# Patient Record
Sex: MALE | Race: WHITE | Employment: OTHER | URBAN - METROPOLITAN AREA
[De-identification: names, ages, dates, MRNs, and addresses within clinical notes are randomized per-mention and may not be internally consistent; named-entity substitution may affect disease eponyms.]

---

## 2020-01-01 ENCOUNTER — ANESTHESIA (OUTPATIENT)
Dept: OPERATING ROOM | Age: 75
End: 2020-01-01
Payer: MEDICARE

## 2020-01-01 ENCOUNTER — HOSPITAL ENCOUNTER (OUTPATIENT)
Age: 75
Setting detail: OUTPATIENT SURGERY
Discharge: HOME | End: 2020-11-03
Attending: OPHTHALMOLOGY | Admitting: OPHTHALMOLOGY
Payer: MEDICARE

## 2020-01-01 ENCOUNTER — HOSPITAL ENCOUNTER (OUTPATIENT)
Age: 75
Discharge: HOME | End: 2020-10-30
Payer: MEDICARE

## 2020-01-01 ENCOUNTER — ANESTHESIA EVENT (OUTPATIENT)
Dept: OPERATING ROOM | Age: 75
End: 2020-01-01
Payer: MEDICARE

## 2020-01-01 VITALS
RESPIRATION RATE: 15 BRPM | DIASTOLIC BLOOD PRESSURE: 70 MMHG | SYSTOLIC BLOOD PRESSURE: 172 MMHG | OXYGEN SATURATION: 98 %

## 2020-01-01 VITALS
WEIGHT: 149 LBS | HEART RATE: 70 BPM | BODY MASS INDEX: 24.83 KG/M2 | DIASTOLIC BLOOD PRESSURE: 72 MMHG | SYSTOLIC BLOOD PRESSURE: 117 MMHG | RESPIRATION RATE: 14 BRPM | HEIGHT: 65 IN | OXYGEN SATURATION: 94 % | TEMPERATURE: 98.6 F

## 2020-01-01 LAB
METER GLUCOSE: 117 MG/DL (ref 74–99)
SARS-COV-2: NOT DETECTED
SOURCE: NORMAL

## 2020-01-01 PROCEDURE — 2580000003 HC RX 258: Performed by: ANESTHESIOLOGY

## 2020-01-01 PROCEDURE — 3700000000 HC ANESTHESIA ATTENDED CARE: Performed by: OPHTHALMOLOGY

## 2020-01-01 PROCEDURE — 82962 GLUCOSE BLOOD TEST: CPT

## 2020-01-01 PROCEDURE — 7100000010 HC PHASE II RECOVERY - FIRST 15 MIN: Performed by: OPHTHALMOLOGY

## 2020-01-01 PROCEDURE — 2500000003 HC RX 250 WO HCPCS: Performed by: NURSE ANESTHETIST, CERTIFIED REGISTERED

## 2020-01-01 PROCEDURE — 6370000000 HC RX 637 (ALT 250 FOR IP): Performed by: OPHTHALMOLOGY

## 2020-01-01 PROCEDURE — 82962 GLUCOSE BLOOD TEST: CPT | Performed by: OPHTHALMOLOGY

## 2020-01-01 PROCEDURE — 6360000002 HC RX W HCPCS: Performed by: NURSE ANESTHETIST, CERTIFIED REGISTERED

## 2020-01-01 PROCEDURE — V2632 POST CHMBR INTRAOCULAR LENS: HCPCS | Performed by: OPHTHALMOLOGY

## 2020-01-01 PROCEDURE — 3600000003 HC SURGERY LEVEL 3 BASE: Performed by: OPHTHALMOLOGY

## 2020-01-01 PROCEDURE — U0003 INFECTIOUS AGENT DETECTION BY NUCLEIC ACID (DNA OR RNA); SEVERE ACUTE RESPIRATORY SYNDROME CORONAVIRUS 2 (SARS-COV-2) (CORONAVIRUS DISEASE [COVID-19]), AMPLIFIED PROBE TECHNIQUE, MAKING USE OF HIGH THROUGHPUT TECHNOLOGIES AS DESCRIBED BY CMS-2020-01-R: HCPCS

## 2020-01-01 PROCEDURE — 7100000011 HC PHASE II RECOVERY - ADDTL 15 MIN: Performed by: OPHTHALMOLOGY

## 2020-01-01 PROCEDURE — 2709999900 HC NON-CHARGEABLE SUPPLY: Performed by: OPHTHALMOLOGY

## 2020-01-01 PROCEDURE — 2500000003 HC RX 250 WO HCPCS: Performed by: OPHTHALMOLOGY

## 2020-01-01 DEVICE — LENS INTOCU +16.0 DIOPT A CONSTANT 118.8 L13MM DIA6MM 0DEG: Type: IMPLANTABLE DEVICE | Site: EYE | Status: FUNCTIONAL

## 2020-01-01 RX ORDER — PROMETHAZINE HYDROCHLORIDE 25 MG/ML
25 INJECTION, SOLUTION INTRAMUSCULAR; INTRAVENOUS
Status: DISCONTINUED | OUTPATIENT
Start: 2020-01-01 | End: 2020-01-01 | Stop reason: HOSPADM

## 2020-01-01 RX ORDER — MIDAZOLAM HYDROCHLORIDE 1 MG/ML
INJECTION INTRAMUSCULAR; INTRAVENOUS PRN
Status: DISCONTINUED | OUTPATIENT
Start: 2020-01-01 | End: 2020-01-01 | Stop reason: SDUPTHER

## 2020-01-01 RX ORDER — DIPHENHYDRAMINE HYDROCHLORIDE 50 MG/ML
12.5 INJECTION INTRAMUSCULAR; INTRAVENOUS
Status: DISCONTINUED | OUTPATIENT
Start: 2020-01-01 | End: 2020-01-01 | Stop reason: HOSPADM

## 2020-01-01 RX ORDER — HYDRALAZINE HYDROCHLORIDE 20 MG/ML
5 INJECTION INTRAMUSCULAR; INTRAVENOUS EVERY 10 MIN PRN
Status: DISCONTINUED | OUTPATIENT
Start: 2020-01-01 | End: 2020-01-01 | Stop reason: HOSPADM

## 2020-01-01 RX ORDER — MORPHINE SULFATE 2 MG/ML
1 INJECTION, SOLUTION INTRAMUSCULAR; INTRAVENOUS EVERY 5 MIN PRN
Status: DISCONTINUED | OUTPATIENT
Start: 2020-01-01 | End: 2020-01-01 | Stop reason: HOSPADM

## 2020-01-01 RX ORDER — FENTANYL CITRATE 50 UG/ML
50 INJECTION, SOLUTION INTRAMUSCULAR; INTRAVENOUS EVERY 5 MIN PRN
Status: DISCONTINUED | OUTPATIENT
Start: 2020-01-01 | End: 2020-01-01 | Stop reason: HOSPADM

## 2020-01-01 RX ORDER — HYDROCODONE BITARTRATE AND ACETAMINOPHEN 5; 325 MG/1; MG/1
1 TABLET ORAL PRN
Status: DISCONTINUED | OUTPATIENT
Start: 2020-01-01 | End: 2020-01-01 | Stop reason: HOSPADM

## 2020-01-01 RX ORDER — LABETALOL HYDROCHLORIDE 5 MG/ML
5 INJECTION, SOLUTION INTRAVENOUS EVERY 10 MIN PRN
Status: DISCONTINUED | OUTPATIENT
Start: 2020-01-01 | End: 2020-01-01 | Stop reason: HOSPADM

## 2020-01-01 RX ORDER — TETRACAINE HYDROCHLORIDE 5 MG/ML
1 SOLUTION OPHTHALMIC ONCE
Status: COMPLETED | OUTPATIENT
Start: 2020-01-01 | End: 2020-01-01

## 2020-01-01 RX ORDER — TETRACAINE HYDROCHLORIDE 5 MG/ML
SOLUTION OPHTHALMIC PRN
Status: DISCONTINUED | OUTPATIENT
Start: 2020-01-01 | End: 2020-01-01 | Stop reason: ALTCHOICE

## 2020-01-01 RX ORDER — MEPERIDINE HYDROCHLORIDE 25 MG/ML
12.5 INJECTION INTRAMUSCULAR; INTRAVENOUS; SUBCUTANEOUS EVERY 5 MIN PRN
Status: DISCONTINUED | OUTPATIENT
Start: 2020-01-01 | End: 2020-01-01 | Stop reason: HOSPADM

## 2020-01-01 RX ORDER — CYCLOPENTOLATE HYDROCHLORIDE 10 MG/ML
1 SOLUTION/ DROPS OPHTHALMIC
Status: COMPLETED | OUTPATIENT
Start: 2020-01-01 | End: 2020-01-01

## 2020-01-01 RX ORDER — HYDROCODONE BITARTRATE AND ACETAMINOPHEN 5; 325 MG/1; MG/1
2 TABLET ORAL PRN
Status: DISCONTINUED | OUTPATIENT
Start: 2020-01-01 | End: 2020-01-01 | Stop reason: HOSPADM

## 2020-01-01 RX ORDER — BALANCED SALT SOLUTION 6.4; .75; .48; .3; 3.9; 1.7 MG/ML; MG/ML; MG/ML; MG/ML; MG/ML; MG/ML
SOLUTION OPHTHALMIC PRN
Status: DISCONTINUED | OUTPATIENT
Start: 2020-01-01 | End: 2020-01-01 | Stop reason: ALTCHOICE

## 2020-01-01 RX ORDER — FLURBIPROFEN SODIUM 0.3 MG/ML
1 SOLUTION/ DROPS OPHTHALMIC
Status: COMPLETED | OUTPATIENT
Start: 2020-01-01 | End: 2020-01-01

## 2020-01-01 RX ORDER — SODIUM CHLORIDE, SODIUM LACTATE, POTASSIUM CHLORIDE, CALCIUM CHLORIDE 600; 310; 30; 20 MG/100ML; MG/100ML; MG/100ML; MG/100ML
INJECTION, SOLUTION INTRAVENOUS CONTINUOUS
Status: DISCONTINUED | OUTPATIENT
Start: 2020-01-01 | End: 2020-01-01 | Stop reason: HOSPADM

## 2020-01-01 RX ORDER — PHENYLEPHRINE HCL 2.5 %
1 DROPS OPHTHALMIC (EYE)
Status: COMPLETED | OUTPATIENT
Start: 2020-01-01 | End: 2020-01-01

## 2020-01-01 RX ORDER — PHENYLEPHRINE HYDROCHLORIDE 100 MG/ML
1 SOLUTION/ DROPS OPHTHALMIC PRN
Status: DISCONTINUED | OUTPATIENT
Start: 2020-01-01 | End: 2020-01-01 | Stop reason: HOSPADM

## 2020-01-01 RX ORDER — ALFENTANIL HYDROCHLORIDE 500 UG/ML
INJECTION INTRAVENOUS PRN
Status: DISCONTINUED | OUTPATIENT
Start: 2020-01-01 | End: 2020-01-01 | Stop reason: SDUPTHER

## 2020-01-01 RX ADMIN — FLURBIPROFEN SODIUM 1 DROP: 0.3 SOLUTION/ DROPS OPHTHALMIC at 08:25

## 2020-01-01 RX ADMIN — CYCLOPENTOLATE HYDROCHLORIDE 1 DROP: 10 SOLUTION/ DROPS OPHTHALMIC at 08:15

## 2020-01-01 RX ADMIN — CYCLOPENTOLATE HYDROCHLORIDE 1 DROP: 10 SOLUTION/ DROPS OPHTHALMIC at 08:25

## 2020-01-01 RX ADMIN — SODIUM CHLORIDE, POTASSIUM CHLORIDE, SODIUM LACTATE AND CALCIUM CHLORIDE: 600; 310; 30; 20 INJECTION, SOLUTION INTRAVENOUS at 08:33

## 2020-01-01 RX ADMIN — FLURBIPROFEN SODIUM 1 DROP: 0.3 SOLUTION/ DROPS OPHTHALMIC at 08:20

## 2020-01-01 RX ADMIN — TETRACAINE HYDROCHLORIDE 1 DROP: 25 LIQUID OPHTHALMIC at 08:34

## 2020-01-01 RX ADMIN — MIDAZOLAM 1 MG: 1 INJECTION INTRAMUSCULAR; INTRAVENOUS at 09:04

## 2020-01-01 RX ADMIN — PHENYLEPHRINE HYDROCHLORIDE 1 DROP: 25 SOLUTION/ DROPS OPHTHALMIC at 08:15

## 2020-01-01 RX ADMIN — PHENYLEPHRINE HYDROCHLORIDE 1 DROP: 25 SOLUTION/ DROPS OPHTHALMIC at 08:20

## 2020-01-01 RX ADMIN — FLURBIPROFEN SODIUM 1 DROP: 0.3 SOLUTION/ DROPS OPHTHALMIC at 08:15

## 2020-01-01 RX ADMIN — MIDAZOLAM 1 MG: 1 INJECTION INTRAMUSCULAR; INTRAVENOUS at 09:06

## 2020-01-01 RX ADMIN — CYCLOPENTOLATE HYDROCHLORIDE 1 DROP: 10 SOLUTION/ DROPS OPHTHALMIC at 08:20

## 2020-01-01 RX ADMIN — ALFENTANIL HYDROCHLORIDE 250 MCG: 500 INJECTION INTRAVENOUS at 09:06

## 2020-01-01 RX ADMIN — PHENYLEPHRINE HYDROCHLORIDE 1 DROP: 25 SOLUTION/ DROPS OPHTHALMIC at 08:25

## 2020-01-01 ASSESSMENT — PAIN SCALES - GENERAL
PAINLEVEL_OUTOF10: 0

## 2020-01-01 ASSESSMENT — PAIN - FUNCTIONAL ASSESSMENT: PAIN_FUNCTIONAL_ASSESSMENT: 0-10

## 2020-01-01 ASSESSMENT — LIFESTYLE VARIABLES: SMOKING_STATUS: 0

## 2020-01-31 ENCOUNTER — APPOINTMENT (OUTPATIENT)
Dept: GENERAL RADIOLOGY | Age: 75
End: 2020-01-31
Payer: MEDICARE

## 2020-01-31 ENCOUNTER — HOSPITAL ENCOUNTER (EMERGENCY)
Age: 75
Discharge: HOME | End: 2020-01-31
Attending: EMERGENCY MEDICINE
Payer: MEDICARE

## 2020-01-31 ENCOUNTER — APPOINTMENT (OUTPATIENT)
Dept: CT IMAGING | Age: 75
End: 2020-01-31
Payer: MEDICARE

## 2020-01-31 VITALS
OXYGEN SATURATION: 93 % | DIASTOLIC BLOOD PRESSURE: 83 MMHG | TEMPERATURE: 97.5 F | WEIGHT: 150 LBS | HEIGHT: 64 IN | HEART RATE: 81 BPM | RESPIRATION RATE: 19 BRPM | BODY MASS INDEX: 25.61 KG/M2 | SYSTOLIC BLOOD PRESSURE: 209 MMHG

## 2020-01-31 LAB
ALBUMIN SERPL-MCNC: 4.5 G/DL (ref 3.5–5.2)
ALP BLD-CCNC: 70 U/L (ref 40–129)
ALT SERPL-CCNC: 41 U/L (ref 0–40)
ANION GAP SERPL CALCULATED.3IONS-SCNC: 15 MMOL/L (ref 7–16)
ANION GAP SERPL CALCULATED.3IONS-SCNC: 17 MMOL/L (ref 7–16)
ANION GAP SERPL CALCULATED.3IONS-SCNC: 17 MMOL/L (ref 7–16)
AST SERPL-CCNC: 49 U/L (ref 0–39)
BACTERIA: ABNORMAL /HPF
BASOPHILS ABSOLUTE: 0.07 E9/L (ref 0–0.2)
BASOPHILS RELATIVE PERCENT: 0.6 % (ref 0–2)
BETA-HYDROXYBUTYRATE: 0.33 MMOL/L (ref 0.02–0.27)
BILIRUB SERPL-MCNC: 0.5 MG/DL (ref 0–1.2)
BILIRUBIN URINE: ABNORMAL
BLOOD, URINE: NEGATIVE
BUN BLDV-MCNC: 28 MG/DL (ref 8–23)
BUN BLDV-MCNC: 28 MG/DL (ref 8–23)
BUN BLDV-MCNC: 33 MG/DL (ref 8–23)
CALCIUM SERPL-MCNC: 9.3 MG/DL (ref 8.6–10.2)
CALCIUM SERPL-MCNC: 9.3 MG/DL (ref 8.6–10.2)
CALCIUM SERPL-MCNC: 9.8 MG/DL (ref 8.6–10.2)
CHLORIDE BLD-SCNC: 98 MMOL/L (ref 98–107)
CHLORIDE BLD-SCNC: 99 MMOL/L (ref 98–107)
CHLORIDE BLD-SCNC: 99 MMOL/L (ref 98–107)
CHP ED QC CHECK: YES
CLARITY: CLEAR
CO2: 22 MMOL/L (ref 22–29)
CO2: 23 MMOL/L (ref 22–29)
CO2: 23 MMOL/L (ref 22–29)
COLOR: YELLOW
CREAT SERPL-MCNC: 1.6 MG/DL (ref 0.7–1.2)
CREAT SERPL-MCNC: 1.6 MG/DL (ref 0.7–1.2)
CREAT SERPL-MCNC: 1.7 MG/DL (ref 0.7–1.2)
EOSINOPHILS ABSOLUTE: 0.29 E9/L (ref 0.05–0.5)
EOSINOPHILS RELATIVE PERCENT: 2.3 % (ref 0–6)
GFR AFRICAN AMERICAN: 48
GFR AFRICAN AMERICAN: 51
GFR AFRICAN AMERICAN: 51
GFR NON-AFRICAN AMERICAN: 40 ML/MIN/1.73
GFR NON-AFRICAN AMERICAN: 42 ML/MIN/1.73
GFR NON-AFRICAN AMERICAN: 42 ML/MIN/1.73
GLUCOSE BLD-MCNC: 265 MG/DL (ref 74–99)
GLUCOSE BLD-MCNC: 266 MG/DL (ref 74–99)
GLUCOSE BLD-MCNC: 267 MG/DL
GLUCOSE BLD-MCNC: 286 MG/DL (ref 74–99)
GLUCOSE URINE: 250 MG/DL
HCT VFR BLD CALC: 39.5 % (ref 37–54)
HEMOGLOBIN: 13.3 G/DL (ref 12.5–16.5)
IMMATURE GRANULOCYTES #: 0.1 E9/L
IMMATURE GRANULOCYTES %: 0.8 % (ref 0–5)
KETONES, URINE: NEGATIVE MG/DL
LEUKOCYTE ESTERASE, URINE: NEGATIVE
LYMPHOCYTES ABSOLUTE: 2.03 E9/L (ref 1.5–4)
LYMPHOCYTES RELATIVE PERCENT: 16 % (ref 20–42)
MAGNESIUM: 1.9 MG/DL (ref 1.6–2.6)
MCH RBC QN AUTO: 28.4 PG (ref 26–35)
MCHC RBC AUTO-ENTMCNC: 33.7 % (ref 32–34.5)
MCV RBC AUTO: 84.4 FL (ref 80–99.9)
METER GLUCOSE: 267 MG/DL (ref 74–99)
MONOCYTES ABSOLUTE: 0.86 E9/L (ref 0.1–0.95)
MONOCYTES RELATIVE PERCENT: 6.8 % (ref 2–12)
NEUTROPHILS ABSOLUTE: 9.35 E9/L (ref 1.8–7.3)
NEUTROPHILS RELATIVE PERCENT: 73.5 % (ref 43–80)
NITRITE, URINE: NEGATIVE
OSMOLALITY: 308 MOSM/KG (ref 285–310)
PDW BLD-RTO: 14.9 FL (ref 11.5–15)
PH UA: 5.5 (ref 5–9)
PH VENOUS: 7.4 (ref 7.35–7.45)
PLATELET # BLD: 333 E9/L (ref 130–450)
PMV BLD AUTO: 10.2 FL (ref 7–12)
POTASSIUM REFLEX MAGNESIUM: 3.5 MMOL/L (ref 3.5–5)
POTASSIUM REFLEX MAGNESIUM: 5 MMOL/L (ref 3.5–5)
POTASSIUM SERPL-SCNC: 3.6 MMOL/L (ref 3.5–5)
PRO-BNP: 79 PG/ML (ref 0–450)
PROTEIN UA: 30 MG/DL
RBC # BLD: 4.68 E12/L (ref 3.8–5.8)
RBC UA: ABNORMAL /HPF (ref 0–2)
REASON FOR REJECTION: NORMAL
REJECTED TEST: NORMAL
SODIUM BLD-SCNC: 137 MMOL/L (ref 132–146)
SODIUM BLD-SCNC: 138 MMOL/L (ref 132–146)
SODIUM BLD-SCNC: 138 MMOL/L (ref 132–146)
SPECIFIC GRAVITY UA: 1.02 (ref 1–1.03)
TOTAL PROTEIN: 7.5 G/DL (ref 6.4–8.3)
TROPONIN: <0.01 NG/ML (ref 0–0.03)
UROBILINOGEN, URINE: 0.2 E.U./DL
WBC # BLD: 12.7 E9/L (ref 4.5–11.5)
WBC UA: ABNORMAL /HPF (ref 0–5)

## 2020-01-31 PROCEDURE — 70450 CT HEAD/BRAIN W/O DYE: CPT

## 2020-01-31 PROCEDURE — 82800 BLOOD PH: CPT

## 2020-01-31 PROCEDURE — 81001 URINALYSIS AUTO W/SCOPE: CPT

## 2020-01-31 PROCEDURE — 99285 EMERGENCY DEPT VISIT HI MDM: CPT

## 2020-01-31 PROCEDURE — 94760 N-INVAS EAR/PLS OXIMETRY 1: CPT

## 2020-01-31 PROCEDURE — 2580000003 HC RX 258: Performed by: STUDENT IN AN ORGANIZED HEALTH CARE EDUCATION/TRAINING PROGRAM

## 2020-01-31 PROCEDURE — 93005 ELECTROCARDIOGRAM TRACING: CPT | Performed by: STUDENT IN AN ORGANIZED HEALTH CARE EDUCATION/TRAINING PROGRAM

## 2020-01-31 PROCEDURE — 83735 ASSAY OF MAGNESIUM: CPT

## 2020-01-31 PROCEDURE — 83880 ASSAY OF NATRIURETIC PEPTIDE: CPT

## 2020-01-31 PROCEDURE — 84484 ASSAY OF TROPONIN QUANT: CPT

## 2020-01-31 PROCEDURE — 6370000000 HC RX 637 (ALT 250 FOR IP): Performed by: STUDENT IN AN ORGANIZED HEALTH CARE EDUCATION/TRAINING PROGRAM

## 2020-01-31 PROCEDURE — 2580000003 HC RX 258: Performed by: EMERGENCY MEDICINE

## 2020-01-31 PROCEDURE — 96360 HYDRATION IV INFUSION INIT: CPT

## 2020-01-31 PROCEDURE — 83930 ASSAY OF BLOOD OSMOLALITY: CPT

## 2020-01-31 PROCEDURE — 85025 COMPLETE CBC W/AUTO DIFF WBC: CPT

## 2020-01-31 PROCEDURE — 71045 X-RAY EXAM CHEST 1 VIEW: CPT

## 2020-01-31 PROCEDURE — 82010 KETONE BODYS QUAN: CPT

## 2020-01-31 PROCEDURE — 80048 BASIC METABOLIC PNL TOTAL CA: CPT

## 2020-01-31 PROCEDURE — 80053 COMPREHEN METABOLIC PANEL: CPT

## 2020-01-31 PROCEDURE — 36415 COLL VENOUS BLD VENIPUNCTURE: CPT

## 2020-01-31 PROCEDURE — 96361 HYDRATE IV INFUSION ADD-ON: CPT

## 2020-01-31 PROCEDURE — 82962 GLUCOSE BLOOD TEST: CPT

## 2020-01-31 RX ORDER — GLIPIZIDE 5 MG/1
10 TABLET ORAL 2 TIMES DAILY
Status: ON HOLD | COMMUNITY
End: 2021-01-01 | Stop reason: HOSPADM

## 2020-01-31 RX ORDER — HYDROCHLOROTHIAZIDE 25 MG/1
25 TABLET ORAL DAILY
COMMUNITY
End: 2020-01-01 | Stop reason: ALTCHOICE

## 2020-01-31 RX ORDER — LAMOTRIGINE 100 MG/1
200 TABLET ORAL 2 TIMES DAILY
Status: ON HOLD | COMMUNITY
End: 2021-01-01 | Stop reason: HOSPADM

## 2020-01-31 RX ORDER — 0.9 % SODIUM CHLORIDE 0.9 %
1000 INTRAVENOUS SOLUTION INTRAVENOUS ONCE
Status: DISCONTINUED | OUTPATIENT
Start: 2020-01-31 | End: 2020-01-31

## 2020-01-31 RX ORDER — TAMSULOSIN HYDROCHLORIDE 0.4 MG/1
0.4 CAPSULE ORAL NIGHTLY
Status: ON HOLD | COMMUNITY
End: 2021-01-01 | Stop reason: HOSPADM

## 2020-01-31 RX ORDER — HYDRALAZINE HYDROCHLORIDE 50 MG/1
25 TABLET, FILM COATED ORAL 3 TIMES DAILY
Status: ON HOLD | COMMUNITY
End: 2021-01-01 | Stop reason: HOSPADM

## 2020-01-31 RX ORDER — ALBUTEROL SULFATE 90 UG/1
2 AEROSOL, METERED RESPIRATORY (INHALATION) EVERY 6 HOURS PRN
Status: ON HOLD | COMMUNITY
End: 2021-01-01 | Stop reason: HOSPADM

## 2020-01-31 RX ORDER — POTASSIUM CHLORIDE 20 MEQ/1
20 TABLET, EXTENDED RELEASE ORAL 2 TIMES DAILY
Status: ON HOLD | COMMUNITY
End: 2021-01-01 | Stop reason: HOSPADM

## 2020-01-31 RX ORDER — GABAPENTIN 300 MG/1
300 CAPSULE ORAL 2 TIMES DAILY
Status: ON HOLD | COMMUNITY
End: 2021-01-01 | Stop reason: HOSPADM

## 2020-01-31 RX ORDER — METOPROLOL SUCCINATE 50 MG/1
50 TABLET, EXTENDED RELEASE ORAL 2 TIMES DAILY
Status: ON HOLD | COMMUNITY
End: 2021-01-01 | Stop reason: HOSPADM

## 2020-01-31 RX ORDER — METOPROLOL SUCCINATE 25 MG/1
50 TABLET, EXTENDED RELEASE ORAL ONCE
Status: COMPLETED | OUTPATIENT
Start: 2020-01-31 | End: 2020-01-31

## 2020-01-31 RX ORDER — HYDRALAZINE HYDROCHLORIDE 25 MG/1
50 TABLET, FILM COATED ORAL ONCE
Status: COMPLETED | OUTPATIENT
Start: 2020-01-31 | End: 2020-01-31

## 2020-01-31 RX ORDER — 0.9 % SODIUM CHLORIDE 0.9 %
1000 INTRAVENOUS SOLUTION INTRAVENOUS ONCE
Status: COMPLETED | OUTPATIENT
Start: 2020-01-31 | End: 2020-01-31

## 2020-01-31 RX ADMIN — SODIUM CHLORIDE 1000 ML: 9 INJECTION, SOLUTION INTRAVENOUS at 12:13

## 2020-01-31 RX ADMIN — HYDRALAZINE HYDROCHLORIDE 50 MG: 25 TABLET, FILM COATED ORAL at 15:26

## 2020-01-31 RX ADMIN — METOPROLOL SUCCINATE 50 MG: 25 TABLET, EXTENDED RELEASE ORAL at 15:25

## 2020-01-31 RX ADMIN — SODIUM CHLORIDE 1000 ML: 9 INJECTION, SOLUTION INTRAVENOUS at 10:30

## 2020-01-31 ASSESSMENT — ENCOUNTER SYMPTOMS
CONSTIPATION: 0
NAUSEA: 0
RHINORRHEA: 0
ABDOMINAL PAIN: 0
COUGH: 0
EYE REDNESS: 0
PHOTOPHOBIA: 0
WHEEZING: 0
APNEA: 0
TROUBLE SWALLOWING: 0
SORE THROAT: 0
CHEST TIGHTNESS: 0
EYE PAIN: 0
SHORTNESS OF BREATH: 0
BACK PAIN: 0
DIARRHEA: 0
VISUAL CHANGE: 0
VOMITING: 0

## 2020-01-31 ASSESSMENT — PAIN DESCRIPTION - LOCATION: LOCATION: WRIST

## 2020-01-31 ASSESSMENT — PAIN DESCRIPTION - DESCRIPTORS: DESCRIPTORS: ACHING

## 2020-01-31 ASSESSMENT — PAIN DESCRIPTION - ORIENTATION: ORIENTATION: RIGHT

## 2020-01-31 ASSESSMENT — PAIN SCALES - GENERAL: PAINLEVEL_OUTOF10: 1

## 2020-01-31 ASSESSMENT — PAIN DESCRIPTION - PAIN TYPE: TYPE: ACUTE PAIN

## 2020-01-31 ASSESSMENT — PAIN DESCRIPTION - FREQUENCY: FREQUENCY: INTERMITTENT

## 2020-01-31 NOTE — ED PROVIDER NOTES
[address]      Pt Name: Alison Johnson  MRN: [id number]  [place] 1945  Date of evaluation: 1/31/2020      CHIEF COMPLAINT       Chief Complaint   Patient presents with    Fall     fell this am, caught himself with right wrist. denies hitting his head or any loc    Hyperglycemia     blood sugar at home 333          Fall   The accident occurred less than 1 hour ago. The fall occurred while walking. The pain is at a severity of 0/10. The patient is experiencing no pain. He was ambulatory at the scene. There was no drug use involved in the accident. Pertinent negatives include no visual change, no fever, no numbness, no abdominal pain, no nausea and no vomiting. Alison Johnson is a 76 y.o. male with history of previous CVA with residual gait issues, hyperlipidemia, type 2 diabetes, hypertension, who presents to the emergency department after a fall this morning as well as with hyperglycemia. The patient states that this morning he was walking into the kitchen when he bent over to drink some water and he fell onto his right side. He is unsure if he lost consciousness. Denies any precipitating symptoms. He was able to get up off of the ground and ambulate after the fall. He then checked his blood sugar and found it to be \"390\". His wife was concerned that with his fall and high blood sugar he may be having another stroke and therefore called 911 to bring him to the emergency department. Patient denies any headache, vision changes, chest pain, shortness of breath, back pain. Denies any injuries from the fall. He states that his gait seems to be about normal for him given his history of CVA. Denies any focal weakness, tingling, difficulty speaking. He is not on any blood thinners. States he has not yet taken his medications this morning.     Except as noted above the remainder of the review of systems was reviewed and negative. Review of Systems   Constitutional: Negative for activity change, chills, diaphoresis, fatigue and fever. HENT: Negative for rhinorrhea, sore throat and trouble swallowing. Eyes: Negative for photophobia, pain and redness. Respiratory: Negative for apnea, cough, chest tightness, shortness of breath and wheezing. Cardiovascular: Negative for chest pain, palpitations and leg swelling. Gastrointestinal: Negative for abdominal pain, constipation, diarrhea, nausea and vomiting. Endocrine: Negative for polyuria. Reports hyperglycemia   Genitourinary: Negative for difficulty urinating and dysuria. Musculoskeletal: Negative for back pain, neck pain and neck stiffness. Skin: Negative for pallor and rash. Neurological: Negative for dizziness, syncope, weakness, light-headedness and numbness. Reports fall   Psychiatric/Behavioral: Negative for confusion. The patient is not nervous/anxious. Physical Exam  Vitals signs and nursing note reviewed. Constitutional:       General: He is not in acute distress. Appearance: He is well-developed. Comments: Awake and alert. In no obvious distress. HENT:      Head: Normocephalic and atraumatic. Mouth/Throat:      Pharynx: No oropharyngeal exudate. Eyes:      General: No scleral icterus. Pupils: Pupils are equal, round, and reactive to light. Neck:      Musculoskeletal: Normal range of motion and neck supple. Cardiovascular:      Rate and Rhythm: Normal rate and regular rhythm. Heart sounds: Normal heart sounds. No murmur. Comments: 2+ radial and dorsal pedis pulses bilaterally. Pulmonary:      Effort: Pulmonary effort is normal. No respiratory distress. Breath sounds: Normal breath sounds. No wheezing. Abdominal:      General: There is no distension. Palpations: Abdomen is soft. Tenderness: There is no abdominal tenderness. There is no guarding or rebound.    Musculoskeletal: Normal range of motion. General: No tenderness or deformity. Right lower leg: No edema. Left lower leg: No edema. Skin:     General: Skin is warm and dry. Capillary Refill: Capillary refill takes less than 2 seconds. Findings: No rash. Neurological:      Mental Status: He is alert and oriented to person, place, and time. Cranial Nerves: No cranial nerve deficit. Sensory: No sensory deficit. Motor: No weakness or abnormal muscle tone. Gait: Gait normal.      Comments: Able to ambulate without difficulty in the emergency department. No pronator drift. Normal finger-nose testing. No aphasia or dysarthria. Psychiatric:         Mood and Affect: Mood normal.         Behavior: Behavior normal.          Procedures     MDM   This is a 72-year-old male with a history of diabetes, hypertension, previous CVA with mild gait abnormality as sequela, who presents to the emergency department after a fall at home and with concerns of hyperglycemia. In the emerge department patient is awake and alert. Denies any pain. In no obvious distress. Hemodynamically stable. CT the head without contrast showed no evidence of any CVA or injury from the fall. Neurologic symmetry no focal findings. metabolic panel showed creatinine of 1.7. Most recent for comparison was from 5 years ago and is 1.0. Likely related to dehydration. Administered 2 L normal saline IV and repeat creatinine mildly improved at 1.6. CBC showed the patient is not anemic. Chest x-ray showed no evidence of pneumonia. Troponin was negative, EKG showed no ischemic changes and patient is not complaining of any chest pain making ACS very unlikely. Hydroxybutyrate 0.33 pH was 7.4 patient is not in DKA. He did ambulate that difficulty in the emergency department. Patient was able to eat and drink well in the emerge department without difficulty.   Given his change in creatinine from previous 5 years ago ordered UA 2-5 0 - 5 /HPF    RBC, UA 1-3 0 - 2 /HPF    Bacteria, UA RARE (A) /HPF   SPECIMEN REJECTION   Result Value Ref Range    Rejected Test bmp     Reason for Rejection see below    Basic Metabolic Panel w/ Reflex to MG   Result Value Ref Range    Sodium 137 132 - 146 mmol/L    Potassium reflex Magnesium 3.5 3.5 - 5.0 mmol/L    Chloride 99 98 - 107 mmol/L    CO2 23 22 - 29 mmol/L    Anion Gap 15 7 - 16 mmol/L    Glucose 265 (H) 74 - 99 mg/dL    BUN 28 (H) 8 - 23 mg/dL    CREATININE 1.6 (H) 0.7 - 1.2 mg/dL    GFR Non-African American 42 >=60 mL/min/1.73    GFR African American 51     Calcium 9.3 8.6 - 10.2 mg/dL   Basic Metabolic Panel   Result Value Ref Range    Sodium 138 132 - 146 mmol/L    Potassium 3.6 3.5 - 5.0 mmol/L    Chloride 99 98 - 107 mmol/L    CO2 22 22 - 29 mmol/L    Anion Gap 17 (H) 7 - 16 mmol/L    Glucose 266 (H) 74 - 99 mg/dL    BUN 28 (H) 8 - 23 mg/dL    CREATININE 1.6 (H) 0.7 - 1.2 mg/dL    GFR Non-African American 42 >=60 mL/min/1.73    GFR African American 51     Calcium 9.3 8.6 - 10.2 mg/dL   POCT Glucose   Result Value Ref Range    Glucose 267 mg/dL    QC OK? YES    POCT Glucose   Result Value Ref Range    Meter Glucose 267 (H) 74 - 99 mg/dL   EKG 12 Lead   Result Value Ref Range    Ventricular Rate 72 BPM    Atrial Rate 72 BPM    P-R Interval 152 ms    QRS Duration 106 ms    Q-T Interval 452 ms    QTc Calculation (Bazett) 494 ms    P Axis 56 degrees    R Axis 49 degrees    T Axis 133 degrees       Radiology:  CT Head WO Contrast   Final Result   1. There is no acute intracranial abnormality. 2. Significant Atrophy and periventricular leukomalacia. XR CHEST PORTABLE   Final Result   No acute cardiopulmonary disease. EKG:  This EKG is signed and interpreted by me. Rate: 72bpm  Rhythm: Sinus  Interpretation: no acute changes and non-specific EKG. no ST segment elevation or depression. QTC of 494 ms.   Comparison: no previous EKG     ------------------------- NURSING NOTES AND VITALS REVIEWED ---------------------------  Date / Time Roomed:  1/31/2020  9:10 AM  ED Bed Assignment:  SHAW/SHAW    The nursing notes within the ED encounter and vital signs as below have been reviewed. BP (!) 209/83 Comment:  MADE AWARE. PT DID NOT TAKE HIS MEDS THIS AM  Pulse 81   Temp 97.5 °F (36.4 °C) (Oral)   Resp 19   Ht 5' 4\" (1.626 m)   Wt 150 lb (68 kg)   SpO2 93%   BMI 25.75 kg/m²   Oxygen Saturation Interpretation: Normal      ------------------------------------------ PROGRESS NOTES ------------------------------------------  3:43 PM  I have spoken with the patient and discussed todays results, in addition to providing specific details for the plan of care and counseling regarding the diagnosis and prognosis. Their questions are answered at this time and they are agreeable with the plan. I discussed at length with them reasons for immediate return here for re evaluation. They will followup with their primary care physician by calling their office tomorrow. --------------------------------- ADDITIONAL PROVIDER NOTES ---------------------------------  At this time the patient is without objective evidence of an acute process requiring hospitalization or inpatient management. They have remained hemodynamically stable throughout their entire ED visit and are stable for discharge with outpatient follow-up. The plan has been discussed in detail and they are aware of the specific conditions for emergent return, as well as the importance of follow-up. Discharge Medication List as of 1/31/2020  3:12 PM          Diagnosis:  1. Dehydration    2. Renal insufficiency    3. Fall, initial encounter    4. Hyperglycemia        Disposition:  Patient's disposition: Discharge to home  Patient's condition is stable.        Paulina Adams DO  Resident  01/31/20 [id number]

## 2020-02-02 LAB
EKG ATRIAL RATE: 72 BPM
EKG P AXIS: 56 DEGREES
EKG P-R INTERVAL: 152 MS
EKG Q-T INTERVAL: 452 MS
EKG QRS DURATION: 106 MS
EKG QTC CALCULATION (BAZETT): 494 MS
EKG R AXIS: 49 DEGREES
EKG T AXIS: 133 DEGREES
EKG VENTRICULAR RATE: 72 BPM

## 2020-10-29 NOTE — H&P
History and Physical    Patient's Name/Date of Birth: Tammy Tabares / 1945 (11 y.o.)    Date: October 29, 2020     Chief Complaint: Decreased vision of the left eye    HPI: Mature left cataract with decreased vision. Risks and complications as well as options and benefits were discussed with the patient and he has elected to proceed with left cataract extraction with intra ocular lens implant. Past Medical History:   Diagnosis Date    Atrial fibrillation ([address]     2018 pt states has a heart monitor, implantable    Carotid artery stenosis     History of cardiovascular stress test 11/13/13    lexiscan    Hyperlipidemia     Hypertension     Non-insulin dependent type 2 diabetes mellitus ([address]     Unspecified cerebral artery occlusion with cerebral infarction nov 11 2013    right face numb balance problems vision fades in and out  walks with walker       Past Surgical History:   Procedure Laterality Date    APPENDECTOMY      CAROTID ENDARTERECTOMY Left Dec 2013    Left Carotid Endarterectomy    ECHO COMPL W DOP COLOR FLOW  11/12/2013         TONSILLECTOMY         Prior to Admission medications    Medication Sig Start Date End Date Taking?  Authorizing Provider   apixaban (ELIQUIS) 5 MG TABS tablet Take by mouth 2 times daily   Yes Historical Provider, MD   hydrALAZINE (APRESOLINE) 50 MG tablet Take 25 mg by mouth 3 times daily     Historical Provider, MD   lamoTRIgine (LAMICTAL) 100 MG tablet Take 200 mg by mouth 2 times daily    Historical Provider, MD   metoprolol succinate (TOPROL XL) 50 MG extended release tablet Take 50 mg by mouth 2 times daily    Historical Provider, MD   potassium chloride (KLOR-CON M) 20 MEQ extended release tablet Take 20 mEq by mouth 2 times daily     Historical Provider, MD   glipiZIDE (GLUCOTROL) 5 MG tablet Take 10 mg by mouth 2 times daily    Historical Provider, MD   tamsulosin (FLOMAX) 0.4 MG capsule Take 0.4 mg by mouth nightly    Historical Provider, MD   gabapentin (NEURONTIN) 300 MG capsule Take 300 mg by mouth 2 times daily. Historical Provider, MD   albuterol sulfate  (90 Base) MCG/ACT inhaler Inhale 2 puffs into the lungs every 6 hours as needed for Wheezing or Shortness of Breath    Historical Provider, MD   influenza virus trivalent vaccine (FLUZONE) injection Inject 0.5 mLs into the muscle once Given 10/2019    Historical Provider, MD   HYDROcodone-acetaminophen (NORCO) 5-325 MG per tablet Take 1 tablet by mouth every 6 hours as needed for Pain    Historical Provider, MD   cloNIDine (CATAPRES) 0.1 MG tablet Take 0.2 mg by mouth 2 times daily     Historical Provider, MD   metFORMIN (GLUCOPHAGE) 500 MG tablet Take 1,000 mg by mouth 2 times daily     Historical Provider, MD   spironolactone (ALDACTONE) 25 MG tablet Take 1 tablet by mouth 2 times daily. Patient taking differently: Take 25 mg by mouth daily  11/18/13   Judge Loren DO, FACOI   lisinopril (PRINIVIL;ZESTRIL) 20 MG tablet Take 20 mg by mouth every 12 hours     Historical Provider, MD   amLODIPine (NORVASC) 10 MG tablet Take 10 mg by mouth daily. Historical Provider, MD   Multiple Vitamins-Minerals (THERAPEUTIC MULTIVITAMIN-MINERALS) tablet Take 1 tablet by mouth daily. Historical Provider, MD   aspirin 81 MG tablet Take 81 mg by mouth daily. Historical Provider, MD       Patient has no known allergies.     Family History   Adopted: Yes       Social History     Socioeconomic History    Marital status:      Spouse name: Not on file    Number of children: Not on file    Years of education: Not on file    Highest education level: Not on file   Occupational History    Not on file   Social Needs    Financial resource strain: Not on file    Food insecurity     Worry: Not on file     Inability: Not on file    Transportation needs     Medical: Not on file     Non-medical: Not on file   Tobacco Use    Smoking status: Former Smoker     Packs/day: 1.00     Types: Cigarettes     Last attempt to quit: 2019     Years since quittin.3    Smokeless tobacco: Never Used   Substance and Sexual Activity    Alcohol use: Yes     Alcohol/week: 1.0 standard drinks     Types: 1 Cans of beer per week     Comment: occ    Drug use: No    Sexual activity: Not on file   Lifestyle    Physical activity     Days per week: Not on file     Minutes per session: Not on file    Stress: Not on file   Relationships    Social connections     Talks on phone: Not on file     Gets together: Not on file     Attends Gnosticist service: Not on file     Active member of club or organization: Not on file     Attends meetings of clubs or organizations: Not on file     Relationship status: Not on file    Intimate partner violence     Fear of current or ex partner: Not on file     Emotionally abused: Not on file     Physically abused: Not on file     Forced sexual activity: Not on file   Other Topics Concern    Not on file   Social History Narrative    Not on file       Review of Systems:   CONSTITUTIONAL: Oriented to person, place and time   EYES:  Mature left cataract with decreased vision effecting reading, driving and all routine home activities.   Visual acuity is 20/80  HEENT:  negative  RESPIRATORY:  negative  CARDIOVASCULAR:  negative  GASTROINTESTINAL:  negative  GENITOURINARY:  negative  INTEGUMENT/BREAST:  negative  HEMATOLOGIC/LYMPHATIC:  negative  ALLERGIC/IMMUNOLOGIC:  negative  ENDOCRINE:  negative  MUSCULOSKELETAL:  negative  NEUROLOGICAL:  negative  BEHAVIOR/PSYCH:  negative    Physical Exam:  Vitals:    10/28/20 0942   Weight: 148 lb (67.1 kg)   Height: 5' 5\" (1.651 m)       CONSTITUTIONAL:  awake, alert, cooperative, no apparent distress, and appears stated age  EYES:  Lids and lashes normal, pupils equal, round and reactive to light, extra ocular muscles intact, sclera clear, conjunctiva normal  ENT:  Normocephalic, without obvious abnormality, atraumatic, sinuses nontender on palpation, external ears without lesions, oral pharynx with moist mucus membranes, tonsils without erythema or exudates, gums normal and good dentition. NECK:  Supple, symmetrical, trachea midline, no adenopathy, thyroid symmetric, not enlarged and no tenderness, skin normal  HEMATOLOGIC/LYMPHATICS:  no cervical lymphadenopathy and no supraclavicular lymphadenopathy  BACK:  Symmetric, no curvature, spinous processes are non-tender on palpation, paraspinous muscles are non-tender on palpation, no costal vertebral tenderness  LUNGS:  No increased work of breathing, good air exchange, clear to auscultation bilaterally, no crackles or wheezing  CARDIOVASCULAR:  Normal apical impulse, regular rate and rhythm, normal S1 and S2, no S3 or S4, and no murmur noted  ABDOMEN:  No scars, normal bowel sounds, soft, non-distended, non-tender, no masses palpated, no hepatosplenomegally  CHEST/BREASTS:  Breasts symmetrical, skin without lesion(s), no nipple retraction or dimpling, no nipple discharge, no masses palpated, no axillary or supraclavicular adenopathy  GENITAL/URINARY: Deferred   MUSCULOSKELETAL:  There is no redness, warmth, or swelling of the joints. Full range of motion noted. Motor strength is 5 out of 5 all extremities bilaterally. Tone is normal.  NEUROLOGIC:  Awake, alert, oriented to name, place and time. Cranial nerves II-XII are grossly intact. Motor is 5 out of 5 bilaterally. Cerebellar finger to nose, heel to shin intact. Sensory is intact. Babinski down going, Romberg negative, and gait is normal.  SKIN:  no bruising or bleeding, normal skin color, texture, turgor and no redness, warmth, or swelling    Assessment:  Active Problems:    * No active hospital problems. *  Resolved Problems:    * No resolved hospital problems. *      Plan:  1.  Left cataract extraction with intra ocular lens implant    Electronically signed by Jono Wright MD on 10/29/20 at 9:35 AM EDT

## 2020-11-02 NOTE — ANESTHESIA PRE PROCEDURE
Department of Anesthesiology  Preprocedure Note       Name:  Elby Aguanga   Age:  76 y.o.  :  1945                                          MRN:  [id number]         Date:  2020      Surgeon: Carissa Ritter):  Harish Enriquez MD    Procedure: Procedure(s):  LEFT EYE CATARACT EMULSIFICATION IOL IMPLANT    Medications prior to admission:   Prior to Admission medications    Medication Sig Start Date End Date Taking? Authorizing Provider   apixaban (ELIQUIS) 5 MG TABS tablet Take by mouth 2 times daily   Yes Historical Provider, MD   hydrALAZINE (APRESOLINE) 50 MG tablet Take 25 mg by mouth 3 times daily     Historical Provider, MD   lamoTRIgine (LAMICTAL) 100 MG tablet Take 200 mg by mouth 2 times daily    Historical Provider, MD   metoprolol succinate (TOPROL XL) 50 MG extended release tablet Take 50 mg by mouth 2 times daily    Historical Provider, MD   potassium chloride (KLOR-CON M) 20 MEQ extended release tablet Take 20 mEq by mouth 2 times daily     Historical Provider, MD   glipiZIDE (GLUCOTROL) 5 MG tablet Take 10 mg by mouth 2 times daily    Historical Provider, MD   tamsulosin (FLOMAX) 0.4 MG capsule Take 0.4 mg by mouth nightly    Historical Provider, MD   gabapentin (NEURONTIN) 300 MG capsule Take 300 mg by mouth 2 times daily.     Historical Provider, MD   albuterol sulfate  (90 Base) MCG/ACT inhaler Inhale 2 puffs into the lungs every 6 hours as needed for Wheezing or Shortness of Breath    Historical Provider, MD   influenza virus trivalent vaccine (FLUZONE) injection Inject 0.5 mLs into the muscle once Given 10/2019    Historical Provider, MD   HYDROcodone-acetaminophen (NORCO) 5-325 MG per tablet Take 1 tablet by mouth every 6 hours as needed for Pain    Historical Provider, MD   cloNIDine (CATAPRES) 0.1 MG tablet Take 0.2 mg by mouth 2 times daily     Historical Provider, MD   metFORMIN (GLUCOPHAGE) 500 MG tablet Take 1,000 mg by mouth 2 times daily     Historical Provider, MD spironolactone (ALDACTONE) 25 MG tablet Take 1 tablet by mouth 2 times daily. Patient taking differently: Take 25 mg by mouth daily  11/18/13   Mackenzie Rojas DO, FACOI   lisinopril (PRINIVIL;ZESTRIL) 20 MG tablet Take 20 mg by mouth every 12 hours     Historical Provider, MD   amLODIPine (NORVASC) 10 MG tablet Take 10 mg by mouth daily. Historical Provider, MD   Multiple Vitamins-Minerals (THERAPEUTIC MULTIVITAMIN-MINERALS) tablet Take 1 tablet by mouth daily. Historical Provider, MD   aspirin 81 MG tablet Take 81 mg by mouth daily. Historical Provider, MD       Current medications:    No current facility-administered medications for this encounter. Current Outpatient Medications   Medication Sig Dispense Refill    apixaban (ELIQUIS) 5 MG TABS tablet Take by mouth 2 times daily      hydrALAZINE (APRESOLINE) 50 MG tablet Take 25 mg by mouth 3 times daily       lamoTRIgine (LAMICTAL) 100 MG tablet Take 200 mg by mouth 2 times daily      metoprolol succinate (TOPROL XL) 50 MG extended release tablet Take 50 mg by mouth 2 times daily      potassium chloride (KLOR-CON M) 20 MEQ extended release tablet Take 20 mEq by mouth 2 times daily       glipiZIDE (GLUCOTROL) 5 MG tablet Take 10 mg by mouth 2 times daily      tamsulosin (FLOMAX) 0.4 MG capsule Take 0.4 mg by mouth nightly      gabapentin (NEURONTIN) 300 MG capsule Take 300 mg by mouth 2 times daily.       albuterol sulfate  (90 Base) MCG/ACT inhaler Inhale 2 puffs into the lungs every 6 hours as needed for Wheezing or Shortness of Breath      influenza virus trivalent vaccine (FLUZONE) injection Inject 0.5 mLs into the muscle once Given 10/2019      HYDROcodone-acetaminophen (NORCO) 5-325 MG per tablet Take 1 tablet by mouth every 6 hours as needed for Pain      cloNIDine (CATAPRES) 0.1 MG tablet Take 0.2 mg by mouth 2 times daily       metFORMIN (GLUCOPHAGE) 500 MG tablet Take 1,000 mg by mouth 2 times daily       spironolactone (ALDACTONE) 25 MG tablet Take 1 tablet by mouth 2 times daily. (Patient taking differently: Take 25 mg by mouth daily ) 30 tablet     lisinopril (PRINIVIL;ZESTRIL) 20 MG tablet Take 20 mg by mouth every 12 hours       amLODIPine (NORVASC) 10 MG tablet Take 10 mg by mouth daily.  Multiple Vitamins-Minerals (THERAPEUTIC MULTIVITAMIN-MINERALS) tablet Take 1 tablet by mouth daily.  aspirin 81 MG tablet Take 81 mg by mouth daily. Allergies:  No Known Allergies    Problem List:    Patient Active Problem List   Diagnosis Code    Carotid stenosis I65.29    CVA (cerebral vascular accident) I63.9    Hypertension I10    Cerebral artery occlusion with cerebral infarction ([facility] [address] I63.50    Hyperlipidemia E78.5    Leukocytosis D72.829    Vitamin D deficiency E55.9    Non-insulin dependent type 2 diabetes mellitus ([facility] [address] E11.9       Past Medical History:        Diagnosis Date    Atrial fibrillation ([facility] [address]     2018 pt states has a heart monitor, implantable    Carotid artery stenosis     History of cardiovascular stress test 13    lexiscan    Hyperlipidemia     Hypertension     Non-insulin dependent type 2 diabetes mellitus ([facility] [address]     Unspecified cerebral artery occlusion with cerebral infarction 2013    right face numb balance problems vision fades in and out  walks with walker       Past Surgical History:        Procedure Laterality Date    APPENDECTOMY      CAROTID ENDARTERECTOMY Left Dec 2013    Left Carotid Endarterectomy    ECHO COMPL W DOP COLOR FLOW  2013         TONSILLECTOMY         Social History:    Social History     Tobacco Use    Smoking status: Former Smoker     Packs/day: 1.00     Types: Cigarettes     Last attempt to quit: 2019     Years since quittin.3    Smokeless tobacco: Never Used   Substance Use Topics    Alcohol use:  Yes     Alcohol/week: 1.0 standard drinks     Types: 1 Cans of beer per week     Comment: occ Counseling given: Not Answered      Vital Signs (Current):   Vitals:    10/28/20 0942   Weight: 148 lb (67.1 kg)   Height: 5' 5\" (1.651 m)                                              BP Readings from Last 3 Encounters:   01/31/20 (!) 209/83   06/25/15 150/82   06/22/15 (!) 164/92       NPO Status:  >8.H                                                                               BMI:   Wt Readings from Last 3 Encounters:   01/31/20 150 lb (68 kg)   06/25/15 148 lb (67.1 kg)   06/22/15 150 lb (68 kg)     Body mass index is 24.63 kg/m². CBC:   Lab Results   Component Value Date    WBC 12.7 01/31/2020    RBC 4.68 01/31/2020    HGB 13.3 01/31/2020    HCT 39.5 01/31/2020    MCV 84.4 01/31/2020    RDW 14.9 01/31/2020     01/31/2020       CMP:   Lab Results   Component Value Date     01/31/2020    K 3.6 01/31/2020    K 3.5 01/31/2020    CL 99 01/31/2020    CO2 22 01/31/2020    BUN 28 01/31/2020    CREATININE 1.6 01/31/2020    GFRAA 51 01/31/2020    LABGLOM 42 01/31/2020    GLUCOSE 266 01/31/2020    PROT 7.5 01/31/2020    CALCIUM 9.3 01/31/2020    BILITOT 0.5 01/31/2020    ALKPHOS 70 01/31/2020    AST 49 01/31/2020    ALT 41 01/31/2020       POC Tests: No results for input(s): POCGLU, POCNA, POCK, POCCL, POCBUN, POCHEMO, POCHCT in the last 72 hours.     Coags:   Lab Results   Component Value Date    PROTIME 12.2 12/11/2013    INR 1.1 12/11/2013    APTT 33.2 12/11/2013       HCG (If Applicable): No results found for: PREGTESTUR, PREGSERUM, HCG, HCGQUANT     ABGs: No results found for: PHART, PO2ART, USA3BYR, UUR9KBE, BEART, Q2MSSCWA     Type & Screen (If Applicable):  No results found for: LABABO, LABRH    Drug/Infectious Status (If Applicable):  No results found for: HIV, HEPCAB    COVID-19 Screening (If Applicable):   Lab Results   Component Value Date    COVID19 Not Detected 10/28/2020         Anesthesia Evaluation  Patient summary reviewed no history of anesthetic complications:   Airway: obese     Vascular:   + PVD, aortic or cerebral (Left carotid stenosis s/p Left carotid endarterectomy), . Anesthesia Plan      MAC     ASA 3       Induction: intravenous. MIPS: Prophylactic antiemetics administered. Anesthetic plan and risks discussed with patient. Plan discussed with CRNA. Preoperative evaluation note updated on 11/2/2020 based on review of patient's medical records on same date. Patient to be evaluated in person by anesthesiologist on day of surgery.      Stacie Salgado MD   11/2/2020

## 2020-11-03 PROBLEM — H26.9 LEFT CATARACT: Status: ACTIVE | Noted: 2020-01-01

## 2020-11-03 NOTE — ANESTHESIA POSTPROCEDURE EVALUATION
Department of Anesthesiology  Postprocedure Note    Patient: Charlotte Arriola  MRN: [id number]  YOB: 1945  Date of evaluation: 11/3/2020  Time:  9:48 AM     Procedure Summary     Date:  11/03/20 Room / Location:  [address] [place] [address]    Anesthesia Start:  5679 Anesthesia Stop:  2939    Procedure:  LEFT EYE CATARACT EMULSIFICATION IOL IMPLANT (Left ) Diagnosis:  (LEFT EYE CATARACT)    Surgeon:  Jose C Lynn MD Responsible Provider:  Ambrosio Coronado MD    Anesthesia Type:  MAC ASA Status:  3          Anesthesia Type: MAC    Maninder Phase I: Maninder Score: 10    Maninder Phase II: Maninder Score: 10    Last vitals: Reviewed and per EMR flowsheets.        Anesthesia Post Evaluation    Patient location during evaluation: PACU  Patient participation: complete - patient participated  Level of consciousness: awake and alert  Airway patency: patent  Nausea & Vomiting: no nausea and no vomiting  Complications: no  Cardiovascular status: hemodynamically stable  Respiratory status: room air  Hydration status: stable

## 2020-11-03 NOTE — OP NOTE
into the capsular bag and dialed to 3 and 9 o'clock positions. Healon was removed from in front of and behind the lens. The lens was found to be well centered, well positioned in the bag and stable. The wound was checked and found to be airtight and watertight. The lid speculum was removed and the drape was removed. The patient was brought to the recovery room in excellent condition, given postoperative instructions, and discharge in excellent condition.    Operative Note      Patient: Sunday Olp  YOB: 1945  MRN: [id number]    Date of Procedure: 11/3/2020    Pre-Op Diagnosis: LEFT EYE CATARACT    Post-Op Diagnosis: Same       Procedure(s):  LEFT EYE CATARACT EMULSIFICATION IOL IMPLANT    Surgeon(s):  Emily Jj MD    Assistant:   * No surgical staff found *    Anesthesia: Monitor Anesthesia Care    Estimated Blood Loss (mL): Minimal    Complications: None    Specimens:   * No specimens in log *    Implants:  * No implants in log *      Drains: * No LDAs found *    Findings: Left cataract    Detailed Description of Procedure:   Left cataract extraction with intra ocular lens implant    Electronically signed by Ray Chavez MD on 11/3/2020 at 9:05 AM

## 2021-01-01 ENCOUNTER — APPOINTMENT (OUTPATIENT)
Dept: MRI IMAGING | Age: 76
DRG: 025 | End: 2021-01-01
Attending: INTERNAL MEDICINE
Payer: MEDICARE

## 2021-01-01 ENCOUNTER — APPOINTMENT (OUTPATIENT)
Dept: GENERAL RADIOLOGY | Age: 76
DRG: 025 | End: 2021-01-01
Attending: INTERNAL MEDICINE
Payer: MEDICARE

## 2021-01-01 ENCOUNTER — HOSPITAL ENCOUNTER (EMERGENCY)
Age: 76
Discharge: HOME | End: 2021-02-09
Attending: EMERGENCY MEDICINE
Payer: MEDICARE

## 2021-01-01 ENCOUNTER — APPOINTMENT (OUTPATIENT)
Dept: CT IMAGING | Age: 76
DRG: 025 | End: 2021-01-01
Attending: INTERNAL MEDICINE
Payer: MEDICARE

## 2021-01-01 ENCOUNTER — HOSPITAL ENCOUNTER (OUTPATIENT)
Dept: RADIATION ONCOLOGY | Age: 76
Discharge: HOME | End: 2021-03-03
Payer: MEDICARE

## 2021-01-01 ENCOUNTER — ANESTHESIA EVENT (OUTPATIENT)
Dept: OPERATING ROOM | Age: 76
DRG: 025 | End: 2021-01-01
Payer: MEDICARE

## 2021-01-01 ENCOUNTER — ANESTHESIA (OUTPATIENT)
Dept: OPERATING ROOM | Age: 76
DRG: 025 | End: 2021-01-01
Payer: MEDICARE

## 2021-01-01 ENCOUNTER — HOSPITAL ENCOUNTER (INPATIENT)
Age: 76
LOS: 21 days | Discharge: HOSPICE | DRG: 025 | End: 2021-03-18
Attending: INTERNAL MEDICINE | Admitting: FAMILY MEDICINE
Payer: MEDICARE

## 2021-01-01 VITALS
WEIGHT: 152.34 LBS | RESPIRATION RATE: 12 BRPM | OXYGEN SATURATION: 97 % | HEIGHT: 65 IN | HEART RATE: 66 BPM | SYSTOLIC BLOOD PRESSURE: 148 MMHG | BODY MASS INDEX: 25.38 KG/M2 | DIASTOLIC BLOOD PRESSURE: 78 MMHG | TEMPERATURE: 98.5 F

## 2021-01-01 VITALS
DIASTOLIC BLOOD PRESSURE: 98 MMHG | WEIGHT: 149 LBS | HEIGHT: 65 IN | BODY MASS INDEX: 24.83 KG/M2 | HEART RATE: 56 BPM | TEMPERATURE: 98.2 F | SYSTOLIC BLOOD PRESSURE: 173 MMHG | RESPIRATION RATE: 20 BRPM | OXYGEN SATURATION: 94 %

## 2021-01-01 VITALS — DIASTOLIC BLOOD PRESSURE: 45 MMHG | SYSTOLIC BLOOD PRESSURE: 66 MMHG | OXYGEN SATURATION: 98 %

## 2021-01-01 DIAGNOSIS — J96.01 ACUTE RESPIRATORY FAILURE WITH HYPOXIA AND HYPERCAPNIA (HCC): Primary | ICD-10-CM

## 2021-01-01 DIAGNOSIS — R53.83 FATIGUE, UNSPECIFIED TYPE: Primary | ICD-10-CM

## 2021-01-01 DIAGNOSIS — I63.9 CEREBROVASCULAR ACCIDENT (CVA), UNSPECIFIED MECHANISM (HCC): ICD-10-CM

## 2021-01-01 DIAGNOSIS — C79.31 SECONDARY CANCER OF BRAIN (HCC): Primary | ICD-10-CM

## 2021-01-01 DIAGNOSIS — J96.02 ACUTE RESPIRATORY FAILURE WITH HYPOXIA AND HYPERCAPNIA (HCC): Primary | ICD-10-CM

## 2021-01-01 LAB
AADO2: 109.4 MMHG
AADO2: 143.1 MMHG
AADO2: 155.2 MMHG
AADO2: 172.4 MMHG
AADO2: 174.4 MMHG
AADO2: 205.3 MMHG
AADO2: 212 MMHG
AADO2: 253.5 MMHG
AADO2: 272.6 MMHG
AADO2: 355.8 MMHG
AADO2: 417.6 MMHG
AADO2: 464.7 MMHG
ABO/RH: NORMAL
ACANTHOCYTES: ABNORMAL
ANION GAP SERPL CALCULATED.3IONS-SCNC: 1 MMOL/L (ref 7–16)
ANION GAP SERPL CALCULATED.3IONS-SCNC: 10 MMOL/L (ref 7–16)
ANION GAP SERPL CALCULATED.3IONS-SCNC: 11 MMOL/L (ref 7–16)
ANION GAP SERPL CALCULATED.3IONS-SCNC: 14 MMOL/L (ref 7–16)
ANION GAP SERPL CALCULATED.3IONS-SCNC: 15 MMOL/L (ref 7–16)
ANION GAP SERPL CALCULATED.3IONS-SCNC: 16 MMOL/L (ref 7–16)
ANION GAP SERPL CALCULATED.3IONS-SCNC: 2 MMOL/L (ref 7–16)
ANION GAP SERPL CALCULATED.3IONS-SCNC: 3 MMOL/L (ref 7–16)
ANION GAP SERPL CALCULATED.3IONS-SCNC: 3 MMOL/L (ref 7–16)
ANION GAP SERPL CALCULATED.3IONS-SCNC: 4 MMOL/L (ref 7–16)
ANION GAP SERPL CALCULATED.3IONS-SCNC: 5 MMOL/L (ref 7–16)
ANION GAP SERPL CALCULATED.3IONS-SCNC: 6 MMOL/L (ref 7–16)
ANION GAP SERPL CALCULATED.3IONS-SCNC: 7 MMOL/L (ref 7–16)
ANION GAP SERPL CALCULATED.3IONS-SCNC: 7 MMOL/L (ref 7–16)
ANION GAP SERPL CALCULATED.3IONS-SCNC: 8 MMOL/L (ref 7–16)
ANION GAP SERPL CALCULATED.3IONS-SCNC: 9 MMOL/L (ref 7–16)
ANISOCYTOSIS: ABNORMAL
ANTIBODY SCREEN: NORMAL
B.E.: -3.2 MMOL/L (ref -3–3)
B.E.: -4.1 MMOL/L (ref -3–3)
B.E.: -4.4 MMOL/L (ref -3–0)
B.E.: 0.7 MMOL/L (ref -3–3)
B.E.: 10.6 MMOL/L (ref -3–3)
B.E.: 11.2 MMOL/L (ref -3–3)
B.E.: 4.3 MMOL/L (ref -3–3)
B.E.: 4.3 MMOL/L (ref -3–3)
B.E.: 5 MMOL/L (ref -3–3)
B.E.: 5.9 MMOL/L (ref -3–3)
B.E.: 6 MMOL/L (ref -3–3)
B.E.: 6.7 MMOL/L (ref -3–3)
B.E.: 7 MMOL/L (ref -3–3)
B.E.: 7.9 MMOL/L (ref -3–3)
B.E.: 7.9 MMOL/L (ref -3–3)
B.E.: 8.2 MMOL/L (ref -3–3)
B.E.: 8.7 MMOL/L (ref -3–3)
B.E.: 9.1 MMOL/L (ref -3–3)
BACTERIA: ABNORMAL /HPF
BASOPHILIC STIPPLING: ABNORMAL
BASOPHILS ABSOLUTE: 0 E9/L (ref 0–0.2)
BASOPHILS ABSOLUTE: 0.01 E9/L (ref 0–0.2)
BASOPHILS ABSOLUTE: 0.01 E9/L (ref 0–0.2)
BASOPHILS ABSOLUTE: 0.02 E9/L (ref 0–0.2)
BASOPHILS ABSOLUTE: 0.02 E9/L (ref 0–0.2)
BASOPHILS ABSOLUTE: 0.03 E9/L (ref 0–0.2)
BASOPHILS ABSOLUTE: 0.05 E9/L (ref 0–0.2)
BASOPHILS ABSOLUTE: 0.06 E9/L (ref 0–0.2)
BASOPHILS ABSOLUTE: 0.08 E9/L (ref 0–0.2)
BASOPHILS ABSOLUTE: 0.25 E9/L (ref 0–0.2)
BASOPHILS RELATIVE PERCENT: 0 % (ref 0–2)
BASOPHILS RELATIVE PERCENT: 0.1 % (ref 0–2)
BASOPHILS RELATIVE PERCENT: 0.2 % (ref 0–2)
BASOPHILS RELATIVE PERCENT: 0.3 % (ref 0–2)
BASOPHILS RELATIVE PERCENT: 0.4 % (ref 0–2)
BASOPHILS RELATIVE PERCENT: 0.4 % (ref 0–2)
BASOPHILS RELATIVE PERCENT: 0.7 % (ref 0–2)
BASOPHILS RELATIVE PERCENT: 0.9 % (ref 0–2)
BILIRUBIN URINE: NEGATIVE
BLOOD BANK DISPENSE STATUS: NORMAL
BLOOD BANK DISPENSE STATUS: NORMAL
BLOOD BANK PRODUCT CODE: NORMAL
BLOOD BANK PRODUCT CODE: NORMAL
BLOOD CULTURE, ROUTINE: NORMAL
BLOOD, URINE: ABNORMAL
BLOOD, URINE: ABNORMAL
BLOOD, URINE: NEGATIVE
BPU ID: NORMAL
BPU ID: NORMAL
BUN BLDV-MCNC: 16 MG/DL (ref 8–23)
BUN BLDV-MCNC: 19 MG/DL (ref 8–23)
BUN BLDV-MCNC: 19 MG/DL (ref 8–23)
BUN BLDV-MCNC: 23 MG/DL (ref 8–23)
BUN BLDV-MCNC: 25 MG/DL (ref 8–23)
BUN BLDV-MCNC: 26 MG/DL (ref 8–23)
BUN BLDV-MCNC: 27 MG/DL (ref 8–23)
BUN BLDV-MCNC: 31 MG/DL (ref 8–23)
BUN BLDV-MCNC: 31 MG/DL (ref 8–23)
BUN BLDV-MCNC: 32 MG/DL (ref 8–23)
BUN BLDV-MCNC: 36 MG/DL (ref 8–23)
BUN BLDV-MCNC: 37 MG/DL (ref 8–23)
BUN BLDV-MCNC: 38 MG/DL (ref 8–23)
BUN BLDV-MCNC: 39 MG/DL (ref 8–23)
BUN BLDV-MCNC: 42 MG/DL (ref 8–23)
BUN BLDV-MCNC: 43 MG/DL (ref 8–23)
BUN BLDV-MCNC: 45 MG/DL (ref 8–23)
BUN BLDV-MCNC: 46 MG/DL (ref 8–23)
BUN BLDV-MCNC: 47 MG/DL (ref 8–23)
BUN BLDV-MCNC: 54 MG/DL (ref 8–23)
BURR CELLS: ABNORMAL
CALCIUM IONIZED: 1.17 MMOL/L (ref 1.15–1.33)
CALCIUM IONIZED: 1.18 MMOL/L (ref 1.15–1.33)
CALCIUM IONIZED: 1.19 MMOL/L (ref 1.15–1.33)
CALCIUM IONIZED: 1.2 MMOL/L (ref 1.15–1.33)
CALCIUM IONIZED: 1.22 MMOL/L (ref 1.15–1.33)
CALCIUM IONIZED: 1.23 MMOL/L (ref 1.15–1.33)
CALCIUM IONIZED: 1.27 MMOL/L (ref 1.15–1.33)
CALCIUM IONIZED: 1.28 MMOL/L (ref 1.15–1.33)
CALCIUM IONIZED: 1.29 MMOL/L (ref 1.15–1.33)
CALCIUM SERPL-MCNC: 7.3 MG/DL (ref 8.6–10.2)
CALCIUM SERPL-MCNC: 7.6 MG/DL (ref 8.6–10.2)
CALCIUM SERPL-MCNC: 7.7 MG/DL (ref 8.6–10.2)
CALCIUM SERPL-MCNC: 7.8 MG/DL (ref 8.6–10.2)
CALCIUM SERPL-MCNC: 7.8 MG/DL (ref 8.6–10.2)
CALCIUM SERPL-MCNC: 7.9 MG/DL (ref 8.6–10.2)
CALCIUM SERPL-MCNC: 8 MG/DL (ref 8.6–10.2)
CALCIUM SERPL-MCNC: 8.1 MG/DL (ref 8.6–10.2)
CALCIUM SERPL-MCNC: 8.1 MG/DL (ref 8.6–10.2)
CALCIUM SERPL-MCNC: 8.2 MG/DL (ref 8.6–10.2)
CALCIUM SERPL-MCNC: 8.3 MG/DL (ref 8.6–10.2)
CALCIUM SERPL-MCNC: 8.4 MG/DL (ref 8.6–10.2)
CALCIUM SERPL-MCNC: 8.6 MG/DL (ref 8.6–10.2)
CALCIUM SERPL-MCNC: 8.9 MG/DL (ref 8.6–10.2)
CALCIUM SERPL-MCNC: 9 MG/DL (ref 8.6–10.2)
CALCIUM SERPL-MCNC: 9 MG/DL (ref 8.6–10.2)
CALCIUM SERPL-MCNC: 9.1 MG/DL (ref 8.6–10.2)
CALCIUM SERPL-MCNC: 9.2 MG/DL (ref 8.6–10.2)
CALCIUM SERPL-MCNC: 9.3 MG/DL (ref 8.6–10.2)
CARDIOPULMONARY BYPASS: NO
CHLORIDE BLD-SCNC: 100 MMOL/L (ref 98–107)
CHLORIDE BLD-SCNC: 100 MMOL/L (ref 98–107)
CHLORIDE BLD-SCNC: 101 MMOL/L (ref 98–107)
CHLORIDE BLD-SCNC: 102 MMOL/L (ref 98–107)
CHLORIDE BLD-SCNC: 102 MMOL/L (ref 98–107)
CHLORIDE BLD-SCNC: 104 MMOL/L (ref 98–107)
CHLORIDE BLD-SCNC: 104 MMOL/L (ref 98–107)
CHLORIDE BLD-SCNC: 105 MMOL/L (ref 98–107)
CHLORIDE BLD-SCNC: 106 MMOL/L (ref 98–107)
CHLORIDE BLD-SCNC: 107 MMOL/L (ref 98–107)
CHLORIDE BLD-SCNC: 107 MMOL/L (ref 98–107)
CHLORIDE BLD-SCNC: 108 MMOL/L (ref 98–107)
CHLORIDE BLD-SCNC: 108 MMOL/L (ref 98–107)
CHLORIDE BLD-SCNC: 109 MMOL/L (ref 98–107)
CHLORIDE BLD-SCNC: 111 MMOL/L (ref 98–107)
CHLORIDE BLD-SCNC: 111 MMOL/L (ref 98–107)
CHLORIDE BLD-SCNC: 112 MMOL/L (ref 98–107)
CHLORIDE BLD-SCNC: 113 MMOL/L (ref 98–107)
CHLORIDE BLD-SCNC: 114 MMOL/L (ref 98–107)
CHLORIDE BLD-SCNC: 114 MMOL/L (ref 98–107)
CHLORIDE BLD-SCNC: 115 MMOL/L (ref 98–107)
CHLORIDE BLD-SCNC: 98 MMOL/L (ref 98–107)
CHLORIDE BLD-SCNC: 99 MMOL/L (ref 98–107)
CLARITY: CLEAR
CO2: 22 MMOL/L (ref 22–29)
CO2: 22 MMOL/L (ref 22–29)
CO2: 24 MMOL/L (ref 22–29)
CO2: 24 MMOL/L (ref 22–29)
CO2: 26 MMOL/L (ref 22–29)
CO2: 28 MMOL/L (ref 22–29)
CO2: 28 MMOL/L (ref 22–29)
CO2: 29 MMOL/L (ref 22–29)
CO2: 29 MMOL/L (ref 22–29)
CO2: 30 MMOL/L (ref 22–29)
CO2: 30 MMOL/L (ref 22–29)
CO2: 32 MMOL/L (ref 22–29)
CO2: 33 MMOL/L (ref 22–29)
CO2: 33 MMOL/L (ref 22–29)
CO2: 34 MMOL/L (ref 22–29)
CO2: 34 MMOL/L (ref 22–29)
CO2: 35 MMOL/L (ref 22–29)
CO2: 36 MMOL/L (ref 22–29)
CO2: 37 MMOL/L (ref 22–29)
CO2: 38 MMOL/L (ref 22–29)
CO2: 39 MMOL/L (ref 22–29)
CO2: 40 MMOL/L (ref 22–29)
COHB: 0.1 % (ref 0–1.5)
COHB: 0.2 % (ref 0–1.5)
COHB: 0.3 % (ref 0–1.5)
COHB: 0.4 % (ref 0–1.5)
COHB: 0.4 % (ref 0–1.5)
COHB: 0.5 % (ref 0–1.5)
COHB: 0.6 % (ref 0–1.5)
COHB: 0.7 % (ref 0–1.5)
COHB: 0.7 % (ref 0–1.5)
COHB: 1 % (ref 0–1.5)
COHB: 1 % (ref 0–1.5)
COLOR: YELLOW
CREAT SERPL-MCNC: 0.9 MG/DL (ref 0.7–1.2)
CREAT SERPL-MCNC: 1 MG/DL (ref 0.7–1.2)
CREAT SERPL-MCNC: 1.1 MG/DL (ref 0.7–1.2)
CREAT SERPL-MCNC: 1.2 MG/DL (ref 0.7–1.2)
CREAT SERPL-MCNC: 1.3 MG/DL (ref 0.7–1.2)
CREAT SERPL-MCNC: 1.3 MG/DL (ref 0.7–1.2)
CREAT SERPL-MCNC: 1.4 MG/DL (ref 0.7–1.2)
CREAT SERPL-MCNC: 1.4 MG/DL (ref 0.7–1.2)
CREAT SERPL-MCNC: 1.5 MG/DL (ref 0.7–1.2)
CREAT SERPL-MCNC: 1.5 MG/DL (ref 0.7–1.2)
CREAT SERPL-MCNC: 1.6 MG/DL (ref 0.7–1.2)
CRITICAL: ABNORMAL
CULTURE, BLOOD 2: NORMAL
CULTURE, RESPIRATORY: ABNORMAL
DATE ANALYZED: ABNORMAL
DATE OF COLLECTION: ABNORMAL
DESCRIPTION BLOOD BANK: NORMAL
DESCRIPTION BLOOD BANK: NORMAL
DEVICE: ABNORMAL
EKG ATRIAL RATE: 108 BPM
EKG ATRIAL RATE: 64 BPM
EKG ATRIAL RATE: 77 BPM
EKG ATRIAL RATE: 79 BPM
EKG P AXIS: 24 DEGREES
EKG P AXIS: 50 DEGREES
EKG P AXIS: 79 DEGREES
EKG P-R INTERVAL: 144 MS
EKG P-R INTERVAL: 146 MS
EKG P-R INTERVAL: 162 MS
EKG Q-T INTERVAL: 338 MS
EKG Q-T INTERVAL: 444 MS
EKG Q-T INTERVAL: 494 MS
EKG Q-T INTERVAL: 496 MS
EKG QRS DURATION: 100 MS
EKG QRS DURATION: 102 MS
EKG QRS DURATION: 108 MS
EKG QRS DURATION: 98 MS
EKG QTC CALCULATION (BAZETT): 451 MS
EKG QTC CALCULATION (BAZETT): 509 MS
EKG QTC CALCULATION (BAZETT): 509 MS
EKG QTC CALCULATION (BAZETT): 561 MS
EKG R AXIS: 27 DEGREES
EKG R AXIS: 45 DEGREES
EKG R AXIS: 52 DEGREES
EKG R AXIS: 54 DEGREES
EKG T AXIS: -154 DEGREES
EKG T AXIS: -85 DEGREES
EKG T AXIS: 138 DEGREES
EKG T AXIS: 151 DEGREES
EKG VENTRICULAR RATE: 107 BPM
EKG VENTRICULAR RATE: 64 BPM
EKG VENTRICULAR RATE: 77 BPM
EKG VENTRICULAR RATE: 79 BPM
EOSINOPHILS ABSOLUTE: 0 E9/L (ref 0.05–0.5)
EOSINOPHILS ABSOLUTE: 0.01 E9/L (ref 0.05–0.5)
EOSINOPHILS ABSOLUTE: 0.03 E9/L (ref 0.05–0.5)
EOSINOPHILS ABSOLUTE: 0.04 E9/L (ref 0.05–0.5)
EOSINOPHILS ABSOLUTE: 0.05 E9/L (ref 0.05–0.5)
EOSINOPHILS ABSOLUTE: 0.11 E9/L (ref 0.05–0.5)
EOSINOPHILS ABSOLUTE: 0.16 E9/L (ref 0.05–0.5)
EOSINOPHILS ABSOLUTE: 0.2 E9/L (ref 0.05–0.5)
EOSINOPHILS ABSOLUTE: 0.2 E9/L (ref 0.05–0.5)
EOSINOPHILS ABSOLUTE: 0.22 E9/L (ref 0.05–0.5)
EOSINOPHILS ABSOLUTE: 0.57 E9/L (ref 0.05–0.5)
EOSINOPHILS RELATIVE PERCENT: 0 % (ref 0–6)
EOSINOPHILS RELATIVE PERCENT: 0.1 % (ref 0–6)
EOSINOPHILS RELATIVE PERCENT: 0.3 % (ref 0–6)
EOSINOPHILS RELATIVE PERCENT: 0.9 % (ref 0–6)
EOSINOPHILS RELATIVE PERCENT: 1.7 % (ref 0–6)
EOSINOPHILS RELATIVE PERCENT: 1.8 % (ref 0–6)
EOSINOPHILS RELATIVE PERCENT: 1.8 % (ref 0–6)
EPITHELIAL CELLS, UA: ABNORMAL /HPF
FIO2: 100 %
FIO2: 40 %
FIO2: 50 %
FIO2: 60 %
FIO2: 60 %
FIO2: 70 %
FIO2: 80 %
GFR AFRICAN AMERICAN: 51
GFR AFRICAN AMERICAN: 55
GFR AFRICAN AMERICAN: 55
GFR AFRICAN AMERICAN: 60
GFR AFRICAN AMERICAN: 60
GFR AFRICAN AMERICAN: >60
GFR NON-AFRICAN AMERICAN: 42 ML/MIN/1.73
GFR NON-AFRICAN AMERICAN: 46 ML/MIN/1.73
GFR NON-AFRICAN AMERICAN: 46 ML/MIN/1.73
GFR NON-AFRICAN AMERICAN: 49 ML/MIN/1.73
GFR NON-AFRICAN AMERICAN: 49 ML/MIN/1.73
GFR NON-AFRICAN AMERICAN: 54 ML/MIN/1.73
GFR NON-AFRICAN AMERICAN: 54 ML/MIN/1.73
GFR NON-AFRICAN AMERICAN: 59 ML/MIN/1.73
GFR NON-AFRICAN AMERICAN: >60 ML/MIN/1.73
GLUCOSE BLD-MCNC: 104 MG/DL (ref 74–99)
GLUCOSE BLD-MCNC: 105 MG/DL (ref 74–99)
GLUCOSE BLD-MCNC: 106 MG/DL (ref 74–99)
GLUCOSE BLD-MCNC: 108 MG/DL (ref 74–99)
GLUCOSE BLD-MCNC: 117 MG/DL (ref 74–99)
GLUCOSE BLD-MCNC: 126 MG/DL (ref 74–99)
GLUCOSE BLD-MCNC: 126 MG/DL (ref 74–99)
GLUCOSE BLD-MCNC: 130 MG/DL (ref 74–99)
GLUCOSE BLD-MCNC: 130 MG/DL (ref 74–99)
GLUCOSE BLD-MCNC: 143 MG/DL (ref 74–99)
GLUCOSE BLD-MCNC: 144 MG/DL (ref 74–99)
GLUCOSE BLD-MCNC: 144 MG/DL (ref 74–99)
GLUCOSE BLD-MCNC: 146 MG/DL (ref 74–99)
GLUCOSE BLD-MCNC: 157 MG/DL (ref 74–99)
GLUCOSE BLD-MCNC: 194 MG/DL (ref 74–99)
GLUCOSE BLD-MCNC: 211 MG/DL (ref 74–99)
GLUCOSE BLD-MCNC: 221 MG/DL (ref 74–99)
GLUCOSE BLD-MCNC: 231 MG/DL (ref 74–99)
GLUCOSE BLD-MCNC: 232 MG/DL (ref 74–99)
GLUCOSE BLD-MCNC: 240 MG/DL (ref 74–99)
GLUCOSE BLD-MCNC: 243 MG/DL (ref 74–99)
GLUCOSE BLD-MCNC: 259 MG/DL (ref 74–99)
GLUCOSE BLD-MCNC: 281 MG/DL (ref 74–99)
GLUCOSE BLD-MCNC: 91 MG/DL (ref 74–99)
GLUCOSE BLD-MCNC: 93 MG/DL (ref 74–99)
GLUCOSE URINE: NEGATIVE MG/DL
GRAM STAIN ORDERABLE: NORMAL
HBA1C MFR BLD: 6.6 % (ref 4–5.6)
HCO3 ARTERIAL: 21.4 MMOL/L (ref 22–26)
HCO3: 18.5 MMOL/L (ref 22–26)
HCO3: 18.8 MMOL/L (ref 22–26)
HCO3: 23.6 MMOL/L (ref 22–26)
HCO3: 27.4 MMOL/L (ref 22–26)
HCO3: 27.4 MMOL/L (ref 22–26)
HCO3: 27.8 MMOL/L (ref 22–26)
HCO3: 28.6 MMOL/L (ref 22–26)
HCO3: 29.1 MMOL/L (ref 22–26)
HCO3: 29.3 MMOL/L (ref 22–26)
HCO3: 29.4 MMOL/L (ref 22–26)
HCO3: 31.2 MMOL/L (ref 22–26)
HCO3: 31.5 MMOL/L (ref 22–26)
HCO3: 32.2 MMOL/L (ref 22–26)
HCO3: 32.4 MMOL/L (ref 22–26)
HCO3: 32.7 MMOL/L (ref 22–26)
HCO3: 34.3 MMOL/L (ref 22–26)
HCO3: 35.5 MMOL/L (ref 22–26)
HCT (EST): 36 % (ref 37–54)
HCT VFR BLD CALC: 20.6 % (ref 37–54)
HCT VFR BLD CALC: 21.5 % (ref 37–54)
HCT VFR BLD CALC: 22.4 % (ref 37–54)
HCT VFR BLD CALC: 22.7 % (ref 37–54)
HCT VFR BLD CALC: 23.2 % (ref 37–54)
HCT VFR BLD CALC: 23.3 % (ref 37–54)
HCT VFR BLD CALC: 23.3 % (ref 37–54)
HCT VFR BLD CALC: 23.4 % (ref 37–54)
HCT VFR BLD CALC: 24.2 % (ref 37–54)
HCT VFR BLD CALC: 24.2 % (ref 37–54)
HCT VFR BLD CALC: 26.2 % (ref 37–54)
HCT VFR BLD CALC: 27 % (ref 37–54)
HCT VFR BLD CALC: 27.4 % (ref 37–54)
HCT VFR BLD CALC: 29.1 % (ref 37–54)
HCT VFR BLD CALC: 30.8 % (ref 37–54)
HCT VFR BLD CALC: 31.5 % (ref 37–54)
HCT VFR BLD CALC: 34.2 % (ref 37–54)
HCT VFR BLD CALC: 35.4 % (ref 37–54)
HCT VFR BLD CALC: 36.1 % (ref 37–54)
HCT VFR BLD CALC: 36.7 % (ref 37–54)
HCT VFR BLD CALC: 40.3 % (ref 37–54)
HCT VFR BLD CALC: 42.1 % (ref 37–54)
HCT VFR BLD CALC: 44.4 % (ref 37–54)
HCT VFR BLD CALC: 44.7 % (ref 37–54)
HEMOGLOBIN: 10.1 G/DL (ref 12.5–16.5)
HEMOGLOBIN: 10.4 G/DL (ref 12.5–16.5)
HEMOGLOBIN: 11.2 G/DL (ref 12.5–16.5)
HEMOGLOBIN: 11.3 G/DL (ref 12.5–16.5)
HEMOGLOBIN: 12 G/DL (ref 12.5–16.5)
HEMOGLOBIN: 12.1 G/DL (ref 12.5–16.5)
HEMOGLOBIN: 13.1 G/DL (ref 12.5–16.5)
HEMOGLOBIN: 14.2 G/DL (ref 12.5–16.5)
HEMOGLOBIN: 14.5 G/DL (ref 12.5–16.5)
HEMOGLOBIN: 14.6 G/DL (ref 12.5–16.5)
HEMOGLOBIN: 6.3 G/DL (ref 12.5–16.5)
HEMOGLOBIN: 6.6 G/DL (ref 12.5–16.5)
HEMOGLOBIN: 7 G/DL (ref 12.5–16.5)
HEMOGLOBIN: 7.1 G/DL (ref 12.5–16.5)
HEMOGLOBIN: 7.2 G/DL (ref 12.5–16.5)
HEMOGLOBIN: 7.2 G/DL (ref 12.5–16.5)
HEMOGLOBIN: 7.3 G/DL (ref 12.5–16.5)
HEMOGLOBIN: 7.5 G/DL (ref 12.5–16.5)
HEMOGLOBIN: 8.4 G/DL (ref 12.5–16.5)
HEMOGLOBIN: 8.5 G/DL (ref 12.5–16.5)
HEMOGLOBIN: 8.6 G/DL (ref 12.5–16.5)
HEMOGLOBIN: 9.1 G/DL (ref 12.5–16.5)
HGB, (EST): 12.3 G/DL (ref 12.5–15.5)
HHB: 1.5 % (ref 0–5)
HHB: 1.9 % (ref 0–5)
HHB: 2 % (ref 0–5)
HHB: 2 % (ref 0–5)
HHB: 2.5 % (ref 0–5)
HHB: 3 % (ref 0–5)
HHB: 3.4 % (ref 0–5)
HHB: 3.6 % (ref 0–5)
HHB: 3.7 % (ref 0–5)
HHB: 3.9 % (ref 0–5)
HHB: 3.9 % (ref 0–5)
HHB: 4.4 % (ref 0–5)
HHB: 4.9 % (ref 0–5)
HHB: 5.6 % (ref 0–5)
HHB: 6 % (ref 0–5)
HHB: 6.4 % (ref 0–5)
HHB: 8.3 % (ref 0–5)
HYALINE CASTS: ABNORMAL /LPF (ref 0–2)
HYPOCHROMIA: ABNORMAL
IMMATURE GRANULOCYTES #: 0.08 E9/L
IMMATURE GRANULOCYTES #: 0.17 E9/L
IMMATURE GRANULOCYTES #: 0.18 E9/L
IMMATURE GRANULOCYTES #: 0.22 E9/L
IMMATURE GRANULOCYTES #: 0.31 E9/L
IMMATURE GRANULOCYTES #: 0.32 E9/L
IMMATURE GRANULOCYTES #: 0.33 E9/L
IMMATURE GRANULOCYTES #: 0.4 E9/L
IMMATURE GRANULOCYTES #: 0.49 E9/L
IMMATURE GRANULOCYTES #: 0.55 E9/L
IMMATURE GRANULOCYTES #: 0.82 E9/L
IMMATURE GRANULOCYTES %: 0.7 % (ref 0–5)
IMMATURE GRANULOCYTES %: 1.5 % (ref 0–5)
IMMATURE GRANULOCYTES %: 1.5 % (ref 0–5)
IMMATURE GRANULOCYTES %: 1.8 % (ref 0–5)
IMMATURE GRANULOCYTES %: 2.1 % (ref 0–5)
IMMATURE GRANULOCYTES %: 2.2 % (ref 0–5)
IMMATURE GRANULOCYTES %: 2.3 % (ref 0–5)
IMMATURE GRANULOCYTES %: 2.5 % (ref 0–5)
IMMATURE GRANULOCYTES %: 2.6 % (ref 0–5)
IMMATURE GRANULOCYTES %: 2.7 % (ref 0–5)
IMMATURE GRANULOCYTES %: 2.9 % (ref 0–5)
INR BLD: 1.2
INR BLD: 1.2
KETONES, URINE: ABNORMAL MG/DL
KETONES, URINE: NEGATIVE MG/DL
KETONES, URINE: NEGATIVE MG/DL
LAB: ABNORMAL
LAMOTRIGINE LEVEL: 4.8 UG/ML (ref 2.5–15)
LEUKOCYTE ESTERASE, URINE: ABNORMAL
LEUKOCYTE ESTERASE, URINE: NEGATIVE
LEUKOCYTE ESTERASE, URINE: NEGATIVE
LYMPHOCYTES ABSOLUTE: 0.22 E9/L (ref 1.5–4)
LYMPHOCYTES ABSOLUTE: 0.38 E9/L (ref 1.5–4)
LYMPHOCYTES ABSOLUTE: 0.63 E9/L (ref 1.5–4)
LYMPHOCYTES ABSOLUTE: 0.7 E9/L (ref 1.5–4)
LYMPHOCYTES ABSOLUTE: 0.72 E9/L (ref 1.5–4)
LYMPHOCYTES ABSOLUTE: 0.73 E9/L (ref 1.5–4)
LYMPHOCYTES ABSOLUTE: 0.73 E9/L (ref 1.5–4)
LYMPHOCYTES ABSOLUTE: 0.85 E9/L (ref 1.5–4)
LYMPHOCYTES ABSOLUTE: 0.9 E9/L (ref 1.5–4)
LYMPHOCYTES ABSOLUTE: 0.93 E9/L (ref 1.5–4)
LYMPHOCYTES ABSOLUTE: 1 E9/L (ref 1.5–4)
LYMPHOCYTES ABSOLUTE: 1.01 E9/L (ref 1.5–4)
LYMPHOCYTES ABSOLUTE: 1.05 E9/L (ref 1.5–4)
LYMPHOCYTES ABSOLUTE: 1.11 E9/L (ref 1.5–4)
LYMPHOCYTES ABSOLUTE: 1.14 E9/L (ref 1.5–4)
LYMPHOCYTES ABSOLUTE: 1.22 E9/L (ref 1.5–4)
LYMPHOCYTES ABSOLUTE: 1.28 E9/L (ref 1.5–4)
LYMPHOCYTES ABSOLUTE: 1.52 E9/L (ref 1.5–4)
LYMPHOCYTES ABSOLUTE: 1.52 E9/L (ref 1.5–4)
LYMPHOCYTES ABSOLUTE: 1.81 E9/L (ref 1.5–4)
LYMPHOCYTES ABSOLUTE: 2.03 E9/L (ref 1.5–4)
LYMPHOCYTES ABSOLUTE: 2.36 E9/L (ref 1.5–4)
LYMPHOCYTES ABSOLUTE: 2.5 E9/L (ref 1.5–4)
LYMPHOCYTES RELATIVE PERCENT: 0.9 % (ref 20–42)
LYMPHOCYTES RELATIVE PERCENT: 0.9 % (ref 20–42)
LYMPHOCYTES RELATIVE PERCENT: 1.8 % (ref 20–42)
LYMPHOCYTES RELATIVE PERCENT: 10.5 % (ref 20–42)
LYMPHOCYTES RELATIVE PERCENT: 12.3 % (ref 20–42)
LYMPHOCYTES RELATIVE PERCENT: 12.4 % (ref 20–42)
LYMPHOCYTES RELATIVE PERCENT: 16.2 % (ref 20–42)
LYMPHOCYTES RELATIVE PERCENT: 2.5 % (ref 20–42)
LYMPHOCYTES RELATIVE PERCENT: 2.6 % (ref 20–42)
LYMPHOCYTES RELATIVE PERCENT: 2.6 % (ref 20–42)
LYMPHOCYTES RELATIVE PERCENT: 21.6 % (ref 20–42)
LYMPHOCYTES RELATIVE PERCENT: 3.5 % (ref 20–42)
LYMPHOCYTES RELATIVE PERCENT: 3.6 % (ref 20–42)
LYMPHOCYTES RELATIVE PERCENT: 4 % (ref 20–42)
LYMPHOCYTES RELATIVE PERCENT: 4.3 % (ref 20–42)
LYMPHOCYTES RELATIVE PERCENT: 4.4 % (ref 20–42)
LYMPHOCYTES RELATIVE PERCENT: 5.2 % (ref 20–42)
LYMPHOCYTES RELATIVE PERCENT: 6.1 % (ref 20–42)
LYMPHOCYTES RELATIVE PERCENT: 7 % (ref 20–42)
LYMPHOCYTES RELATIVE PERCENT: 8.4 % (ref 20–42)
LYMPHOCYTES RELATIVE PERCENT: 8.7 % (ref 20–42)
LYMPHOCYTES RELATIVE PERCENT: 8.7 % (ref 20–42)
LYMPHOCYTES RELATIVE PERCENT: 9.6 % (ref 20–42)
Lab: ABNORMAL
MAGNESIUM: 1.9 MG/DL (ref 1.6–2.6)
MAGNESIUM: 2 MG/DL (ref 1.6–2.6)
MAGNESIUM: 2 MG/DL (ref 1.6–2.6)
MAGNESIUM: 2.1 MG/DL (ref 1.6–2.6)
MAGNESIUM: 2.2 MG/DL (ref 1.6–2.6)
MAGNESIUM: 2.3 MG/DL (ref 1.6–2.6)
MAGNESIUM: 2.3 MG/DL (ref 1.6–2.6)
MAGNESIUM: 2.4 MG/DL (ref 1.6–2.6)
MAGNESIUM: 2.5 MG/DL (ref 1.6–2.6)
MAGNESIUM: 2.6 MG/DL (ref 1.6–2.6)
MCH RBC QN AUTO: 26.8 PG (ref 26–35)
MCH RBC QN AUTO: 27.1 PG (ref 26–35)
MCH RBC QN AUTO: 27.2 PG (ref 26–35)
MCH RBC QN AUTO: 27.3 PG (ref 26–35)
MCH RBC QN AUTO: 27.4 PG (ref 26–35)
MCH RBC QN AUTO: 27.4 PG (ref 26–35)
MCH RBC QN AUTO: 27.5 PG (ref 26–35)
MCH RBC QN AUTO: 27.5 PG (ref 26–35)
MCH RBC QN AUTO: 27.6 PG (ref 26–35)
MCH RBC QN AUTO: 27.8 PG (ref 26–35)
MCH RBC QN AUTO: 27.9 PG (ref 26–35)
MCH RBC QN AUTO: 27.9 PG (ref 26–35)
MCH RBC QN AUTO: 28 PG (ref 26–35)
MCH RBC QN AUTO: 28 PG (ref 26–35)
MCH RBC QN AUTO: 28.1 PG (ref 26–35)
MCH RBC QN AUTO: 28.1 PG (ref 26–35)
MCH RBC QN AUTO: 28.2 PG (ref 26–35)
MCH RBC QN AUTO: 28.2 PG (ref 26–35)
MCH RBC QN AUTO: 28.3 PG (ref 26–35)
MCH RBC QN AUTO: 28.3 PG (ref 26–35)
MCHC RBC AUTO-ENTMCNC: 30.3 % (ref 32–34.5)
MCHC RBC AUTO-ENTMCNC: 30.6 % (ref 32–34.5)
MCHC RBC AUTO-ENTMCNC: 30.7 % (ref 32–34.5)
MCHC RBC AUTO-ENTMCNC: 31 % (ref 32–34.5)
MCHC RBC AUTO-ENTMCNC: 31.3 % (ref 32–34.5)
MCHC RBC AUTO-ENTMCNC: 31.4 % (ref 32–34.5)
MCHC RBC AUTO-ENTMCNC: 31.5 % (ref 32–34.5)
MCHC RBC AUTO-ENTMCNC: 31.6 % (ref 32–34.5)
MCHC RBC AUTO-ENTMCNC: 31.7 % (ref 32–34.5)
MCHC RBC AUTO-ENTMCNC: 32.1 % (ref 32–34.5)
MCHC RBC AUTO-ENTMCNC: 32.5 % (ref 32–34.5)
MCHC RBC AUTO-ENTMCNC: 32.7 % (ref 32–34.5)
MCHC RBC AUTO-ENTMCNC: 32.8 % (ref 32–34.5)
MCHC RBC AUTO-ENTMCNC: 33 % (ref 32–34.5)
MCHC RBC AUTO-ENTMCNC: 33 % (ref 32–34.5)
MCHC RBC AUTO-ENTMCNC: 33.5 % (ref 32–34.5)
MCHC RBC AUTO-ENTMCNC: 33.7 % (ref 32–34.5)
MCV RBC AUTO: 82.5 FL (ref 80–99.9)
MCV RBC AUTO: 83.3 FL (ref 80–99.9)
MCV RBC AUTO: 83.4 FL (ref 80–99.9)
MCV RBC AUTO: 84.3 FL (ref 80–99.9)
MCV RBC AUTO: 84.4 FL (ref 80–99.9)
MCV RBC AUTO: 85.3 FL (ref 80–99.9)
MCV RBC AUTO: 85.4 FL (ref 80–99.9)
MCV RBC AUTO: 85.6 FL (ref 80–99.9)
MCV RBC AUTO: 85.7 FL (ref 80–99.9)
MCV RBC AUTO: 86.6 FL (ref 80–99.9)
MCV RBC AUTO: 87.3 FL (ref 80–99.9)
MCV RBC AUTO: 87.4 FL (ref 80–99.9)
MCV RBC AUTO: 87.7 FL (ref 80–99.9)
MCV RBC AUTO: 88.2 FL (ref 80–99.9)
MCV RBC AUTO: 88.3 FL (ref 80–99.9)
MCV RBC AUTO: 88.5 FL (ref 80–99.9)
MCV RBC AUTO: 88.6 FL (ref 80–99.9)
MCV RBC AUTO: 88.7 FL (ref 80–99.9)
MCV RBC AUTO: 89 FL (ref 80–99.9)
MCV RBC AUTO: 89.3 FL (ref 80–99.9)
MCV RBC AUTO: 90.3 FL (ref 80–99.9)
MCV RBC AUTO: 91 FL (ref 80–99.9)
MCV RBC AUTO: 91.5 FL (ref 80–99.9)
METAMYELOCYTES RELATIVE PERCENT: 0.9 % (ref 0–1)
METAMYELOCYTES RELATIVE PERCENT: 0.9 % (ref 0–1)
METAMYELOCYTES RELATIVE PERCENT: 2.6 % (ref 0–1)
METER GLUCOSE: 100 MG/DL (ref 74–99)
METER GLUCOSE: 107 MG/DL (ref 74–99)
METER GLUCOSE: 113 MG/DL (ref 74–99)
METER GLUCOSE: 113 MG/DL (ref 74–99)
METER GLUCOSE: 115 MG/DL (ref 74–99)
METER GLUCOSE: 115 MG/DL (ref 74–99)
METER GLUCOSE: 120 MG/DL (ref 74–99)
METER GLUCOSE: 121 MG/DL (ref 74–99)
METER GLUCOSE: 121 MG/DL (ref 74–99)
METER GLUCOSE: 123 MG/DL (ref 74–99)
METER GLUCOSE: 123 MG/DL (ref 74–99)
METER GLUCOSE: 128 MG/DL (ref 74–99)
METER GLUCOSE: 131 MG/DL (ref 74–99)
METER GLUCOSE: 131 MG/DL (ref 74–99)
METER GLUCOSE: 135 MG/DL (ref 74–99)
METER GLUCOSE: 135 MG/DL (ref 74–99)
METER GLUCOSE: 136 MG/DL (ref 74–99)
METER GLUCOSE: 138 MG/DL (ref 74–99)
METER GLUCOSE: 139 MG/DL (ref 74–99)
METER GLUCOSE: 140 MG/DL (ref 74–99)
METER GLUCOSE: 140 MG/DL (ref 74–99)
METER GLUCOSE: 141 MG/DL (ref 74–99)
METER GLUCOSE: 146 MG/DL (ref 74–99)
METER GLUCOSE: 147 MG/DL (ref 74–99)
METER GLUCOSE: 147 MG/DL (ref 74–99)
METER GLUCOSE: 148 MG/DL (ref 74–99)
METER GLUCOSE: 149 MG/DL (ref 74–99)
METER GLUCOSE: 150 MG/DL (ref 74–99)
METER GLUCOSE: 152 MG/DL (ref 74–99)
METER GLUCOSE: 152 MG/DL (ref 74–99)
METER GLUCOSE: 154 MG/DL (ref 74–99)
METER GLUCOSE: 156 MG/DL (ref 74–99)
METER GLUCOSE: 156 MG/DL (ref 74–99)
METER GLUCOSE: 159 MG/DL (ref 74–99)
METER GLUCOSE: 159 MG/DL (ref 74–99)
METER GLUCOSE: 160 MG/DL (ref 74–99)
METER GLUCOSE: 161 MG/DL (ref 74–99)
METER GLUCOSE: 162 MG/DL (ref 74–99)
METER GLUCOSE: 163 MG/DL (ref 74–99)
METER GLUCOSE: 164 MG/DL (ref 74–99)
METER GLUCOSE: 164 MG/DL (ref 74–99)
METER GLUCOSE: 166 MG/DL (ref 74–99)
METER GLUCOSE: 166 MG/DL (ref 74–99)
METER GLUCOSE: 167 MG/DL (ref 74–99)
METER GLUCOSE: 169 MG/DL (ref 74–99)
METER GLUCOSE: 170 MG/DL (ref 74–99)
METER GLUCOSE: 170 MG/DL (ref 74–99)
METER GLUCOSE: 171 MG/DL (ref 74–99)
METER GLUCOSE: 172 MG/DL (ref 74–99)
METER GLUCOSE: 173 MG/DL (ref 74–99)
METER GLUCOSE: 173 MG/DL (ref 74–99)
METER GLUCOSE: 174 MG/DL (ref 74–99)
METER GLUCOSE: 176 MG/DL (ref 74–99)
METER GLUCOSE: 178 MG/DL (ref 74–99)
METER GLUCOSE: 178 MG/DL (ref 74–99)
METER GLUCOSE: 181 MG/DL (ref 74–99)
METER GLUCOSE: 185 MG/DL (ref 74–99)
METER GLUCOSE: 186 MG/DL (ref 74–99)
METER GLUCOSE: 189 MG/DL (ref 74–99)
METER GLUCOSE: 192 MG/DL (ref 74–99)
METER GLUCOSE: 193 MG/DL (ref 74–99)
METER GLUCOSE: 194 MG/DL (ref 74–99)
METER GLUCOSE: 195 MG/DL (ref 74–99)
METER GLUCOSE: 196 MG/DL (ref 74–99)
METER GLUCOSE: 199 MG/DL (ref 74–99)
METER GLUCOSE: 206 MG/DL (ref 74–99)
METER GLUCOSE: 208 MG/DL (ref 74–99)
METER GLUCOSE: 212 MG/DL (ref 74–99)
METER GLUCOSE: 222 MG/DL (ref 74–99)
METER GLUCOSE: 225 MG/DL (ref 74–99)
METER GLUCOSE: 225 MG/DL (ref 74–99)
METER GLUCOSE: 226 MG/DL (ref 74–99)
METER GLUCOSE: 232 MG/DL (ref 74–99)
METER GLUCOSE: 233 MG/DL (ref 74–99)
METER GLUCOSE: 246 MG/DL (ref 74–99)
METER GLUCOSE: 247 MG/DL (ref 74–99)
METER GLUCOSE: 256 MG/DL (ref 74–99)
METER GLUCOSE: 266 MG/DL (ref 74–99)
METER GLUCOSE: 266 MG/DL (ref 74–99)
METER GLUCOSE: 275 MG/DL (ref 74–99)
METER GLUCOSE: 290 MG/DL (ref 74–99)
METER GLUCOSE: 300 MG/DL (ref 74–99)
METER GLUCOSE: 334 MG/DL (ref 74–99)
METER GLUCOSE: 67 MG/DL (ref 74–99)
METER GLUCOSE: 72 MG/DL (ref 74–99)
METER GLUCOSE: 91 MG/DL (ref 74–99)
METER GLUCOSE: 96 MG/DL (ref 74–99)
METER GLUCOSE: 97 MG/DL (ref 74–99)
METER GLUCOSE: 99 MG/DL (ref 74–99)
METHB: 0.3 % (ref 0–1.5)
METHB: 0.4 % (ref 0–1.5)
METHB: 0.5 % (ref 0–1.5)
METHB: 0.6 % (ref 0–1.5)
METHB: 0.8 % (ref 0–1.5)
MODE: ABNORMAL
MODE: AC
MONOCYTES ABSOLUTE: 0 E9/L (ref 0.1–0.95)
MONOCYTES ABSOLUTE: 0.24 E9/L (ref 0.1–0.95)
MONOCYTES ABSOLUTE: 0.25 E9/L (ref 0.1–0.95)
MONOCYTES ABSOLUTE: 0.42 E9/L (ref 0.1–0.95)
MONOCYTES ABSOLUTE: 0.49 E9/L (ref 0.1–0.95)
MONOCYTES ABSOLUTE: 0.58 E9/L (ref 0.1–0.95)
MONOCYTES ABSOLUTE: 0.62 E9/L (ref 0.1–0.95)
MONOCYTES ABSOLUTE: 0.64 E9/L (ref 0.1–0.95)
MONOCYTES ABSOLUTE: 0.68 E9/L (ref 0.1–0.95)
MONOCYTES ABSOLUTE: 0.69 E9/L (ref 0.1–0.95)
MONOCYTES ABSOLUTE: 0.79 E9/L (ref 0.1–0.95)
MONOCYTES ABSOLUTE: 0.85 E9/L (ref 0.1–0.95)
MONOCYTES ABSOLUTE: 0.85 E9/L (ref 0.1–0.95)
MONOCYTES ABSOLUTE: 0.89 E9/L (ref 0.1–0.95)
MONOCYTES ABSOLUTE: 0.9 E9/L (ref 0.1–0.95)
MONOCYTES ABSOLUTE: 0.91 E9/L (ref 0.1–0.95)
MONOCYTES ABSOLUTE: 0.94 E9/L (ref 0.1–0.95)
MONOCYTES ABSOLUTE: 1.09 E9/L (ref 0.1–0.95)
MONOCYTES ABSOLUTE: 1.14 E9/L (ref 0.1–0.95)
MONOCYTES ABSOLUTE: 1.2 E9/L (ref 0.1–0.95)
MONOCYTES ABSOLUTE: 1.26 E9/L (ref 0.1–0.95)
MONOCYTES RELATIVE PERCENT: 1.3 % (ref 2–12)
MONOCYTES RELATIVE PERCENT: 1.7 % (ref 2–12)
MONOCYTES RELATIVE PERCENT: 1.8 % (ref 2–12)
MONOCYTES RELATIVE PERCENT: 1.8 % (ref 2–12)
MONOCYTES RELATIVE PERCENT: 2.2 % (ref 2–12)
MONOCYTES RELATIVE PERCENT: 2.5 % (ref 2–12)
MONOCYTES RELATIVE PERCENT: 2.6 % (ref 2–12)
MONOCYTES RELATIVE PERCENT: 2.8 % (ref 2–12)
MONOCYTES RELATIVE PERCENT: 3 % (ref 2–12)
MONOCYTES RELATIVE PERCENT: 3.4 % (ref 2–12)
MONOCYTES RELATIVE PERCENT: 3.5 % (ref 2–12)
MONOCYTES RELATIVE PERCENT: 3.5 % (ref 2–12)
MONOCYTES RELATIVE PERCENT: 3.8 % (ref 2–12)
MONOCYTES RELATIVE PERCENT: 4 % (ref 2–12)
MONOCYTES RELATIVE PERCENT: 4.2 % (ref 2–12)
MONOCYTES RELATIVE PERCENT: 5.8 % (ref 2–12)
MONOCYTES RELATIVE PERCENT: 6.2 % (ref 2–12)
MONOCYTES RELATIVE PERCENT: 6.4 % (ref 2–12)
MONOCYTES RELATIVE PERCENT: 6.4 % (ref 2–12)
MONOCYTES RELATIVE PERCENT: 7.5 % (ref 2–12)
MONOCYTES RELATIVE PERCENT: 7.7 % (ref 2–12)
MRSA CULTURE ONLY: NORMAL
MYELOCYTE PERCENT: 0.9 % (ref 0–0)
MYELOCYTE PERCENT: 1.7 % (ref 0–0)
MYELOCYTE PERCENT: 3.5 % (ref 0–0)
MYELOCYTE PERCENT: 4.4 % (ref 0–0)
NEUTROPHILS ABSOLUTE: 10.8 E9/L (ref 1.8–7.3)
NEUTROPHILS ABSOLUTE: 10.89 E9/L (ref 1.8–7.3)
NEUTROPHILS ABSOLUTE: 10.95 E9/L (ref 1.8–7.3)
NEUTROPHILS ABSOLUTE: 11.75 E9/L (ref 1.8–7.3)
NEUTROPHILS ABSOLUTE: 12.04 E9/L (ref 1.8–7.3)
NEUTROPHILS ABSOLUTE: 13.11 E9/L (ref 1.8–7.3)
NEUTROPHILS ABSOLUTE: 17.29 E9/L (ref 1.8–7.3)
NEUTROPHILS ABSOLUTE: 17.65 E9/L (ref 1.8–7.3)
NEUTROPHILS ABSOLUTE: 19.8 E9/L (ref 1.8–7.3)
NEUTROPHILS ABSOLUTE: 21.29 E9/L (ref 1.8–7.3)
NEUTROPHILS ABSOLUTE: 22.15 E9/L (ref 1.8–7.3)
NEUTROPHILS ABSOLUTE: 22.5 E9/L (ref 1.8–7.3)
NEUTROPHILS ABSOLUTE: 26.29 E9/L (ref 1.8–7.3)
NEUTROPHILS ABSOLUTE: 26.6 E9/L (ref 1.8–7.3)
NEUTROPHILS ABSOLUTE: 27.88 E9/L (ref 1.8–7.3)
NEUTROPHILS ABSOLUTE: 28.2 E9/L (ref 1.8–7.3)
NEUTROPHILS ABSOLUTE: 29.39 E9/L (ref 1.8–7.3)
NEUTROPHILS ABSOLUTE: 29.99 E9/L (ref 1.8–7.3)
NEUTROPHILS ABSOLUTE: 36.58 E9/L (ref 1.8–7.3)
NEUTROPHILS ABSOLUTE: 7.81 E9/L (ref 1.8–7.3)
NEUTROPHILS ABSOLUTE: 7.82 E9/L (ref 1.8–7.3)
NEUTROPHILS ABSOLUTE: 9.67 E9/L (ref 1.8–7.3)
NEUTROPHILS ABSOLUTE: 9.83 E9/L (ref 1.8–7.3)
NEUTROPHILS RELATIVE PERCENT: 67.6 % (ref 43–80)
NEUTROPHILS RELATIVE PERCENT: 74.3 % (ref 43–80)
NEUTROPHILS RELATIVE PERCENT: 78.3 % (ref 43–80)
NEUTROPHILS RELATIVE PERCENT: 80.8 % (ref 43–80)
NEUTROPHILS RELATIVE PERCENT: 80.9 % (ref 43–80)
NEUTROPHILS RELATIVE PERCENT: 82.8 % (ref 43–80)
NEUTROPHILS RELATIVE PERCENT: 82.9 % (ref 43–80)
NEUTROPHILS RELATIVE PERCENT: 87 % (ref 43–80)
NEUTROPHILS RELATIVE PERCENT: 87 % (ref 43–80)
NEUTROPHILS RELATIVE PERCENT: 87.4 % (ref 43–80)
NEUTROPHILS RELATIVE PERCENT: 88.6 % (ref 43–80)
NEUTROPHILS RELATIVE PERCENT: 89.8 % (ref 43–80)
NEUTROPHILS RELATIVE PERCENT: 90.4 % (ref 43–80)
NEUTROPHILS RELATIVE PERCENT: 91.1 % (ref 43–80)
NEUTROPHILS RELATIVE PERCENT: 91.3 % (ref 43–80)
NEUTROPHILS RELATIVE PERCENT: 91.3 % (ref 43–80)
NEUTROPHILS RELATIVE PERCENT: 91.4 % (ref 43–80)
NEUTROPHILS RELATIVE PERCENT: 92.1 % (ref 43–80)
NEUTROPHILS RELATIVE PERCENT: 92.2 % (ref 43–80)
NEUTROPHILS RELATIVE PERCENT: 93.8 % (ref 43–80)
NEUTROPHILS RELATIVE PERCENT: 94.8 % (ref 43–80)
NEUTROPHILS RELATIVE PERCENT: 96.5 % (ref 43–80)
NEUTROPHILS RELATIVE PERCENT: 98.3 % (ref 43–80)
NITRITE, URINE: NEGATIVE
NUCLEATED RED BLOOD CELLS: 0.9 /100 WBC
NUCLEATED RED BLOOD CELLS: 1.7 /100 WBC
NUCLEATED RED BLOOD CELLS: 1.8 /100 WBC
O2 SATURATION: 91.6 % (ref 92–98.5)
O2 SATURATION: 93.5 % (ref 92–98.5)
O2 SATURATION: 93.9 % (ref 92–98.5)
O2 SATURATION: 94.4 % (ref 92–98.5)
O2 SATURATION: 95 % (ref 92–98.5)
O2 SATURATION: 95.6 % (ref 92–98.5)
O2 SATURATION: 96 % (ref 92–98.5)
O2 SATURATION: 96.1 % (ref 92–98.5)
O2 SATURATION: 96.3 % (ref 92–98.5)
O2 SATURATION: 96.4 % (ref 92–98.5)
O2 SATURATION: 96.6 % (ref 92–98.5)
O2 SATURATION: 97 % (ref 92–98.5)
O2 SATURATION: 97.5 % (ref 92–98.5)
O2 SATURATION: 98 % (ref 92–98.5)
O2 SATURATION: 98 % (ref 92–98.5)
O2 SATURATION: 98.1 % (ref 92–98.5)
O2 SATURATION: 98.5 % (ref 92–98.5)
O2 SATURATION: 99.7 % (ref 92–98.5)
O2HB: 91 % (ref 94–97)
O2HB: 92.7 % (ref 94–97)
O2HB: 93 % (ref 94–97)
O2HB: 93.6 % (ref 94–97)
O2HB: 93.9 % (ref 94–97)
O2HB: 94.6 % (ref 94–97)
O2HB: 94.8 % (ref 94–97)
O2HB: 95.3 % (ref 94–97)
O2HB: 95.4 % (ref 94–97)
O2HB: 95.6 % (ref 94–97)
O2HB: 95.9 % (ref 94–97)
O2HB: 96.2 % (ref 94–97)
O2HB: 96.4 % (ref 94–97)
O2HB: 96.7 % (ref 94–97)
O2HB: 96.7 % (ref 94–97)
O2HB: 96.8 % (ref 94–97)
O2HB: 97.7 % (ref 94–97)
OPERATOR ID: 1394
OPERATOR ID: 1394
OPERATOR ID: 1632
OPERATOR ID: 1874
OPERATOR ID: 2577
OPERATOR ID: 2577
OPERATOR ID: 2863
OPERATOR ID: 421
OPERATOR ID: ABNORMAL
ORGANISM: ABNORMAL
OVALOCYTES: ABNORMAL
PATIENT TEMP: 37 C
PCO2 ARTERIAL: 41.2 MMHG (ref 35–45)
PCO2: 25.6 MMHG (ref 35–45)
PCO2: 26.5 MMHG (ref 35–45)
PCO2: 30.8 MMHG (ref 35–45)
PCO2: 32.7 MMHG (ref 35–45)
PCO2: 32.9 MMHG (ref 35–45)
PCO2: 33.1 MMHG (ref 35–45)
PCO2: 33.9 MMHG (ref 35–45)
PCO2: 34.5 MMHG (ref 35–45)
PCO2: 34.9 MMHG (ref 35–45)
PCO2: 36.4 MMHG (ref 35–45)
PCO2: 36.7 MMHG (ref 35–45)
PCO2: 39.5 MMHG (ref 35–45)
PCO2: 40.8 MMHG (ref 35–45)
PCO2: 41.1 MMHG (ref 35–45)
PCO2: 42.1 MMHG (ref 35–45)
PCO2: 44.8 MMHG (ref 35–45)
PCO2: 46.4 MMHG (ref 35–45)
PDW BLD-RTO: 15.2 FL (ref 11.5–15)
PDW BLD-RTO: 15.3 FL (ref 11.5–15)
PDW BLD-RTO: 15.6 FL (ref 11.5–15)
PDW BLD-RTO: 15.9 FL (ref 11.5–15)
PDW BLD-RTO: 16.1 FL (ref 11.5–15)
PDW BLD-RTO: 16.2 FL (ref 11.5–15)
PDW BLD-RTO: 16.3 FL (ref 11.5–15)
PDW BLD-RTO: 16.4 FL (ref 11.5–15)
PDW BLD-RTO: 16.4 FL (ref 11.5–15)
PDW BLD-RTO: 16.5 FL (ref 11.5–15)
PDW BLD-RTO: 16.6 FL (ref 11.5–15)
PDW BLD-RTO: 16.8 FL (ref 11.5–15)
PDW BLD-RTO: 16.8 FL (ref 11.5–15)
PDW BLD-RTO: 16.9 FL (ref 11.5–15)
PDW BLD-RTO: 17 FL (ref 11.5–15)
PDW BLD-RTO: 17.1 FL (ref 11.5–15)
PEEP/CPAP: 12 CMH2O
PEEP/CPAP: 5 CMH2O
PEEP/CPAP: 5 CMH2O
PEEP/CPAP: 8 CMH2O
PFO2: 1.26 MMHG/%
PFO2: 1.29 MMHG/%
PFO2: 1.66 MMHG/%
PFO2: 1.72 MMHG/%
PFO2: 1.79 MMHG/%
PFO2: 1.83 MMHG/%
PFO2: 1.84 MMHG/%
PFO2: 1.85 MMHG/%
PFO2: 1.97 MMHG/%
PFO2: 2.57 MMHG/%
PFO2: 2.7 MMHG/%
PFO2: 3.14 MMHG/%
PH BLOOD GAS: 7.33 (ref 7.35–7.45)
PH BLOOD GAS: 7.46 (ref 7.35–7.45)
PH BLOOD GAS: 7.47 (ref 7.35–7.45)
PH BLOOD GAS: 7.48 (ref 7.35–7.45)
PH BLOOD GAS: 7.5 (ref 7.35–7.45)
PH BLOOD GAS: 7.5 (ref 7.35–7.45)
PH BLOOD GAS: 7.51 (ref 7.35–7.45)
PH BLOOD GAS: 7.51 (ref 7.35–7.45)
PH BLOOD GAS: 7.52 (ref 7.35–7.45)
PH BLOOD GAS: 7.53 (ref 7.35–7.45)
PH BLOOD GAS: 7.55 (ref 7.35–7.45)
PH BLOOD GAS: 7.56 (ref 7.35–7.45)
PH BLOOD GAS: 7.57 (ref 7.35–7.45)
PH UA: 5.5 (ref 5–9)
PH UA: 7 (ref 5–9)
PH UA: 7 (ref 5–9)
PHOSPHORUS: 1.9 MG/DL (ref 2.5–4.5)
PHOSPHORUS: 2.5 MG/DL (ref 2.5–4.5)
PHOSPHORUS: 2.5 MG/DL (ref 2.5–4.5)
PHOSPHORUS: 3 MG/DL (ref 2.5–4.5)
PHOSPHORUS: 3 MG/DL (ref 2.5–4.5)
PHOSPHORUS: 3.2 MG/DL (ref 2.5–4.5)
PHOSPHORUS: 3.3 MG/DL (ref 2.5–4.5)
PHOSPHORUS: 3.4 MG/DL (ref 2.5–4.5)
PHOSPHORUS: 3.5 MG/DL (ref 2.5–4.5)
PHOSPHORUS: 3.6 MG/DL (ref 2.5–4.5)
PHOSPHORUS: 3.9 MG/DL (ref 2.5–4.5)
PHOSPHORUS: 4 MG/DL (ref 2.5–4.5)
PHOSPHORUS: 4 MG/DL (ref 2.5–4.5)
PHOSPHORUS: 4.4 MG/DL (ref 2.5–4.5)
PLATELET # BLD: 219 E9/L (ref 130–450)
PLATELET # BLD: 221 E9/L (ref 130–450)
PLATELET # BLD: 231 E9/L (ref 130–450)
PLATELET # BLD: 240 E9/L (ref 130–450)
PLATELET # BLD: 241 E9/L (ref 130–450)
PLATELET # BLD: 245 E9/L (ref 130–450)
PLATELET # BLD: 252 E9/L (ref 130–450)
PLATELET # BLD: 255 E9/L (ref 130–450)
PLATELET # BLD: 255 E9/L (ref 130–450)
PLATELET # BLD: 260 E9/L (ref 130–450)
PLATELET # BLD: 282 E9/L (ref 130–450)
PLATELET # BLD: 315 E9/L (ref 130–450)
PLATELET # BLD: 324 E9/L (ref 130–450)
PLATELET # BLD: 328 E9/L (ref 130–450)
PLATELET # BLD: 347 E9/L (ref 130–450)
PLATELET # BLD: 348 E9/L (ref 130–450)
PLATELET # BLD: 350 E9/L (ref 130–450)
PLATELET # BLD: 352 E9/L (ref 130–450)
PLATELET # BLD: 356 E9/L (ref 130–450)
PLATELET # BLD: 356 E9/L (ref 130–450)
PLATELET # BLD: 370 E9/L (ref 130–450)
PLATELET # BLD: 392 E9/L (ref 130–450)
PLATELET # BLD: 424 E9/L (ref 130–450)
PMV BLD AUTO: 10 FL (ref 7–12)
PMV BLD AUTO: 10 FL (ref 7–12)
PMV BLD AUTO: 10.1 FL (ref 7–12)
PMV BLD AUTO: 10.2 FL (ref 7–12)
PMV BLD AUTO: 10.3 FL (ref 7–12)
PMV BLD AUTO: 10.4 FL (ref 7–12)
PMV BLD AUTO: 10.4 FL (ref 7–12)
PMV BLD AUTO: 10.5 FL (ref 7–12)
PMV BLD AUTO: 10.6 FL (ref 7–12)
PMV BLD AUTO: 10.8 FL (ref 7–12)
PMV BLD AUTO: 9.3 FL (ref 7–12)
PMV BLD AUTO: 9.5 FL (ref 7–12)
PMV BLD AUTO: 9.7 FL (ref 7–12)
PMV BLD AUTO: 9.7 FL (ref 7–12)
PMV BLD AUTO: 9.9 FL (ref 7–12)
PMV BLD AUTO: 9.9 FL (ref 7–12)
PO2 ARTERIAL: 214.9 MMHG (ref 60–80)
PO2: 100.5 MMHG (ref 75–100)
PO2: 110 MMHG (ref 75–100)
PO2: 125.7 MMHG (ref 75–100)
PO2: 128.7 MMHG (ref 75–100)
PO2: 134.8 MMHG (ref 75–100)
PO2: 183.8 MMHG (ref 75–100)
PO2: 63.5 MMHG (ref 75–100)
PO2: 69 MMHG (ref 75–100)
PO2: 70.9 MMHG (ref 75–100)
PO2: 71.6 MMHG (ref 75–100)
PO2: 78.7 MMHG (ref 75–100)
PO2: 86.1 MMHG (ref 75–100)
PO2: 86.1 MMHG (ref 75–100)
PO2: 90.4 MMHG (ref 75–100)
PO2: 91.2 MMHG (ref 75–100)
PO2: 92.5 MMHG (ref 75–100)
PO2: 99.8 MMHG (ref 75–100)
POIKILOCYTES: ABNORMAL
POLYCHROMASIA: ABNORMAL
POTASSIUM REFLEX MAGNESIUM: 2.9 MMOL/L (ref 3.5–5)
POTASSIUM REFLEX MAGNESIUM: 3 MMOL/L (ref 3.5–5)
POTASSIUM REFLEX MAGNESIUM: 3.1 MMOL/L (ref 3.5–5)
POTASSIUM REFLEX MAGNESIUM: 3.3 MMOL/L (ref 3.5–5)
POTASSIUM REFLEX MAGNESIUM: 3.4 MMOL/L (ref 3.5–5)
POTASSIUM REFLEX MAGNESIUM: 3.4 MMOL/L (ref 3.5–5)
POTASSIUM REFLEX MAGNESIUM: 3.6 MMOL/L (ref 3.5–5)
POTASSIUM REFLEX MAGNESIUM: 3.7 MMOL/L (ref 3.5–5)
POTASSIUM REFLEX MAGNESIUM: 3.8 MMOL/L (ref 3.5–5)
POTASSIUM REFLEX MAGNESIUM: 3.8 MMOL/L (ref 3.5–5)
POTASSIUM REFLEX MAGNESIUM: 3.9 MMOL/L (ref 3.5–5)
POTASSIUM REFLEX MAGNESIUM: 3.9 MMOL/L (ref 3.5–5)
POTASSIUM REFLEX MAGNESIUM: 4 MMOL/L (ref 3.5–5)
POTASSIUM REFLEX MAGNESIUM: 4.1 MMOL/L (ref 3.5–5)
POTASSIUM SERPL-SCNC: 3.12 MMOL/L (ref 3.5–5)
POTASSIUM SERPL-SCNC: 3.3 MMOL/L (ref 3.5–5)
POTASSIUM SERPL-SCNC: 3.4 MMOL/L (ref 3.5–5.5)
POTASSIUM SERPL-SCNC: 3.6 MMOL/L (ref 3.5–5)
POTASSIUM SERPL-SCNC: 4.4 MMOL/L (ref 3.5–5)
POTASSIUM SERPL-SCNC: 4.6 MMOL/L (ref 3.5–5)
PROCALCITONIN: 0.33 NG/ML (ref 0–0.08)
PROCALCITONIN: 0.42 NG/ML (ref 0–0.08)
PROTEIN UA: 100 MG/DL
PROTEIN UA: 30 MG/DL
PROTEIN UA: 30 MG/DL
PROTHROMBIN TIME: 13.1 SEC (ref 9.3–12.4)
PROTHROMBIN TIME: 13.4 SEC (ref 9.3–12.4)
PS: 10 CMH20
PS: 10 CMH20
RBC # BLD: 2.35 E12/L (ref 3.8–5.8)
RBC # BLD: 2.35 E12/L (ref 3.8–5.8)
RBC # BLD: 2.48 E12/L (ref 3.8–5.8)
RBC # BLD: 2.56 E12/L (ref 3.8–5.8)
RBC # BLD: 2.62 E12/L (ref 3.8–5.8)
RBC # BLD: 2.62 E12/L (ref 3.8–5.8)
RBC # BLD: 2.63 E12/L (ref 3.8–5.8)
RBC # BLD: 2.66 E12/L (ref 3.8–5.8)
RBC # BLD: 2.74 E12/L (ref 3.8–5.8)
RBC # BLD: 3 E12/L (ref 3.8–5.8)
RBC # BLD: 3.06 E12/L (ref 3.8–5.8)
RBC # BLD: 3.08 E12/L (ref 3.8–5.8)
RBC # BLD: 3.33 E12/L (ref 3.8–5.8)
RBC # BLD: 3.61 E12/L (ref 3.8–5.8)
RBC # BLD: 3.68 E12/L (ref 3.8–5.8)
RBC # BLD: 3.99 E12/L (ref 3.8–5.8)
RBC # BLD: 4.09 E12/L (ref 3.8–5.8)
RBC # BLD: 4.33 E12/L (ref 3.8–5.8)
RBC # BLD: 4.35 E12/L (ref 3.8–5.8)
RBC # BLD: 4.72 E12/L (ref 3.8–5.8)
RBC # BLD: 5.1 E12/L (ref 3.8–5.8)
RBC # BLD: 5.3 E12/L (ref 3.8–5.8)
RBC # BLD: 5.33 E12/L (ref 3.8–5.8)
RBC UA: ABNORMAL /HPF (ref 0–2)
RI(T): 0.87
RI(T): 1.28
RI(T): 1.35
RI(T): 1.82
RI(T): 2.17
RI(T): 2.22
RI(T): 2.3
RI(T): 2.46
RI(T): 2.53
RI(T): 2.73
RI(T): 3.94
RI(T): 4.15
RR MECHANICAL: 16 B/MIN
SARS-COV-2, NAAT: NOT DETECTED
SCHISTOCYTES: ABNORMAL
SCHISTOCYTES: ABNORMAL
SMEAR, RESPIRATORY: ABNORMAL
SODIUM BLD-SCNC: 138 MMOL/L (ref 132–146)
SODIUM BLD-SCNC: 139 MMOL/L (ref 132–146)
SODIUM BLD-SCNC: 140 MMOL/L (ref 132–146)
SODIUM BLD-SCNC: 141 MMOL/L (ref 132–146)
SODIUM BLD-SCNC: 141 MMOL/L (ref 132–146)
SODIUM BLD-SCNC: 142 MMOL/L (ref 132–146)
SODIUM BLD-SCNC: 144 MMOL/L (ref 132–146)
SODIUM BLD-SCNC: 145 MMOL/L (ref 132–146)
SODIUM BLD-SCNC: 146 MMOL/L (ref 132–146)
SODIUM BLD-SCNC: 147 MMOL/L (ref 132–146)
SODIUM BLD-SCNC: 148 MMOL/L (ref 132–146)
SODIUM BLD-SCNC: 152 MMOL/L (ref 132–146)
SODIUM BLD-SCNC: 152 MMOL/L (ref 132–146)
SODIUM BLD-SCNC: 158 MMOL/L (ref 132–146)
SOURCE, BLOOD GAS: ABNORMAL
SPECIFIC GRAVITY UA: 1.01 (ref 1–1.03)
SPECIFIC GRAVITY UA: 1.02 (ref 1–1.03)
SPECIFIC GRAVITY UA: 1.02 (ref 1–1.03)
SPHEROCYTES: ABNORMAL
TARGET CELLS: ABNORMAL
TARGET CELLS: ABNORMAL
TEAR DROP CELLS: ABNORMAL
THB: 11 G/DL (ref 11.5–16.5)
THB: 12.4 G/DL (ref 11.5–16.5)
THB: 7 G/DL (ref 11.5–16.5)
THB: 7.3 G/DL (ref 11.5–16.5)
THB: 7.9 G/DL (ref 11.5–16.5)
THB: 7.9 G/DL (ref 11.5–16.5)
THB: 8 G/DL (ref 11.5–16.5)
THB: 8.1 G/DL (ref 11.5–16.5)
THB: 8.2 G/DL (ref 11.5–16.5)
THB: 8.3 G/DL (ref 11.5–16.5)
THB: 8.4 G/DL (ref 11.5–16.5)
THB: 8.4 G/DL (ref 11.5–16.5)
THB: 8.5 G/DL (ref 11.5–16.5)
THB: 8.9 G/DL (ref 11.5–16.5)
THB: 9 G/DL (ref 11.5–16.5)
THB: 9.3 G/DL (ref 11.5–16.5)
THB: 9.3 G/DL (ref 11.5–16.5)
TIME ANALYZED: 1716
TIME ANALYZED: 2028
TIME ANALYZED: 2053
TIME ANALYZED: 213
TIME ANALYZED: 2201
TIME ANALYZED: 2242
TIME ANALYZED: 4
TIME ANALYZED: 428
TIME ANALYZED: 457
TIME ANALYZED: 503
TIME ANALYZED: 505
TIME ANALYZED: 509
TIME ANALYZED: 511
TIME ANALYZED: 532
TIME ANALYZED: 554
TIME ANALYZED: 618
TIME ANALYZED: 724
TROPONIN: <0.01 NG/ML (ref 0–0.03)
URINE CULTURE, ROUTINE: NORMAL
UROBILINOGEN, URINE: 0.2 E.U./DL
UROBILINOGEN, URINE: 0.2 E.U./DL
UROBILINOGEN, URINE: 1 E.U./DL
VALPROIC ACID LEVEL: 14 MCG/ML (ref 50–100)
VANCOMYCIN TROUGH: 9 MCG/ML (ref 5–16)
VT MECHANICAL: 400 ML
VT MECHANICAL: 450 ML
WBC # BLD: 11.6 E9/L (ref 4.5–11.5)
WBC # BLD: 11.9 E9/L (ref 4.5–11.5)
WBC # BLD: 12.1 E9/L (ref 4.5–11.5)
WBC # BLD: 12.3 E9/L (ref 4.5–11.5)
WBC # BLD: 12.4 E9/L (ref 4.5–11.5)
WBC # BLD: 14.5 E9/L (ref 4.5–11.5)
WBC # BLD: 15 E9/L (ref 4.5–11.5)
WBC # BLD: 18.2 E9/L (ref 4.5–11.5)
WBC # BLD: 19.4 E9/L (ref 4.5–11.5)
WBC # BLD: 21.5 E9/L (ref 4.5–11.5)
WBC # BLD: 22.5 E9/L (ref 4.5–11.5)
WBC # BLD: 23.2 E9/L (ref 4.5–11.5)
WBC # BLD: 24.7 E9/L (ref 4.5–11.5)
WBC # BLD: 28.3 E9/L (ref 4.5–11.5)
WBC # BLD: 28.7 E9/L (ref 4.5–11.5)
WBC # BLD: 30 E9/L (ref 4.5–11.5)
WBC # BLD: 30.3 E9/L (ref 4.5–11.5)
WBC # BLD: 31.6 E9/L (ref 4.5–11.5)
WBC # BLD: 31.9 E9/L (ref 4.5–11.5)
WBC # BLD: 38.1 E9/L (ref 4.5–11.5)
WBC # BLD: 9.7 E9/L (ref 4.5–11.5)
WBC UA: ABNORMAL /HPF (ref 0–5)

## 2021-01-01 PROCEDURE — 84100 ASSAY OF PHOSPHORUS: CPT

## 2021-01-01 PROCEDURE — 36415 COLL VENOUS BLD VENIPUNCTURE: CPT

## 2021-01-01 PROCEDURE — 80048 BASIC METABOLIC PNL TOTAL CA: CPT

## 2021-01-01 PROCEDURE — 6360000002 HC RX W HCPCS: Performed by: SURGERY

## 2021-01-01 PROCEDURE — 99999 PR OFFICE/OUTPT VISIT,PROCEDURE ONLY: CPT | Performed by: RADIOLOGY

## 2021-01-01 PROCEDURE — 92526 ORAL FUNCTION THERAPY: CPT

## 2021-01-01 PROCEDURE — 6370000000 HC RX 637 (ALT 250 FOR IP): Performed by: STUDENT IN AN ORGANIZED HEALTH CARE EDUCATION/TRAINING PROGRAM

## 2021-01-01 PROCEDURE — 1200000000 HC SEMI PRIVATE

## 2021-01-01 PROCEDURE — 6370000000 HC RX 637 (ALT 250 FOR IP): Performed by: FAMILY MEDICINE

## 2021-01-01 PROCEDURE — 2580000003 HC RX 258: Performed by: HOSPITALIST

## 2021-01-01 PROCEDURE — 2580000003 HC RX 258: Performed by: INTERNAL MEDICINE

## 2021-01-01 PROCEDURE — 99291 CRITICAL CARE FIRST HOUR: CPT | Performed by: SURGERY

## 2021-01-01 PROCEDURE — 82962 GLUCOSE BLOOD TEST: CPT

## 2021-01-01 PROCEDURE — 88342 IMHCHEM/IMCYTCHM 1ST ANTB: CPT

## 2021-01-01 PROCEDURE — 2580000003 HC RX 258: Performed by: NEUROLOGICAL SURGERY

## 2021-01-01 PROCEDURE — 2500000003 HC RX 250 WO HCPCS: Performed by: NURSE PRACTITIONER

## 2021-01-01 PROCEDURE — 6360000002 HC RX W HCPCS: Performed by: NEUROLOGICAL SURGERY

## 2021-01-01 PROCEDURE — 6370000000 HC RX 637 (ALT 250 FOR IP): Performed by: NURSE PRACTITIONER

## 2021-01-01 PROCEDURE — 6360000002 HC RX W HCPCS: Performed by: NURSE PRACTITIONER

## 2021-01-01 PROCEDURE — 2500000003 HC RX 250 WO HCPCS: Performed by: INTERNAL MEDICINE

## 2021-01-01 PROCEDURE — 2500000003 HC RX 250 WO HCPCS: Performed by: CLINICAL NURSE SPECIALIST

## 2021-01-01 PROCEDURE — 6370000000 HC RX 637 (ALT 250 FOR IP): Performed by: CLINICAL NURSE SPECIALIST

## 2021-01-01 PROCEDURE — 93010 ELECTROCARDIOGRAM REPORT: CPT | Performed by: INTERNAL MEDICINE

## 2021-01-01 PROCEDURE — 51702 INSERT TEMP BLADDER CATH: CPT

## 2021-01-01 PROCEDURE — 6370000000 HC RX 637 (ALT 250 FOR IP): Performed by: NEUROLOGICAL SURGERY

## 2021-01-01 PROCEDURE — 99221 1ST HOSP IP/OBS SF/LOW 40: CPT | Performed by: RADIOLOGY

## 2021-01-01 PROCEDURE — 6360000002 HC RX W HCPCS: Performed by: INTERNAL MEDICINE

## 2021-01-01 PROCEDURE — 99231 SBSQ HOSP IP/OBS SF/LOW 25: CPT | Performed by: NURSE PRACTITIONER

## 2021-01-01 PROCEDURE — 6360000002 HC RX W HCPCS: Performed by: CLINICAL NURSE SPECIALIST

## 2021-01-01 PROCEDURE — P9016 RBC LEUKOCYTES REDUCED: HCPCS

## 2021-01-01 PROCEDURE — 85025 COMPLETE CBC W/AUTO DIFF WBC: CPT

## 2021-01-01 PROCEDURE — 82805 BLOOD GASES W/O2 SATURATION: CPT

## 2021-01-01 PROCEDURE — 2700000000 HC OXYGEN THERAPY PER DAY

## 2021-01-01 PROCEDURE — 71045 X-RAY EXAM CHEST 1 VIEW: CPT

## 2021-01-01 PROCEDURE — 31500 INSERT EMERGENCY AIRWAY: CPT

## 2021-01-01 PROCEDURE — 82330 ASSAY OF CALCIUM: CPT

## 2021-01-01 PROCEDURE — 94660 CPAP INITIATION&MGMT: CPT

## 2021-01-01 PROCEDURE — 86901 BLOOD TYPING SEROLOGIC RH(D): CPT

## 2021-01-01 PROCEDURE — 2580000003 HC RX 258: Performed by: RADIOLOGY

## 2021-01-01 PROCEDURE — 2000000000 HC ICU R&B

## 2021-01-01 PROCEDURE — 86850 RBC ANTIBODY SCREEN: CPT

## 2021-01-01 PROCEDURE — 83735 ASSAY OF MAGNESIUM: CPT

## 2021-01-01 PROCEDURE — 2500000003 HC RX 250 WO HCPCS: Performed by: FAMILY MEDICINE

## 2021-01-01 PROCEDURE — 2500000003 HC RX 250 WO HCPCS

## 2021-01-01 PROCEDURE — 99233 SBSQ HOSP IP/OBS HIGH 50: CPT | Performed by: NURSE PRACTITIONER

## 2021-01-01 PROCEDURE — 51700 IRRIGATION OF BLADDER: CPT

## 2021-01-01 PROCEDURE — APPSS30 APP SPLIT SHARED TIME 16-30 MINUTES: Performed by: NURSE PRACTITIONER

## 2021-01-01 PROCEDURE — APPSS15 APP SPLIT SHARED TIME 0-15 MINUTES: Performed by: NURSE PRACTITIONER

## 2021-01-01 PROCEDURE — 6360000002 HC RX W HCPCS: Performed by: FAMILY MEDICINE

## 2021-01-01 PROCEDURE — 31624 DX BRONCHOSCOPE/LAVAGE: CPT

## 2021-01-01 PROCEDURE — 36430 TRANSFUSION BLD/BLD COMPNT: CPT

## 2021-01-01 PROCEDURE — 94003 VENT MGMT INPAT SUBQ DAY: CPT

## 2021-01-01 PROCEDURE — C9113 INJ PANTOPRAZOLE SODIUM, VIA: HCPCS | Performed by: RADIOLOGY

## 2021-01-01 PROCEDURE — 94669 MECHANICAL CHEST WALL OSCILL: CPT

## 2021-01-01 PROCEDURE — 88331 PATH CONSLTJ SURG 1 BLK 1SPC: CPT

## 2021-01-01 PROCEDURE — 2580000003 HC RX 258: Performed by: FAMILY MEDICINE

## 2021-01-01 PROCEDURE — 87040 BLOOD CULTURE FOR BACTERIA: CPT

## 2021-01-01 PROCEDURE — 99283 EMERGENCY DEPT VISIT LOW MDM: CPT

## 2021-01-01 PROCEDURE — 2500000003 HC RX 250 WO HCPCS: Performed by: NURSE ANESTHETIST, CERTIFIED REGISTERED

## 2021-01-01 PROCEDURE — 6360000002 HC RX W HCPCS: Performed by: STUDENT IN AN ORGANIZED HEALTH CARE EDUCATION/TRAINING PROGRAM

## 2021-01-01 PROCEDURE — 93005 ELECTROCARDIOGRAM TRACING: CPT | Performed by: EMERGENCY MEDICINE

## 2021-01-01 PROCEDURE — 2500000003 HC RX 250 WO HCPCS: Performed by: NEUROLOGICAL SURGERY

## 2021-01-01 PROCEDURE — 2580000003 HC RX 258: Performed by: NURSE PRACTITIONER

## 2021-01-01 PROCEDURE — C1713 ANCHOR/SCREW BN/BN,TIS/BN: HCPCS | Performed by: NEUROLOGICAL SURGERY

## 2021-01-01 PROCEDURE — 37799 UNLISTED PX VASCULAR SURGERY: CPT

## 2021-01-01 PROCEDURE — 93005 ELECTROCARDIOGRAM TRACING: CPT | Performed by: FAMILY MEDICINE

## 2021-01-01 PROCEDURE — 2500000003 HC RX 250 WO HCPCS: Performed by: STUDENT IN AN ORGANIZED HEALTH CARE EDUCATION/TRAINING PROGRAM

## 2021-01-01 PROCEDURE — 70553 MRI BRAIN STEM W/O & W/DYE: CPT

## 2021-01-01 PROCEDURE — 86923 COMPATIBILITY TEST ELECTRIC: CPT

## 2021-01-01 PROCEDURE — 6360000002 HC RX W HCPCS: Performed by: RADIOLOGY

## 2021-01-01 PROCEDURE — 31500 INSERT EMERGENCY AIRWAY: CPT | Performed by: SURGERY

## 2021-01-01 PROCEDURE — 7100000001 HC PACU RECOVERY - ADDTL 15 MIN: Performed by: NEUROLOGICAL SURGERY

## 2021-01-01 PROCEDURE — 2580000003 HC RX 258: Performed by: CLINICAL NURSE SPECIALIST

## 2021-01-01 PROCEDURE — 0BH17EZ INSERTION OF ENDOTRACHEAL AIRWAY INTO TRACHEA, VIA NATURAL OR ARTIFICIAL OPENING: ICD-10-PCS | Performed by: STUDENT IN AN ORGANIZED HEALTH CARE EDUCATION/TRAINING PROGRAM

## 2021-01-01 PROCEDURE — C9113 INJ PANTOPRAZOLE SODIUM, VIA: HCPCS | Performed by: NEUROLOGICAL SURGERY

## 2021-01-01 PROCEDURE — 80164 ASSAY DIPROPYLACETIC ACD TOT: CPT

## 2021-01-01 PROCEDURE — 86900 BLOOD TYPING SEROLOGIC ABO: CPT

## 2021-01-01 PROCEDURE — 3600000005 HC SURGERY LEVEL 5 BASE: Performed by: NEUROLOGICAL SURGERY

## 2021-01-01 PROCEDURE — 6370000000 HC RX 637 (ALT 250 FOR IP): Performed by: SURGERY

## 2021-01-01 PROCEDURE — 0BC78ZZ EXTIRPATION OF MATTER FROM LEFT MAIN BRONCHUS, VIA NATURAL OR ARTIFICIAL OPENING ENDOSCOPIC: ICD-10-PCS | Performed by: SURGERY

## 2021-01-01 PROCEDURE — 6360000002 HC RX W HCPCS

## 2021-01-01 PROCEDURE — 84145 PROCALCITONIN (PCT): CPT

## 2021-01-01 PROCEDURE — 81001 URINALYSIS AUTO W/SCOPE: CPT

## 2021-01-01 PROCEDURE — 87206 SMEAR FLUORESCENT/ACID STAI: CPT

## 2021-01-01 PROCEDURE — 94640 AIRWAY INHALATION TREATMENT: CPT

## 2021-01-01 PROCEDURE — 99231 SBSQ HOSP IP/OBS SF/LOW 25: CPT | Performed by: CLINICAL NURSE SPECIALIST

## 2021-01-01 PROCEDURE — 92610 EVALUATE SWALLOWING FUNCTION: CPT

## 2021-01-01 PROCEDURE — 3700000000 HC ANESTHESIA ATTENDED CARE: Performed by: NEUROLOGICAL SURGERY

## 2021-01-01 PROCEDURE — 80202 ASSAY OF VANCOMYCIN: CPT

## 2021-01-01 PROCEDURE — 70450 CT HEAD/BRAIN W/O DYE: CPT

## 2021-01-01 PROCEDURE — 80175 DRUG SCREEN QUAN LAMOTRIGINE: CPT

## 2021-01-01 PROCEDURE — 85018 HEMOGLOBIN: CPT

## 2021-01-01 PROCEDURE — 02HV33Z INSERTION OF INFUSION DEVICE INTO SUPERIOR VENA CAVA, PERCUTANEOUS APPROACH: ICD-10-PCS | Performed by: INTERNAL MEDICINE

## 2021-01-01 PROCEDURE — 87088 URINE BACTERIA CULTURE: CPT

## 2021-01-01 PROCEDURE — 6370000000 HC RX 637 (ALT 250 FOR IP): Performed by: INTERNAL MEDICINE

## 2021-01-01 PROCEDURE — 87186 SC STD MICRODIL/AGAR DIL: CPT

## 2021-01-01 PROCEDURE — 87205 SMEAR GRAM STAIN: CPT

## 2021-01-01 PROCEDURE — 84484 ASSAY OF TROPONIN QUANT: CPT

## 2021-01-01 PROCEDURE — 87635 SARS-COV-2 COVID-19 AMP PRB: CPT

## 2021-01-01 PROCEDURE — 99233 SBSQ HOSP IP/OBS HIGH 50: CPT | Performed by: FAMILY MEDICINE

## 2021-01-01 PROCEDURE — P9041 ALBUMIN (HUMAN),5%, 50ML: HCPCS | Performed by: NURSE ANESTHETIST, CERTIFIED REGISTERED

## 2021-01-01 PROCEDURE — 99231 SBSQ HOSP IP/OBS SF/LOW 25: CPT | Performed by: PHYSICIAN ASSISTANT

## 2021-01-01 PROCEDURE — 31645 BRNCHSC W/THER ASPIR 1ST: CPT | Performed by: SURGERY

## 2021-01-01 PROCEDURE — 02HV33Z INSERTION OF INFUSION DEVICE INTO SUPERIOR VENA CAVA, PERCUTANEOUS APPROACH: ICD-10-PCS | Performed by: STUDENT IN AN ORGANIZED HEALTH CARE EDUCATION/TRAINING PROGRAM

## 2021-01-01 PROCEDURE — 31646 BRNCHSC W/THER ASPIR SBSQ: CPT | Performed by: SURGERY

## 2021-01-01 PROCEDURE — 7100000000 HC PACU RECOVERY - FIRST 15 MIN: Performed by: NEUROLOGICAL SURGERY

## 2021-01-01 PROCEDURE — 87070 CULTURE OTHR SPECIMN AEROBIC: CPT

## 2021-01-01 PROCEDURE — 36569 INSJ PICC 5 YR+ W/O IMAGING: CPT

## 2021-01-01 PROCEDURE — 88307 TISSUE EXAM BY PATHOLOGIST: CPT

## 2021-01-01 PROCEDURE — 51798 US URINE CAPACITY MEASURE: CPT

## 2021-01-01 PROCEDURE — 84132 ASSAY OF SERUM POTASSIUM: CPT

## 2021-01-01 PROCEDURE — 87081 CULTURE SCREEN ONLY: CPT

## 2021-01-01 PROCEDURE — 36556 INSERT NON-TUNNEL CV CATH: CPT

## 2021-01-01 PROCEDURE — 85610 PROTHROMBIN TIME: CPT

## 2021-01-01 PROCEDURE — 0BC38ZZ EXTIRPATION OF MATTER FROM RIGHT MAIN BRONCHUS, VIA NATURAL OR ARTIFICIAL OPENING ENDOSCOPIC: ICD-10-PCS | Performed by: SURGERY

## 2021-01-01 PROCEDURE — 93005 ELECTROCARDIOGRAM TRACING: CPT | Performed by: HOSPITALIST

## 2021-01-01 PROCEDURE — 74176 CT ABD & PELVIS W/O CONTRAST: CPT

## 2021-01-01 PROCEDURE — 2720000010 HC SURG SUPPLY STERILE: Performed by: NEUROLOGICAL SURGERY

## 2021-01-01 PROCEDURE — 5A1955Z RESPIRATORY VENTILATION, GREATER THAN 96 CONSECUTIVE HOURS: ICD-10-PCS | Performed by: STUDENT IN AN ORGANIZED HEALTH CARE EDUCATION/TRAINING PROGRAM

## 2021-01-01 PROCEDURE — 85014 HEMATOCRIT: CPT

## 2021-01-01 PROCEDURE — 3700000001 HC ADD 15 MINUTES (ANESTHESIA): Performed by: NEUROLOGICAL SURGERY

## 2021-01-01 PROCEDURE — 6360000002 HC RX W HCPCS: Performed by: NURSE ANESTHETIST, CERTIFIED REGISTERED

## 2021-01-01 PROCEDURE — 83036 HEMOGLOBIN GLYCOSYLATED A1C: CPT

## 2021-01-01 PROCEDURE — C1751 CATH, INF, PER/CENT/MIDLINE: HCPCS

## 2021-01-01 PROCEDURE — 99231 SBSQ HOSP IP/OBS SF/LOW 25: CPT | Performed by: FAMILY MEDICINE

## 2021-01-01 PROCEDURE — 31720 CLEARANCE OF AIRWAYS: CPT

## 2021-01-01 PROCEDURE — 2709999900 HC NON-CHARGEABLE SUPPLY: Performed by: NEUROLOGICAL SURGERY

## 2021-01-01 PROCEDURE — 99223 1ST HOSP IP/OBS HIGH 75: CPT | Performed by: NURSE PRACTITIONER

## 2021-01-01 PROCEDURE — 6370000000 HC RX 637 (ALT 250 FOR IP): Performed by: EMERGENCY MEDICINE

## 2021-01-01 PROCEDURE — 88112 CYTOPATH CELL ENHANCE TECH: CPT

## 2021-01-01 PROCEDURE — 0NB70ZZ EXCISION OF OCCIPITAL BONE, OPEN APPROACH: ICD-10-PCS | Performed by: INTERNAL MEDICINE

## 2021-01-01 PROCEDURE — 99232 SBSQ HOSP IP/OBS MODERATE 35: CPT | Performed by: FAMILY MEDICINE

## 2021-01-01 PROCEDURE — 6360000004 HC RX CONTRAST MEDICATION: Performed by: RADIOLOGY

## 2021-01-01 PROCEDURE — 94002 VENT MGMT INPAT INIT DAY: CPT

## 2021-01-01 PROCEDURE — 88341 IMHCHEM/IMCYTCHM EA ADD ANTB: CPT

## 2021-01-01 PROCEDURE — 74018 RADEX ABDOMEN 1 VIEW: CPT

## 2021-01-01 PROCEDURE — 3600000015 HC SURGERY LEVEL 5 ADDTL 15MIN: Performed by: NEUROLOGICAL SURGERY

## 2021-01-01 PROCEDURE — 82803 BLOOD GASES ANY COMBINATION: CPT

## 2021-01-01 PROCEDURE — D020DZZ STEREOTACTIC OTHER PHOTON RADIOSURGERY OF BRAIN: ICD-10-PCS | Performed by: INTERNAL MEDICINE

## 2021-01-01 PROCEDURE — 2580000003 HC RX 258: Performed by: NURSE ANESTHETIST, CERTIFIED REGISTERED

## 2021-01-01 PROCEDURE — A9577 INJ MULTIHANCE: HCPCS | Performed by: RADIOLOGY

## 2021-01-01 DEVICE — SCREW, AXS, SELF-TAPPING
Type: IMPLANTABLE DEVICE | Site: CRANIAL | Status: FUNCTIONAL
Brand: UNIVERSAL NEURO 3

## 2021-01-01 DEVICE — LOW PROFILE BURR HOLE COVER, W/TAB, 20MM
Type: IMPLANTABLE DEVICE | Site: CRANIAL | Status: FUNCTIONAL
Brand: UNIVERSAL NEURO 2

## 2021-01-01 RX ORDER — LORAZEPAM 2 MG/ML
0.5 INJECTION INTRAMUSCULAR EVERY 4 HOURS PRN
Status: DISCONTINUED | OUTPATIENT
Start: 2021-01-01 | End: 2021-01-01

## 2021-01-01 RX ORDER — HYDRALAZINE HYDROCHLORIDE 20 MG/ML
20 INJECTION INTRAMUSCULAR; INTRAVENOUS EVERY 6 HOURS
Status: DISCONTINUED | OUTPATIENT
Start: 2021-01-01 | End: 2021-01-01

## 2021-01-01 RX ORDER — SENNA PLUS 8.6 MG/1
1 TABLET ORAL NIGHTLY
Status: DISCONTINUED | OUTPATIENT
Start: 2021-01-01 | End: 2021-01-01

## 2021-01-01 RX ORDER — POTASSIUM CHLORIDE 20 MEQ/1
40 TABLET, EXTENDED RELEASE ORAL ONCE
Status: COMPLETED | OUTPATIENT
Start: 2021-01-01 | End: 2021-01-01

## 2021-01-01 RX ORDER — ROCURONIUM BROMIDE 10 MG/ML
INJECTION, SOLUTION INTRAVENOUS PRN
Status: DISCONTINUED | OUTPATIENT
Start: 2021-01-01 | End: 2021-01-01 | Stop reason: SDUPTHER

## 2021-01-01 RX ORDER — SODIUM CHLORIDE 9 MG/ML
INJECTION, SOLUTION INTRAVENOUS CONTINUOUS PRN
Status: DISCONTINUED | OUTPATIENT
Start: 2021-01-01 | End: 2021-01-01 | Stop reason: SDUPTHER

## 2021-01-01 RX ORDER — DEXAMETHASONE SODIUM PHOSPHATE 10 MG/ML
10 INJECTION INTRAMUSCULAR; INTRAVENOUS EVERY 6 HOURS
Status: COMPLETED | OUTPATIENT
Start: 2021-01-01 | End: 2021-01-01

## 2021-01-01 RX ORDER — IPRATROPIUM BROMIDE AND ALBUTEROL SULFATE 2.5; .5 MG/3ML; MG/3ML
1 SOLUTION RESPIRATORY (INHALATION)
Status: DISCONTINUED | OUTPATIENT
Start: 2021-01-01 | End: 2021-01-01

## 2021-01-01 RX ORDER — METOPROLOL SUCCINATE 50 MG/1
50 TABLET, EXTENDED RELEASE ORAL 2 TIMES DAILY
Status: DISCONTINUED | OUTPATIENT
Start: 2021-01-01 | End: 2021-01-01

## 2021-01-01 RX ORDER — SUCCINYLCHOLINE CHLORIDE 20 MG/ML
200 INJECTION INTRAMUSCULAR; INTRAVENOUS ONCE
Status: COMPLETED | OUTPATIENT
Start: 2021-01-01 | End: 2021-01-01

## 2021-01-01 RX ORDER — LANSOPRAZOLE
15 KIT
Status: DISCONTINUED | OUTPATIENT
Start: 2021-01-01 | End: 2021-01-01

## 2021-01-01 RX ORDER — FENTANYL CITRATE 50 UG/ML
100 INJECTION, SOLUTION INTRAMUSCULAR; INTRAVENOUS SEE ADMIN INSTRUCTIONS
Status: COMPLETED | OUTPATIENT
Start: 2021-01-01 | End: 2021-01-01

## 2021-01-01 RX ORDER — METOPROLOL TARTRATE 50 MG/1
50 TABLET, FILM COATED ORAL 2 TIMES DAILY
Status: DISCONTINUED | OUTPATIENT
Start: 2021-01-01 | End: 2021-01-01

## 2021-01-01 RX ORDER — PROPOFOL 10 MG/ML
5-50 INJECTION, EMULSION INTRAVENOUS
Status: DISCONTINUED | OUTPATIENT
Start: 2021-01-01 | End: 2021-01-01

## 2021-01-01 RX ORDER — HYDRALAZINE HYDROCHLORIDE 20 MG/ML
10 INJECTION INTRAMUSCULAR; INTRAVENOUS EVERY 6 HOURS PRN
Status: DISCONTINUED | OUTPATIENT
Start: 2021-01-01 | End: 2021-01-01

## 2021-01-01 RX ORDER — SODIUM CHLORIDE 9 MG/ML
INJECTION, SOLUTION INTRAVENOUS CONTINUOUS
Status: DISCONTINUED | OUTPATIENT
Start: 2021-01-01 | End: 2021-01-01

## 2021-01-01 RX ORDER — POTASSIUM CHLORIDE 20 MEQ/1
40 TABLET, EXTENDED RELEASE ORAL EVERY 6 HOURS
Status: DISCONTINUED | OUTPATIENT
Start: 2021-01-01 | End: 2021-01-01

## 2021-01-01 RX ORDER — AMIODARONE HYDROCHLORIDE 50 MG/ML
INJECTION, SOLUTION INTRAVENOUS
Status: COMPLETED
Start: 2021-01-01 | End: 2021-01-01

## 2021-01-01 RX ORDER — FUROSEMIDE 10 MG/ML
20 INJECTION INTRAMUSCULAR; INTRAVENOUS ONCE
Status: COMPLETED | OUTPATIENT
Start: 2021-01-01 | End: 2021-01-01

## 2021-01-01 RX ORDER — POTASSIUM CHLORIDE 7.45 MG/ML
10 INJECTION INTRAVENOUS
Status: DISPENSED | OUTPATIENT
Start: 2021-01-01 | End: 2021-01-01

## 2021-01-01 RX ORDER — GLYCOPYRROLATE 1 MG/5 ML
SYRINGE (ML) INTRAVENOUS PRN
Status: DISCONTINUED | OUTPATIENT
Start: 2021-01-01 | End: 2021-01-01 | Stop reason: SDUPTHER

## 2021-01-01 RX ORDER — POTASSIUM CHLORIDE 7.45 MG/ML
10 INJECTION INTRAVENOUS
Status: COMPLETED | OUTPATIENT
Start: 2021-01-01 | End: 2021-01-01

## 2021-01-01 RX ORDER — DOFETILIDE 0.12 MG/1
125 CAPSULE ORAL EVERY 12 HOURS SCHEDULED
Status: DISCONTINUED | OUTPATIENT
Start: 2021-01-01 | End: 2021-01-01

## 2021-01-01 RX ORDER — SODIUM CHLORIDE 0.9 % (FLUSH) 0.9 %
10 SYRINGE (ML) INJECTION EVERY 12 HOURS SCHEDULED
Status: DISCONTINUED | OUTPATIENT
Start: 2021-01-01 | End: 2021-01-01

## 2021-01-01 RX ORDER — DIAPER,BRIEF,INFANT-TODD,DISP
EACH MISCELLANEOUS PRN
Status: DISCONTINUED | OUTPATIENT
Start: 2021-01-01 | End: 2021-01-01 | Stop reason: HOSPADM

## 2021-01-01 RX ORDER — METOPROLOL TARTRATE 5 MG/5ML
INJECTION INTRAVENOUS
Status: COMPLETED
Start: 2021-01-01 | End: 2021-01-01

## 2021-01-01 RX ORDER — HYDRALAZINE HYDROCHLORIDE 25 MG/1
25 TABLET, FILM COATED ORAL 3 TIMES DAILY
Status: DISCONTINUED | OUTPATIENT
Start: 2021-01-01 | End: 2021-01-01

## 2021-01-01 RX ORDER — HYDROCODONE BITARTRATE AND ACETAMINOPHEN 5; 325 MG/1; MG/1
1 TABLET ORAL EVERY 6 HOURS PRN
Status: DISCONTINUED | OUTPATIENT
Start: 2021-01-01 | End: 2021-01-01

## 2021-01-01 RX ORDER — 0.9 % SODIUM CHLORIDE 0.9 %
250 INTRAVENOUS SOLUTION INTRAVENOUS ONCE
Status: COMPLETED | OUTPATIENT
Start: 2021-01-01 | End: 2021-01-01

## 2021-01-01 RX ORDER — SODIUM CHLORIDE 9 MG/ML
10 INJECTION INTRAVENOUS DAILY
Status: DISCONTINUED | OUTPATIENT
Start: 2021-01-01 | End: 2021-01-01

## 2021-01-01 RX ORDER — INSULIN GLARGINE 100 [IU]/ML
15 INJECTION, SOLUTION SUBCUTANEOUS 2 TIMES DAILY
Status: DISCONTINUED | OUTPATIENT
Start: 2021-01-01 | End: 2021-01-01

## 2021-01-01 RX ORDER — SODIUM CHLORIDE 0.9 % (FLUSH) 0.9 %
10 SYRINGE (ML) INJECTION PRN
Status: DISCONTINUED | OUTPATIENT
Start: 2021-01-01 | End: 2021-01-01

## 2021-01-01 RX ORDER — SODIUM CHLORIDE 9 MG/ML
INJECTION, SOLUTION INTRAVENOUS PRN
Status: DISCONTINUED | OUTPATIENT
Start: 2021-01-01 | End: 2021-01-01

## 2021-01-01 RX ORDER — DOCUSATE SODIUM 100 MG/1
100 CAPSULE, LIQUID FILLED ORAL 2 TIMES DAILY
Status: DISCONTINUED | OUTPATIENT
Start: 2021-01-01 | End: 2021-01-01

## 2021-01-01 RX ORDER — MORPHINE SULFATE 2 MG/ML
INJECTION, SOLUTION INTRAMUSCULAR; INTRAVENOUS
Status: COMPLETED
Start: 2021-01-01 | End: 2021-01-01

## 2021-01-01 RX ORDER — PROPOFOL 10 MG/ML
INJECTION, EMULSION INTRAVENOUS PRN
Status: DISCONTINUED | OUTPATIENT
Start: 2021-01-01 | End: 2021-01-01 | Stop reason: SDUPTHER

## 2021-01-01 RX ORDER — FUROSEMIDE 10 MG/ML
40 INJECTION INTRAMUSCULAR; INTRAVENOUS ONCE
Status: COMPLETED | OUTPATIENT
Start: 2021-01-01 | End: 2021-01-01

## 2021-01-01 RX ORDER — POLYVINYL ALCOHOL 14 MG/ML
1 SOLUTION/ DROPS OPHTHALMIC PRN
Status: DISCONTINUED | OUTPATIENT
Start: 2021-01-01 | End: 2021-01-01 | Stop reason: HOSPADM

## 2021-01-01 RX ORDER — ONDANSETRON 2 MG/ML
INJECTION INTRAMUSCULAR; INTRAVENOUS PRN
Status: DISCONTINUED | OUTPATIENT
Start: 2021-01-01 | End: 2021-01-01 | Stop reason: SDUPTHER

## 2021-01-01 RX ORDER — INSULIN GLARGINE 100 [IU]/ML
12 INJECTION, SOLUTION SUBCUTANEOUS NIGHTLY
Status: ON HOLD | COMMUNITY
End: 2021-01-01 | Stop reason: HOSPADM

## 2021-01-01 RX ORDER — FENTANYL CITRATE 50 UG/ML
INJECTION, SOLUTION INTRAMUSCULAR; INTRAVENOUS
Status: COMPLETED
Start: 2021-01-01 | End: 2021-01-01

## 2021-01-01 RX ORDER — VECURONIUM BROMIDE 1 MG/ML
10 INJECTION, POWDER, LYOPHILIZED, FOR SOLUTION INTRAVENOUS SEE ADMIN INSTRUCTIONS
Status: DISCONTINUED | OUTPATIENT
Start: 2021-01-01 | End: 2021-01-01

## 2021-01-01 RX ORDER — AMLODIPINE BESYLATE 5 MG/1
5 TABLET ORAL DAILY
Status: DISCONTINUED | OUTPATIENT
Start: 2021-01-01 | End: 2021-01-01

## 2021-01-01 RX ORDER — BISACODYL 10 MG
10 SUPPOSITORY, RECTAL RECTAL ONCE
Status: COMPLETED | OUTPATIENT
Start: 2021-01-01 | End: 2021-01-01

## 2021-01-01 RX ORDER — POTASSIUM CHLORIDE 7.45 MG/ML
INJECTION INTRAVENOUS
Status: DISPENSED
Start: 2021-01-01 | End: 2021-01-01

## 2021-01-01 RX ORDER — DEXTROSE, SODIUM CHLORIDE, AND POTASSIUM CHLORIDE 5; .45; .15 G/100ML; G/100ML; G/100ML
INJECTION INTRAVENOUS CONTINUOUS
Status: DISCONTINUED | OUTPATIENT
Start: 2021-01-01 | End: 2021-01-01

## 2021-01-01 RX ORDER — FENTANYL CITRATE 50 UG/ML
25 INJECTION, SOLUTION INTRAMUSCULAR; INTRAVENOUS ONCE
Status: COMPLETED | OUTPATIENT
Start: 2021-01-01 | End: 2021-01-01

## 2021-01-01 RX ORDER — HALOPERIDOL 5 MG/ML
INJECTION INTRAMUSCULAR
Status: DISPENSED
Start: 2021-01-01 | End: 2021-01-01

## 2021-01-01 RX ORDER — LABETALOL HYDROCHLORIDE 5 MG/ML
10 INJECTION, SOLUTION INTRAVENOUS EVERY 4 HOURS PRN
Status: DISCONTINUED | OUTPATIENT
Start: 2021-01-01 | End: 2021-01-01

## 2021-01-01 RX ORDER — HYDRALAZINE HYDROCHLORIDE 25 MG/1
50 TABLET, FILM COATED ORAL 3 TIMES DAILY
Status: DISCONTINUED | OUTPATIENT
Start: 2021-01-01 | End: 2021-01-01

## 2021-01-01 RX ORDER — MIDAZOLAM HYDROCHLORIDE 1 MG/ML
INJECTION INTRAMUSCULAR; INTRAVENOUS
Status: COMPLETED
Start: 2021-01-01 | End: 2021-01-01

## 2021-01-01 RX ORDER — GLYCOPYRROLATE 0.2 MG/ML
0.4 INJECTION INTRAMUSCULAR; INTRAVENOUS EVERY 4 HOURS PRN
Status: DISCONTINUED | OUTPATIENT
Start: 2021-01-01 | End: 2021-01-01 | Stop reason: HOSPADM

## 2021-01-01 RX ORDER — CHLORHEXIDINE GLUCONATE 0.12 MG/ML
15 RINSE ORAL 2 TIMES DAILY
Status: DISCONTINUED | OUTPATIENT
Start: 2021-01-01 | End: 2021-01-01 | Stop reason: HOSPADM

## 2021-01-01 RX ORDER — AMIODARONE HYDROCHLORIDE 200 MG/1
200 TABLET ORAL 2 TIMES DAILY
Status: DISCONTINUED | OUTPATIENT
Start: 2021-01-01 | End: 2021-01-01

## 2021-01-01 RX ORDER — POLYETHYLENE GLYCOL 3350 17 G/17G
17 POWDER, FOR SOLUTION ORAL DAILY
Status: DISCONTINUED | OUTPATIENT
Start: 2021-01-01 | End: 2021-01-01

## 2021-01-01 RX ORDER — GABAPENTIN 250 MG/5ML
300 SOLUTION ORAL EVERY 12 HOURS SCHEDULED
Status: DISCONTINUED | OUTPATIENT
Start: 2021-01-01 | End: 2021-01-01

## 2021-01-01 RX ORDER — GLIPIZIDE 5 MG/1
10 TABLET ORAL
Status: DISCONTINUED | OUTPATIENT
Start: 2021-01-01 | End: 2021-01-01

## 2021-01-01 RX ORDER — HYDRALAZINE HYDROCHLORIDE 20 MG/ML
10 INJECTION INTRAMUSCULAR; INTRAVENOUS EVERY 10 MIN PRN
Status: DISCONTINUED | OUTPATIENT
Start: 2021-01-01 | End: 2021-01-01

## 2021-01-01 RX ORDER — MAGNESIUM OXIDE 400 MG/1
400 TABLET ORAL DAILY
Status: ON HOLD | COMMUNITY
End: 2021-01-01 | Stop reason: HOSPADM

## 2021-01-01 RX ORDER — PROPOFOL 10 MG/ML
INJECTION, EMULSION INTRAVENOUS
Status: COMPLETED
Start: 2021-01-01 | End: 2021-01-01

## 2021-01-01 RX ORDER — LABETALOL HYDROCHLORIDE 5 MG/ML
5 INJECTION, SOLUTION INTRAVENOUS EVERY 30 MIN PRN
Status: DISCONTINUED | OUTPATIENT
Start: 2021-01-01 | End: 2021-01-01

## 2021-01-01 RX ORDER — LIDOCAINE HYDROCHLORIDE 10 MG/ML
5 INJECTION, SOLUTION EPIDURAL; INFILTRATION; INTRACAUDAL; PERINEURAL ONCE
Status: COMPLETED | OUTPATIENT
Start: 2021-01-01 | End: 2021-01-01

## 2021-01-01 RX ORDER — LORAZEPAM 2 MG/ML
0.5 INJECTION INTRAMUSCULAR ONCE
Status: COMPLETED | OUTPATIENT
Start: 2021-01-01 | End: 2021-01-01

## 2021-01-01 RX ORDER — ALBUMIN, HUMAN INJ 5% 5 %
SOLUTION INTRAVENOUS PRN
Status: DISCONTINUED | OUTPATIENT
Start: 2021-01-01 | End: 2021-01-01 | Stop reason: SDUPTHER

## 2021-01-01 RX ORDER — POTASSIUM CHLORIDE 29.8 MG/ML
20 INJECTION INTRAVENOUS
Status: COMPLETED | OUTPATIENT
Start: 2021-01-01 | End: 2021-01-01

## 2021-01-01 RX ORDER — DIVALPROEX SODIUM 125 MG/1
125 CAPSULE, COATED PELLETS ORAL 2 TIMES DAILY
Status: ON HOLD | COMMUNITY
End: 2021-01-01 | Stop reason: HOSPADM

## 2021-01-01 RX ORDER — DEXTROSE MONOHYDRATE 50 MG/ML
INJECTION, SOLUTION INTRAVENOUS CONTINUOUS
Status: DISCONTINUED | OUTPATIENT
Start: 2021-01-01 | End: 2021-01-01

## 2021-01-01 RX ORDER — LAMOTRIGINE 100 MG/1
200 TABLET ORAL 2 TIMES DAILY
Status: DISCONTINUED | OUTPATIENT
Start: 2021-01-01 | End: 2021-01-01

## 2021-01-01 RX ORDER — SUCCINYLCHOLINE CHLORIDE 20 MG/ML
INJECTION INTRAMUSCULAR; INTRAVENOUS
Status: COMPLETED
Start: 2021-01-01 | End: 2021-01-01

## 2021-01-01 RX ORDER — PROPOFOL 10 MG/ML
INJECTION, EMULSION INTRAVENOUS CONTINUOUS PRN
Status: DISCONTINUED | OUTPATIENT
Start: 2021-01-01 | End: 2021-01-01 | Stop reason: SDUPTHER

## 2021-01-01 RX ORDER — DEXTROSE AND SODIUM CHLORIDE 5; .45 G/100ML; G/100ML
INJECTION, SOLUTION INTRAVENOUS CONTINUOUS
Status: DISCONTINUED | OUTPATIENT
Start: 2021-01-01 | End: 2021-01-01

## 2021-01-01 RX ORDER — HYDRALAZINE HYDROCHLORIDE 25 MG/1
25 TABLET, FILM COATED ORAL EVERY 8 HOURS SCHEDULED
Status: DISCONTINUED | OUTPATIENT
Start: 2021-01-01 | End: 2021-01-01

## 2021-01-01 RX ORDER — LORAZEPAM 2 MG/ML
1 INJECTION INTRAMUSCULAR
Status: COMPLETED | OUTPATIENT
Start: 2021-01-01 | End: 2021-01-01

## 2021-01-01 RX ORDER — LANSOPRAZOLE
30 KIT
Status: DISCONTINUED | OUTPATIENT
Start: 2021-01-01 | End: 2021-01-01

## 2021-01-01 RX ORDER — MECOBALAMIN 5000 MCG
10 TABLET,DISINTEGRATING ORAL NIGHTLY
Status: DISCONTINUED | OUTPATIENT
Start: 2021-01-01 | End: 2021-01-01 | Stop reason: HOSPADM

## 2021-01-01 RX ORDER — LORAZEPAM 2 MG/ML
1 INJECTION INTRAMUSCULAR EVERY 4 HOURS PRN
Status: DISCONTINUED | OUTPATIENT
Start: 2021-01-01 | End: 2021-01-01 | Stop reason: HOSPADM

## 2021-01-01 RX ORDER — HEPARIN SODIUM (PORCINE) LOCK FLUSH IV SOLN 100 UNIT/ML 100 UNIT/ML
3 SOLUTION INTRAVENOUS PRN
Status: DISCONTINUED | OUTPATIENT
Start: 2021-01-01 | End: 2021-01-01

## 2021-01-01 RX ORDER — MECOBALAMIN 5000 MCG
5 TABLET,DISINTEGRATING ORAL NIGHTLY PRN
Status: DISCONTINUED | OUTPATIENT
Start: 2021-01-01 | End: 2021-01-01

## 2021-01-01 RX ORDER — POLYETHYLENE GLYCOL 3350 17 G/17G
17 POWDER, FOR SOLUTION ORAL DAILY PRN
Status: DISCONTINUED | OUTPATIENT
Start: 2021-01-01 | End: 2021-01-01

## 2021-01-01 RX ORDER — ACETAMINOPHEN 650 MG/1
650 SUPPOSITORY RECTAL EVERY 6 HOURS PRN
Status: DISCONTINUED | OUTPATIENT
Start: 2021-01-01 | End: 2021-01-01 | Stop reason: HOSPADM

## 2021-01-01 RX ORDER — MIDAZOLAM HYDROCHLORIDE 2 MG/2ML
2 INJECTION, SOLUTION INTRAMUSCULAR; INTRAVENOUS SEE ADMIN INSTRUCTIONS
Status: DISCONTINUED | OUTPATIENT
Start: 2021-01-01 | End: 2021-01-01

## 2021-01-01 RX ORDER — LEVETIRACETAM 5 MG/ML
500 INJECTION INTRAVASCULAR EVERY 12 HOURS
Status: DISCONTINUED | OUTPATIENT
Start: 2021-01-01 | End: 2021-01-01 | Stop reason: SDUPTHER

## 2021-01-01 RX ORDER — MIDAZOLAM HYDROCHLORIDE 2 MG/2ML
4 INJECTION, SOLUTION INTRAMUSCULAR; INTRAVENOUS SEE ADMIN INSTRUCTIONS
Status: DISCONTINUED | OUTPATIENT
Start: 2021-01-01 | End: 2021-01-01

## 2021-01-01 RX ORDER — LISINOPRIL 20 MG/1
20 TABLET ORAL DAILY
Status: DISCONTINUED | OUTPATIENT
Start: 2021-01-01 | End: 2021-01-01

## 2021-01-01 RX ORDER — INSULIN GLARGINE 100 [IU]/ML
10 INJECTION, SOLUTION SUBCUTANEOUS 2 TIMES DAILY
Status: DISCONTINUED | OUTPATIENT
Start: 2021-01-01 | End: 2021-01-01

## 2021-01-01 RX ORDER — METOPROLOL TARTRATE 5 MG/5ML
5 INJECTION INTRAVENOUS EVERY 4 HOURS
Status: DISCONTINUED | OUTPATIENT
Start: 2021-01-01 | End: 2021-01-01

## 2021-01-01 RX ORDER — VANCOMYCIN HYDROCHLORIDE 500 MG/10ML
INJECTION, POWDER, LYOPHILIZED, FOR SOLUTION INTRAVENOUS PRN
Status: DISCONTINUED | OUTPATIENT
Start: 2021-01-01 | End: 2021-01-01 | Stop reason: HOSPADM

## 2021-01-01 RX ORDER — GABAPENTIN 300 MG/1
300 CAPSULE ORAL 2 TIMES DAILY
Status: DISCONTINUED | OUTPATIENT
Start: 2021-01-01 | End: 2021-01-01

## 2021-01-01 RX ORDER — LANOLIN ALCOHOL/MO/W.PET/CERES
5 CREAM (GRAM) TOPICAL NIGHTLY PRN
Status: ON HOLD | COMMUNITY
End: 2021-01-01 | Stop reason: HOSPADM

## 2021-01-01 RX ORDER — GLYCOPYRROLATE 0.2 MG/ML
INJECTION INTRAMUSCULAR; INTRAVENOUS
Status: COMPLETED
Start: 2021-01-01 | End: 2021-01-01

## 2021-01-01 RX ORDER — MECOBALAMIN 5000 MCG
10 TABLET,DISINTEGRATING ORAL NIGHTLY
Status: DISCONTINUED | OUTPATIENT
Start: 2021-01-01 | End: 2021-01-01

## 2021-01-01 RX ORDER — HEPARIN SODIUM (PORCINE) LOCK FLUSH IV SOLN 100 UNIT/ML 100 UNIT/ML
3 SOLUTION INTRAVENOUS EVERY 12 HOURS SCHEDULED
Status: DISCONTINUED | OUTPATIENT
Start: 2021-01-01 | End: 2021-01-01 | Stop reason: HOSPADM

## 2021-01-01 RX ORDER — SPIRONOLACTONE 25 MG/1
25 TABLET ORAL DAILY
Status: DISCONTINUED | OUTPATIENT
Start: 2021-01-01 | End: 2021-01-01

## 2021-01-01 RX ORDER — POTASSIUM CHLORIDE 29.8 MG/ML
40 INJECTION INTRAVENOUS
Status: COMPLETED | OUTPATIENT
Start: 2021-01-01 | End: 2021-01-01

## 2021-01-01 RX ORDER — METOPROLOL TARTRATE 5 MG/5ML
5 INJECTION INTRAVENOUS EVERY 6 HOURS
Status: DISCONTINUED | OUTPATIENT
Start: 2021-01-01 | End: 2021-01-01

## 2021-01-01 RX ORDER — PANTOPRAZOLE SODIUM 40 MG/10ML
20 INJECTION, POWDER, LYOPHILIZED, FOR SOLUTION INTRAVENOUS DAILY
Status: DISCONTINUED | OUTPATIENT
Start: 2021-01-01 | End: 2021-01-01

## 2021-01-01 RX ORDER — M-VIT,TX,IRON,MINS/CALC/FOLIC 27MG-0.4MG
1 TABLET ORAL DAILY
Status: DISCONTINUED | OUTPATIENT
Start: 2021-01-01 | End: 2021-01-01

## 2021-01-01 RX ORDER — LEVETIRACETAM 500 MG/1
500 TABLET ORAL 2 TIMES DAILY
Status: DISCONTINUED | OUTPATIENT
Start: 2021-01-01 | End: 2021-01-01

## 2021-01-01 RX ORDER — ACETAMINOPHEN 325 MG/1
650 TABLET ORAL EVERY 6 HOURS PRN
Status: DISCONTINUED | OUTPATIENT
Start: 2021-01-01 | End: 2021-01-01

## 2021-01-01 RX ORDER — LABETALOL HYDROCHLORIDE 5 MG/ML
10 INJECTION, SOLUTION INTRAVENOUS EVERY 10 MIN PRN
Status: DISCONTINUED | OUTPATIENT
Start: 2021-01-01 | End: 2021-01-01

## 2021-01-01 RX ORDER — MORPHINE SULFATE 4 MG/ML
4 INJECTION, SOLUTION INTRAMUSCULAR; INTRAVENOUS
Status: DISCONTINUED | OUTPATIENT
Start: 2021-01-01 | End: 2021-01-01 | Stop reason: HOSPADM

## 2021-01-01 RX ORDER — HYDRALAZINE HYDROCHLORIDE 20 MG/ML
5 INJECTION INTRAMUSCULAR; INTRAVENOUS ONCE
Status: COMPLETED | OUTPATIENT
Start: 2021-01-01 | End: 2021-01-01

## 2021-01-01 RX ORDER — DEXTROSE MONOHYDRATE 25 G/50ML
12.5 INJECTION, SOLUTION INTRAVENOUS PRN
Status: DISCONTINUED | OUTPATIENT
Start: 2021-01-01 | End: 2021-01-01

## 2021-01-01 RX ORDER — LORAZEPAM 2 MG/ML
INJECTION INTRAMUSCULAR
Status: COMPLETED
Start: 2021-01-01 | End: 2021-01-01

## 2021-01-01 RX ORDER — DEXAMETHASONE 4 MG/1
6 TABLET ORAL DAILY
Status: ON HOLD | COMMUNITY
End: 2021-01-01 | Stop reason: HOSPADM

## 2021-01-01 RX ORDER — HALOPERIDOL 5 MG/ML
2 INJECTION INTRAMUSCULAR EVERY 4 HOURS PRN
Status: DISCONTINUED | OUTPATIENT
Start: 2021-01-01 | End: 2021-01-01

## 2021-01-01 RX ORDER — LISINOPRIL 20 MG/1
20 TABLET ORAL 2 TIMES DAILY
Status: DISCONTINUED | OUTPATIENT
Start: 2021-01-01 | End: 2021-01-01

## 2021-01-01 RX ORDER — DEXAMETHASONE SODIUM PHOSPHATE 4 MG/ML
4 INJECTION, SOLUTION INTRA-ARTICULAR; INTRALESIONAL; INTRAMUSCULAR; INTRAVENOUS; SOFT TISSUE EVERY 6 HOURS
Status: COMPLETED | OUTPATIENT
Start: 2021-01-01 | End: 2021-01-01

## 2021-01-01 RX ORDER — HYDRALAZINE HYDROCHLORIDE 20 MG/ML
5 INJECTION INTRAMUSCULAR; INTRAVENOUS EVERY 30 MIN PRN
Status: DISCONTINUED | OUTPATIENT
Start: 2021-01-01 | End: 2021-01-01

## 2021-01-01 RX ORDER — POTASSIUM CHLORIDE 20 MEQ/1
20 TABLET, EXTENDED RELEASE ORAL 2 TIMES DAILY
Status: DISCONTINUED | OUTPATIENT
Start: 2021-01-01 | End: 2021-01-01

## 2021-01-01 RX ORDER — MORPHINE SULFATE 2 MG/ML
2 INJECTION, SOLUTION INTRAMUSCULAR; INTRAVENOUS
Status: DISCONTINUED | OUTPATIENT
Start: 2021-01-01 | End: 2021-01-01 | Stop reason: HOSPADM

## 2021-01-01 RX ORDER — DOCUSATE SODIUM 50 MG/5ML
100 LIQUID ORAL 2 TIMES DAILY
Status: DISCONTINUED | OUTPATIENT
Start: 2021-01-01 | End: 2021-01-01

## 2021-01-01 RX ORDER — CLONIDINE HYDROCHLORIDE 0.1 MG/1
0.1 TABLET ORAL ONCE
Status: COMPLETED | OUTPATIENT
Start: 2021-01-01 | End: 2021-01-01

## 2021-01-01 RX ORDER — ALOGLIPTIN 6.25 MG/1
6.25 TABLET, FILM COATED ORAL DAILY
Status: DISCONTINUED | OUTPATIENT
Start: 2021-01-01 | End: 2021-01-01

## 2021-01-01 RX ORDER — DEXAMETHASONE SODIUM PHOSPHATE 10 MG/ML
INJECTION, SOLUTION INTRAMUSCULAR; INTRAVENOUS PRN
Status: DISCONTINUED | OUTPATIENT
Start: 2021-01-01 | End: 2021-01-01 | Stop reason: SDUPTHER

## 2021-01-01 RX ORDER — AMIODARONE HYDROCHLORIDE 200 MG/1
200 TABLET ORAL EVERY 8 HOURS
Status: DISCONTINUED | OUTPATIENT
Start: 2021-01-01 | End: 2021-01-01

## 2021-01-01 RX ORDER — NICOTINE POLACRILEX 4 MG
15 LOZENGE BUCCAL PRN
Status: DISCONTINUED | OUTPATIENT
Start: 2021-01-01 | End: 2021-01-01

## 2021-01-01 RX ORDER — DIVALPROEX SODIUM 125 MG/1
125 CAPSULE, COATED PELLETS ORAL 2 TIMES DAILY
Status: DISCONTINUED | OUTPATIENT
Start: 2021-01-01 | End: 2021-01-01

## 2021-01-01 RX ORDER — CHOLECALCIFEROL (VITAMIN D3) 125 MCG
5 CAPSULE ORAL NIGHTLY PRN
Status: DISCONTINUED | OUTPATIENT
Start: 2021-01-01 | End: 2021-01-01 | Stop reason: CLARIF

## 2021-01-01 RX ORDER — DEXTROSE MONOHYDRATE 50 MG/ML
100 INJECTION, SOLUTION INTRAVENOUS PRN
Status: DISCONTINUED | OUTPATIENT
Start: 2021-01-01 | End: 2021-01-01

## 2021-01-01 RX ORDER — FUROSEMIDE 10 MG/ML
20 INJECTION INTRAMUSCULAR; INTRAVENOUS DAILY
Status: DISCONTINUED | OUTPATIENT
Start: 2021-01-01 | End: 2021-01-01

## 2021-01-01 RX ORDER — LEVETIRACETAM 5 MG/ML
500 INJECTION INTRAVASCULAR EVERY 12 HOURS
Status: DISCONTINUED | OUTPATIENT
Start: 2021-01-01 | End: 2021-01-01

## 2021-01-01 RX ORDER — NEOSTIGMINE METHYLSULFATE 1 MG/ML
INJECTION, SOLUTION INTRAVENOUS PRN
Status: DISCONTINUED | OUTPATIENT
Start: 2021-01-01 | End: 2021-01-01 | Stop reason: SDUPTHER

## 2021-01-01 RX ORDER — DEXAMETHASONE SODIUM PHOSPHATE 4 MG/ML
2 INJECTION, SOLUTION INTRA-ARTICULAR; INTRALESIONAL; INTRAMUSCULAR; INTRAVENOUS; SOFT TISSUE EVERY 8 HOURS
Status: DISCONTINUED | OUTPATIENT
Start: 2021-01-01 | End: 2021-01-01

## 2021-01-01 RX ORDER — ALBUTEROL SULFATE 2.5 MG/3ML
2.5 SOLUTION RESPIRATORY (INHALATION) EVERY 4 HOURS PRN
Status: DISCONTINUED | OUTPATIENT
Start: 2021-01-01 | End: 2021-01-01 | Stop reason: HOSPADM

## 2021-01-01 RX ORDER — 0.9 % SODIUM CHLORIDE 0.9 %
500 INTRAVENOUS SOLUTION INTRAVENOUS ONCE
Status: COMPLETED | OUTPATIENT
Start: 2021-01-01 | End: 2021-01-01

## 2021-01-01 RX ORDER — FENTANYL CITRATE 50 UG/ML
INJECTION, SOLUTION INTRAMUSCULAR; INTRAVENOUS PRN
Status: DISCONTINUED | OUTPATIENT
Start: 2021-01-01 | End: 2021-01-01 | Stop reason: SDUPTHER

## 2021-01-01 RX ORDER — MIDAZOLAM HYDROCHLORIDE 1 MG/ML
INJECTION INTRAMUSCULAR; INTRAVENOUS PRN
Status: DISCONTINUED | OUTPATIENT
Start: 2021-01-01 | End: 2021-01-01 | Stop reason: SDUPTHER

## 2021-01-01 RX ORDER — ACETAMINOPHEN 650 MG/1
650 SUPPOSITORY RECTAL EVERY 6 HOURS PRN
Status: DISCONTINUED | OUTPATIENT
Start: 2021-01-01 | End: 2021-01-01

## 2021-01-01 RX ORDER — VECURONIUM BROMIDE 1 MG/ML
INJECTION, POWDER, LYOPHILIZED, FOR SOLUTION INTRAVENOUS
Status: DISCONTINUED
Start: 2021-01-01 | End: 2021-01-01

## 2021-01-01 RX ORDER — SODIUM CHLORIDE, SODIUM LACTATE, POTASSIUM CHLORIDE, CALCIUM CHLORIDE 600; 310; 30; 20 MG/100ML; MG/100ML; MG/100ML; MG/100ML
INJECTION, SOLUTION INTRAVENOUS CONTINUOUS PRN
Status: COMPLETED | OUTPATIENT
Start: 2021-01-01 | End: 2021-01-01

## 2021-01-01 RX ORDER — DILTIAZEM HYDROCHLORIDE 5 MG/ML
15 INJECTION INTRAVENOUS ONCE
Status: COMPLETED | OUTPATIENT
Start: 2021-01-01 | End: 2021-01-01

## 2021-01-01 RX ORDER — INSULIN GLARGINE 100 [IU]/ML
12 INJECTION, SOLUTION SUBCUTANEOUS NIGHTLY
Status: DISCONTINUED | OUTPATIENT
Start: 2021-01-01 | End: 2021-01-01

## 2021-01-01 RX ORDER — HEPARIN SODIUM 10000 [USP'U]/ML
5000 INJECTION, SOLUTION INTRAVENOUS; SUBCUTANEOUS EVERY 8 HOURS SCHEDULED
Status: DISCONTINUED | OUTPATIENT
Start: 2021-01-01 | End: 2021-01-01

## 2021-01-01 RX ORDER — ATROPA BELLADONNA AND OPIUM 16.2; 6 MG/1; MG/1
60 SUPPOSITORY RECTAL EVERY 8 HOURS PRN
Status: DISCONTINUED | OUTPATIENT
Start: 2021-01-01 | End: 2021-01-01 | Stop reason: HOSPADM

## 2021-01-01 RX ORDER — EPHEDRINE SULFATE/0.9% NACL/PF 50 MG/5 ML
SYRINGE (ML) INTRAVENOUS PRN
Status: DISCONTINUED | OUTPATIENT
Start: 2021-01-01 | End: 2021-01-01 | Stop reason: SDUPTHER

## 2021-01-01 RX ORDER — LIDOCAINE HYDROCHLORIDE AND EPINEPHRINE 10; 10 MG/ML; UG/ML
INJECTION, SOLUTION INFILTRATION; PERINEURAL PRN
Status: DISCONTINUED | OUTPATIENT
Start: 2021-01-01 | End: 2021-01-01 | Stop reason: HOSPADM

## 2021-01-01 RX ORDER — TAMSULOSIN HYDROCHLORIDE 0.4 MG/1
0.4 CAPSULE ORAL NIGHTLY
Status: DISCONTINUED | OUTPATIENT
Start: 2021-01-01 | End: 2021-01-01

## 2021-01-01 RX ORDER — MORPHINE SULFATE 2 MG/ML
2 INJECTION, SOLUTION INTRAMUSCULAR; INTRAVENOUS ONCE
Status: COMPLETED | OUTPATIENT
Start: 2021-01-01 | End: 2021-01-01

## 2021-01-01 RX ORDER — FUROSEMIDE 10 MG/ML
20 INJECTION INTRAMUSCULAR; INTRAVENOUS 2 TIMES DAILY
Status: DISCONTINUED | OUTPATIENT
Start: 2021-01-01 | End: 2021-01-01

## 2021-01-01 RX ORDER — MINERAL OIL AND WHITE PETROLATUM 150; 830 MG/G; MG/G
OINTMENT OPHTHALMIC PRN
Status: DISCONTINUED | OUTPATIENT
Start: 2021-01-01 | End: 2021-01-01 | Stop reason: HOSPADM

## 2021-01-01 RX ORDER — SODIUM CHLORIDE AND POTASSIUM CHLORIDE .9; .15 G/100ML; G/100ML
SOLUTION INTRAVENOUS CONTINUOUS
Status: DISCONTINUED | OUTPATIENT
Start: 2021-01-01 | End: 2021-01-01

## 2021-01-01 RX ORDER — AMLODIPINE BESYLATE 10 MG/1
10 TABLET ORAL DAILY
Status: DISCONTINUED | OUTPATIENT
Start: 2021-01-01 | End: 2021-01-01

## 2021-01-01 RX ORDER — LIDOCAINE HYDROCHLORIDE 20 MG/ML
INJECTION, SOLUTION INTRAVENOUS PRN
Status: DISCONTINUED | OUTPATIENT
Start: 2021-01-01 | End: 2021-01-01 | Stop reason: SDUPTHER

## 2021-01-01 RX ORDER — CLONIDINE HYDROCHLORIDE 0.2 MG/1
0.2 TABLET ORAL 2 TIMES DAILY
Status: DISCONTINUED | OUTPATIENT
Start: 2021-01-01 | End: 2021-01-01

## 2021-01-01 RX ADMIN — AMIODARONE HYDROCHLORIDE 0.5 MG/MIN: 50 INJECTION, SOLUTION INTRAVENOUS at 16:14

## 2021-01-01 RX ADMIN — MEROPENEM 1000 MG: 1 INJECTION, POWDER, FOR SOLUTION INTRAVENOUS at 05:10

## 2021-01-01 RX ADMIN — LAMOTRIGINE 200 MG: 100 TABLET ORAL at 20:42

## 2021-01-01 RX ADMIN — INSULIN LISPRO 3 UNITS: 100 INJECTION, SOLUTION INTRAVENOUS; SUBCUTANEOUS at 20:00

## 2021-01-01 RX ADMIN — LAMOTRIGINE 200 MG: 100 TABLET ORAL at 08:53

## 2021-01-01 RX ADMIN — PANTOPRAZOLE SODIUM 20 MG: 40 INJECTION, POWDER, FOR SOLUTION INTRAVENOUS at 07:53

## 2021-01-01 RX ADMIN — MEROPENEM 1000 MG: 1 INJECTION, POWDER, FOR SOLUTION INTRAVENOUS at 10:34

## 2021-01-01 RX ADMIN — DEXAMETHASONE SODIUM PHOSPHATE 2 MG: 4 INJECTION, SOLUTION INTRA-ARTICULAR; INTRALESIONAL; INTRAMUSCULAR; INTRAVENOUS; SOFT TISSUE at 12:56

## 2021-01-01 RX ADMIN — INSULIN LISPRO 3 UNITS: 100 INJECTION, SOLUTION INTRAVENOUS; SUBCUTANEOUS at 20:19

## 2021-01-01 RX ADMIN — LEVETIRACETAM 500 MG: 5 INJECTION, SOLUTION INTRAVENOUS at 10:15

## 2021-01-01 RX ADMIN — AMPICILLIN AND SULBACTAM 3000 MG: 2; 1 INJECTION, POWDER, FOR SOLUTION INTRAMUSCULAR; INTRAVENOUS at 13:07

## 2021-01-01 RX ADMIN — PSYLLIUM HUSK 1 PACKET: 3.4 GRANULE ORAL at 13:49

## 2021-01-01 RX ADMIN — AMIODARONE HYDROCHLORIDE 0.5 MG/MIN: 50 INJECTION, SOLUTION INTRAVENOUS at 05:45

## 2021-01-01 RX ADMIN — INSULIN LISPRO 6 UNITS: 100 INJECTION, SOLUTION INTRAVENOUS; SUBCUTANEOUS at 18:23

## 2021-01-01 RX ADMIN — POTASSIUM CHLORIDE 20 MEQ: 400 INJECTION, SOLUTION INTRAVENOUS at 07:07

## 2021-01-01 RX ADMIN — GABAPENTIN 300 MG: 250 SUSPENSION ORAL at 10:01

## 2021-01-01 RX ADMIN — IPRATROPIUM BROMIDE AND ALBUTEROL SULFATE 1 AMPULE: 2.5; .5 SOLUTION RESPIRATORY (INHALATION) at 12:30

## 2021-01-01 RX ADMIN — DIVALPROEX SODIUM 125 MG: 125 CAPSULE, COATED PELLETS ORAL at 08:59

## 2021-01-01 RX ADMIN — AMIODARONE HYDROCHLORIDE 200 MG: 200 TABLET ORAL at 03:29

## 2021-01-01 RX ADMIN — INSULIN LISPRO 3 UNITS: 100 INJECTION, SOLUTION INTRAVENOUS; SUBCUTANEOUS at 19:53

## 2021-01-01 RX ADMIN — DIVALPROEX SODIUM 125 MG: 125 CAPSULE, COATED PELLETS ORAL at 20:08

## 2021-01-01 RX ADMIN — LAMOTRIGINE 200 MG: 100 TABLET ORAL at 21:10

## 2021-01-01 RX ADMIN — HYDRALAZINE HYDROCHLORIDE 50 MG: 25 TABLET, FILM COATED ORAL at 19:56

## 2021-01-01 RX ADMIN — AMIODARONE HYDROCHLORIDE 200 MG: 200 TABLET ORAL at 18:49

## 2021-01-01 RX ADMIN — INSULIN GLARGINE 10 UNITS: 100 INJECTION, SOLUTION SUBCUTANEOUS at 21:39

## 2021-01-01 RX ADMIN — DEXAMETHASONE SODIUM PHOSPHATE 10 MG: 10 INJECTION INTRAMUSCULAR; INTRAVENOUS at 21:13

## 2021-01-01 RX ADMIN — SODIUM CHLORIDE, PRESERVATIVE FREE 10 ML: 5 INJECTION INTRAVENOUS at 10:01

## 2021-01-01 RX ADMIN — LAMOTRIGINE 200 MG: 100 TABLET ORAL at 19:43

## 2021-01-01 RX ADMIN — HEPARIN SODIUM 5000 UNITS: 10000 INJECTION INTRAVENOUS; SUBCUTANEOUS at 05:40

## 2021-01-01 RX ADMIN — SENNOSIDES 8.6 MG: 8.6 TABLET, FILM COATED ORAL at 21:30

## 2021-01-01 RX ADMIN — HYDRALAZINE HYDROCHLORIDE 10 MG: 20 INJECTION, SOLUTION INTRAMUSCULAR; INTRAVENOUS at 23:36

## 2021-01-01 RX ADMIN — LAMOTRIGINE 200 MG: 100 TABLET ORAL at 21:09

## 2021-01-01 RX ADMIN — LABETALOL HYDROCHLORIDE 10 MG: 5 INJECTION INTRAVENOUS at 18:03

## 2021-01-01 RX ADMIN — Medication 1 TABLET: at 10:13

## 2021-01-01 RX ADMIN — HYDRALAZINE HYDROCHLORIDE 20 MG: 20 INJECTION, SOLUTION INTRAMUSCULAR; INTRAVENOUS at 03:26

## 2021-01-01 RX ADMIN — FUROSEMIDE 20 MG: 10 INJECTION, SOLUTION INTRAMUSCULAR; INTRAVENOUS at 08:39

## 2021-01-01 RX ADMIN — HYDRALAZINE HYDROCHLORIDE 10 MG: 20 INJECTION INTRAMUSCULAR; INTRAVENOUS at 17:49

## 2021-01-01 RX ADMIN — GABAPENTIN 300 MG: 300 CAPSULE ORAL at 08:47

## 2021-01-01 RX ADMIN — DEXAMETHASONE SODIUM PHOSPHATE 10 MG: 10 INJECTION INTRAMUSCULAR; INTRAVENOUS at 16:17

## 2021-01-01 RX ADMIN — DEXTROSE MONOHYDRATE 5 MG/HR: 50 INJECTION, SOLUTION INTRAVENOUS at 18:10

## 2021-01-01 RX ADMIN — GABAPENTIN 300 MG: 300 CAPSULE ORAL at 20:01

## 2021-01-01 RX ADMIN — AMIODARONE HYDROCHLORIDE 200 MG: 200 TABLET ORAL at 17:29

## 2021-01-01 RX ADMIN — METOPROLOL TARTRATE 50 MG: 50 TABLET, FILM COATED ORAL at 19:43

## 2021-01-01 RX ADMIN — SODIUM CHLORIDE 33.3 ML: 9 INJECTION, SOLUTION INTRAVENOUS at 08:18

## 2021-01-01 RX ADMIN — HYDRALAZINE HYDROCHLORIDE 20 MG: 20 INJECTION, SOLUTION INTRAMUSCULAR; INTRAVENOUS at 08:15

## 2021-01-01 RX ADMIN — SODIUM CHLORIDE 33.3 ML: 9 INJECTION, SOLUTION INTRAVENOUS at 22:45

## 2021-01-01 RX ADMIN — METOPROLOL TARTRATE 5 MG: 1 INJECTION, SOLUTION INTRAVENOUS at 00:49

## 2021-01-01 RX ADMIN — ALBUTEROL SULFATE 2.5 MG: 2.5 SOLUTION RESPIRATORY (INHALATION) at 02:30

## 2021-01-01 RX ADMIN — POTASSIUM BICARBONATE 40 MEQ: 782 TABLET, EFFERVESCENT ORAL at 20:41

## 2021-01-01 RX ADMIN — FUROSEMIDE 20 MG: 10 INJECTION, SOLUTION INTRAMUSCULAR; INTRAVENOUS at 14:41

## 2021-01-01 RX ADMIN — LAMOTRIGINE 200 MG: 100 TABLET ORAL at 09:59

## 2021-01-01 RX ADMIN — GABAPENTIN 300 MG: 300 CAPSULE ORAL at 21:12

## 2021-01-01 RX ADMIN — GADOBENATE DIMEGLUMINE 13 ML: 529 INJECTION, SOLUTION INTRAVENOUS at 12:24

## 2021-01-01 RX ADMIN — LAMOTRIGINE 200 MG: 100 TABLET ORAL at 10:00

## 2021-01-01 RX ADMIN — PROPOFOL 50 MG: 10 INJECTION, EMULSION INTRAVENOUS at 11:09

## 2021-01-01 RX ADMIN — CHLORHEXIDINE GLUCONATE 15 ML: 1.2 RINSE ORAL at 21:00

## 2021-01-01 RX ADMIN — LAMOTRIGINE 200 MG: 100 TABLET ORAL at 19:44

## 2021-01-01 RX ADMIN — MEROPENEM 1000 MG: 1 INJECTION, POWDER, FOR SOLUTION INTRAVENOUS at 18:52

## 2021-01-01 RX ADMIN — HEPARIN SODIUM 5000 UNITS: 10000 INJECTION INTRAVENOUS; SUBCUTANEOUS at 05:15

## 2021-01-01 RX ADMIN — LABETALOL HYDROCHLORIDE 10 MG: 5 INJECTION INTRAVENOUS at 14:49

## 2021-01-01 RX ADMIN — POTASSIUM CHLORIDE, DEXTROSE MONOHYDRATE AND SODIUM CHLORIDE 50 ML/HR: 150; 5; 450 INJECTION, SOLUTION INTRAVENOUS at 14:40

## 2021-01-01 RX ADMIN — SODIUM CHLORIDE, PRESERVATIVE FREE 10 ML: 5 INJECTION INTRAVENOUS at 07:58

## 2021-01-01 RX ADMIN — LAMOTRIGINE 200 MG: 100 TABLET ORAL at 20:02

## 2021-01-01 RX ADMIN — INSULIN LISPRO 3 UNITS: 100 INJECTION, SOLUTION INTRAVENOUS; SUBCUTANEOUS at 11:32

## 2021-01-01 RX ADMIN — INSULIN LISPRO 3 UNITS: 100 INJECTION, SOLUTION INTRAVENOUS; SUBCUTANEOUS at 11:42

## 2021-01-01 RX ADMIN — HYDRALAZINE HYDROCHLORIDE 10 MG: 20 INJECTION, SOLUTION INTRAMUSCULAR; INTRAVENOUS at 00:53

## 2021-01-01 RX ADMIN — POTASSIUM CHLORIDE, DEXTROSE MONOHYDRATE AND SODIUM CHLORIDE: 150; 5; 450 INJECTION, SOLUTION INTRAVENOUS at 09:23

## 2021-01-01 RX ADMIN — METOPROLOL TARTRATE 5 MG: 1 INJECTION, SOLUTION INTRAVENOUS at 08:02

## 2021-01-01 RX ADMIN — GABAPENTIN 300 MG: 300 CAPSULE ORAL at 10:14

## 2021-01-01 RX ADMIN — HYDROCODONE BITARTRATE AND ACETAMINOPHEN 1 TABLET: 5; 325 TABLET ORAL at 20:03

## 2021-01-01 RX ADMIN — HALOPERIDOL LACTATE 2 MG: 5 INJECTION, SOLUTION INTRAMUSCULAR at 07:50

## 2021-01-01 RX ADMIN — ROCURONIUM BROMIDE 20 MG: 10 INJECTION, SOLUTION INTRAVENOUS at 11:19

## 2021-01-01 RX ADMIN — HYDRALAZINE HYDROCHLORIDE 20 MG: 20 INJECTION, SOLUTION INTRAMUSCULAR; INTRAVENOUS at 00:19

## 2021-01-01 RX ADMIN — DOCUSATE SODIUM 100 MG: 100 CAPSULE ORAL at 21:17

## 2021-01-01 RX ADMIN — GABAPENTIN 300 MG: 300 CAPSULE ORAL at 20:03

## 2021-01-01 RX ADMIN — TAMSULOSIN HYDROCHLORIDE 0.4 MG: 0.4 CAPSULE ORAL at 21:00

## 2021-01-01 RX ADMIN — Medication 10 ML: at 20:49

## 2021-01-01 RX ADMIN — ONDANSETRON HYDROCHLORIDE 4 MG: 2 INJECTION, SOLUTION INTRAMUSCULAR; INTRAVENOUS at 12:43

## 2021-01-01 RX ADMIN — INSULIN LISPRO 3 UNITS: 100 INJECTION, SOLUTION INTRAVENOUS; SUBCUTANEOUS at 12:24

## 2021-01-01 RX ADMIN — MAGNESIUM GLUCONATE 500 MG ORAL TABLET 400 MG: 500 TABLET ORAL at 08:26

## 2021-01-01 RX ADMIN — DOFETILIDE 125 MCG: 0.12 CAPSULE ORAL at 21:44

## 2021-01-01 RX ADMIN — AMLODIPINE BESYLATE 5 MG: 5 TABLET ORAL at 09:04

## 2021-01-01 RX ADMIN — LAMOTRIGINE 200 MG: 100 TABLET ORAL at 22:29

## 2021-01-01 RX ADMIN — CHLORHEXIDINE GLUCONATE 15 ML: 1.2 RINSE ORAL at 08:42

## 2021-01-01 RX ADMIN — HEPARIN SODIUM 5000 UNITS: 10000 INJECTION INTRAVENOUS; SUBCUTANEOUS at 22:58

## 2021-01-01 RX ADMIN — METOPROLOL TARTRATE 50 MG: 50 TABLET, FILM COATED ORAL at 20:37

## 2021-01-01 RX ADMIN — CLONIDINE HYDROCHLORIDE 0.2 MG: 0.2 TABLET ORAL at 08:27

## 2021-01-01 RX ADMIN — POLYETHYLENE GLYCOL 3350 17 G: 17 POWDER, FOR SOLUTION ORAL at 09:00

## 2021-01-01 RX ADMIN — TAMSULOSIN HYDROCHLORIDE 0.4 MG: 0.4 CAPSULE ORAL at 21:19

## 2021-01-01 RX ADMIN — DEXAMETHASONE SODIUM PHOSPHATE 10 MG: 10 INJECTION INTRAMUSCULAR; INTRAVENOUS at 16:31

## 2021-01-01 RX ADMIN — HEPARIN 300 UNITS: 100 SYRINGE at 06:14

## 2021-01-01 RX ADMIN — DIVALPROEX SODIUM 125 MG: 125 CAPSULE, COATED PELLETS ORAL at 08:57

## 2021-01-01 RX ADMIN — POTASSIUM BICARBONATE 40 MEQ: 782 TABLET, EFFERVESCENT ORAL at 19:43

## 2021-01-01 RX ADMIN — Medication 500 MG: at 08:06

## 2021-01-01 RX ADMIN — MEROPENEM 1000 MG: 1 INJECTION, POWDER, FOR SOLUTION INTRAVENOUS at 10:57

## 2021-01-01 RX ADMIN — MORPHINE SULFATE 2 MG: 2 INJECTION, SOLUTION INTRAMUSCULAR; INTRAVENOUS at 09:56

## 2021-01-01 RX ADMIN — LEVETIRACETAM 500 MG: 5 INJECTION, SOLUTION INTRAVENOUS at 14:49

## 2021-01-01 RX ADMIN — Medication 10 ML: at 21:09

## 2021-01-01 RX ADMIN — LAMOTRIGINE 200 MG: 100 TABLET ORAL at 08:26

## 2021-01-01 RX ADMIN — AMIODARONE HYDROCHLORIDE 200 MG: 200 TABLET ORAL at 10:03

## 2021-01-01 RX ADMIN — HEPARIN SODIUM 5000 UNITS: 10000 INJECTION INTRAVENOUS; SUBCUTANEOUS at 13:34

## 2021-01-01 RX ADMIN — INSULIN LISPRO 3 UNITS: 100 INJECTION, SOLUTION INTRAVENOUS; SUBCUTANEOUS at 16:25

## 2021-01-01 RX ADMIN — GABAPENTIN 300 MG: 250 SUSPENSION ORAL at 08:04

## 2021-01-01 RX ADMIN — METOPROLOL TARTRATE 5 MG: 1 INJECTION, SOLUTION INTRAVENOUS at 06:09

## 2021-01-01 RX ADMIN — LEVETIRACETAM 500 MG: 500 TABLET ORAL at 10:29

## 2021-01-01 RX ADMIN — SENNOSIDES 8.6 MG: 8.6 TABLET, FILM COATED ORAL at 20:01

## 2021-01-01 RX ADMIN — LABETALOL HYDROCHLORIDE 10 MG: 5 INJECTION INTRAVENOUS at 15:49

## 2021-01-01 RX ADMIN — DOXYCYCLINE 100 MG: 100 INJECTION, POWDER, LYOPHILIZED, FOR SOLUTION INTRAVENOUS at 10:24

## 2021-01-01 RX ADMIN — MIDAZOLAM 1 MG: 1 INJECTION INTRAMUSCULAR; INTRAVENOUS at 11:51

## 2021-01-01 RX ADMIN — PHENYLEPHRINE HYDROCHLORIDE 200 MCG: 10 INJECTION INTRAVENOUS at 11:38

## 2021-01-01 RX ADMIN — HYDRALAZINE HYDROCHLORIDE 25 MG: 25 TABLET, FILM COATED ORAL at 06:18

## 2021-01-01 RX ADMIN — IPRATROPIUM BROMIDE AND ALBUTEROL SULFATE 1 AMPULE: 2.5; .5 SOLUTION RESPIRATORY (INHALATION) at 16:11

## 2021-01-01 RX ADMIN — DIVALPROEX SODIUM 125 MG: 125 CAPSULE, COATED PELLETS ORAL at 10:13

## 2021-01-01 RX ADMIN — LABETALOL HYDROCHLORIDE 10 MG: 5 INJECTION INTRAVENOUS at 09:15

## 2021-01-01 RX ADMIN — ACETAMINOPHEN 650 MG: 650 SUPPOSITORY RECTAL at 09:34

## 2021-01-01 RX ADMIN — DIVALPROEX SODIUM 125 MG: 125 CAPSULE, COATED PELLETS ORAL at 08:04

## 2021-01-01 RX ADMIN — DEXTROSE MONOHYDRATE 5 MG/HR: 50 INJECTION, SOLUTION INTRAVENOUS at 09:05

## 2021-01-01 RX ADMIN — LABETALOL HYDROCHLORIDE 10 MG: 5 INJECTION INTRAVENOUS at 07:58

## 2021-01-01 RX ADMIN — INSULIN LISPRO 2 UNITS: 100 INJECTION, SOLUTION INTRAVENOUS; SUBCUTANEOUS at 20:56

## 2021-01-01 RX ADMIN — HYDRALAZINE HYDROCHLORIDE 20 MG: 20 INJECTION, SOLUTION INTRAMUSCULAR; INTRAVENOUS at 10:15

## 2021-01-01 RX ADMIN — AMLODIPINE BESYLATE 5 MG: 5 TABLET ORAL at 08:57

## 2021-01-01 RX ADMIN — LABETALOL HYDROCHLORIDE 10 MG: 5 INJECTION INTRAVENOUS at 04:57

## 2021-01-01 RX ADMIN — CHLORHEXIDINE GLUCONATE 15 ML: 1.2 RINSE ORAL at 20:09

## 2021-01-01 RX ADMIN — Medication 10 ML: at 08:48

## 2021-01-01 RX ADMIN — MEROPENEM 1000 MG: 1 INJECTION, POWDER, FOR SOLUTION INTRAVENOUS at 03:05

## 2021-01-01 RX ADMIN — ACETAMINOPHEN 650 MG: 650 SUPPOSITORY RECTAL at 12:18

## 2021-01-01 RX ADMIN — HYDRALAZINE HYDROCHLORIDE 25 MG: 25 TABLET, FILM COATED ORAL at 13:39

## 2021-01-01 RX ADMIN — INSULIN LISPRO 9 UNITS: 100 INJECTION, SOLUTION INTRAVENOUS; SUBCUTANEOUS at 08:24

## 2021-01-01 RX ADMIN — METOPROLOL TARTRATE 50 MG: 50 TABLET, FILM COATED ORAL at 08:55

## 2021-01-01 RX ADMIN — CHLORHEXIDINE GLUCONATE 15 ML: 1.2 RINSE ORAL at 21:20

## 2021-01-01 RX ADMIN — LAMOTRIGINE 200 MG: 100 TABLET ORAL at 08:39

## 2021-01-01 RX ADMIN — INSULIN LISPRO 3 UNITS: 100 INJECTION, SOLUTION INTRAVENOUS; SUBCUTANEOUS at 10:25

## 2021-01-01 RX ADMIN — LABETALOL HYDROCHLORIDE 10 MG: 5 INJECTION INTRAVENOUS at 12:08

## 2021-01-01 RX ADMIN — POTASSIUM BICARBONATE 40 MEQ: 782 TABLET, EFFERVESCENT ORAL at 09:52

## 2021-01-01 RX ADMIN — Medication 10 ML: at 05:45

## 2021-01-01 RX ADMIN — AMIODARONE HYDROCHLORIDE 200 MG: 200 TABLET ORAL at 18:37

## 2021-01-01 RX ADMIN — AMIODARONE HYDROCHLORIDE 200 MG: 200 TABLET ORAL at 08:55

## 2021-01-01 RX ADMIN — HALOPERIDOL LACTATE 2 MG: 5 INJECTION, SOLUTION INTRAMUSCULAR at 21:15

## 2021-01-01 RX ADMIN — AMIODARONE HYDROCHLORIDE 200 MG: 200 TABLET ORAL at 04:57

## 2021-01-01 RX ADMIN — INSULIN LISPRO 2 UNITS: 100 INJECTION, SOLUTION INTRAVENOUS; SUBCUTANEOUS at 17:56

## 2021-01-01 RX ADMIN — HYDRALAZINE HYDROCHLORIDE 25 MG: 25 TABLET, FILM COATED ORAL at 23:22

## 2021-01-01 RX ADMIN — AMIODARONE HYDROCHLORIDE 200 MG: 200 TABLET ORAL at 08:33

## 2021-01-01 RX ADMIN — AMPICILLIN AND SULBACTAM 3000 MG: 2; 1 INJECTION, POWDER, FOR SOLUTION INTRAMUSCULAR; INTRAVENOUS at 13:56

## 2021-01-01 RX ADMIN — SODIUM CHLORIDE, PRESERVATIVE FREE 300 UNITS: 5 INJECTION INTRAVENOUS at 21:00

## 2021-01-01 RX ADMIN — LEVETIRACETAM 500 MG: 5 INJECTION, SOLUTION INTRAVENOUS at 12:57

## 2021-01-01 RX ADMIN — IPRATROPIUM BROMIDE AND ALBUTEROL SULFATE 1 AMPULE: 2.5; .5 SOLUTION RESPIRATORY (INHALATION) at 22:27

## 2021-01-01 RX ADMIN — DEXAMETHASONE SODIUM PHOSPHATE 2 MG: 4 INJECTION, SOLUTION INTRA-ARTICULAR; INTRALESIONAL; INTRAMUSCULAR; INTRAVENOUS; SOFT TISSUE at 00:07

## 2021-01-01 RX ADMIN — Medication 10 MG: at 12:48

## 2021-01-01 RX ADMIN — LISINOPRIL 20 MG: 20 TABLET ORAL at 07:58

## 2021-01-01 RX ADMIN — INSULIN LISPRO 3 UNITS: 100 INJECTION, SOLUTION INTRAVENOUS; SUBCUTANEOUS at 16:46

## 2021-01-01 RX ADMIN — Medication 3 MG: at 13:22

## 2021-01-01 RX ADMIN — HYDRALAZINE HYDROCHLORIDE 25 MG: 25 TABLET, FILM COATED ORAL at 13:53

## 2021-01-01 RX ADMIN — GABAPENTIN 300 MG: 250 SUSPENSION ORAL at 09:07

## 2021-01-01 RX ADMIN — AMLODIPINE BESYLATE 10 MG: 10 TABLET ORAL at 10:13

## 2021-01-01 RX ADMIN — SODIUM CHLORIDE 33.3 ML: 9 INJECTION, SOLUTION INTRAVENOUS at 22:24

## 2021-01-01 RX ADMIN — METOPROLOL TARTRATE 50 MG: 50 TABLET, FILM COATED ORAL at 09:07

## 2021-01-01 RX ADMIN — AMIODARONE HYDROCHLORIDE 200 MG: 200 TABLET ORAL at 09:17

## 2021-01-01 RX ADMIN — SODIUM CHLORIDE 250 ML: 9 INJECTION, SOLUTION INTRAVENOUS at 16:16

## 2021-01-01 RX ADMIN — CHLORHEXIDINE GLUCONATE 15 ML: 1.2 RINSE ORAL at 19:43

## 2021-01-01 RX ADMIN — HYDRALAZINE HYDROCHLORIDE 25 MG: 25 TABLET, FILM COATED ORAL at 06:00

## 2021-01-01 RX ADMIN — GABAPENTIN 300 MG: 300 CAPSULE ORAL at 21:11

## 2021-01-01 RX ADMIN — LAMOTRIGINE 200 MG: 100 TABLET ORAL at 20:38

## 2021-01-01 RX ADMIN — Medication 10 ML: at 08:01

## 2021-01-01 RX ADMIN — AMIODARONE HYDROCHLORIDE 200 MG: 200 TABLET ORAL at 04:33

## 2021-01-01 RX ADMIN — HEPARIN SODIUM 5000 UNITS: 10000 INJECTION INTRAVENOUS; SUBCUTANEOUS at 06:10

## 2021-01-01 RX ADMIN — IPRATROPIUM BROMIDE AND ALBUTEROL SULFATE 1 AMPULE: 2.5; .5 SOLUTION RESPIRATORY (INHALATION) at 09:43

## 2021-01-01 RX ADMIN — MEROPENEM 1000 MG: 1 INJECTION, POWDER, FOR SOLUTION INTRAVENOUS at 03:02

## 2021-01-01 RX ADMIN — SUCCINYLCHOLINE CHLORIDE 200 MG: 20 INJECTION INTRAMUSCULAR; INTRAVENOUS at 15:44

## 2021-01-01 RX ADMIN — AMPICILLIN AND SULBACTAM 3000 MG: 2; 1 INJECTION, POWDER, FOR SOLUTION INTRAMUSCULAR; INTRAVENOUS at 02:06

## 2021-01-01 RX ADMIN — INSULIN GLARGINE 12 UNITS: 100 INJECTION, SOLUTION SUBCUTANEOUS at 23:17

## 2021-01-01 RX ADMIN — SODIUM CHLORIDE: 9 INJECTION, SOLUTION INTRAVENOUS at 10:34

## 2021-01-01 RX ADMIN — HYDRALAZINE HYDROCHLORIDE 10 MG: 20 INJECTION, SOLUTION INTRAMUSCULAR; INTRAVENOUS at 16:08

## 2021-01-01 RX ADMIN — Medication 10 MG: at 20:07

## 2021-01-01 RX ADMIN — HALOPERIDOL LACTATE 2 MG: 5 INJECTION, SOLUTION INTRAMUSCULAR at 02:51

## 2021-01-01 RX ADMIN — POLYVINYL ALCOHOL 1 DROP: 14 SOLUTION/ DROPS OPHTHALMIC at 10:38

## 2021-01-01 RX ADMIN — HYDRALAZINE HYDROCHLORIDE 5 MG: 20 INJECTION, SOLUTION INTRAMUSCULAR; INTRAVENOUS at 22:48

## 2021-01-01 RX ADMIN — LISINOPRIL 20 MG: 20 TABLET ORAL at 08:47

## 2021-01-01 RX ADMIN — IPRATROPIUM BROMIDE AND ALBUTEROL SULFATE 1 AMPULE: 2.5; .5 SOLUTION RESPIRATORY (INHALATION) at 12:16

## 2021-01-01 RX ADMIN — VANCOMYCIN HYDROCHLORIDE 1500 MG: 1 INJECTION, POWDER, LYOPHILIZED, FOR SOLUTION INTRAVENOUS at 08:06

## 2021-01-01 RX ADMIN — DEXAMETHASONE SODIUM PHOSPHATE 4 MG: 4 INJECTION, SOLUTION INTRA-ARTICULAR; INTRALESIONAL; INTRAMUSCULAR; INTRAVENOUS; SOFT TISSUE at 05:45

## 2021-01-01 RX ADMIN — Medication 0.6 MG: at 13:22

## 2021-01-01 RX ADMIN — DEXAMETHASONE SODIUM PHOSPHATE 4 MG: 4 INJECTION, SOLUTION INTRA-ARTICULAR; INTRALESIONAL; INTRAMUSCULAR; INTRAVENOUS; SOFT TISSUE at 15:31

## 2021-01-01 RX ADMIN — INSULIN LISPRO 3 UNITS: 100 INJECTION, SOLUTION INTRAVENOUS; SUBCUTANEOUS at 04:05

## 2021-01-01 RX ADMIN — METOPROLOL TARTRATE 50 MG: 50 TABLET, FILM COATED ORAL at 08:07

## 2021-01-01 RX ADMIN — SODIUM CHLORIDE 33.3 ML: 9 INJECTION, SOLUTION INTRAVENOUS at 06:07

## 2021-01-01 RX ADMIN — DEXAMETHASONE SODIUM PHOSPHATE 10 MG: 10 INJECTION, SOLUTION INTRAMUSCULAR; INTRAVENOUS at 12:35

## 2021-01-01 RX ADMIN — LABETALOL HYDROCHLORIDE 10 MG: 5 INJECTION INTRAVENOUS at 10:06

## 2021-01-01 RX ADMIN — CHLORHEXIDINE GLUCONATE 15 ML: 1.2 RINSE ORAL at 09:00

## 2021-01-01 RX ADMIN — AMIODARONE HYDROCHLORIDE 200 MG: 200 TABLET ORAL at 18:25

## 2021-01-01 RX ADMIN — Medication 10 ML: at 10:17

## 2021-01-01 RX ADMIN — AMPICILLIN AND SULBACTAM 3000 MG: 2; 1 INJECTION, POWDER, FOR SOLUTION INTRAMUSCULAR; INTRAVENOUS at 14:58

## 2021-01-01 RX ADMIN — GABAPENTIN 300 MG: 300 CAPSULE ORAL at 22:30

## 2021-01-01 RX ADMIN — HALOPERIDOL LACTATE 2 MG: 5 INJECTION, SOLUTION INTRAMUSCULAR at 20:03

## 2021-01-01 RX ADMIN — Medication 10 ML: at 21:18

## 2021-01-01 RX ADMIN — Medication 10 ML: at 20:56

## 2021-01-01 RX ADMIN — LISINOPRIL 20 MG: 20 TABLET ORAL at 09:53

## 2021-01-01 RX ADMIN — Medication 10 MG: at 11:25

## 2021-01-01 RX ADMIN — PSYLLIUM HUSK 1 PACKET: 3.4 GRANULE ORAL at 08:03

## 2021-01-01 RX ADMIN — AMPICILLIN AND SULBACTAM 3000 MG: 2; 1 INJECTION, POWDER, FOR SOLUTION INTRAMUSCULAR; INTRAVENOUS at 21:20

## 2021-01-01 RX ADMIN — SODIUM CHLORIDE 33.3 ML: 9 INJECTION, SOLUTION INTRAVENOUS at 23:24

## 2021-01-01 RX ADMIN — LISINOPRIL 20 MG: 20 TABLET ORAL at 08:38

## 2021-01-01 RX ADMIN — PANTOPRAZOLE SODIUM 20 MG: 40 INJECTION, POWDER, FOR SOLUTION INTRAVENOUS at 09:52

## 2021-01-01 RX ADMIN — HYDRALAZINE HYDROCHLORIDE 10 MG: 20 INJECTION, SOLUTION INTRAMUSCULAR; INTRAVENOUS at 04:25

## 2021-01-01 RX ADMIN — Medication 10 MG: at 11:39

## 2021-01-01 RX ADMIN — IPRATROPIUM BROMIDE AND ALBUTEROL SULFATE 1 AMPULE: 2.5; .5 SOLUTION RESPIRATORY (INHALATION) at 22:00

## 2021-01-01 RX ADMIN — CLONIDINE HYDROCHLORIDE 0.2 MG: 0.2 TABLET ORAL at 21:15

## 2021-01-01 RX ADMIN — LORAZEPAM 0.5 MG: 2 INJECTION INTRAMUSCULAR; INTRAVENOUS at 11:41

## 2021-01-01 RX ADMIN — Medication 10 ML: at 21:20

## 2021-01-01 RX ADMIN — FUROSEMIDE 20 MG: 10 INJECTION, SOLUTION INTRAMUSCULAR; INTRAVENOUS at 07:59

## 2021-01-01 RX ADMIN — DIVALPROEX SODIUM 125 MG: 125 CAPSULE, COATED PELLETS ORAL at 21:00

## 2021-01-01 RX ADMIN — Medication 10 ML: at 20:38

## 2021-01-01 RX ADMIN — ACETAMINOPHEN 650 MG: 325 TABLET ORAL at 11:31

## 2021-01-01 RX ADMIN — INSULIN LISPRO 3 UNITS: 100 INJECTION, SOLUTION INTRAVENOUS; SUBCUTANEOUS at 00:08

## 2021-01-01 RX ADMIN — LISINOPRIL 20 MG: 20 TABLET ORAL at 09:58

## 2021-01-01 RX ADMIN — AMIODARONE HYDROCHLORIDE 200 MG: 200 TABLET ORAL at 20:48

## 2021-01-01 RX ADMIN — LISINOPRIL 20 MG: 20 TABLET ORAL at 09:00

## 2021-01-01 RX ADMIN — POTASSIUM BICARBONATE 40 MEQ: 782 TABLET, EFFERVESCENT ORAL at 20:04

## 2021-01-01 RX ADMIN — HEPARIN SODIUM 5000 UNITS: 10000 INJECTION INTRAVENOUS; SUBCUTANEOUS at 06:00

## 2021-01-01 RX ADMIN — SPIRONOLACTONE 25 MG: 25 TABLET ORAL at 08:27

## 2021-01-01 RX ADMIN — HYDRALAZINE HYDROCHLORIDE 20 MG: 20 INJECTION, SOLUTION INTRAMUSCULAR; INTRAVENOUS at 02:58

## 2021-01-01 RX ADMIN — FUROSEMIDE 20 MG: 10 INJECTION, SOLUTION INTRAMUSCULAR; INTRAVENOUS at 14:59

## 2021-01-01 RX ADMIN — HYDRALAZINE HYDROCHLORIDE 10 MG: 20 INJECTION, SOLUTION INTRAMUSCULAR; INTRAVENOUS at 14:20

## 2021-01-01 RX ADMIN — IPRATROPIUM BROMIDE AND ALBUTEROL SULFATE 1 AMPULE: 2.5; .5 SOLUTION RESPIRATORY (INHALATION) at 12:10

## 2021-01-01 RX ADMIN — METOPROLOL TARTRATE 50 MG: 50 TABLET, FILM COATED ORAL at 09:04

## 2021-01-01 RX ADMIN — METOPROLOL TARTRATE 5 MG: 1 INJECTION, SOLUTION INTRAVENOUS at 06:02

## 2021-01-01 RX ADMIN — Medication 10 ML: at 08:02

## 2021-01-01 RX ADMIN — HYDRALAZINE HYDROCHLORIDE 25 MG: 25 TABLET, FILM COATED ORAL at 05:35

## 2021-01-01 RX ADMIN — IPRATROPIUM BROMIDE AND ALBUTEROL SULFATE 1 AMPULE: 2.5; .5 SOLUTION RESPIRATORY (INHALATION) at 15:30

## 2021-01-01 RX ADMIN — POLYETHYLENE GLYCOL 3350 17 G: 17 POWDER, FOR SOLUTION ORAL at 09:44

## 2021-01-01 RX ADMIN — DIVALPROEX SODIUM 125 MG: 125 CAPSULE, COATED PELLETS ORAL at 19:42

## 2021-01-01 RX ADMIN — Medication 10 ML: at 21:29

## 2021-01-01 RX ADMIN — METOPROLOL TARTRATE 5 MG: 1 INJECTION, SOLUTION INTRAVENOUS at 14:30

## 2021-01-01 RX ADMIN — DEXAMETHASONE SODIUM PHOSPHATE 2 MG: 4 INJECTION, SOLUTION INTRA-ARTICULAR; INTRALESIONAL; INTRAMUSCULAR; INTRAVENOUS; SOFT TISSUE at 22:12

## 2021-01-01 RX ADMIN — METOPROLOL TARTRATE 5 MG: 1 INJECTION, SOLUTION INTRAVENOUS at 11:00

## 2021-01-01 RX ADMIN — POTASSIUM CHLORIDE 20 MEQ: 400 INJECTION, SOLUTION INTRAVENOUS at 07:59

## 2021-01-01 RX ADMIN — DEXAMETHASONE SODIUM PHOSPHATE 10 MG: 10 INJECTION INTRAMUSCULAR; INTRAVENOUS at 03:04

## 2021-01-01 RX ADMIN — AMIODARONE HYDROCHLORIDE: 50 INJECTION, SOLUTION INTRAVENOUS at 10:53

## 2021-01-01 RX ADMIN — LABETALOL HYDROCHLORIDE 10 MG: 5 INJECTION INTRAVENOUS at 09:35

## 2021-01-01 RX ADMIN — METOPROLOL TARTRATE 5 MG: 1 INJECTION, SOLUTION INTRAVENOUS at 12:56

## 2021-01-01 RX ADMIN — POTASSIUM CHLORIDE 40 MEQ: 400 INJECTION, SOLUTION INTRAVENOUS at 05:58

## 2021-01-01 RX ADMIN — CLONIDINE HYDROCHLORIDE 0.2 MG: 0.2 TABLET ORAL at 08:57

## 2021-01-01 RX ADMIN — LANSOPRAZOLE 15 MG: KIT at 06:05

## 2021-01-01 RX ADMIN — PANTOPRAZOLE SODIUM 20 MG: 40 INJECTION, POWDER, FOR SOLUTION INTRAVENOUS at 14:15

## 2021-01-01 RX ADMIN — LABETALOL HYDROCHLORIDE 10 MG: 5 INJECTION INTRAVENOUS at 17:36

## 2021-01-01 RX ADMIN — IPRATROPIUM BROMIDE AND ALBUTEROL SULFATE 1 AMPULE: 2.5; .5 SOLUTION RESPIRATORY (INHALATION) at 17:08

## 2021-01-01 RX ADMIN — AMIODARONE HYDROCHLORIDE 200 MG: 200 TABLET ORAL at 17:59

## 2021-01-01 RX ADMIN — IPRATROPIUM BROMIDE AND ALBUTEROL SULFATE 1 AMPULE: 2.5; .5 SOLUTION RESPIRATORY (INHALATION) at 08:45

## 2021-01-01 RX ADMIN — HYDRALAZINE HYDROCHLORIDE 50 MG: 25 TABLET, FILM COATED ORAL at 10:29

## 2021-01-01 RX ADMIN — FENTANYL CITRATE 50 MCG: 50 INJECTION, SOLUTION INTRAMUSCULAR; INTRAVENOUS at 10:44

## 2021-01-01 RX ADMIN — INSULIN LISPRO 6 UNITS: 100 INJECTION, SOLUTION INTRAVENOUS; SUBCUTANEOUS at 21:02

## 2021-01-01 RX ADMIN — SPIRONOLACTONE 25 MG: 25 TABLET ORAL at 08:47

## 2021-01-01 RX ADMIN — HEPARIN SODIUM 5000 UNITS: 10000 INJECTION INTRAVENOUS; SUBCUTANEOUS at 21:09

## 2021-01-01 RX ADMIN — HYDRALAZINE HYDROCHLORIDE 10 MG: 20 INJECTION, SOLUTION INTRAMUSCULAR; INTRAVENOUS at 14:41

## 2021-01-01 RX ADMIN — PANTOPRAZOLE SODIUM 20 MG: 40 INJECTION, POWDER, FOR SOLUTION INTRAVENOUS at 10:15

## 2021-01-01 RX ADMIN — Medication 10 ML: at 09:00

## 2021-01-01 RX ADMIN — LISINOPRIL 20 MG: 20 TABLET ORAL at 08:54

## 2021-01-01 RX ADMIN — DOXYCYCLINE 100 MG: 100 INJECTION, POWDER, LYOPHILIZED, FOR SOLUTION INTRAVENOUS at 23:22

## 2021-01-01 RX ADMIN — Medication 10 ML: at 22:13

## 2021-01-01 RX ADMIN — METOPROLOL SUCCINATE 50 MG: 50 TABLET, EXTENDED RELEASE ORAL at 22:45

## 2021-01-01 RX ADMIN — GABAPENTIN 300 MG: 300 CAPSULE ORAL at 20:58

## 2021-01-01 RX ADMIN — LABETALOL HYDROCHLORIDE 10 MG: 5 INJECTION INTRAVENOUS at 06:50

## 2021-01-01 RX ADMIN — HYDRALAZINE HYDROCHLORIDE 25 MG: 25 TABLET, FILM COATED ORAL at 20:03

## 2021-01-01 RX ADMIN — HEPARIN SODIUM 5000 UNITS: 10000 INJECTION INTRAVENOUS; SUBCUTANEOUS at 22:00

## 2021-01-01 RX ADMIN — DOCUSATE SODIUM 100 MG: 100 CAPSULE ORAL at 08:57

## 2021-01-01 RX ADMIN — LABETALOL HYDROCHLORIDE 10 MG: 5 INJECTION INTRAVENOUS at 21:45

## 2021-01-01 RX ADMIN — INSULIN LISPRO 3 UNITS: 100 INJECTION, SOLUTION INTRAVENOUS; SUBCUTANEOUS at 11:38

## 2021-01-01 RX ADMIN — DEXAMETHASONE SODIUM PHOSPHATE 4 MG: 4 INJECTION, SOLUTION INTRA-ARTICULAR; INTRALESIONAL; INTRAMUSCULAR; INTRAVENOUS; SOFT TISSUE at 10:30

## 2021-01-01 RX ADMIN — HYDRALAZINE HYDROCHLORIDE 10 MG: 20 INJECTION, SOLUTION INTRAMUSCULAR; INTRAVENOUS at 04:05

## 2021-01-01 RX ADMIN — Medication 2 MCG/MIN: at 16:17

## 2021-01-01 RX ADMIN — Medication 10 MG: at 11:33

## 2021-01-01 RX ADMIN — GABAPENTIN 300 MG: 250 SUSPENSION ORAL at 09:45

## 2021-01-01 RX ADMIN — PHENYLEPHRINE HYDROCHLORIDE 200 MCG: 10 INJECTION INTRAVENOUS at 11:19

## 2021-01-01 RX ADMIN — DOCUSATE SODIUM 100 MG: 100 CAPSULE ORAL at 08:27

## 2021-01-01 RX ADMIN — CLONIDINE HYDROCHLORIDE 0.2 MG: 0.2 TABLET ORAL at 19:56

## 2021-01-01 RX ADMIN — LABETALOL HYDROCHLORIDE 10 MG: 5 INJECTION INTRAVENOUS at 01:03

## 2021-01-01 RX ADMIN — HEPARIN SODIUM 5000 UNITS: 10000 INJECTION INTRAVENOUS; SUBCUTANEOUS at 22:48

## 2021-01-01 RX ADMIN — SODIUM CHLORIDE, PRESERVATIVE FREE 10 ML: 5 INJECTION INTRAVENOUS at 09:02

## 2021-01-01 RX ADMIN — INSULIN LISPRO 2 UNITS: 100 INJECTION, SOLUTION INTRAVENOUS; SUBCUTANEOUS at 20:45

## 2021-01-01 RX ADMIN — POTASSIUM BICARBONATE 40 MEQ: 782 TABLET, EFFERVESCENT ORAL at 20:01

## 2021-01-01 RX ADMIN — INSULIN LISPRO 12 UNITS: 100 INJECTION, SOLUTION INTRAVENOUS; SUBCUTANEOUS at 07:57

## 2021-01-01 RX ADMIN — AMIODARONE HYDROCHLORIDE 0.5 MG/MIN: 50 INJECTION, SOLUTION INTRAVENOUS at 22:10

## 2021-01-01 RX ADMIN — DEXAMETHASONE SODIUM PHOSPHATE 10 MG: 10 INJECTION INTRAMUSCULAR; INTRAVENOUS at 07:54

## 2021-01-01 RX ADMIN — SODIUM CHLORIDE, PRESERVATIVE FREE 300 UNITS: 5 INJECTION INTRAVENOUS at 19:44

## 2021-01-01 RX ADMIN — METOPROLOL TARTRATE 50 MG: 50 TABLET, FILM COATED ORAL at 20:58

## 2021-01-01 RX ADMIN — HEPARIN SODIUM 5000 UNITS: 10000 INJECTION INTRAVENOUS; SUBCUTANEOUS at 13:46

## 2021-01-01 RX ADMIN — DIVALPROEX SODIUM 125 MG: 125 CAPSULE, COATED PELLETS ORAL at 20:57

## 2021-01-01 RX ADMIN — LABETALOL HYDROCHLORIDE 10 MG: 5 INJECTION INTRAVENOUS at 02:06

## 2021-01-01 RX ADMIN — MEROPENEM 1000 MG: 1 INJECTION, POWDER, FOR SOLUTION INTRAVENOUS at 10:58

## 2021-01-01 RX ADMIN — SODIUM CHLORIDE, PRESERVATIVE FREE 10 ML: 5 INJECTION INTRAVENOUS at 07:53

## 2021-01-01 RX ADMIN — METOPROLOL TARTRATE 5 MG: 1 INJECTION, SOLUTION INTRAVENOUS at 18:16

## 2021-01-01 RX ADMIN — Medication 500 MG: at 08:57

## 2021-01-01 RX ADMIN — LISINOPRIL 20 MG: 20 TABLET ORAL at 08:27

## 2021-01-01 RX ADMIN — POTASSIUM BICARBONATE 40 MEQ: 782 TABLET, EFFERVESCENT ORAL at 08:06

## 2021-01-01 RX ADMIN — DIVALPROEX SODIUM 125 MG: 125 CAPSULE, COATED PELLETS ORAL at 08:51

## 2021-01-01 RX ADMIN — DOXYCYCLINE 100 MG: 100 INJECTION, POWDER, LYOPHILIZED, FOR SOLUTION INTRAVENOUS at 23:00

## 2021-01-01 RX ADMIN — TAMSULOSIN HYDROCHLORIDE 0.4 MG: 0.4 CAPSULE ORAL at 20:03

## 2021-01-01 RX ADMIN — INSULIN LISPRO 8 UNITS: 100 INJECTION, SOLUTION INTRAVENOUS; SUBCUTANEOUS at 20:12

## 2021-01-01 RX ADMIN — SENNOSIDES 5 ML: 8.8 LIQUID ORAL at 21:10

## 2021-01-01 RX ADMIN — DIVALPROEX SODIUM 125 MG: 125 CAPSULE, COATED PELLETS ORAL at 20:02

## 2021-01-01 RX ADMIN — METOPROLOL TARTRATE 5 MG: 1 INJECTION, SOLUTION INTRAVENOUS at 18:11

## 2021-01-01 RX ADMIN — DIVALPROEX SODIUM 125 MG: 125 CAPSULE, COATED PELLETS ORAL at 21:45

## 2021-01-01 RX ADMIN — LAMOTRIGINE 200 MG: 100 TABLET ORAL at 09:00

## 2021-01-01 RX ADMIN — HEPARIN SODIUM 5000 UNITS: 10000 INJECTION INTRAVENOUS; SUBCUTANEOUS at 14:36

## 2021-01-01 RX ADMIN — AMIODARONE HYDROCHLORIDE 0.5 MG/MIN: 50 INJECTION, SOLUTION INTRAVENOUS at 00:48

## 2021-01-01 RX ADMIN — SODIUM CHLORIDE, PRESERVATIVE FREE 10 ML: 5 INJECTION INTRAVENOUS at 14:34

## 2021-01-01 RX ADMIN — HYDRALAZINE HYDROCHLORIDE 10 MG: 20 INJECTION, SOLUTION INTRAMUSCULAR; INTRAVENOUS at 22:18

## 2021-01-01 RX ADMIN — POTASSIUM BICARBONATE 40 MEQ: 782 TABLET, EFFERVESCENT ORAL at 21:20

## 2021-01-01 RX ADMIN — METOPROLOL TARTRATE 5 MG: 1 INJECTION, SOLUTION INTRAVENOUS at 19:33

## 2021-01-01 RX ADMIN — POTASSIUM BICARBONATE 40 MEQ: 782 TABLET, EFFERVESCENT ORAL at 08:27

## 2021-01-01 RX ADMIN — INSULIN GLARGINE 10 UNITS: 100 INJECTION, SOLUTION SUBCUTANEOUS at 08:18

## 2021-01-01 RX ADMIN — DOCUSATE SODIUM 100 MG: 100 CAPSULE ORAL at 10:14

## 2021-01-01 RX ADMIN — Medication 10 ML: at 19:45

## 2021-01-01 RX ADMIN — HYDRALAZINE HYDROCHLORIDE 25 MG: 25 TABLET, FILM COATED ORAL at 22:58

## 2021-01-01 RX ADMIN — HEPARIN SODIUM 5000 UNITS: 10000 INJECTION INTRAVENOUS; SUBCUTANEOUS at 14:11

## 2021-01-01 RX ADMIN — PANTOPRAZOLE SODIUM 20 MG: 40 INJECTION, POWDER, FOR SOLUTION INTRAVENOUS at 08:48

## 2021-01-01 RX ADMIN — VANCOMYCIN HYDROCHLORIDE 1250 MG: 10 INJECTION, POWDER, LYOPHILIZED, FOR SOLUTION INTRAVENOUS at 11:32

## 2021-01-01 RX ADMIN — AMIODARONE HYDROCHLORIDE 200 MG: 200 TABLET ORAL at 18:35

## 2021-01-01 RX ADMIN — LABETALOL HYDROCHLORIDE 10 MG: 5 INJECTION INTRAVENOUS at 06:35

## 2021-01-01 RX ADMIN — Medication 10 ML: at 14:59

## 2021-01-01 RX ADMIN — Medication 500 MG: at 17:29

## 2021-01-01 RX ADMIN — INSULIN GLARGINE 12 UNITS: 100 INJECTION, SOLUTION SUBCUTANEOUS at 20:12

## 2021-01-01 RX ADMIN — MORPHINE SULFATE 2 MG: 2 INJECTION, SOLUTION INTRAMUSCULAR; INTRAVENOUS at 11:32

## 2021-01-01 RX ADMIN — HALOPERIDOL LACTATE 2 MG: 5 INJECTION, SOLUTION INTRAMUSCULAR at 13:47

## 2021-01-01 RX ADMIN — DIVALPROEX SODIUM 125 MG: 125 CAPSULE, COATED PELLETS ORAL at 20:56

## 2021-01-01 RX ADMIN — INSULIN LISPRO 3 UNITS: 100 INJECTION, SOLUTION INTRAVENOUS; SUBCUTANEOUS at 08:17

## 2021-01-01 RX ADMIN — HEPARIN SODIUM 5000 UNITS: 10000 INJECTION INTRAVENOUS; SUBCUTANEOUS at 13:53

## 2021-01-01 RX ADMIN — SODIUM CHLORIDE, PRESERVATIVE FREE 300 UNITS: 5 INJECTION INTRAVENOUS at 13:57

## 2021-01-01 RX ADMIN — PHENYLEPHRINE HYDROCHLORIDE 100 MCG: 10 INJECTION INTRAVENOUS at 10:46

## 2021-01-01 RX ADMIN — INSULIN LISPRO 3 UNITS: 100 INJECTION, SOLUTION INTRAVENOUS; SUBCUTANEOUS at 16:14

## 2021-01-01 RX ADMIN — INSULIN GLARGINE 10 UNITS: 100 INJECTION, SOLUTION SUBCUTANEOUS at 21:21

## 2021-01-01 RX ADMIN — Medication 10 ML: at 07:55

## 2021-01-01 RX ADMIN — POLYETHYLENE GLYCOL 3350 17 G: 17 POWDER, FOR SOLUTION ORAL at 08:05

## 2021-01-01 RX ADMIN — GABAPENTIN 300 MG: 250 SUSPENSION ORAL at 21:00

## 2021-01-01 RX ADMIN — INSULIN LISPRO 3 UNITS: 100 INJECTION, SOLUTION INTRAVENOUS; SUBCUTANEOUS at 23:47

## 2021-01-01 RX ADMIN — POTASSIUM BICARBONATE 40 MEQ: 782 TABLET, EFFERVESCENT ORAL at 21:35

## 2021-01-01 RX ADMIN — IPRATROPIUM BROMIDE AND ALBUTEROL SULFATE 1 AMPULE: 2.5; .5 SOLUTION RESPIRATORY (INHALATION) at 10:13

## 2021-01-01 RX ADMIN — INSULIN LISPRO 3 UNITS: 100 INJECTION, SOLUTION INTRAVENOUS; SUBCUTANEOUS at 01:21

## 2021-01-01 RX ADMIN — DILTIAZEM HYDROCHLORIDE 15 MG: 5 INJECTION INTRAVENOUS at 07:26

## 2021-01-01 RX ADMIN — LABETALOL HYDROCHLORIDE 10 MG: 5 INJECTION INTRAVENOUS at 16:14

## 2021-01-01 RX ADMIN — AMPICILLIN AND SULBACTAM 3000 MG: 2; 1 INJECTION, POWDER, FOR SOLUTION INTRAMUSCULAR; INTRAVENOUS at 20:37

## 2021-01-01 RX ADMIN — POTASSIUM BICARBONATE 40 MEQ: 782 TABLET, EFFERVESCENT ORAL at 20:48

## 2021-01-01 RX ADMIN — DOXYCYCLINE 100 MG: 100 INJECTION, POWDER, LYOPHILIZED, FOR SOLUTION INTRAVENOUS at 10:44

## 2021-01-01 RX ADMIN — AMLODIPINE BESYLATE 5 MG: 5 TABLET ORAL at 09:51

## 2021-01-01 RX ADMIN — GABAPENTIN 300 MG: 250 SUSPENSION ORAL at 20:38

## 2021-01-01 RX ADMIN — DIVALPROEX SODIUM 125 MG: 125 CAPSULE, COATED PELLETS ORAL at 08:54

## 2021-01-01 RX ADMIN — INSULIN LISPRO 3 UNITS: 100 INJECTION, SOLUTION INTRAVENOUS; SUBCUTANEOUS at 04:33

## 2021-01-01 RX ADMIN — DEXTROSE AND SODIUM CHLORIDE: 5; 450 INJECTION, SOLUTION INTRAVENOUS at 17:11

## 2021-01-01 RX ADMIN — POTASSIUM CHLORIDE 40 MEQ: 1500 TABLET, EXTENDED RELEASE ORAL at 09:55

## 2021-01-01 RX ADMIN — METOPROLOL TARTRATE 5 MG: 1 INJECTION, SOLUTION INTRAVENOUS at 18:01

## 2021-01-01 RX ADMIN — METOPROLOL TARTRATE 5 MG: 1 INJECTION, SOLUTION INTRAVENOUS at 19:00

## 2021-01-01 RX ADMIN — LANSOPRAZOLE 15 MG: KIT at 06:10

## 2021-01-01 RX ADMIN — METOPROLOL TARTRATE 50 MG: 50 TABLET, FILM COATED ORAL at 08:39

## 2021-01-01 RX ADMIN — DOFETILIDE 125 MCG: 0.12 CAPSULE ORAL at 22:29

## 2021-01-01 RX ADMIN — CLONIDINE HYDROCHLORIDE 0.2 MG: 0.2 TABLET ORAL at 22:30

## 2021-01-01 RX ADMIN — POTASSIUM CHLORIDE 10 MEQ: 10 INJECTION, SOLUTION INTRAVENOUS at 09:55

## 2021-01-01 RX ADMIN — DOFETILIDE 125 MCG: 0.12 CAPSULE ORAL at 08:27

## 2021-01-01 RX ADMIN — Medication 10 ML: at 20:12

## 2021-01-01 RX ADMIN — PANTOPRAZOLE SODIUM 20 MG: 40 INJECTION, POWDER, FOR SOLUTION INTRAVENOUS at 08:18

## 2021-01-01 RX ADMIN — LEVETIRACETAM 500 MG: 5 INJECTION, SOLUTION INTRAVENOUS at 11:00

## 2021-01-01 RX ADMIN — LEVETIRACETAM 500 MG: 5 INJECTION, SOLUTION INTRAVENOUS at 23:08

## 2021-01-01 RX ADMIN — HYDRALAZINE HYDROCHLORIDE 10 MG: 20 INJECTION, SOLUTION INTRAMUSCULAR; INTRAVENOUS at 20:47

## 2021-01-01 RX ADMIN — POLYETHYLENE GLYCOL 3350 17 G: 17 POWDER, FOR SOLUTION ORAL at 08:04

## 2021-01-01 RX ADMIN — AMIODARONE HYDROCHLORIDE 200 MG: 200 TABLET ORAL at 02:05

## 2021-01-01 RX ADMIN — Medication 10 ML: at 20:06

## 2021-01-01 RX ADMIN — INSULIN LISPRO 3 UNITS: 100 INJECTION, SOLUTION INTRAVENOUS; SUBCUTANEOUS at 16:05

## 2021-01-01 RX ADMIN — AMIODARONE HYDROCHLORIDE 150 MG: 50 INJECTION, SOLUTION INTRAVENOUS at 09:28

## 2021-01-01 RX ADMIN — GABAPENTIN 300 MG: 250 SUSPENSION ORAL at 20:56

## 2021-01-01 RX ADMIN — DOFETILIDE 125 MCG: 0.12 CAPSULE ORAL at 20:03

## 2021-01-01 RX ADMIN — DIVALPROEX SODIUM 125 MG: 125 CAPSULE, COATED PELLETS ORAL at 08:47

## 2021-01-01 RX ADMIN — HEPARIN SODIUM 5000 UNITS: 10000 INJECTION INTRAVENOUS; SUBCUTANEOUS at 23:22

## 2021-01-01 RX ADMIN — INSULIN LISPRO 3 UNITS: 100 INJECTION, SOLUTION INTRAVENOUS; SUBCUTANEOUS at 08:03

## 2021-01-01 RX ADMIN — INSULIN LISPRO 6 UNITS: 100 INJECTION, SOLUTION INTRAVENOUS; SUBCUTANEOUS at 14:51

## 2021-01-01 RX ADMIN — INSULIN LISPRO 3 UNITS: 100 INJECTION, SOLUTION INTRAVENOUS; SUBCUTANEOUS at 21:03

## 2021-01-01 RX ADMIN — HYDRALAZINE HYDROCHLORIDE 25 MG: 25 TABLET, FILM COATED ORAL at 22:48

## 2021-01-01 RX ADMIN — POTASSIUM CHLORIDE 10 MEQ: 10 INJECTION, SOLUTION INTRAVENOUS at 14:14

## 2021-01-01 RX ADMIN — LABETALOL HYDROCHLORIDE 10 MG: 5 INJECTION INTRAVENOUS at 07:41

## 2021-01-01 RX ADMIN — ACETAMINOPHEN 650 MG: 325 TABLET ORAL at 23:40

## 2021-01-01 RX ADMIN — DEXAMETHASONE SODIUM PHOSPHATE 2 MG: 4 INJECTION, SOLUTION INTRA-ARTICULAR; INTRALESIONAL; INTRAMUSCULAR; INTRAVENOUS; SOFT TISSUE at 14:14

## 2021-01-01 RX ADMIN — INSULIN LISPRO 6 UNITS: 100 INJECTION, SOLUTION INTRAVENOUS; SUBCUTANEOUS at 12:16

## 2021-01-01 RX ADMIN — SODIUM CHLORIDE, PRESERVATIVE FREE 300 UNITS: 5 INJECTION INTRAVENOUS at 08:56

## 2021-01-01 RX ADMIN — Medication 10 MG: at 21:04

## 2021-01-01 RX ADMIN — AMIODARONE HYDROCHLORIDE 200 MG: 200 TABLET ORAL at 10:30

## 2021-01-01 RX ADMIN — INSULIN LISPRO 3 UNITS: 100 INJECTION, SOLUTION INTRAVENOUS; SUBCUTANEOUS at 08:29

## 2021-01-01 RX ADMIN — LABETALOL HYDROCHLORIDE 10 MG: 5 INJECTION INTRAVENOUS at 22:36

## 2021-01-01 RX ADMIN — LORAZEPAM 0.5 MG: 2 INJECTION INTRAMUSCULAR; INTRAVENOUS at 03:36

## 2021-01-01 RX ADMIN — POTASSIUM CHLORIDE, DEXTROSE MONOHYDRATE AND SODIUM CHLORIDE: 150; 5; 450 INJECTION, SOLUTION INTRAVENOUS at 23:51

## 2021-01-01 RX ADMIN — IPRATROPIUM BROMIDE AND ALBUTEROL SULFATE 1 AMPULE: 2.5; .5 SOLUTION RESPIRATORY (INHALATION) at 20:15

## 2021-01-01 RX ADMIN — HYDRALAZINE HYDROCHLORIDE 25 MG: 25 TABLET, FILM COATED ORAL at 14:45

## 2021-01-01 RX ADMIN — MAGNESIUM GLUCONATE 500 MG ORAL TABLET 400 MG: 500 TABLET ORAL at 10:13

## 2021-01-01 RX ADMIN — LABETALOL HYDROCHLORIDE 10 MG: 5 INJECTION INTRAVENOUS at 15:06

## 2021-01-01 RX ADMIN — DIVALPROEX SODIUM 125 MG: 125 CAPSULE, COATED PELLETS ORAL at 09:52

## 2021-01-01 RX ADMIN — CHLORHEXIDINE GLUCONATE 15 ML: 1.2 RINSE ORAL at 08:39

## 2021-01-01 RX ADMIN — INSULIN LISPRO 3 UNITS: 100 INJECTION, SOLUTION INTRAVENOUS; SUBCUTANEOUS at 07:30

## 2021-01-01 RX ADMIN — METOPROLOL TARTRATE 5 MG: 1 INJECTION, SOLUTION INTRAVENOUS at 17:27

## 2021-01-01 RX ADMIN — POTASSIUM CHLORIDE AND SODIUM CHLORIDE: 900; 150 INJECTION, SOLUTION INTRAVENOUS at 05:45

## 2021-01-01 RX ADMIN — METOPROLOL TARTRATE 50 MG: 50 TABLET, FILM COATED ORAL at 10:00

## 2021-01-01 RX ADMIN — LABETALOL HYDROCHLORIDE 10 MG: 5 INJECTION INTRAVENOUS at 08:18

## 2021-01-01 RX ADMIN — SENNOSIDES 5 ML: 8.8 LIQUID ORAL at 20:38

## 2021-01-01 RX ADMIN — LAMOTRIGINE 200 MG: 100 TABLET ORAL at 10:14

## 2021-01-01 RX ADMIN — INSULIN GLARGINE 10 UNITS: 100 INJECTION, SOLUTION SUBCUTANEOUS at 08:28

## 2021-01-01 RX ADMIN — GABAPENTIN 300 MG: 250 SUSPENSION ORAL at 10:56

## 2021-01-01 RX ADMIN — AMLODIPINE BESYLATE 5 MG: 5 TABLET ORAL at 08:00

## 2021-01-01 RX ADMIN — HYDRALAZINE HYDROCHLORIDE 5 MG: 20 INJECTION, SOLUTION INTRAMUSCULAR; INTRAVENOUS at 04:25

## 2021-01-01 RX ADMIN — MEROPENEM 1000 MG: 1 INJECTION, POWDER, FOR SOLUTION INTRAVENOUS at 18:32

## 2021-01-01 RX ADMIN — MEROPENEM 1000 MG: 1 INJECTION, POWDER, FOR SOLUTION INTRAVENOUS at 19:30

## 2021-01-01 RX ADMIN — Medication 10 ML: at 08:56

## 2021-01-01 RX ADMIN — INSULIN LISPRO 6 UNITS: 100 INJECTION, SOLUTION INTRAVENOUS; SUBCUTANEOUS at 17:28

## 2021-01-01 RX ADMIN — LANSOPRAZOLE 15 MG: KIT at 08:04

## 2021-01-01 RX ADMIN — AMIODARONE HYDROCHLORIDE 200 MG: 200 TABLET ORAL at 20:15

## 2021-01-01 RX ADMIN — LAMOTRIGINE 200 MG: 100 TABLET ORAL at 20:07

## 2021-01-01 RX ADMIN — LAMOTRIGINE 200 MG: 100 TABLET ORAL at 08:33

## 2021-01-01 RX ADMIN — INSULIN LISPRO 3 UNITS: 100 INJECTION, SOLUTION INTRAVENOUS; SUBCUTANEOUS at 05:03

## 2021-01-01 RX ADMIN — FUROSEMIDE 40 MG: 10 INJECTION, SOLUTION INTRAMUSCULAR; INTRAVENOUS at 14:24

## 2021-01-01 RX ADMIN — PSYLLIUM HUSK 1 PACKET: 3.4 GRANULE ORAL at 08:28

## 2021-01-01 RX ADMIN — GLYCOPYRROLATE 0.4 MG: 0.2 INJECTION INTRAMUSCULAR; INTRAVENOUS at 09:56

## 2021-01-01 RX ADMIN — HYDRALAZINE HYDROCHLORIDE 25 MG: 25 TABLET, FILM COATED ORAL at 23:30

## 2021-01-01 RX ADMIN — DIVALPROEX SODIUM 125 MG: 125 CAPSULE, COATED PELLETS ORAL at 19:44

## 2021-01-01 RX ADMIN — LAMOTRIGINE 200 MG: 100 TABLET ORAL at 19:56

## 2021-01-01 RX ADMIN — INSULIN GLARGINE 10 UNITS: 100 INJECTION, SOLUTION SUBCUTANEOUS at 21:05

## 2021-01-01 RX ADMIN — AMIODARONE HYDROCHLORIDE 1 MG/MIN: 50 INJECTION, SOLUTION INTRAVENOUS at 05:14

## 2021-01-01 RX ADMIN — AMIODARONE HYDROCHLORIDE 0.5 MG/MIN: 50 INJECTION, SOLUTION INTRAVENOUS at 09:41

## 2021-01-01 RX ADMIN — LANSOPRAZOLE 30 MG: KIT at 09:00

## 2021-01-01 RX ADMIN — METOPROLOL TARTRATE 5 MG: 1 INJECTION, SOLUTION INTRAVENOUS at 13:56

## 2021-01-01 RX ADMIN — CHLORHEXIDINE GLUCONATE 15 ML: 1.2 RINSE ORAL at 07:58

## 2021-01-01 RX ADMIN — DOFETILIDE 125 MCG: 0.12 CAPSULE ORAL at 16:20

## 2021-01-01 RX ADMIN — PANTOPRAZOLE SODIUM 20 MG: 40 INJECTION, POWDER, FOR SOLUTION INTRAVENOUS at 09:02

## 2021-01-01 RX ADMIN — LAMOTRIGINE 200 MG: 100 TABLET ORAL at 09:53

## 2021-01-01 RX ADMIN — SODIUM CHLORIDE, PRESERVATIVE FREE 300 UNITS: 5 INJECTION INTRAVENOUS at 13:50

## 2021-01-01 RX ADMIN — Medication 10 MG: at 20:57

## 2021-01-01 RX ADMIN — POTASSIUM CHLORIDE 10 MEQ: 10 INJECTION, SOLUTION INTRAVENOUS at 11:06

## 2021-01-01 RX ADMIN — SODIUM CHLORIDE 33.3 ML: 9 INJECTION, SOLUTION INTRAVENOUS at 14:45

## 2021-01-01 RX ADMIN — METOPROLOL TARTRATE 50 MG: 50 TABLET, FILM COATED ORAL at 20:01

## 2021-01-01 RX ADMIN — Medication 500 MG: at 13:30

## 2021-01-01 RX ADMIN — DOXYCYCLINE 100 MG: 100 INJECTION, POWDER, LYOPHILIZED, FOR SOLUTION INTRAVENOUS at 23:56

## 2021-01-01 RX ADMIN — GABAPENTIN 300 MG: 300 CAPSULE ORAL at 08:58

## 2021-01-01 RX ADMIN — INSULIN LISPRO 3 UNITS: 100 INJECTION, SOLUTION INTRAVENOUS; SUBCUTANEOUS at 06:40

## 2021-01-01 RX ADMIN — AMIODARONE HYDROCHLORIDE 200 MG: 200 TABLET ORAL at 01:58

## 2021-01-01 RX ADMIN — DEXAMETHASONE SODIUM PHOSPHATE 10 MG: 10 INJECTION INTRAMUSCULAR; INTRAVENOUS at 03:18

## 2021-01-01 RX ADMIN — HALOPERIDOL LACTATE 2 MG: 5 INJECTION, SOLUTION INTRAMUSCULAR at 01:56

## 2021-01-01 RX ADMIN — MIDAZOLAM 2 MG: 1 INJECTION INTRAMUSCULAR; INTRAVENOUS at 10:40

## 2021-01-01 RX ADMIN — HYDRALAZINE HYDROCHLORIDE 10 MG: 20 INJECTION, SOLUTION INTRAMUSCULAR; INTRAVENOUS at 03:35

## 2021-01-01 RX ADMIN — AMIODARONE HYDROCHLORIDE 200 MG: 200 TABLET ORAL at 02:00

## 2021-01-01 RX ADMIN — INSULIN LISPRO 3 UNITS: 100 INJECTION, SOLUTION INTRAVENOUS; SUBCUTANEOUS at 12:55

## 2021-01-01 RX ADMIN — DIVALPROEX SODIUM 125 MG: 125 CAPSULE, COATED PELLETS ORAL at 21:19

## 2021-01-01 RX ADMIN — AMIODARONE HYDROCHLORIDE 200 MG: 200 TABLET ORAL at 02:10

## 2021-01-01 RX ADMIN — INSULIN LISPRO 9 UNITS: 100 INJECTION, SOLUTION INTRAVENOUS; SUBCUTANEOUS at 09:11

## 2021-01-01 RX ADMIN — LABETALOL HYDROCHLORIDE 10 MG: 5 INJECTION INTRAVENOUS at 00:42

## 2021-01-01 RX ADMIN — LISINOPRIL 20 MG: 20 TABLET ORAL at 08:00

## 2021-01-01 RX ADMIN — METOPROLOL TARTRATE 5 MG: 1 INJECTION, SOLUTION INTRAVENOUS at 10:49

## 2021-01-01 RX ADMIN — AMIODARONE HYDROCHLORIDE 0.5 MG/MIN: 50 INJECTION, SOLUTION INTRAVENOUS at 16:50

## 2021-01-01 RX ADMIN — Medication 10 ML: at 10:08

## 2021-01-01 RX ADMIN — HEPARIN SODIUM 5000 UNITS: 10000 INJECTION INTRAVENOUS; SUBCUTANEOUS at 21:36

## 2021-01-01 RX ADMIN — Medication 10 MG: at 11:16

## 2021-01-01 RX ADMIN — DOCUSATE SODIUM 283 MG: 283 LIQUID RECTAL at 08:58

## 2021-01-01 RX ADMIN — HYDRALAZINE HYDROCHLORIDE 10 MG: 20 INJECTION, SOLUTION INTRAMUSCULAR; INTRAVENOUS at 06:51

## 2021-01-01 RX ADMIN — PSYLLIUM HUSK 1 PACKET: 3.4 GRANULE ORAL at 08:33

## 2021-01-01 RX ADMIN — GABAPENTIN 300 MG: 300 CAPSULE ORAL at 20:41

## 2021-01-01 RX ADMIN — Medication 10 MG: at 21:19

## 2021-01-01 RX ADMIN — SODIUM CHLORIDE, PRESERVATIVE FREE 10 ML: 5 INJECTION INTRAVENOUS at 14:43

## 2021-01-01 RX ADMIN — MIDAZOLAM HYDROCHLORIDE 4 MG: 1 INJECTION, SOLUTION INTRAMUSCULAR; INTRAVENOUS at 21:28

## 2021-01-01 RX ADMIN — DIVALPROEX SODIUM 125 MG: 125 CAPSULE, COATED PELLETS ORAL at 08:05

## 2021-01-01 RX ADMIN — Medication 10 ML: at 08:05

## 2021-01-01 RX ADMIN — POTASSIUM BICARBONATE 40 MEQ: 782 TABLET, EFFERVESCENT ORAL at 08:39

## 2021-01-01 RX ADMIN — DOCUSATE SODIUM 100 MG: 100 CAPSULE ORAL at 22:29

## 2021-01-01 RX ADMIN — DIVALPROEX SODIUM 125 MG: 125 CAPSULE, COATED PELLETS ORAL at 21:11

## 2021-01-01 RX ADMIN — IPRATROPIUM BROMIDE AND ALBUTEROL SULFATE 1 AMPULE: 2.5; .5 SOLUTION RESPIRATORY (INHALATION) at 20:26

## 2021-01-01 RX ADMIN — HYDRALAZINE HYDROCHLORIDE 25 MG: 25 TABLET, FILM COATED ORAL at 06:13

## 2021-01-01 RX ADMIN — POTASSIUM BICARBONATE 40 MEQ: 782 TABLET, EFFERVESCENT ORAL at 09:59

## 2021-01-01 RX ADMIN — HYDRALAZINE HYDROCHLORIDE 25 MG: 25 TABLET, FILM COATED ORAL at 13:34

## 2021-01-01 RX ADMIN — AMIODARONE HYDROCHLORIDE 200 MG: 200 TABLET ORAL at 17:37

## 2021-01-01 RX ADMIN — GABAPENTIN 300 MG: 250 SUSPENSION ORAL at 19:44

## 2021-01-01 RX ADMIN — ALBUTEROL SULFATE 2.5 MG: 2.5 SOLUTION RESPIRATORY (INHALATION) at 20:41

## 2021-01-01 RX ADMIN — PSYLLIUM HUSK 1 PACKET: 3.4 GRANULE ORAL at 15:25

## 2021-01-01 RX ADMIN — ALOGLIPTIN 6.25 MG: 6.25 TABLET, FILM COATED ORAL at 08:52

## 2021-01-01 RX ADMIN — HYDRALAZINE HYDROCHLORIDE 10 MG: 20 INJECTION, SOLUTION INTRAMUSCULAR; INTRAVENOUS at 20:24

## 2021-01-01 RX ADMIN — CEFAZOLIN 1 G: 1 INJECTION, POWDER, FOR SOLUTION INTRAMUSCULAR; INTRAVENOUS at 11:08

## 2021-01-01 RX ADMIN — FUROSEMIDE 20 MG: 10 INJECTION, SOLUTION INTRAMUSCULAR; INTRAVENOUS at 13:46

## 2021-01-01 RX ADMIN — AMLODIPINE BESYLATE 5 MG: 5 TABLET ORAL at 07:59

## 2021-01-01 RX ADMIN — SODIUM CHLORIDE 33.3 ML: 9 INJECTION, SOLUTION INTRAVENOUS at 14:17

## 2021-01-01 RX ADMIN — POLYETHYLENE GLYCOL 3350 17 G: 17 POWDER, FOR SOLUTION ORAL at 10:00

## 2021-01-01 RX ADMIN — AMIODARONE HYDROCHLORIDE 200 MG: 200 TABLET ORAL at 17:32

## 2021-01-01 RX ADMIN — HYDRALAZINE HYDROCHLORIDE 10 MG: 20 INJECTION, SOLUTION INTRAMUSCULAR; INTRAVENOUS at 08:15

## 2021-01-01 RX ADMIN — POTASSIUM BICARBONATE 40 MEQ: 782 TABLET, EFFERVESCENT ORAL at 08:34

## 2021-01-01 RX ADMIN — DIVALPROEX SODIUM 125 MG: 125 CAPSULE, COATED PELLETS ORAL at 19:56

## 2021-01-01 RX ADMIN — Medication 10 MG: at 21:10

## 2021-01-01 RX ADMIN — POTASSIUM CHLORIDE, DEXTROSE MONOHYDRATE AND SODIUM CHLORIDE: 150; 5; 450 INJECTION, SOLUTION INTRAVENOUS at 09:34

## 2021-01-01 RX ADMIN — LAMOTRIGINE 200 MG: 100 TABLET ORAL at 08:51

## 2021-01-01 RX ADMIN — AMPICILLIN AND SULBACTAM 3000 MG: 2; 1 INJECTION, POWDER, FOR SOLUTION INTRAMUSCULAR; INTRAVENOUS at 08:58

## 2021-01-01 RX ADMIN — GABAPENTIN 300 MG: 250 SUSPENSION ORAL at 08:37

## 2021-01-01 RX ADMIN — DIVALPROEX SODIUM 125 MG: 125 CAPSULE, COATED PELLETS ORAL at 09:59

## 2021-01-01 RX ADMIN — AMPICILLIN AND SULBACTAM 3000 MG: 2; 1 INJECTION, POWDER, FOR SOLUTION INTRAMUSCULAR; INTRAVENOUS at 20:41

## 2021-01-01 RX ADMIN — CHLORHEXIDINE GLUCONATE 15 ML: 1.2 RINSE ORAL at 13:46

## 2021-01-01 RX ADMIN — AMIODARONE HYDROCHLORIDE 200 MG: 200 TABLET ORAL at 17:01

## 2021-01-01 RX ADMIN — SODIUM CHLORIDE, PRESERVATIVE FREE 300 UNITS: 5 INJECTION INTRAVENOUS at 19:43

## 2021-01-01 RX ADMIN — VANCOMYCIN HYDROCHLORIDE 1500 MG: 1 INJECTION, POWDER, LYOPHILIZED, FOR SOLUTION INTRAVENOUS at 10:28

## 2021-01-01 RX ADMIN — LAMOTRIGINE 200 MG: 100 TABLET ORAL at 21:45

## 2021-01-01 RX ADMIN — IPRATROPIUM BROMIDE AND ALBUTEROL SULFATE 1 AMPULE: 2.5; .5 SOLUTION RESPIRATORY (INHALATION) at 21:18

## 2021-01-01 RX ADMIN — LABETALOL HYDROCHLORIDE 10 MG: 5 INJECTION INTRAVENOUS at 04:04

## 2021-01-01 RX ADMIN — AMIODARONE HYDROCHLORIDE 200 MG: 200 TABLET ORAL at 08:27

## 2021-01-01 RX ADMIN — PROPOFOL 10 MCG/KG/MIN: 10 INJECTION, EMULSION INTRAVENOUS at 16:09

## 2021-01-01 RX ADMIN — HYDRALAZINE HYDROCHLORIDE 25 MG: 25 TABLET, FILM COATED ORAL at 21:58

## 2021-01-01 RX ADMIN — Medication 500 MG: at 13:07

## 2021-01-01 RX ADMIN — HYDRALAZINE HYDROCHLORIDE 25 MG: 25 TABLET, FILM COATED ORAL at 22:23

## 2021-01-01 RX ADMIN — LABETALOL HYDROCHLORIDE 10 MG: 5 INJECTION INTRAVENOUS at 18:56

## 2021-01-01 RX ADMIN — INSULIN GLARGINE 10 UNITS: 100 INJECTION, SOLUTION SUBCUTANEOUS at 19:53

## 2021-01-01 RX ADMIN — IPRATROPIUM BROMIDE AND ALBUTEROL SULFATE 1 AMPULE: 2.5; .5 SOLUTION RESPIRATORY (INHALATION) at 06:04

## 2021-01-01 RX ADMIN — POTASSIUM CHLORIDE 20 MEQ: 400 INJECTION, SOLUTION INTRAVENOUS at 10:02

## 2021-01-01 RX ADMIN — HYDRALAZINE HYDROCHLORIDE 50 MG: 25 TABLET, FILM COATED ORAL at 22:30

## 2021-01-01 RX ADMIN — AMIODARONE HYDROCHLORIDE 1 MG/MIN: 50 INJECTION, SOLUTION INTRAVENOUS at 13:04

## 2021-01-01 RX ADMIN — METOPROLOL SUCCINATE 50 MG: 50 TABLET, EXTENDED RELEASE ORAL at 20:04

## 2021-01-01 RX ADMIN — SODIUM CHLORIDE: 9 INJECTION, SOLUTION INTRAVENOUS at 10:56

## 2021-01-01 RX ADMIN — HYDRALAZINE HYDROCHLORIDE 25 MG: 25 TABLET, FILM COATED ORAL at 13:55

## 2021-01-01 RX ADMIN — AMIODARONE HYDROCHLORIDE 200 MG: 200 TABLET ORAL at 09:00

## 2021-01-01 RX ADMIN — AMIODARONE HYDROCHLORIDE 200 MG: 200 TABLET ORAL at 02:12

## 2021-01-01 RX ADMIN — POTASSIUM CHLORIDE 20 MEQ: 1500 TABLET, EXTENDED RELEASE ORAL at 08:34

## 2021-01-01 RX ADMIN — LAMOTRIGINE 200 MG: 100 TABLET ORAL at 08:29

## 2021-01-01 RX ADMIN — PHENYLEPHRINE HYDROCHLORIDE 200 MCG: 10 INJECTION INTRAVENOUS at 11:14

## 2021-01-01 RX ADMIN — AMIODARONE HYDROCHLORIDE 200 MG: 200 TABLET ORAL at 17:27

## 2021-01-01 RX ADMIN — HYDRALAZINE HYDROCHLORIDE 20 MG: 20 INJECTION, SOLUTION INTRAMUSCULAR; INTRAVENOUS at 21:05

## 2021-01-01 RX ADMIN — SODIUM CHLORIDE 33.3 ML: 9 INJECTION, SOLUTION INTRAVENOUS at 14:36

## 2021-01-01 RX ADMIN — METOPROLOL TARTRATE 5 MG: 1 INJECTION, SOLUTION INTRAVENOUS at 00:31

## 2021-01-01 RX ADMIN — HYDRALAZINE HYDROCHLORIDE 20 MG: 20 INJECTION, SOLUTION INTRAMUSCULAR; INTRAVENOUS at 16:31

## 2021-01-01 RX ADMIN — MAGNESIUM GLUCONATE 500 MG ORAL TABLET 400 MG: 500 TABLET ORAL at 08:47

## 2021-01-01 RX ADMIN — INSULIN GLARGINE 10 UNITS: 100 INJECTION, SOLUTION SUBCUTANEOUS at 21:24

## 2021-01-01 RX ADMIN — HEPARIN SODIUM 5000 UNITS: 10000 INJECTION INTRAVENOUS; SUBCUTANEOUS at 06:18

## 2021-01-01 RX ADMIN — PANTOPRAZOLE SODIUM 20 MG: 40 INJECTION, POWDER, FOR SOLUTION INTRAVENOUS at 10:01

## 2021-01-01 RX ADMIN — INSULIN LISPRO 3 UNITS: 100 INJECTION, SOLUTION INTRAVENOUS; SUBCUTANEOUS at 16:42

## 2021-01-01 RX ADMIN — HYDRALAZINE HYDROCHLORIDE 20 MG: 20 INJECTION, SOLUTION INTRAMUSCULAR; INTRAVENOUS at 17:08

## 2021-01-01 RX ADMIN — Medication 500 MG: at 20:01

## 2021-01-01 RX ADMIN — CHLORHEXIDINE GLUCONATE 15 ML: 1.2 RINSE ORAL at 21:35

## 2021-01-01 RX ADMIN — AMLODIPINE BESYLATE 10 MG: 10 TABLET ORAL at 16:21

## 2021-01-01 RX ADMIN — SODIUM CHLORIDE, PRESERVATIVE FREE 300 UNITS: 5 INJECTION INTRAVENOUS at 20:38

## 2021-01-01 RX ADMIN — HYDRALAZINE HYDROCHLORIDE 10 MG: 20 INJECTION, SOLUTION INTRAMUSCULAR; INTRAVENOUS at 05:25

## 2021-01-01 RX ADMIN — HYDRALAZINE HYDROCHLORIDE 5 MG: 20 INJECTION, SOLUTION INTRAMUSCULAR; INTRAVENOUS at 22:20

## 2021-01-01 RX ADMIN — PROPOFOL 50 MCG/KG/MIN: 10 INJECTION, EMULSION INTRAVENOUS at 11:19

## 2021-01-01 RX ADMIN — HYDRALAZINE HYDROCHLORIDE 10 MG: 20 INJECTION, SOLUTION INTRAMUSCULAR; INTRAVENOUS at 05:22

## 2021-01-01 RX ADMIN — LISINOPRIL 20 MG: 20 TABLET ORAL at 09:51

## 2021-01-01 RX ADMIN — INSULIN LISPRO 6 UNITS: 100 INJECTION, SOLUTION INTRAVENOUS; SUBCUTANEOUS at 08:25

## 2021-01-01 RX ADMIN — SODIUM CHLORIDE: 9 INJECTION, SOLUTION INTRAVENOUS at 08:57

## 2021-01-01 RX ADMIN — DIVALPROEX SODIUM 125 MG: 125 CAPSULE, COATED PELLETS ORAL at 22:29

## 2021-01-01 RX ADMIN — DOXYCYCLINE 100 MG: 100 INJECTION, POWDER, LYOPHILIZED, FOR SOLUTION INTRAVENOUS at 11:27

## 2021-01-01 RX ADMIN — METOPROLOL TARTRATE 5 MG: 1 INJECTION, SOLUTION INTRAVENOUS at 07:59

## 2021-01-01 RX ADMIN — METOPROLOL TARTRATE 50 MG: 50 TABLET, FILM COATED ORAL at 20:04

## 2021-01-01 RX ADMIN — IPRATROPIUM BROMIDE AND ALBUTEROL SULFATE 1 AMPULE: 2.5; .5 SOLUTION RESPIRATORY (INHALATION) at 15:58

## 2021-01-01 RX ADMIN — PHENYLEPHRINE HYDROCHLORIDE 200 MCG: 10 INJECTION INTRAVENOUS at 11:36

## 2021-01-01 RX ADMIN — IPRATROPIUM BROMIDE AND ALBUTEROL SULFATE 1 AMPULE: 2.5; .5 SOLUTION RESPIRATORY (INHALATION) at 13:05

## 2021-01-01 RX ADMIN — HYDRALAZINE HYDROCHLORIDE 25 MG: 25 TABLET, FILM COATED ORAL at 21:20

## 2021-01-01 RX ADMIN — LABETALOL HYDROCHLORIDE 10 MG: 5 INJECTION INTRAVENOUS at 08:58

## 2021-01-01 RX ADMIN — INSULIN LISPRO 2 UNITS: 100 INJECTION, SOLUTION INTRAVENOUS; SUBCUTANEOUS at 08:38

## 2021-01-01 RX ADMIN — IPRATROPIUM BROMIDE AND ALBUTEROL SULFATE 1 AMPULE: 2.5; .5 SOLUTION RESPIRATORY (INHALATION) at 16:22

## 2021-01-01 RX ADMIN — SODIUM CHLORIDE, PRESERVATIVE FREE 300 UNITS: 5 INJECTION INTRAVENOUS at 08:30

## 2021-01-01 RX ADMIN — POTASSIUM CHLORIDE, DEXTROSE MONOHYDRATE AND SODIUM CHLORIDE: 150; 5; 450 INJECTION, SOLUTION INTRAVENOUS at 13:29

## 2021-01-01 RX ADMIN — ROCURONIUM BROMIDE 50 MG: 10 INJECTION, SOLUTION INTRAVENOUS at 10:44

## 2021-01-01 RX ADMIN — INSULIN LISPRO 3 UNITS: 100 INJECTION, SOLUTION INTRAVENOUS; SUBCUTANEOUS at 21:16

## 2021-01-01 RX ADMIN — DOXYCYCLINE 100 MG: 100 INJECTION, POWDER, LYOPHILIZED, FOR SOLUTION INTRAVENOUS at 11:09

## 2021-01-01 RX ADMIN — AMIODARONE HYDROCHLORIDE 0.5 MG/MIN: 50 INJECTION, SOLUTION INTRAVENOUS at 09:39

## 2021-01-01 RX ADMIN — METOPROLOL TARTRATE 50 MG: 50 TABLET, FILM COATED ORAL at 08:34

## 2021-01-01 RX ADMIN — FENTANYL CITRATE 50 MCG: 50 INJECTION, SOLUTION INTRAMUSCULAR; INTRAVENOUS at 12:20

## 2021-01-01 RX ADMIN — DOCUSATE SODIUM 100 MG: 100 CAPSULE ORAL at 20:57

## 2021-01-01 RX ADMIN — CHLORHEXIDINE GLUCONATE 15 ML: 1.2 RINSE ORAL at 08:24

## 2021-01-01 RX ADMIN — Medication 500 MG: at 17:30

## 2021-01-01 RX ADMIN — PROPOFOL 50 MG: 10 INJECTION, EMULSION INTRAVENOUS at 10:44

## 2021-01-01 RX ADMIN — LAMOTRIGINE 200 MG: 100 TABLET ORAL at 16:20

## 2021-01-01 RX ADMIN — GABAPENTIN 300 MG: 300 CAPSULE ORAL at 10:01

## 2021-01-01 RX ADMIN — IPRATROPIUM BROMIDE AND ALBUTEROL SULFATE 1 AMPULE: 2.5; .5 SOLUTION RESPIRATORY (INHALATION) at 08:26

## 2021-01-01 RX ADMIN — DEXTROSE MONOHYDRATE: 50 INJECTION, SOLUTION INTRAVENOUS at 00:11

## 2021-01-01 RX ADMIN — INSULIN GLARGINE 10 UNITS: 100 INJECTION, SOLUTION SUBCUTANEOUS at 10:25

## 2021-01-01 RX ADMIN — INSULIN GLARGINE 12 UNITS: 100 INJECTION, SOLUTION SUBCUTANEOUS at 20:54

## 2021-01-01 RX ADMIN — HYDRALAZINE HYDROCHLORIDE 25 MG: 25 TABLET, FILM COATED ORAL at 14:11

## 2021-01-01 RX ADMIN — CHLORHEXIDINE GLUCONATE 15 ML: 1.2 RINSE ORAL at 21:08

## 2021-01-01 RX ADMIN — HALOPERIDOL LACTATE 2 MG: 5 INJECTION, SOLUTION INTRAMUSCULAR at 09:58

## 2021-01-01 RX ADMIN — DIVALPROEX SODIUM 125 MG: 125 CAPSULE, COATED PELLETS ORAL at 07:59

## 2021-01-01 RX ADMIN — MAGNESIUM GLUCONATE 500 MG ORAL TABLET 400 MG: 500 TABLET ORAL at 08:54

## 2021-01-01 RX ADMIN — POTASSIUM CHLORIDE 20 MEQ: 400 INJECTION, SOLUTION INTRAVENOUS at 08:57

## 2021-01-01 RX ADMIN — AMIODARONE HYDROCHLORIDE 1 MG/MIN: 50 INJECTION, SOLUTION INTRAVENOUS at 20:15

## 2021-01-01 RX ADMIN — METOPROLOL SUCCINATE 50 MG: 50 TABLET, EXTENDED RELEASE ORAL at 08:27

## 2021-01-01 RX ADMIN — HEPARIN SODIUM 5000 UNITS: 10000 INJECTION INTRAVENOUS; SUBCUTANEOUS at 06:14

## 2021-01-01 RX ADMIN — HEPARIN 300 UNITS: 100 SYRINGE at 04:56

## 2021-01-01 RX ADMIN — PHENYLEPHRINE HYDROCHLORIDE 300 MCG: 10 INJECTION INTRAVENOUS at 10:53

## 2021-01-01 RX ADMIN — LAMOTRIGINE 200 MG: 100 TABLET ORAL at 20:01

## 2021-01-01 RX ADMIN — TAMSULOSIN HYDROCHLORIDE 0.4 MG: 0.4 CAPSULE ORAL at 21:09

## 2021-01-01 RX ADMIN — LISINOPRIL 20 MG: 20 TABLET ORAL at 08:29

## 2021-01-01 RX ADMIN — Medication 10 ML: at 20:58

## 2021-01-01 RX ADMIN — LANSOPRAZOLE 15 MG: KIT at 06:29

## 2021-01-01 RX ADMIN — LORAZEPAM 0.5 MG: 2 INJECTION INTRAMUSCULAR; INTRAVENOUS at 09:58

## 2021-01-01 RX ADMIN — DOCUSATE SODIUM 100 MG: 50 LIQUID ORAL at 08:04

## 2021-01-01 RX ADMIN — LABETALOL HYDROCHLORIDE 10 MG: 5 INJECTION INTRAVENOUS at 12:55

## 2021-01-01 RX ADMIN — IPRATROPIUM BROMIDE AND ALBUTEROL SULFATE 1 AMPULE: 2.5; .5 SOLUTION RESPIRATORY (INHALATION) at 16:26

## 2021-01-01 RX ADMIN — INSULIN LISPRO 3 UNITS: 100 INJECTION, SOLUTION INTRAVENOUS; SUBCUTANEOUS at 16:51

## 2021-01-01 RX ADMIN — INSULIN LISPRO 3 UNITS: 100 INJECTION, SOLUTION INTRAVENOUS; SUBCUTANEOUS at 00:00

## 2021-01-01 RX ADMIN — GABAPENTIN 300 MG: 250 SUSPENSION ORAL at 08:40

## 2021-01-01 RX ADMIN — LIDOCAINE HYDROCHLORIDE 5 ML: 10 INJECTION, SOLUTION EPIDURAL; INFILTRATION; INTRACAUDAL; PERINEURAL at 14:05

## 2021-01-01 RX ADMIN — HYDRALAZINE HYDROCHLORIDE 25 MG: 25 TABLET, FILM COATED ORAL at 13:02

## 2021-01-01 RX ADMIN — LABETALOL HYDROCHLORIDE 10 MG: 5 INJECTION INTRAVENOUS at 15:13

## 2021-01-01 RX ADMIN — POTASSIUM CHLORIDE 20 MEQ: 400 INJECTION, SOLUTION INTRAVENOUS at 08:39

## 2021-01-01 RX ADMIN — GABAPENTIN 300 MG: 250 SUSPENSION ORAL at 20:49

## 2021-01-01 RX ADMIN — AMLODIPINE BESYLATE 5 MG: 5 TABLET ORAL at 08:38

## 2021-01-01 RX ADMIN — AMIODARONE HYDROCHLORIDE 200 MG: 200 TABLET ORAL at 09:53

## 2021-01-01 RX ADMIN — CHLORHEXIDINE GLUCONATE 15 ML: 1.2 RINSE ORAL at 08:01

## 2021-01-01 RX ADMIN — INSULIN LISPRO 3 UNITS: 100 INJECTION, SOLUTION INTRAVENOUS; SUBCUTANEOUS at 00:12

## 2021-01-01 RX ADMIN — HALOPERIDOL LACTATE 2 MG: 5 INJECTION, SOLUTION INTRAMUSCULAR at 18:13

## 2021-01-01 RX ADMIN — LAMOTRIGINE 200 MG: 100 TABLET ORAL at 08:03

## 2021-01-01 RX ADMIN — INSULIN LISPRO 2 UNITS: 100 INJECTION, SOLUTION INTRAVENOUS; SUBCUTANEOUS at 21:36

## 2021-01-01 RX ADMIN — POTASSIUM CHLORIDE 10 MEQ: 10 INJECTION, SOLUTION INTRAVENOUS at 17:48

## 2021-01-01 RX ADMIN — IPRATROPIUM BROMIDE AND ALBUTEROL SULFATE 1 AMPULE: 2.5; .5 SOLUTION RESPIRATORY (INHALATION) at 12:23

## 2021-01-01 RX ADMIN — Medication 10 ML: at 08:30

## 2021-01-01 RX ADMIN — Medication 10 ML: at 12:57

## 2021-01-01 RX ADMIN — METOPROLOL TARTRATE 50 MG: 50 TABLET, FILM COATED ORAL at 07:59

## 2021-01-01 RX ADMIN — POTASSIUM CHLORIDE 40 MEQ: 400 INJECTION, SOLUTION INTRAVENOUS at 03:28

## 2021-01-01 RX ADMIN — LAMOTRIGINE 200 MG: 100 TABLET ORAL at 07:59

## 2021-01-01 RX ADMIN — HYDRALAZINE HYDROCHLORIDE 10 MG: 20 INJECTION, SOLUTION INTRAMUSCULAR; INTRAVENOUS at 10:56

## 2021-01-01 RX ADMIN — LEVETIRACETAM 500 MG: 5 INJECTION, SOLUTION INTRAVENOUS at 23:02

## 2021-01-01 RX ADMIN — AMIODARONE HYDROCHLORIDE 200 MG: 200 TABLET ORAL at 10:00

## 2021-01-01 RX ADMIN — LABETALOL HYDROCHLORIDE 10 MG: 5 INJECTION INTRAVENOUS at 04:23

## 2021-01-01 RX ADMIN — DOCUSATE SODIUM 100 MG: 100 CAPSULE ORAL at 08:47

## 2021-01-01 RX ADMIN — POLYETHYLENE GLYCOL 3350 17 G: 17 POWDER, FOR SOLUTION ORAL at 09:52

## 2021-01-01 RX ADMIN — LABETALOL HYDROCHLORIDE 10 MG: 5 INJECTION INTRAVENOUS at 20:41

## 2021-01-01 RX ADMIN — LAMOTRIGINE 200 MG: 100 TABLET ORAL at 08:06

## 2021-01-01 RX ADMIN — METOPROLOL TARTRATE 5 MG: 1 INJECTION, SOLUTION INTRAVENOUS at 05:34

## 2021-01-01 RX ADMIN — HYDRALAZINE HYDROCHLORIDE 20 MG: 20 INJECTION, SOLUTION INTRAMUSCULAR; INTRAVENOUS at 18:21

## 2021-01-01 RX ADMIN — PROPOFOL 5 MCG/KG/MIN: 10 INJECTION, EMULSION INTRAVENOUS at 05:05

## 2021-01-01 RX ADMIN — HYDRALAZINE HYDROCHLORIDE 20 MG: 20 INJECTION, SOLUTION INTRAMUSCULAR; INTRAVENOUS at 08:48

## 2021-01-01 RX ADMIN — HEPARIN SODIUM 5000 UNITS: 10000 INJECTION INTRAVENOUS; SUBCUTANEOUS at 12:56

## 2021-01-01 RX ADMIN — IPRATROPIUM BROMIDE AND ALBUTEROL SULFATE 1 AMPULE: 2.5; .5 SOLUTION RESPIRATORY (INHALATION) at 08:56

## 2021-01-01 RX ADMIN — DOFETILIDE 125 MCG: 0.12 CAPSULE ORAL at 10:14

## 2021-01-01 RX ADMIN — CLONIDINE HYDROCHLORIDE 0.2 MG: 0.2 TABLET ORAL at 20:57

## 2021-01-01 RX ADMIN — INSULIN LISPRO 9 UNITS: 100 INJECTION, SOLUTION INTRAVENOUS; SUBCUTANEOUS at 12:06

## 2021-01-01 RX ADMIN — INSULIN LISPRO 3 UNITS: 100 INJECTION, SOLUTION INTRAVENOUS; SUBCUTANEOUS at 09:49

## 2021-01-01 RX ADMIN — DOCUSATE SODIUM 100 MG: 50 LIQUID ORAL at 08:55

## 2021-01-01 RX ADMIN — AMPICILLIN AND SULBACTAM 3000 MG: 2; 1 INJECTION, POWDER, FOR SOLUTION INTRAMUSCULAR; INTRAVENOUS at 20:01

## 2021-01-01 RX ADMIN — HEPARIN SODIUM 5000 UNITS: 10000 INJECTION INTRAVENOUS; SUBCUTANEOUS at 13:37

## 2021-01-01 RX ADMIN — METOPROLOL TARTRATE 5 MG: 1 INJECTION, SOLUTION INTRAVENOUS at 18:35

## 2021-01-01 RX ADMIN — DEXTROSE MONOHYDRATE: 50 INJECTION, SOLUTION INTRAVENOUS at 10:04

## 2021-01-01 RX ADMIN — METOPROLOL TARTRATE 5 MG: 1 INJECTION, SOLUTION INTRAVENOUS at 02:06

## 2021-01-01 RX ADMIN — GABAPENTIN 300 MG: 300 CAPSULE ORAL at 09:52

## 2021-01-01 RX ADMIN — CHLORHEXIDINE GLUCONATE 15 ML: 1.2 RINSE ORAL at 19:42

## 2021-01-01 RX ADMIN — METOPROLOL TARTRATE 5 MG: 1 INJECTION, SOLUTION INTRAVENOUS at 00:08

## 2021-01-01 RX ADMIN — DIVALPROEX SODIUM 125 MG: 125 CAPSULE, COATED PELLETS ORAL at 20:48

## 2021-01-01 RX ADMIN — LABETALOL HYDROCHLORIDE 10 MG: 5 INJECTION INTRAVENOUS at 02:35

## 2021-01-01 RX ADMIN — LABETALOL HYDROCHLORIDE 10 MG: 5 INJECTION INTRAVENOUS at 02:05

## 2021-01-01 RX ADMIN — DEXAMETHASONE SODIUM PHOSPHATE 10 MG: 10 INJECTION INTRAMUSCULAR; INTRAVENOUS at 10:16

## 2021-01-01 RX ADMIN — AMIODARONE HYDROCHLORIDE 0.5 MG/MIN: 50 INJECTION, SOLUTION INTRAVENOUS at 06:02

## 2021-01-01 RX ADMIN — HYDRALAZINE HYDROCHLORIDE 10 MG: 20 INJECTION, SOLUTION INTRAMUSCULAR; INTRAVENOUS at 07:04

## 2021-01-01 RX ADMIN — DOCUSATE SODIUM 100 MG: 100 CAPSULE ORAL at 20:03

## 2021-01-01 RX ADMIN — LANSOPRAZOLE 15 MG: KIT at 05:34

## 2021-01-01 RX ADMIN — HEPARIN SODIUM 5000 UNITS: 10000 INJECTION INTRAVENOUS; SUBCUTANEOUS at 04:33

## 2021-01-01 RX ADMIN — PHENYLEPHRINE HYDROCHLORIDE 200 MCG: 10 INJECTION INTRAVENOUS at 10:49

## 2021-01-01 RX ADMIN — POTASSIUM CHLORIDE, DEXTROSE MONOHYDRATE AND SODIUM CHLORIDE: 150; 5; 450 INJECTION, SOLUTION INTRAVENOUS at 13:41

## 2021-01-01 RX ADMIN — HYDRALAZINE HYDROCHLORIDE 10 MG: 20 INJECTION, SOLUTION INTRAMUSCULAR; INTRAVENOUS at 02:53

## 2021-01-01 RX ADMIN — IPRATROPIUM BROMIDE AND ALBUTEROL SULFATE 1 AMPULE: 2.5; .5 SOLUTION RESPIRATORY (INHALATION) at 12:46

## 2021-01-01 RX ADMIN — Medication 10 ML: at 09:54

## 2021-01-01 RX ADMIN — IPRATROPIUM BROMIDE AND ALBUTEROL SULFATE 1 AMPULE: 2.5; .5 SOLUTION RESPIRATORY (INHALATION) at 11:47

## 2021-01-01 RX ADMIN — Medication 1 TABLET: at 08:47

## 2021-01-01 RX ADMIN — AMPICILLIN AND SULBACTAM 3000 MG: 2; 1 INJECTION, POWDER, FOR SOLUTION INTRAMUSCULAR; INTRAVENOUS at 07:58

## 2021-01-01 RX ADMIN — METOPROLOL TARTRATE 5 MG: 1 INJECTION, SOLUTION INTRAVENOUS at 06:03

## 2021-01-01 RX ADMIN — INSULIN LISPRO 3 UNITS: 100 INJECTION, SOLUTION INTRAVENOUS; SUBCUTANEOUS at 14:00

## 2021-01-01 RX ADMIN — LABETALOL HYDROCHLORIDE 10 MG: 5 INJECTION INTRAVENOUS at 22:05

## 2021-01-01 RX ADMIN — DOXYCYCLINE 100 MG: 100 INJECTION, POWDER, LYOPHILIZED, FOR SOLUTION INTRAVENOUS at 11:47

## 2021-01-01 RX ADMIN — GABAPENTIN 300 MG: 250 SUSPENSION ORAL at 19:46

## 2021-01-01 RX ADMIN — HEPARIN SODIUM 5000 UNITS: 10000 INJECTION INTRAVENOUS; SUBCUTANEOUS at 22:23

## 2021-01-01 RX ADMIN — INSULIN LISPRO 3 UNITS: 100 INJECTION, SOLUTION INTRAVENOUS; SUBCUTANEOUS at 18:22

## 2021-01-01 RX ADMIN — INSULIN LISPRO 3 UNITS: 100 INJECTION, SOLUTION INTRAVENOUS; SUBCUTANEOUS at 11:53

## 2021-01-01 RX ADMIN — CLONIDINE HYDROCHLORIDE 0.2 MG: 0.2 TABLET ORAL at 08:46

## 2021-01-01 RX ADMIN — DIVALPROEX SODIUM 125 MG: 125 CAPSULE, COATED PELLETS ORAL at 08:39

## 2021-01-01 RX ADMIN — INSULIN GLARGINE 10 UNITS: 100 INJECTION, SOLUTION SUBCUTANEOUS at 08:44

## 2021-01-01 RX ADMIN — POTASSIUM BICARBONATE 40 MEQ: 782 TABLET, EFFERVESCENT ORAL at 07:58

## 2021-01-01 RX ADMIN — LABETALOL HYDROCHLORIDE 10 MG: 5 INJECTION INTRAVENOUS at 05:59

## 2021-01-01 RX ADMIN — LABETALOL HYDROCHLORIDE 5 MG: 5 INJECTION INTRAVENOUS at 23:18

## 2021-01-01 RX ADMIN — HYDRALAZINE HYDROCHLORIDE 25 MG: 25 TABLET, FILM COATED ORAL at 14:36

## 2021-01-01 RX ADMIN — LABETALOL HYDROCHLORIDE 10 MG: 5 INJECTION INTRAVENOUS at 13:58

## 2021-01-01 RX ADMIN — MIDAZOLAM HYDROCHLORIDE 4 MG: 2 INJECTION, SOLUTION INTRAMUSCULAR; INTRAVENOUS at 21:28

## 2021-01-01 RX ADMIN — AMIODARONE HYDROCHLORIDE 200 MG: 200 TABLET ORAL at 05:10

## 2021-01-01 RX ADMIN — AMPICILLIN AND SULBACTAM 3000 MG: 2; 1 INJECTION, POWDER, FOR SOLUTION INTRAMUSCULAR; INTRAVENOUS at 02:10

## 2021-01-01 RX ADMIN — IPRATROPIUM BROMIDE AND ALBUTEROL SULFATE 1 AMPULE: 2.5; .5 SOLUTION RESPIRATORY (INHALATION) at 21:14

## 2021-01-01 RX ADMIN — LORAZEPAM 0.5 MG: 2 INJECTION INTRAMUSCULAR at 09:58

## 2021-01-01 RX ADMIN — METOPROLOL TARTRATE 5 MG: 1 INJECTION, SOLUTION INTRAVENOUS at 06:14

## 2021-01-01 RX ADMIN — AMIODARONE HYDROCHLORIDE 0.5 MG/MIN: 50 INJECTION, SOLUTION INTRAVENOUS at 00:11

## 2021-01-01 RX ADMIN — LEVETIRACETAM 500 MG: 5 INJECTION, SOLUTION INTRAVENOUS at 14:16

## 2021-01-01 RX ADMIN — HYDRALAZINE HYDROCHLORIDE 10 MG: 20 INJECTION, SOLUTION INTRAMUSCULAR; INTRAVENOUS at 17:59

## 2021-01-01 RX ADMIN — ATROPA BELLADONNA AND OPIUM 60 MG: 16.2; 6 SUPPOSITORY RECTAL at 14:49

## 2021-01-01 RX ADMIN — PHENYLEPHRINE HYDROCHLORIDE 200 MCG: 10 INJECTION INTRAVENOUS at 11:23

## 2021-01-01 RX ADMIN — AMLODIPINE BESYLATE 5 MG: 5 TABLET ORAL at 09:58

## 2021-01-01 RX ADMIN — IPRATROPIUM BROMIDE AND ALBUTEROL SULFATE 1 AMPULE: 2.5; .5 SOLUTION RESPIRATORY (INHALATION) at 19:58

## 2021-01-01 RX ADMIN — Medication 10 ML: at 18:15

## 2021-01-01 RX ADMIN — INSULIN GLARGINE 12 UNITS: 100 INJECTION, SOLUTION SUBCUTANEOUS at 21:21

## 2021-01-01 RX ADMIN — FENTANYL CITRATE 50 MCG: 50 INJECTION, SOLUTION INTRAMUSCULAR; INTRAVENOUS at 11:09

## 2021-01-01 RX ADMIN — INSULIN GLARGINE 12 UNITS: 100 INJECTION, SOLUTION SUBCUTANEOUS at 21:03

## 2021-01-01 RX ADMIN — INSULIN LISPRO 9 UNITS: 100 INJECTION, SOLUTION INTRAVENOUS; SUBCUTANEOUS at 08:45

## 2021-01-01 RX ADMIN — AMLODIPINE BESYLATE 5 MG: 5 TABLET ORAL at 08:34

## 2021-01-01 RX ADMIN — SODIUM CHLORIDE, PRESERVATIVE FREE 300 UNITS: 5 INJECTION INTRAVENOUS at 21:19

## 2021-01-01 RX ADMIN — FUROSEMIDE 20 MG: 10 INJECTION, SOLUTION INTRAMUSCULAR; INTRAVENOUS at 08:00

## 2021-01-01 RX ADMIN — ACETAMINOPHEN 650 MG: 650 SUPPOSITORY RECTAL at 09:53

## 2021-01-01 RX ADMIN — LIDOCAINE HYDROCHLORIDE 100 MG: 20 INJECTION, SOLUTION INTRAVENOUS at 10:44

## 2021-01-01 RX ADMIN — BISACODYL 10 MG: 10 SUPPOSITORY RECTAL at 09:34

## 2021-01-01 RX ADMIN — DIVALPROEX SODIUM 125 MG: 125 CAPSULE, COATED PELLETS ORAL at 08:34

## 2021-01-01 RX ADMIN — LABETALOL HYDROCHLORIDE 10 MG: 5 INJECTION INTRAVENOUS at 20:32

## 2021-01-01 RX ADMIN — ACETAMINOPHEN 650 MG: 325 TABLET ORAL at 21:02

## 2021-01-01 RX ADMIN — Medication 10 MG: at 19:42

## 2021-01-01 RX ADMIN — DIVALPROEX SODIUM 125 MG: 125 CAPSULE, COATED PELLETS ORAL at 08:26

## 2021-01-01 RX ADMIN — Medication 1 TABLET: at 08:54

## 2021-01-01 RX ADMIN — SODIUM CHLORIDE, PRESERVATIVE FREE 300 UNITS: 5 INJECTION INTRAVENOUS at 21:08

## 2021-01-01 RX ADMIN — Medication 10 ML: at 22:29

## 2021-01-01 RX ADMIN — HYDRALAZINE HYDROCHLORIDE 20 MG: 20 INJECTION, SOLUTION INTRAMUSCULAR; INTRAVENOUS at 21:10

## 2021-01-01 RX ADMIN — LAMOTRIGINE 200 MG: 100 TABLET ORAL at 08:57

## 2021-01-01 RX ADMIN — GABAPENTIN 300 MG: 250 SUSPENSION ORAL at 21:20

## 2021-01-01 RX ADMIN — HYDRALAZINE HYDROCHLORIDE 25 MG: 25 TABLET, FILM COATED ORAL at 06:10

## 2021-01-01 RX ADMIN — CHLORHEXIDINE GLUCONATE 15 ML: 1.2 RINSE ORAL at 20:48

## 2021-01-01 RX ADMIN — DOCUSATE SODIUM 100 MG: 100 CAPSULE ORAL at 21:19

## 2021-01-01 RX ADMIN — DOCUSATE SODIUM 283 MG: 283 LIQUID RECTAL at 17:29

## 2021-01-01 RX ADMIN — LANSOPRAZOLE 30 MG: KIT at 06:21

## 2021-01-01 RX ADMIN — DOFETILIDE 125 MCG: 0.12 CAPSULE ORAL at 19:56

## 2021-01-01 RX ADMIN — POTASSIUM CHLORIDE 10 MEQ: 10 INJECTION, SOLUTION INTRAVENOUS at 16:23

## 2021-01-01 RX ADMIN — INSULIN GLARGINE 10 UNITS: 100 INJECTION, SOLUTION SUBCUTANEOUS at 09:11

## 2021-01-01 RX ADMIN — AMLODIPINE BESYLATE 10 MG: 10 TABLET ORAL at 08:53

## 2021-01-01 RX ADMIN — DOCUSATE SODIUM 100 MG: 50 LIQUID ORAL at 20:50

## 2021-01-01 RX ADMIN — DIVALPROEX SODIUM 125 MG: 125 CAPSULE, COATED PELLETS ORAL at 20:01

## 2021-01-01 RX ADMIN — CLONIDINE HYDROCHLORIDE 0.2 MG: 0.2 TABLET ORAL at 10:14

## 2021-01-01 RX ADMIN — CLONIDINE HYDROCHLORIDE 0.1 MG: 0.1 TABLET ORAL at 17:32

## 2021-01-01 RX ADMIN — SUCCINYLCHOLINE CHLORIDE 200 MG: 20 INJECTION, SOLUTION INTRAMUSCULAR; INTRAVENOUS at 15:44

## 2021-01-01 RX ADMIN — HYDRALAZINE HYDROCHLORIDE 20 MG: 20 INJECTION, SOLUTION INTRAMUSCULAR; INTRAVENOUS at 05:34

## 2021-01-01 RX ADMIN — Medication 10 ML: at 08:09

## 2021-01-01 RX ADMIN — SODIUM CHLORIDE, PRESERVATIVE FREE 10 ML: 5 INJECTION INTRAVENOUS at 10:15

## 2021-01-01 RX ADMIN — HYDRALAZINE HYDROCHLORIDE 10 MG: 20 INJECTION, SOLUTION INTRAMUSCULAR; INTRAVENOUS at 02:30

## 2021-01-01 RX ADMIN — HYDRALAZINE HYDROCHLORIDE 10 MG: 20 INJECTION, SOLUTION INTRAMUSCULAR; INTRAVENOUS at 01:03

## 2021-01-01 RX ADMIN — LEVETIRACETAM 500 MG: 5 INJECTION, SOLUTION INTRAVENOUS at 22:52

## 2021-01-01 RX ADMIN — AMIODARONE HYDROCHLORIDE 200 MG: 200 TABLET ORAL at 08:38

## 2021-01-01 RX ADMIN — INSULIN LISPRO 6 UNITS: 100 INJECTION, SOLUTION INTRAVENOUS; SUBCUTANEOUS at 16:38

## 2021-01-01 RX ADMIN — HEPARIN SODIUM 5000 UNITS: 10000 INJECTION INTRAVENOUS; SUBCUTANEOUS at 05:21

## 2021-01-01 RX ADMIN — LAMOTRIGINE 200 MG: 100 TABLET ORAL at 20:10

## 2021-01-01 RX ADMIN — LABETALOL HYDROCHLORIDE 10 MG: 5 INJECTION INTRAVENOUS at 05:05

## 2021-01-01 RX ADMIN — GABAPENTIN 300 MG: 250 SUSPENSION ORAL at 08:07

## 2021-01-01 RX ADMIN — DEXAMETHASONE SODIUM PHOSPHATE 2 MG: 4 INJECTION, SOLUTION INTRA-ARTICULAR; INTRALESIONAL; INTRAMUSCULAR; INTRAVENOUS; SOFT TISSUE at 05:33

## 2021-01-01 RX ADMIN — Medication 10 MG: at 20:48

## 2021-01-01 RX ADMIN — PROPOFOL 10 MCG/KG/MIN: 10 INJECTION, EMULSION INTRAVENOUS at 06:00

## 2021-01-01 RX ADMIN — CHLORHEXIDINE GLUCONATE 15 ML: 1.2 RINSE ORAL at 08:27

## 2021-01-01 RX ADMIN — MEROPENEM 1000 MG: 1 INJECTION, POWDER, FOR SOLUTION INTRAVENOUS at 04:23

## 2021-01-01 RX ADMIN — MEROPENEM 1000 MG: 1 INJECTION, POWDER, FOR SOLUTION INTRAVENOUS at 11:17

## 2021-01-01 RX ADMIN — FAMOTIDINE 20 MG: 10 INJECTION INTRAVENOUS at 21:08

## 2021-01-01 RX ADMIN — DIVALPROEX SODIUM 125 MG: 125 CAPSULE, COATED PELLETS ORAL at 08:29

## 2021-01-01 RX ADMIN — HYDRALAZINE HYDROCHLORIDE 25 MG: 25 TABLET, FILM COATED ORAL at 05:21

## 2021-01-01 RX ADMIN — METOPROLOL TARTRATE 5 MG: 1 INJECTION, SOLUTION INTRAVENOUS at 12:12

## 2021-01-01 RX ADMIN — AMPICILLIN AND SULBACTAM 3000 MG: 2; 1 INJECTION, POWDER, FOR SOLUTION INTRAMUSCULAR; INTRAVENOUS at 07:59

## 2021-01-01 RX ADMIN — INSULIN LISPRO 6 UNITS: 100 INJECTION, SOLUTION INTRAVENOUS; SUBCUTANEOUS at 17:20

## 2021-01-01 RX ADMIN — ALBUMIN (HUMAN) 25 G: 12.5 INJECTION, SOLUTION INTRAVENOUS at 11:45

## 2021-01-01 RX ADMIN — DOFETILIDE 125 MCG: 0.12 CAPSULE ORAL at 08:55

## 2021-01-01 RX ADMIN — LAMOTRIGINE 200 MG: 100 TABLET ORAL at 20:56

## 2021-01-01 RX ADMIN — HYDRALAZINE HYDROCHLORIDE 10 MG: 20 INJECTION, SOLUTION INTRAMUSCULAR; INTRAVENOUS at 11:16

## 2021-01-01 RX ADMIN — DEXAMETHASONE SODIUM PHOSPHATE 10 MG: 10 INJECTION INTRAMUSCULAR; INTRAVENOUS at 20:55

## 2021-01-01 RX ADMIN — LABETALOL HYDROCHLORIDE 10 MG: 5 INJECTION INTRAVENOUS at 00:09

## 2021-01-01 RX ADMIN — Medication 10 ML: at 19:42

## 2021-01-01 RX ADMIN — DIVALPROEX SODIUM 125 MG: 125 CAPSULE, COATED PELLETS ORAL at 20:37

## 2021-01-01 RX ADMIN — METOPROLOL TARTRATE 5 MG: 1 INJECTION, SOLUTION INTRAVENOUS at 01:57

## 2021-01-01 RX ADMIN — CEFAZOLIN 1000 MG: 1 INJECTION, POWDER, FOR SOLUTION INTRAMUSCULAR; INTRAVENOUS at 16:18

## 2021-01-01 RX ADMIN — INSULIN GLARGINE 10 UNITS: 100 INJECTION, SOLUTION SUBCUTANEOUS at 19:46

## 2021-01-01 RX ADMIN — Medication 10 ML: at 20:08

## 2021-01-01 RX ADMIN — AMPICILLIN AND SULBACTAM 3000 MG: 2; 1 INJECTION, POWDER, FOR SOLUTION INTRAMUSCULAR; INTRAVENOUS at 01:58

## 2021-01-01 RX ADMIN — LEVETIRACETAM 500 MG: 500 TABLET ORAL at 20:03

## 2021-01-01 RX ADMIN — INSULIN LISPRO 3 UNITS: 100 INJECTION, SOLUTION INTRAVENOUS; SUBCUTANEOUS at 15:34

## 2021-01-01 RX ADMIN — LABETALOL HYDROCHLORIDE 10 MG: 5 INJECTION INTRAVENOUS at 21:26

## 2021-01-01 RX ADMIN — FENTANYL CITRATE 100 MCG: 50 INJECTION, SOLUTION INTRAMUSCULAR; INTRAVENOUS at 21:26

## 2021-01-01 RX ADMIN — LISINOPRIL 20 MG: 20 TABLET ORAL at 14:30

## 2021-01-01 RX ADMIN — INSULIN LISPRO 4 UNITS: 100 INJECTION, SOLUTION INTRAVENOUS; SUBCUTANEOUS at 12:07

## 2021-01-01 RX ADMIN — Medication 500 MG: at 20:37

## 2021-01-01 RX ADMIN — LISINOPRIL 20 MG: 20 TABLET ORAL at 08:33

## 2021-01-01 RX ADMIN — DEXTROSE MONOHYDRATE 10 MG/HR: 50 INJECTION, SOLUTION INTRAVENOUS at 05:14

## 2021-01-01 RX ADMIN — SODIUM CHLORIDE 33.3 ML: 9 INJECTION, SOLUTION INTRAVENOUS at 21:52

## 2021-01-01 RX ADMIN — METOPROLOL TARTRATE 5 MG: 1 INJECTION, SOLUTION INTRAVENOUS at 22:14

## 2021-01-01 RX ADMIN — GABAPENTIN 300 MG: 300 CAPSULE ORAL at 08:27

## 2021-01-01 RX ADMIN — HYDRALAZINE HYDROCHLORIDE 10 MG: 20 INJECTION INTRAMUSCULAR; INTRAVENOUS at 11:02

## 2021-01-01 RX ADMIN — METOPROLOL TARTRATE 5 MG: 1 INJECTION, SOLUTION INTRAVENOUS at 14:48

## 2021-01-01 RX ADMIN — CLONIDINE HYDROCHLORIDE 0.2 MG: 0.2 TABLET ORAL at 20:02

## 2021-01-01 RX ADMIN — METOPROLOL TARTRATE 5 MG: 1 INJECTION, SOLUTION INTRAVENOUS at 12:50

## 2021-01-01 RX ADMIN — SODIUM CHLORIDE, PRESERVATIVE FREE 300 UNITS: 5 INJECTION INTRAVENOUS at 20:07

## 2021-01-01 RX ADMIN — LABETALOL HYDROCHLORIDE 10 MG: 5 INJECTION INTRAVENOUS at 08:41

## 2021-01-01 RX ADMIN — PHENYLEPHRINE HYDROCHLORIDE 200 MCG: 10 INJECTION INTRAVENOUS at 10:51

## 2021-01-01 RX ADMIN — AMPICILLIN AND SULBACTAM 3000 MG: 2; 1 INJECTION, POWDER, FOR SOLUTION INTRAMUSCULAR; INTRAVENOUS at 02:30

## 2021-01-01 RX ADMIN — INSULIN LISPRO 2 UNITS: 100 INJECTION, SOLUTION INTRAVENOUS; SUBCUTANEOUS at 20:42

## 2021-01-01 RX ADMIN — DOCUSATE SODIUM 100 MG: 50 LIQUID ORAL at 21:08

## 2021-01-01 RX ADMIN — DIVALPROEX SODIUM 125 MG: 125 CAPSULE, COATED PELLETS ORAL at 20:07

## 2021-01-01 RX ADMIN — HYDRALAZINE HYDROCHLORIDE 25 MG: 25 TABLET, FILM COATED ORAL at 08:34

## 2021-01-01 RX ADMIN — GABAPENTIN 300 MG: 300 CAPSULE ORAL at 08:57

## 2021-01-01 RX ADMIN — Medication 10 MG: at 19:45

## 2021-01-01 RX ADMIN — HYDRALAZINE HYDROCHLORIDE 10 MG: 20 INJECTION, SOLUTION INTRAMUSCULAR; INTRAVENOUS at 03:08

## 2021-01-01 RX ADMIN — Medication 10 ML: at 13:30

## 2021-01-01 RX ADMIN — SODIUM CHLORIDE 500 ML: 9 INJECTION, SOLUTION INTRAVENOUS at 15:08

## 2021-01-01 RX ADMIN — HYDRALAZINE HYDROCHLORIDE 10 MG: 20 INJECTION, SOLUTION INTRAMUSCULAR; INTRAVENOUS at 04:42

## 2021-01-01 RX ADMIN — GABAPENTIN 300 MG: 250 SUSPENSION ORAL at 08:01

## 2021-01-01 RX ADMIN — GABAPENTIN 300 MG: 300 CAPSULE ORAL at 08:05

## 2021-01-01 RX ADMIN — SODIUM CHLORIDE, PRESERVATIVE FREE 300 UNITS: 5 INJECTION INTRAVENOUS at 08:54

## 2021-01-01 RX ADMIN — LABETALOL HYDROCHLORIDE 10 MG: 5 INJECTION INTRAVENOUS at 04:03

## 2021-01-01 RX ADMIN — LAMOTRIGINE 200 MG: 100 TABLET ORAL at 20:48

## 2021-01-01 RX ADMIN — LANSOPRAZOLE 15 MG: KIT at 05:15

## 2021-01-01 RX ADMIN — HEPARIN SODIUM 5000 UNITS: 10000 INJECTION INTRAVENOUS; SUBCUTANEOUS at 21:35

## 2021-01-01 RX ADMIN — HYDROMORPHONE HYDROCHLORIDE 0.25 MG: 1 INJECTION, SOLUTION INTRAMUSCULAR; INTRAVENOUS; SUBCUTANEOUS at 21:37

## 2021-01-01 RX ADMIN — HYDRALAZINE HYDROCHLORIDE 25 MG: 25 TABLET, FILM COATED ORAL at 05:15

## 2021-01-01 RX ADMIN — INSULIN LISPRO 3 UNITS: 100 INJECTION, SOLUTION INTRAVENOUS; SUBCUTANEOUS at 00:16

## 2021-01-01 RX ADMIN — GLIPIZIDE 10 MG: 5 TABLET ORAL at 08:53

## 2021-01-01 RX ADMIN — LABETALOL HYDROCHLORIDE 10 MG: 5 INJECTION INTRAVENOUS at 20:25

## 2021-01-01 RX ADMIN — CHLORHEXIDINE GLUCONATE 15 ML: 1.2 RINSE ORAL at 20:04

## 2021-01-01 RX ADMIN — AMIODARONE HYDROCHLORIDE 0.5 MG/MIN: 50 INJECTION, SOLUTION INTRAVENOUS at 10:59

## 2021-01-01 RX ADMIN — DEXAMETHASONE SODIUM PHOSPHATE 2 MG: 4 INJECTION, SOLUTION INTRA-ARTICULAR; INTRALESIONAL; INTRAMUSCULAR; INTRAVENOUS; SOFT TISSUE at 06:03

## 2021-01-01 RX ADMIN — DEXAMETHASONE SODIUM PHOSPHATE 4 MG: 4 INJECTION, SOLUTION INTRA-ARTICULAR; INTRALESIONAL; INTRAMUSCULAR; INTRAVENOUS; SOFT TISSUE at 22:28

## 2021-01-01 RX ADMIN — TAMSULOSIN HYDROCHLORIDE 0.4 MG: 0.4 CAPSULE ORAL at 20:55

## 2021-01-01 RX ADMIN — IPRATROPIUM BROMIDE AND ALBUTEROL SULFATE 1 AMPULE: 2.5; .5 SOLUTION RESPIRATORY (INHALATION) at 05:00

## 2021-01-01 RX ADMIN — LORAZEPAM 1 MG: 2 INJECTION INTRAMUSCULAR; INTRAVENOUS at 10:30

## 2021-01-01 RX ADMIN — HYDRALAZINE HYDROCHLORIDE 10 MG: 20 INJECTION, SOLUTION INTRAMUSCULAR; INTRAVENOUS at 23:30

## 2021-01-01 RX ADMIN — HYDRALAZINE HYDROCHLORIDE 10 MG: 20 INJECTION, SOLUTION INTRAMUSCULAR; INTRAVENOUS at 00:32

## 2021-01-01 RX ADMIN — GABAPENTIN 300 MG: 250 SUSPENSION ORAL at 21:11

## 2021-01-01 RX ADMIN — SODIUM CHLORIDE, PRESERVATIVE FREE 10 ML: 5 INJECTION INTRAVENOUS at 09:52

## 2021-01-01 RX ADMIN — ALOGLIPTIN 6.25 MG: 6.25 TABLET, FILM COATED ORAL at 16:21

## 2021-01-01 RX ADMIN — HYDRALAZINE HYDROCHLORIDE 10 MG: 20 INJECTION, SOLUTION INTRAMUSCULAR; INTRAVENOUS at 18:24

## 2021-01-01 RX ADMIN — FENTANYL CITRATE 25 MCG: 50 INJECTION, SOLUTION INTRAMUSCULAR; INTRAVENOUS at 10:15

## 2021-01-01 RX ADMIN — FUROSEMIDE 20 MG: 10 INJECTION, SOLUTION INTRAMUSCULAR; INTRAVENOUS at 10:01

## 2021-01-01 RX ADMIN — FUROSEMIDE 20 MG: 10 INJECTION, SOLUTION INTRAMUSCULAR; INTRAVENOUS at 14:36

## 2021-01-01 RX ADMIN — ALOGLIPTIN 6.25 MG: 6.25 TABLET, FILM COATED ORAL at 08:54

## 2021-01-01 RX ADMIN — LABETALOL HYDROCHLORIDE 10 MG: 5 INJECTION INTRAVENOUS at 16:56

## 2021-01-01 RX ADMIN — LAMOTRIGINE 200 MG: 100 TABLET ORAL at 08:00

## 2021-01-01 RX ADMIN — LAMOTRIGINE 200 MG: 100 TABLET ORAL at 21:19

## 2021-01-01 RX ADMIN — AMIODARONE HYDROCHLORIDE 200 MG: 200 TABLET ORAL at 09:04

## 2021-01-01 RX ADMIN — LABETALOL HYDROCHLORIDE 10 MG: 5 INJECTION INTRAVENOUS at 23:40

## 2021-01-01 RX ADMIN — METOPROLOL TARTRATE 50 MG: 50 TABLET, FILM COATED ORAL at 08:58

## 2021-01-01 RX ADMIN — SODIUM CHLORIDE 33.3 ML: 9 INJECTION, SOLUTION INTRAVENOUS at 05:58

## 2021-01-01 RX ADMIN — HYDRALAZINE HYDROCHLORIDE 25 MG: 25 TABLET, FILM COATED ORAL at 06:29

## 2021-01-01 RX ADMIN — METOPROLOL SUCCINATE 50 MG: 50 TABLET, EXTENDED RELEASE ORAL at 10:28

## 2021-01-01 RX ADMIN — TAMSULOSIN HYDROCHLORIDE 0.4 MG: 0.4 CAPSULE ORAL at 22:30

## 2021-01-01 RX ADMIN — MAGNESIUM GLUCONATE 500 MG ORAL TABLET 400 MG: 500 TABLET ORAL at 16:19

## 2021-01-01 RX ADMIN — FAMOTIDINE 20 MG: 10 INJECTION INTRAVENOUS at 08:04

## 2021-01-01 RX ADMIN — INSULIN GLARGINE 10 UNITS: 100 INJECTION, SOLUTION SUBCUTANEOUS at 21:16

## 2021-01-01 RX ADMIN — INSULIN LISPRO 3 UNITS: 100 INJECTION, SOLUTION INTRAVENOUS; SUBCUTANEOUS at 12:14

## 2021-01-01 RX ADMIN — METOPROLOL TARTRATE 50 MG: 50 TABLET, FILM COATED ORAL at 20:48

## 2021-01-01 RX ADMIN — METOPROLOL TARTRATE 50 MG: 50 TABLET, FILM COATED ORAL at 20:07

## 2021-01-01 RX ADMIN — DOFETILIDE 125 MCG: 0.12 CAPSULE ORAL at 08:47

## 2021-01-01 RX ADMIN — AMLODIPINE BESYLATE 5 MG: 5 TABLET ORAL at 08:59

## 2021-01-01 RX ADMIN — LAMOTRIGINE 200 MG: 100 TABLET ORAL at 08:47

## 2021-01-01 RX ADMIN — IPRATROPIUM BROMIDE AND ALBUTEROL SULFATE 1 AMPULE: 2.5; .5 SOLUTION RESPIRATORY (INHALATION) at 17:04

## 2021-01-01 RX ADMIN — INSULIN LISPRO 3 UNITS: 100 INJECTION, SOLUTION INTRAVENOUS; SUBCUTANEOUS at 15:50

## 2021-01-01 RX ADMIN — METOPROLOL TARTRATE 50 MG: 50 TABLET, FILM COATED ORAL at 21:20

## 2021-01-01 RX ADMIN — PHENYLEPHRINE HYDROCHLORIDE 5 MCG/MIN: 10 INJECTION, SOLUTION INTRAMUSCULAR; INTRAVENOUS; SUBCUTANEOUS at 11:35

## 2021-01-01 RX ADMIN — IPRATROPIUM BROMIDE AND ALBUTEROL SULFATE 1 AMPULE: 2.5; .5 SOLUTION RESPIRATORY (INHALATION) at 05:43

## 2021-01-01 RX ADMIN — AMIODARONE HYDROCHLORIDE 150 MG: 50 INJECTION, SOLUTION INTRAVENOUS at 19:45

## 2021-01-01 RX ADMIN — SODIUM CHLORIDE, PRESERVATIVE FREE 300 UNITS: 5 INJECTION INTRAVENOUS at 08:02

## 2021-01-01 RX ADMIN — DEXTROSE MONOHYDRATE 100 ML/HR: 50 INJECTION, SOLUTION INTRAVENOUS at 16:56

## 2021-01-01 RX ADMIN — INSULIN GLARGINE 10 UNITS: 100 INJECTION, SOLUTION SUBCUTANEOUS at 09:13

## 2021-01-01 RX ADMIN — SENNOSIDES 8.6 MG: 8.6 TABLET, FILM COATED ORAL at 20:41

## 2021-01-01 RX ADMIN — HYDRALAZINE HYDROCHLORIDE 10 MG: 20 INJECTION, SOLUTION INTRAMUSCULAR; INTRAVENOUS at 16:05

## 2021-01-01 RX ADMIN — LABETALOL HYDROCHLORIDE 10 MG: 5 INJECTION INTRAVENOUS at 19:53

## 2021-01-01 RX ADMIN — LABETALOL HYDROCHLORIDE 10 MG: 5 INJECTION INTRAVENOUS at 18:44

## 2021-01-01 RX ADMIN — AMLODIPINE BESYLATE 5 MG: 5 TABLET ORAL at 08:30

## 2021-01-01 RX ADMIN — GABAPENTIN 300 MG: 250 SUSPENSION ORAL at 20:10

## 2021-01-01 RX ADMIN — HYDRALAZINE HYDROCHLORIDE 25 MG: 25 TABLET, FILM COATED ORAL at 04:59

## 2021-01-01 RX ADMIN — LAMOTRIGINE 200 MG: 100 TABLET ORAL at 21:00

## 2021-01-01 RX ADMIN — IPRATROPIUM BROMIDE AND ALBUTEROL SULFATE 1 AMPULE: 2.5; .5 SOLUTION RESPIRATORY (INHALATION) at 06:35

## 2021-01-01 RX ADMIN — FUROSEMIDE 20 MG: 10 INJECTION, SOLUTION INTRAMUSCULAR; INTRAVENOUS at 09:45

## 2021-01-01 RX ADMIN — Medication 10 MG: at 20:38

## 2021-01-01 RX ADMIN — POTASSIUM CHLORIDE 10 MEQ: 10 INJECTION, SOLUTION INTRAVENOUS at 19:27

## 2021-01-01 RX ADMIN — DIVALPROEX SODIUM 125 MG: 125 CAPSULE, COATED PELLETS ORAL at 21:08

## 2021-01-01 RX ADMIN — SODIUM CHLORIDE, PRESERVATIVE FREE 300 UNITS: 5 INJECTION INTRAVENOUS at 21:36

## 2021-01-01 RX ADMIN — INSULIN GLARGINE 12 UNITS: 100 INJECTION, SOLUTION SUBCUTANEOUS at 21:00

## 2021-01-01 RX ADMIN — LEVETIRACETAM 500 MG: 500 TABLET ORAL at 22:29

## 2021-01-01 RX ADMIN — LABETALOL HYDROCHLORIDE 10 MG: 5 INJECTION INTRAVENOUS at 13:21

## 2021-01-01 RX ADMIN — HYDRALAZINE HYDROCHLORIDE 25 MG: 25 TABLET, FILM COATED ORAL at 13:44

## 2021-01-01 RX ADMIN — GLIPIZIDE 10 MG: 5 TABLET ORAL at 17:14

## 2021-01-01 RX ADMIN — PANTOPRAZOLE SODIUM 20 MG: 40 INJECTION, POWDER, FOR SOLUTION INTRAVENOUS at 07:58

## 2021-01-01 RX ADMIN — LABETALOL HYDROCHLORIDE 10 MG: 5 INJECTION INTRAVENOUS at 12:16

## 2021-01-01 RX ADMIN — INSULIN LISPRO 2 UNITS: 100 INJECTION, SOLUTION INTRAVENOUS; SUBCUTANEOUS at 21:23

## 2021-01-01 RX ADMIN — HYDRALAZINE HYDROCHLORIDE 25 MG: 25 TABLET, FILM COATED ORAL at 13:41

## 2021-01-01 RX ADMIN — SODIUM CHLORIDE, PRESERVATIVE FREE 300 UNITS: 5 INJECTION INTRAVENOUS at 20:05

## 2021-01-01 RX ADMIN — SODIUM CHLORIDE 33.3 ML: 9 INJECTION, SOLUTION INTRAVENOUS at 06:08

## 2021-01-01 RX ADMIN — DIVALPROEX SODIUM 125 MG: 125 CAPSULE, COATED PELLETS ORAL at 10:00

## 2021-01-01 RX ADMIN — METOPROLOL TARTRATE 50 MG: 50 TABLET, FILM COATED ORAL at 08:27

## 2021-01-01 RX ADMIN — AMPICILLIN AND SULBACTAM 3000 MG: 2; 1 INJECTION, POWDER, FOR SOLUTION INTRAMUSCULAR; INTRAVENOUS at 08:42

## 2021-01-01 RX ADMIN — HYDRALAZINE HYDROCHLORIDE 10 MG: 20 INJECTION, SOLUTION INTRAMUSCULAR; INTRAVENOUS at 04:14

## 2021-01-01 RX ADMIN — INSULIN GLARGINE 10 UNITS: 100 INJECTION, SOLUTION SUBCUTANEOUS at 21:06

## 2021-01-01 RX ADMIN — SODIUM CHLORIDE 5 MG/HR: 9 INJECTION, SOLUTION INTRAVENOUS at 06:12

## 2021-01-01 RX ADMIN — AMLODIPINE BESYLATE 10 MG: 10 TABLET ORAL at 08:27

## 2021-01-01 RX ADMIN — HYDRALAZINE HYDROCHLORIDE 25 MG: 25 TABLET, FILM COATED ORAL at 21:00

## 2021-01-01 RX ADMIN — BISACODYL 10 MG: 10 SUPPOSITORY RECTAL at 09:53

## 2021-01-01 RX ADMIN — DIVALPROEX SODIUM 125 MG: 125 CAPSULE, COATED PELLETS ORAL at 08:02

## 2021-01-01 RX ADMIN — LEVETIRACETAM 500 MG: 5 INJECTION, SOLUTION INTRAVENOUS at 00:09

## 2021-01-01 RX ADMIN — LISINOPRIL 20 MG: 20 TABLET ORAL at 16:19

## 2021-01-01 RX ADMIN — DIVALPROEX SODIUM 125 MG: 125 CAPSULE, COATED PELLETS ORAL at 20:42

## 2021-01-01 RX ADMIN — AMIODARONE HYDROCHLORIDE 200 MG: 200 TABLET ORAL at 17:31

## 2021-01-01 RX ADMIN — MEROPENEM 1000 MG: 1 INJECTION, POWDER, FOR SOLUTION INTRAVENOUS at 18:26

## 2021-01-01 RX ADMIN — AMIODARONE HYDROCHLORIDE 200 MG: 200 TABLET ORAL at 02:30

## 2021-01-01 RX ADMIN — SODIUM CHLORIDE, PRESERVATIVE FREE 10 ML: 5 INJECTION INTRAVENOUS at 08:18

## 2021-01-01 RX ADMIN — GLUCAGON HYDROCHLORIDE 1 MG: KIT at 16:30

## 2021-01-01 RX ADMIN — INSULIN LISPRO 6 UNITS: 100 INJECTION, SOLUTION INTRAVENOUS; SUBCUTANEOUS at 11:48

## 2021-01-01 RX ADMIN — INSULIN LISPRO 3 UNITS: 100 INJECTION, SOLUTION INTRAVENOUS; SUBCUTANEOUS at 07:51

## 2021-01-01 RX ADMIN — GABAPENTIN 300 MG: 250 SUSPENSION ORAL at 20:04

## 2021-01-01 RX ADMIN — PHENYLEPHRINE HYDROCHLORIDE 200 MCG: 10 INJECTION INTRAVENOUS at 11:25

## 2021-01-01 RX ADMIN — POTASSIUM CHLORIDE 10 MEQ: 10 INJECTION, SOLUTION INTRAVENOUS at 12:12

## 2021-01-01 RX ADMIN — LISINOPRIL 20 MG: 20 TABLET ORAL at 10:14

## 2021-01-01 RX ADMIN — LABETALOL HYDROCHLORIDE 10 MG: 5 INJECTION INTRAVENOUS at 17:14

## 2021-01-01 RX ADMIN — Medication 10 MG: at 21:11

## 2021-01-01 RX ADMIN — HYDRALAZINE HYDROCHLORIDE 10 MG: 20 INJECTION INTRAMUSCULAR; INTRAVENOUS at 09:34

## 2021-01-01 ASSESSMENT — PAIN SCALES - GENERAL
PAINLEVEL_OUTOF10: 0
PAINLEVEL_OUTOF10: 2
PAINLEVEL_OUTOF10: 0
PAINLEVEL_OUTOF10: 5
PAINLEVEL_OUTOF10: 0
PAINLEVEL_OUTOF10: 2
PAINLEVEL_OUTOF10: 0
PAINLEVEL_OUTOF10: 2
PAINLEVEL_OUTOF10: 0
PAINLEVEL_OUTOF10: 7
PAINLEVEL_OUTOF10: 3

## 2021-01-01 ASSESSMENT — PULMONARY FUNCTION TESTS
PIF_VALUE: 17
PIF_VALUE: 19
PIF_VALUE: 22
PIF_VALUE: 21
PIF_VALUE: 19
PIF_VALUE: 20
PIF_VALUE: 17
PIF_VALUE: 10
PIF_VALUE: 20
PIF_VALUE: 24
PIF_VALUE: 18
PIF_VALUE: 19
PIF_VALUE: 17
PIF_VALUE: 2
PIF_VALUE: 19
PIF_VALUE: 15
PIF_VALUE: 16
PIF_VALUE: 13
PIF_VALUE: 17
PIF_VALUE: 21
PIF_VALUE: 16
PIF_VALUE: 19
PIF_VALUE: 21
PIF_VALUE: 37
PIF_VALUE: 18
PIF_VALUE: 13
PIF_VALUE: 20
PIF_VALUE: 19
PIF_VALUE: 22
PIF_VALUE: 21
PIF_VALUE: 20
PIF_VALUE: 2
PIF_VALUE: 21
PIF_VALUE: 22
PIF_VALUE: 19
PIF_VALUE: 19
PIF_VALUE: 13
PIF_VALUE: 21
PIF_VALUE: 18
PIF_VALUE: 12
PIF_VALUE: 13
PIF_VALUE: 10
PIF_VALUE: 20
PIF_VALUE: 16
PIF_VALUE: 0
PIF_VALUE: 21
PIF_VALUE: 21
PIF_VALUE: 24
PIF_VALUE: 21
PIF_VALUE: 19
PIF_VALUE: 19
PIF_VALUE: 16
PIF_VALUE: 16
PIF_VALUE: 20
PIF_VALUE: 30
PIF_VALUE: 19
PIF_VALUE: 10
PIF_VALUE: 19
PIF_VALUE: 20
PIF_VALUE: 19
PIF_VALUE: 22
PIF_VALUE: 11
PIF_VALUE: 19
PIF_VALUE: 19
PIF_VALUE: 16
PIF_VALUE: 18
PIF_VALUE: 19
PIF_VALUE: 10
PIF_VALUE: 16
PIF_VALUE: 19
PIF_VALUE: 19
PIF_VALUE: 25
PIF_VALUE: 17
PIF_VALUE: 16
PIF_VALUE: 10
PIF_VALUE: 10
PIF_VALUE: 20
PIF_VALUE: 17
PIF_VALUE: 2
PIF_VALUE: 21
PIF_VALUE: 25
PIF_VALUE: 21
PIF_VALUE: 18
PIF_VALUE: 20
PIF_VALUE: 22
PIF_VALUE: 19
PIF_VALUE: 19
PIF_VALUE: 15
PIF_VALUE: 19
PIF_VALUE: 19
PIF_VALUE: 20
PIF_VALUE: 17
PIF_VALUE: 19
PIF_VALUE: 14
PIF_VALUE: 19
PIF_VALUE: 27
PIF_VALUE: 23
PIF_VALUE: 18
PIF_VALUE: 19
PIF_VALUE: 21
PIF_VALUE: 19
PIF_VALUE: 17
PIF_VALUE: 18
PIF_VALUE: 14
PIF_VALUE: 21
PIF_VALUE: 19
PIF_VALUE: 19
PIF_VALUE: 20
PIF_VALUE: 19
PIF_VALUE: 17
PIF_VALUE: 13
PIF_VALUE: 5
PIF_VALUE: 19
PIF_VALUE: 13
PIF_VALUE: 20
PIF_VALUE: 23
PIF_VALUE: 1
PIF_VALUE: 19
PIF_VALUE: 16
PIF_VALUE: 20
PIF_VALUE: 16
PIF_VALUE: 19
PIF_VALUE: 19
PIF_VALUE: 7
PIF_VALUE: 36
PIF_VALUE: 0
PIF_VALUE: 16
PIF_VALUE: 18
PIF_VALUE: 16
PIF_VALUE: 17
PIF_VALUE: 17
PIF_VALUE: 10
PIF_VALUE: 23
PIF_VALUE: 17
PIF_VALUE: 19
PIF_VALUE: 19
PIF_VALUE: 25
PIF_VALUE: 17
PIF_VALUE: 18
PIF_VALUE: 20
PIF_VALUE: 19
PIF_VALUE: 0
PIF_VALUE: 19
PIF_VALUE: 20
PIF_VALUE: 16
PIF_VALUE: 20
PIF_VALUE: 20
PIF_VALUE: 13
PIF_VALUE: 22
PIF_VALUE: 17
PIF_VALUE: 19
PIF_VALUE: 19
PIF_VALUE: 11
PIF_VALUE: 16
PIF_VALUE: 23
PIF_VALUE: 18
PIF_VALUE: 19
PIF_VALUE: 13
PIF_VALUE: 6
PIF_VALUE: 16
PIF_VALUE: 16
PIF_VALUE: 22
PIF_VALUE: 16
PIF_VALUE: 20
PIF_VALUE: 15
PIF_VALUE: 17
PIF_VALUE: 19
PIF_VALUE: 17
PIF_VALUE: 16
PIF_VALUE: 4
PIF_VALUE: 19
PIF_VALUE: 19
PIF_VALUE: 16
PIF_VALUE: 17
PIF_VALUE: 19
PIF_VALUE: 16
PIF_VALUE: 19
PIF_VALUE: 19
PIF_VALUE: 3
PIF_VALUE: 19
PIF_VALUE: 11
PIF_VALUE: 16
PIF_VALUE: 16
PIF_VALUE: 18
PIF_VALUE: 18
PIF_VALUE: 16
PIF_VALUE: 0
PIF_VALUE: 20
PIF_VALUE: 21
PIF_VALUE: 2
PIF_VALUE: 20
PIF_VALUE: 21
PIF_VALUE: 14
PIF_VALUE: 20
PIF_VALUE: 25
PIF_VALUE: 16
PIF_VALUE: 22
PIF_VALUE: 2
PIF_VALUE: 19
PIF_VALUE: 11
PIF_VALUE: 14
PIF_VALUE: 16
PIF_VALUE: 15
PIF_VALUE: 10
PIF_VALUE: 16
PIF_VALUE: 0
PIF_VALUE: 19
PIF_VALUE: 20
PIF_VALUE: 1
PIF_VALUE: 20
PIF_VALUE: 18
PIF_VALUE: 16
PIF_VALUE: 19
PIF_VALUE: 15
PIF_VALUE: 19
PIF_VALUE: 20
PIF_VALUE: 22
PIF_VALUE: 20
PIF_VALUE: 19
PIF_VALUE: 0
PIF_VALUE: 10
PIF_VALUE: 23
PIF_VALUE: 17
PIF_VALUE: 19
PIF_VALUE: 21
PIF_VALUE: 19
PIF_VALUE: 17
PIF_VALUE: 22
PIF_VALUE: 23
PIF_VALUE: 10
PIF_VALUE: 22
PIF_VALUE: 16
PIF_VALUE: 0
PIF_VALUE: 2
PIF_VALUE: 15
PIF_VALUE: 21
PIF_VALUE: 25
PIF_VALUE: 11

## 2021-01-01 ASSESSMENT — ENCOUNTER SYMPTOMS
NAUSEA: 0
COUGH: 0
BACK PAIN: 0
SINUS PRESSURE: 0
DIARRHEA: 0
SHORTNESS OF BREATH: 0
VOMITING: 0
PHOTOPHOBIA: 0
WHEEZING: 0
SINUS PAIN: 0
SORE THROAT: 0
RHINORRHEA: 0
ABDOMINAL PAIN: 0
CONSTIPATION: 0

## 2021-01-01 ASSESSMENT — PAIN DESCRIPTION - FREQUENCY
FREQUENCY: CONTINUOUS
FREQUENCY: CONTINUOUS
FREQUENCY: INTERMITTENT
FREQUENCY: CONTINUOUS

## 2021-01-01 ASSESSMENT — PAIN DESCRIPTION - DESCRIPTORS
DESCRIPTORS: CONSTANT;DISCOMFORT
DESCRIPTORS: DISCOMFORT;CRAMPING
DESCRIPTORS: ACHING;DISCOMFORT;SORE
DESCRIPTORS: DISCOMFORT

## 2021-01-01 ASSESSMENT — PAIN DESCRIPTION - PROGRESSION
CLINICAL_PROGRESSION: GRADUALLY WORSENING

## 2021-01-01 ASSESSMENT — PAIN DESCRIPTION - LOCATION
LOCATION: EAR
LOCATION: EAR
LOCATION: BACK;NECK
LOCATION: HEAD;BUTTOCKS

## 2021-01-01 ASSESSMENT — LIFESTYLE VARIABLES: SMOKING_STATUS: 0

## 2021-01-01 ASSESSMENT — PAIN SCALES - WONG BAKER

## 2021-01-01 ASSESSMENT — PAIN DESCRIPTION - ONSET
ONSET: ON-GOING

## 2021-01-01 ASSESSMENT — PAIN DESCRIPTION - PAIN TYPE
TYPE: ACUTE PAIN

## 2021-01-01 ASSESSMENT — PAIN - FUNCTIONAL ASSESSMENT: PAIN_FUNCTIONAL_ASSESSMENT: ACTIVITIES ARE NOT PREVENTED

## 2021-01-01 ASSESSMENT — PAIN DESCRIPTION - ORIENTATION
ORIENTATION: RIGHT;LEFT
ORIENTATION: RIGHT;LEFT

## 2021-02-09 NOTE — ED PROVIDER NOTES
Appearance: He is not toxic-appearing. HENT:      Head: Normocephalic. Mouth/Throat:      Mouth: Mucous membranes are moist.   Eyes:      Pupils: Pupils are equal, round, and reactive to light. Neck:      Musculoskeletal: Normal range of motion. No neck rigidity. Cardiovascular:      Rate and Rhythm: Normal rate. Rhythm irregular. Heart sounds: No murmur. No gallop. Pulmonary:      Effort: No respiratory distress. Breath sounds: Normal breath sounds. No wheezing. Comments: Clear breath sounds in all lung fields. No acute respiratory stress. Pulse ox 94% on room air. Abdominal:      General: There is no distension. Palpations: Abdomen is soft. Tenderness: There is no abdominal tenderness. Musculoskeletal: Normal range of motion. General: No swelling or deformity. Comments: No lower extremity edema present. Lymphadenopathy:      Cervical: No cervical adenopathy. Skin:     General: Skin is warm. Capillary Refill: Capillary refill takes less than 2 seconds. Coloration: Skin is not jaundiced. Findings: No bruising. Neurological:      Mental Status: He is alert and oriented to person, place, and time. Cranial Nerves: No cranial nerve deficit. Motor: No weakness. Comments: Patient is awake, alert, oriented x3. No focal neurological deficit. Psychiatric:         Mood and Affect: Mood normal.         Thought Content: Thought content normal.          Procedures     MDM  Number of Diagnoses or Management Options  Fatigue, unspecified type  Diagnosis management comments: Patient is a 79-year-old male who presented with a chief complaint of leg weakness. Patient states that this has been a progressive issue for the past year or so. He does use a cane at home and states that he was walking out of his bedroom when he got weak and gently fell to his knees. Patient was complaining of just feeling fatigued but had no other complaints. On examination he is in no acute distress. He initially was hypertensive but then after Catapres I did reduce it, this is his home medication. Patient states that he did not take his home medications this morning for blood pressure. His labs are all within normal limits and has no evidence of urinary tract infection. The patient has been ambulatory in the emergency department on his own several times. He started becoming verbally aggressive with staff because he was still in the hallway because we had no beds available. The patient was found walking by himself in the x-ray department and was guided back to the emergency department. The patient will be discharged home, there is any worsening symptoms he will return. No further questions. No focal neurological deficit. ED Course as of Feb 09 2056 Tue Feb 09, 2021   1726 Patient ambulated around the emergency department to the bathroom unassisted and then returned back to his bed without difficulty. [MS]      ED Course User Index  [MS] Jovanna Fine DO      ED Course as of Feb 09 2056 Tue Feb 09, 2021   1726 Patient ambulated around the emergency department to the bathroom unassisted and then returned back to his bed without difficulty. [MS]      ED Course User Index  [MS] Jovanna Fine DO       --------------------------------------------- PAST HISTORY ---------------------------------------------  Past Medical History:  has a past medical history of Atrial fibrillation ([facility] [address] Carotid artery stenosis, History of cardiovascular stress test, Hyperlipidemia, Hypertension, Non-insulin dependent type 2 diabetes mellitus ([facility] [address] and Unspecified cerebral artery occlusion with cerebral infarction. Past Surgical History:  has a past surgical history that includes ECHO Compl W Dop Color Flow (11/12/2013); Tonsillectomy; Appendectomy; Carotid endarterectomy (Left, Dec 2013); and Intracapsular cataract extraction (Left, 11/3/2020).     Social History:  reports that he quit smoking about 19 months ago. His smoking use included cigarettes. He smoked 1.00 pack per day. He has never used smokeless tobacco. He reports current alcohol use of about 1.0 standard drinks of alcohol per week. He reports that he does not use drugs. Family History: family history is not on file. He was adopted. The patients home medications have been reviewed. Allergies: Patient has no known allergies.     -------------------------------------------------- RESULTS -------------------------------------------------  Labs:  Results for orders placed or performed during the hospital encounter of 02/09/21   CBC Auto Differential   Result Value Ref Range    WBC 11.6 (H) 4.5 - 11.5 E9/L    RBC 5.30 3.80 - 5.80 E12/L    Hemoglobin 14.6 12.5 - 16.5 g/dL    Hematocrit 44.7 37.0 - 54.0 %    MCV 84.3 80.0 - 99.9 fL    MCH 27.5 26.0 - 35.0 pg    MCHC 32.7 32.0 - 34.5 %    RDW 15.2 (H) 11.5 - 15.0 fL    Platelets 273 376 - 633 E9/L    MPV 9.3 7.0 - 12.0 fL    Neutrophils % 67.6 43.0 - 80.0 %    Immature Granulocytes % 0.7 0.0 - 5.0 %    Lymphocytes % 21.6 20.0 - 42.0 %    Monocytes % 7.7 2.0 - 12.0 %    Eosinophils % 1.7 0.0 - 6.0 %    Basophils % 0.7 0.0 - 2.0 %    Neutrophils Absolute 7.81 (H) 1.80 - 7.30 E9/L    Immature Granulocytes # 0.08 E9/L    Lymphocytes Absolute 2.50 1.50 - 4.00 E9/L    Monocytes Absolute 0.89 0.10 - 0.95 E9/L    Eosinophils Absolute 0.20 0.05 - 0.50 E9/L    Basophils Absolute 0.08 0.00 - 0.20 Q9/V   Basic metabolic panel   Result Value Ref Range    Sodium 141 132 - 146 mmol/L    Potassium 3.3 (L) 3.5 - 5.0 mmol/L    Chloride 104 98 - 107 mmol/L    CO2 26 22 - 29 mmol/L    Anion Gap 11 7 - 16 mmol/L    Glucose 108 (H) 74 - 99 mg/dL    BUN 16 8 - 23 mg/dL    CREATININE 1.3 (H) 0.7 - 1.2 mg/dL    GFR Non-African American 54 >=60 mL/min/1.73    GFR African American >60     Calcium 9.3 8.6 - 10.2 mg/dL   Troponin   Result Value Ref Range    Troponin <0.01 0.00 - 0.03 ng/mL   Urinalysis   Result Value Ref Range    Color, UA Yellow Straw/Yellow    Clarity, UA Clear Clear    Glucose, Ur Negative Negative mg/dL    Bilirubin Urine Negative Negative    Ketones, Urine Negative Negative mg/dL    Specific Gravity, UA 1.020 1.005 - 1.030    Blood, Urine Negative Negative    pH, UA 7.0 5.0 - 9.0    Protein, UA 30 (A) Negative mg/dL    Urobilinogen, Urine 0.2 <2.0 E.U./dL    Nitrite, Urine Negative Negative    Leukocyte Esterase, Urine Negative Negative   Microscopic Urinalysis   Result Value Ref Range    Hyaline Casts, UA 0-2 0 - 2 /LPF    WBC, UA NONE 0 - 5 /HPF    RBC, UA NONE 0 - 2 /HPF    Epithelial Cells, UA NONE SEEN /HPF    Bacteria, UA RARE (A) None Seen /HPF   POCT Glucose   Result Value Ref Range    Meter Glucose 113 (H) 74 - 99 mg/dL   EKG 12 Lead   Result Value Ref Range    Ventricular Rate 64 BPM    Atrial Rate 64 BPM    P-R Interval 162 ms    QRS Duration 108 ms    Q-T Interval 494 ms    QTc Calculation (Bazett) 509 ms    P Axis 79 degrees    R Axis 54 degrees    T Axis 151 degrees       Radiology:  No orders to display       ------------------------- NURSING NOTES AND VITALS REVIEWED ---------------------------  Date / Time Roomed:  2/9/2021  1:23 PM  ED Bed Assignment:  [place] [id number]    The nursing notes within the ED encounter and vital signs as below have been reviewed. BP (!) 173/98   Pulse 56   Temp 98.2 °F (36.8 °C) (Oral)   Resp 20   Ht 5' 5\" (1.651 m)   Wt 149 lb (67.6 kg)   SpO2 94%   BMI 24.79 kg/m²   Oxygen Saturation Interpretation: Normal    EKG: This EKG is signed and interpreted by me. Rate: 64  Rhythm: Sinus  Interpretation: Sinus rhythm, no acute ischemic changes seen.   Patient has T wave inversions in 1 and aVL previous EKG on 1/31/2020.    ------------------------------------------ PROGRESS NOTES ------------------------------------------  I have spoken with the patient and discussed todays results, in addition to providing specific details for the plan of care and counseling regarding the diagnosis and prognosis. Their questions are answered at this time and they are agreeable with the plan. I discussed at length with them reasons for immediate return here for re evaluation. They will followup with primary care by calling their office tomorrow. --------------------------------- ADDITIONAL PROVIDER NOTES ---------------------------------  At this time the patient is without objective evidence of an acute process requiring hospitalization or inpatient management. They have remained hemodynamically stable throughout their entire ED visit and are stable for discharge with outpatient follow-up. The plan has been discussed in detail and they are aware of the specific conditions for emergent return, as well as the importance of follow-up. Discharge Medication List as of 2/9/2021  6:14 PM          Diagnosis:  1. Fatigue, unspecified type        Disposition:  Patient's disposition: Discharge to home  Patient's condition is stable.               Austyn Peoples DO  02/09/21 2100

## 2021-02-09 NOTE — ED NOTES
Bed: H2  Expected date:   Expected time:   Means of arrival:   Comments:     Flaca Madsen, RAFFI  02/09/21 3641

## 2021-02-25 PROBLEM — C79.31 BRAIN METASTASIS (HCC): Status: ACTIVE | Noted: 2021-01-01

## 2021-02-26 PROBLEM — I48.91 ATRIAL FIBRILLATION (HCC): Status: ACTIVE | Noted: 2021-01-01

## 2021-02-26 PROBLEM — C43.9 MELANOMA (HCC): Status: ACTIVE | Noted: 2021-01-01

## 2021-02-26 PROBLEM — N18.30 STAGE 3 CHRONIC KIDNEY DISEASE (HCC): Status: ACTIVE | Noted: 2021-01-01

## 2021-02-26 PROBLEM — Z79.01 ANTICOAGULATED: Status: ACTIVE | Noted: 2021-01-01

## 2021-02-26 PROBLEM — K59.00 CONSTIPATION: Status: ACTIVE | Noted: 2021-01-01

## 2021-02-26 PROBLEM — N28.1 RENAL CYST, LEFT: Status: ACTIVE | Noted: 2021-01-01

## 2021-02-26 PROBLEM — E87.6 HYPOKALEMIA: Status: ACTIVE | Noted: 2021-01-01

## 2021-02-26 PROBLEM — R41.82 AMS (ALTERED MENTAL STATUS): Status: ACTIVE | Noted: 2021-01-01

## 2021-02-26 PROBLEM — Z86.73 HISTORY OF CVA (CEREBROVASCULAR ACCIDENT): Status: ACTIVE | Noted: 2021-01-01

## 2021-02-26 PROBLEM — J44.9 COPD (CHRONIC OBSTRUCTIVE PULMONARY DISEASE) (HCC): Status: ACTIVE | Noted: 2021-01-01

## 2021-02-26 PROBLEM — R56.9 SEIZURE (HCC): Status: ACTIVE | Noted: 2021-01-01

## 2021-02-26 PROBLEM — F03.90 DEMENTIA (HCC): Status: ACTIVE | Noted: 2021-01-01
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
## 2021-02-26 NOTE — H&P
scan of the abdomen and pelvis is negative for any acute findings, showed moderate amount of stools in the bowel, right kidney cyst and atrophic pancreas. He is being admitted for further management. Past Medical History:          Diagnosis Date    Atrial fibrillation ([facility] [address]     2018 pt states has a heart monitor, implantable    Carotid artery stenosis     History of cardiovascular stress test 11/13/13    lexiscan    Hyperlipidemia     Hypertension     Non-insulin dependent type 2 diabetes mellitus ([facility] [address]     Unspecified cerebral artery occlusion with cerebral infarction nov 11 2013    right face numb balance problems vision fades in and out  walks with walker       Past Surgical History:          Procedure Laterality Date    APPENDECTOMY      CAROTID ENDARTERECTOMY Left Dec 2013    Left Carotid Endarterectomy    ECHO COMPL W DOP COLOR FLOW  11/12/2013         INTRACAPSULAR CATARACT EXTRACTION Left 11/3/2020    LEFT EYE CATARACT EMULSIFICATION IOL IMPLANT performed by Malgorzata Colby MD at [facility]         Medications Prior to Admission:      Prior to Admission medications    Medication Sig Start Date End Date Taking?  Authorizing Provider   magnesium oxide (MAG-OX) 400 MG tablet Take 400 mg by mouth daily   Yes Historical Provider, MD   SITagliptin (JANUVIA) 50 MG tablet Take 50 mg by mouth daily   Yes Historical Provider, MD   insulin glargine (LANTUS) 100 UNIT/ML injection vial Inject 12 Units into the skin nightly   Yes Historical Provider, MD   dexamethasone (DECADRON) 4 MG tablet Take 6 mg by mouth daily   Yes Historical Provider, MD   divalproex (DEPAKOTE SPRINKLE) 125 MG capsule Take 125 mg by mouth 2 times daily   Yes Historical Provider, MD   melatonin 3 MG TABS tablet Take 5 mg by mouth nightly as needed   Yes Historical Provider, MD   NONFORMULARY Take 125 mcg by mouth 2 times daily tikosyn   Yes Historical Provider, MD   apixaban (ELIQUIS) 5 MG TABS tablet Take by mouth 2 times daily    Historical Provider, MD   hydrALAZINE (APRESOLINE) 50 MG tablet Take 25 mg by mouth 3 times daily     Historical Provider, MD   lamoTRIgine (LAMICTAL) 100 MG tablet Take 200 mg by mouth 2 times daily    Historical Provider, MD   metoprolol succinate (TOPROL XL) 50 MG extended release tablet Take 50 mg by mouth 2 times daily    Historical Provider, MD   potassium chloride (KLOR-CON M) 20 MEQ extended release tablet Take 20 mEq by mouth 2 times daily     Historical Provider, MD   glipiZIDE (GLUCOTROL) 5 MG tablet Take 10 mg by mouth 2 times daily    Historical Provider, MD   tamsulosin (FLOMAX) 0.4 MG capsule Take 0.4 mg by mouth nightly    Historical Provider, MD   gabapentin (NEURONTIN) 300 MG capsule Take 300 mg by mouth 2 times daily. Historical Provider, MD   albuterol sulfate  (90 Base) MCG/ACT inhaler Inhale 2 puffs into the lungs every 6 hours as needed for Wheezing or Shortness of Breath    Historical Provider, MD   influenza virus trivalent vaccine (FLUZONE) injection Inject 0.5 mLs into the muscle once Given 10/2019    Historical Provider, MD   HYDROcodone-acetaminophen (NORCO) 5-325 MG per tablet Take 1 tablet by mouth every 6 hours as needed for Pain    Historical Provider, MD   cloNIDine (CATAPRES) 0.1 MG tablet Take 0.2 mg by mouth 2 times daily     Historical Provider, MD   metFORMIN (GLUCOPHAGE) 500 MG tablet Take 1,000 mg by mouth 2 times daily     Historical Provider, MD   spironolactone (ALDACTONE) 25 MG tablet Take 1 tablet by mouth 2 times daily. Patient taking differently: Take 25 mg by mouth daily  11/18/13   Nate Ireland DO, FACOI   lisinopril (PRINIVIL;ZESTRIL) 20 MG tablet Take 20 mg by mouth every 12 hours     Historical Provider, MD   amLODIPine (NORVASC) 10 MG tablet Take 10 mg by mouth daily. Historical Provider, MD   Multiple Vitamins-Minerals (THERAPEUTIC MULTIVITAMIN-MINERALS) tablet Take 1 tablet by mouth daily.     Historical Provider, MD aspirin 81 MG tablet Take 81 mg by mouth daily. Historical Provider, MD       Allergies:  Patient has no known allergies. Social History:      The patient currently lives at home with his wife. TOBACCO:   reports that he quit smoking about 19 months ago. His smoking use included cigarettes. He smoked 1.00 pack per day. He has never used smokeless tobacco.  ETOH:   reports current alcohol use of about 1.0 standard drinks of alcohol per week. Family History:     Reviewed in detail Positive as follows: Adopted: Yes       REVIEW OF SYSTEMS:   Pertinent positives as noted in the HPI. All other systems reviewed and negative. PHYSICAL EXAM:    BP (!) 194/85   Pulse 79   Temp 97.5 °F (36.4 °C) (Temporal)   Resp 16   SpO2 97%     General appearance: Elderly male, no apparent distress, appears stated age and cooperative. Afebrile. HEENT:  Normal cephalic, atraumatic without obvious deformity. Pupils equal, round, and reactive to light. Extra ocular muscles intact. Conjunctivae/corneas clear. Neck: Supple, with full range of motion. No jugular venous distention. Trachea midline. Respiratory:  Normal respiratory effort. Clear to auscultation, bilaterally without Rales/Wheezes/Rhonchi. Cardiovascular:  Regular rate and rhythm with normal S1/S2 without murmurs, rubs or gallops. Abdomen: Soft, non-tender, non-distended with normal bowel sounds. Musculoskeletal:  No clubbing, cyanosis or edema bilaterally. Full range of motion without deformity. Skin: Skin color, texture, turgor normal.  There is a small black lesion on the right side of the calvarium.   Neurologic:   Cranial nerves: II-XII intact, grossly non-focal.  Psychiatric:  Alert and oriented, thought content seems appropriate at this time, impaired insight  Capillary Refill: Brisk,< 3 seconds   Peripheral Pulses: +2 palpable, equal bilaterally       Labs: Reviewed and documented    Urinalysis:      Lab Results   Component Value Date History of seizure on multiple antiepileptic drugs. Check Depakote and Lamictal levels. Continue current medications. 10.  Hypertension on multiple medications. Monitor pressures closely. Holding parameters. 11.  Atrial fibrillation anticoagulated with Eliquis. Hold anticoagulation. Obtain EKG. 12. Stage III chronic kidney disease at baseline  13. Type 2 diabetes, controlled, sliding scale coverage, monitor blood sugar. 14.History of CVA. Hold aspirin. 15.  Renal cyst        DVT Prophylaxis: SCDs  Diet: No diet orders on file n.p.o. after midnight  Code Status: Prior full code    PT/OT Eval Status: As needed    Dispo -inpatient/MedSurg       Kain Mason MD    Thank you Ariana Denny DO for the opportunity to be involved in this patient's care.
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
## 2021-02-27 NOTE — PLAN OF CARE
Mariellen Odor w good appetite for bkfst.    Wife in to visit. Had on her oxygen / visited at bedside. MRI testing complete at approx 1215. Sl delay in getting in the tube b/o urgent case that arose and bumped Ginaellen Odor out of the line up for approx 75 min. MRI results communicated fr Riaz's surgeon to wife Shaylee Ocampo at . Later courtesy call back to her. Stated she had some knowledge re what the findings would be ( mets ). Re the oncology and palliative consults, wife states her preferred long term facility would be [facility] as it is near her house. Wife also presented us with an imm paper this am stating that Riaz's 2d covid shot was due on 2.26.2021. researched issue and informed her that [facility] can get the 2d imm as late as 12 days from 2.26.     wife ackn that there may be more pressing issues that prevent him from going to get this. Reviewed w her that, acc to the latest research, there is an 85% effectiveness w the individual being given only the single dose of the vaccine.        Wife ackn underst.

## 2021-02-27 NOTE — PROGRESS NOTES
Messaged Leslye Morgan regarding palliative consult. [phone]: Called answering service regarding oncology consult .

## 2021-02-27 NOTE — CONSULTS
[address]                  [address] [facility],  [address]                                  CONSULTATION    PATIENT NAME: Jett Medina                      :        1945  MED REC NO:   [id number]                            ROOM:       5204  ACCOUNT NO:   [de-identified]                           ADMIT DATE: 2021  PROVIDER:     Tracey Livingston MD    CONSULT DATE:  2021    CHIEF COMPLAINT:  Altered mental status. HISTORY OF PRESENT ILLNESS:  This 77-year-old male who was transferred  from [facility] where he was evaluated for altered  mental status. The patient has history of atrial fibrillation and is on  Eliquis. He has history of melanoma resection in the past.  The patient  was admitted because of confusion and inappropriate behavior. He does  not recall the events. He denies significant headache or significant  weakness. He underwent a CT scan of the brain and subsequent MRI scan  of the brain at [facility] which revealed a mass in the  left cerebral hemisphere suspicious for neoplasm. There were several  foci of extra-axial parasellar, subependymal enhancement. These films  are not available for review. We got most of the history from the  patient's daughter and from the medical records. The patient is pretty  confused and not aware of the events. PAST MEDICAL HISTORY:  Diabetes mellitus; hypertension; seizure  disorder; BPH; depression; atrial fibrillation; chronic kidney disease;  melanoma, back and forehead; chronic back pain; trigeminal neuralgia;  CVA with residual unsteady gait; and carotid artery disease. PAST SURGICAL HISTORY:  Appendectomy, carotid endarterectomy, echo  complete, intracapsular cataract extraction, and tonsillectomy. ALLERGIES:  No known allergies. SOCIAL HISTORY:  Former smoker. Does use alcohol, but denies the use of  street drugs.     PHYSICAL EXAMINATION: GENERAL:  He is a well-developed, well-nourished male in no acute  distress. NEUROLOGIC:  He is alert, awake, confused. He does not know how old he  is and he is not aware of the date and the month. Speech is clear. Memory is poor. Cranial nerves II through XII are grossly intact. He  can count the fingers well. Extraocular movements are present. Handgrip is equal bilaterally. No focal deficit per se is noted. Lower  extremities, he can move the legs well. Questionable weakness of the  right lower extremity. I did not review any of the films as they are not available. IMPRESSION:  Probably metastatic disease to the brain from melanoma. The etiology of altered mental status is to be determined. The patient  is on Keppra for seizure disorder. I have ordered MRI scan of the brain  with contrast enhancement with BRAVO protocol for further evaluation. I discussed the various issues with the daughter. After the MRI scan is  completed, we will further discuss the option of further management with  the family.         Eileen Byrnes MD    D: 02/26/2021 17:20:28       T: 02/26/2021 17:30:22     [email or web address]  Job#: [id number]     Doc#: [id number]    CC:

## 2021-02-27 NOTE — PROGRESS NOTES
IMPRESSION:  Probably metastatic disease to the brain from melanoma  Awaiting completion of MRI scan

## 2021-02-27 NOTE — PROGRESS NOTES
Pt's wife is asking were the pt's wedding band. Called Security regarding this. Stated they will be up with the ring.
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
## 2021-02-27 NOTE — PROGRESS NOTES
I sent a message to Dr. Tootie Townsend regarding low blood sugar of 67. I gave him the Glucagon Injection IM and when I rechecked him in 15 minutes he was back at 139. I started the D5 IV per protocol. Will try to get him to eat dinner as he slept through lunch today.

## 2021-02-28 NOTE — PLAN OF CARE
Problem: Non-Violent Restraints  Goal: No harm/injury to patient while restraints in use  2/28/2021 1049 by Pippa Calderon RN  Outcome: Met This Shift  2/27/2021 2359 by Griselda Springs, RN  Outcome: Met This Shift     Problem: Non-Violent Restraints  Goal: Removal from restraints as soon as assessed to be safe  2/28/2021 1049 by Pippa Calderon RN  Outcome: Not Met This Shift  2/27/2021 2359 by Griselda Springs, RN  Outcome: Ongoing

## 2021-02-28 NOTE — PROGRESS NOTES
I sent a message to Dr. Ene Dhillon via perfect serve. He woke up yelling and trying to get out of bed. When an RN and PCA entered the room to restrain him, he was hitting/kicking/spitting/biting. I have him haldol and placed him in 2pt restraints. Need an order placed in Epic. Dr. Augie Hightower is on so I sent a message to her. However, Ene Dhillon is listed as Attending in [address] [place] [address] Rd.

## 2021-02-28 NOTE — CONSULTS
Palliative Care Department  Palliative Care Initial Consult  Provider: Nathan SALAZAR  [address] Day: 4  Date of Initial Consult: 2/28/2021  Referring Provider: Jamshid SALAZAR  Palliative Medicine was consulted for assistance with: Code status Discussion, Assist with goals of care, Symptom Management and Family Support    Chief Complaint: Dickson Sood is a 76 y.o. male with chief complaint of AMS    HPI:   Dickson Sood is a 76 y.o. male with significant past medical history of hypertension, atrial fibrillation, seizure disorder, dementia, chronic kidney disease, as well as prior melanoma on his back and forehead status post resection, and chronic back pain as well as trigeminal neuralgia. He was admitted on 2/25/2021 after he was found to have brain metastasis at an outside hospital.  He was transferred to [facility] for neurosurgical evaluation, and consultations have also been placed to medical and radiation oncology, with a high concern for malignant melanoma.     ASSESSMENT/PLAN:     Pertinent Hospital Diagnoses:  Current medical issues leading to Palliative Medicine involvement include   Active Hospital Problems    Diagnosis Date Noted    Melanoma ([facility] [C43.9] 02/26/2021    Hypokalemia [E87.6] 02/26/2021    Stage 3 chronic kidney disease [N18.30] 02/26/2021    COPD (chronic obstructive pulmonary disease) ([facility] [J44.9] 02/26/2021    Constipation [K59.00] 02/26/2021    Renal cyst, left [N28.1] 02/26/2021    AMS (altered mental status) [R41.82] 02/26/2021    Dementia ([address] [F03.90] 02/26/2021    Seizure ([facility] [R56.9] 02/26/2021    History of CVA (cerebrovascular accident) [Z86.73] 02/26/2021    Atrial fibrillation ([address] [I48.91] 02/26/2021    Anticoagulated [Z79.01] 02/26/2021    Brain metastasis ([address] [C79.31] 02/25/2021    Leucocytosis [D72.829] 12/15/2013    Non-insulin dependent type 2 diabetes mellitus ([address] [E11.9] Palliative Care Encounter / Counseling Regarding Goals of Care:  Please see detailed goals of care discussion as below.  At this time, Luis Amezquita, Does Not have capacity for medical decision-making. Capacity is time limited and situation/question specific.  During encounter the patient's wife Shey Guardian was surrogate medical decision-maker   Outcome of goals of care meeting: continue current management   Code status: Full Code  o I reviewed with the family that given multiple medical co-morbidities and advanced disease process, in the event of cardiac arrest, the chances of surviving may likely be low. It was also reviewed that if they survived a cardiac arrest, a return to prior level of function also may be low.  Advanced Directives: None   Surrogate/Legal NOK:   Stevie Lester ([phone]) is the patient's wife   Will follow and continued to discuss goals of care pending clinical progression. Referrals: none today    SUBJECTIVE:   Events/Discussions:  2/28/21:   Patient seen at the bedside, no family present. He is alert, oriented to person, and time, has some disorientation regarding his location, and demonstrates a lack of knowledge of his current medical condition. He is currently in four-point restraints, unfortunately uncooperative and intermittently combative. When trying to discuss with him his current condition, he states that he does not believe anything anyone is telling him, and feels as though there is nothing wrong with him. He asked to be untied and allowed to leave, and when restraints have been removed he attempts to exit the bed. I did speak with the patient's wife by phone, discussed the role of palliative medicine in his care. As for goals of care, she would like for him to continue with his current work-up, to his current plan of care, as well as remain a full code. She is hopeful his agitation will improve. He otherwise denies any other needs at this time.     Past Medical History:   Diagnosis Date    Atrial fibrillation ([facility]     2018 pt states has a heart monitor, implantable    Carotid artery stenosis     History of cardiovascular stress test 11/13/13    lexiscan    Hyperlipidemia     Hypertension     Non-insulin dependent type 2 diabetes mellitus ([facility]     Unspecified cerebral artery occlusion with cerebral infarction nov 11 2013    right face numb balance problems vision fades in and out  walks with walker       Past Surgical History:   Procedure Laterality Date    APPENDECTOMY      CAROTID ENDARTERECTOMY Left Dec 2013    Left Carotid Endarterectomy    ECHO COMPL W DOP COLOR FLOW  11/12/2013         INTRACAPSULAR CATARACT EXTRACTION Left 11/3/2020    LEFT EYE CATARACT EMULSIFICATION IOL IMPLANT performed by Guy Rose MD at [facility]         Family History   Adopted: Yes       No Known Allergies    ROS: UNLESS STATED ABOVE PATIENT DENIES:  CONSTITUTIONAL:  fever, chill, rigors, nausea, vomiting, fatigue. HEENT: blurry vision, double vision, hearing problem, tinnitus, hoarseness, dysphagia, odynophagia  RESPIRATORY: cough, shortness of breath, sputum expectoration. CARDIOVASCULAR:  Chest pain/pressure, palpitation, syncope, irregular beats  GASTROINTESTINAL:  abdominal or rectal pain, diarrhea, constipation, . GENITOURINARY:  Burning, frequency, urgency, incontinence, discharge  INTEGUMENTARY: rash, wound, pruritis  HEMATOLOGIC/LYMPHATIC:  Swelling, sores, gum bleeding, easy bruising, pica.   MUSCULOSKELETAL:  pain, edema, joint swelling or redness  NEUROLOGICAL:  light headed, dizziness, loss of consciousness, weakness, change in memory, seizures, tremors    OBJECTIVE:   Prognosis: unknown    Physical Exam:  BP (!) 179/79   Pulse 72   Temp 98.4 °F (36.9 °C) (Temporal)   Resp 16   SpO2 91%     Gen:  Alert, appears stated age, well nourished, restless and confrontational  HEENT:  Normocephalic, conjunctiva pink, no drainage, mucosa moist  Neck:  Supple  Lungs: Breath sounds coarse  Heart: RRR, no murmur, rub, or gallop noted during exam  Abd:  Soft, non tender, non distended, BS+  M/S/Ext:  Moving all extremities, weak, no edema, pulses present  Skin:  Warm and dry  Neuro:  PERRL, Alert, oriented to person time, confused situation; following commands    Objective data reviewed: labs, images, records, medication use, vitals and chart    Time/Communication:  Greater than 50% of time spent, total 70 minutes in counseling and coordination of care at the bedside regarding goals of care, diagnosis and prognosis and see above. Umesh SALAZAR  Palliative Medicine    Patient and the plan of care discussed with the other IDT members of Palliative Care Team, and with patient, family and floor nurse, as appropriate and available. Thank you for allowing Palliative Medicine to participate in the care of Tejas Del Rosario. Note: This report was completed using computerize voiced recognition software. Every effort has been made to ensure accuracy; however, inadvertent computerized transcription errors may be present.
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
## 2021-02-28 NOTE — CONSULTS
Blood and Melina Travis  Dr. Morris Calltootie      Patient Name: Chiki Arevalo  YOB: 1945  PCP: Tianna Rios DO   Referring Provider: No address on file     Reason for Consultation: No chief complaint on file. History of Present Illness: This pt is a 77 yo male w/ a PMH of hypertension, diabetes, dementia, seizure disorder, BPH, depression, atrial fibrillation on anticoagulation with Eliquis, chronic kidney disease, melanoma removed from the back and forehead, chronic back pain, trigeminal neuralgia, CVA with residual unsteady gait and carotid artery disease who presented to [facility] from [facility] 2/25/21 after presenting with AMS and confusion with irrational behavior. In the ER at [facility] ([facility]), an MRI of the brain revealed a heterogeneous enhancing mass in the left cerebral hemisphere suspicious for neoplasm and several foci of extra-axial parasellar cisterns and subependymal enhancement in the left lateral ventricle suspicious for metastatic disease. From [facility], CT scan of the chest showed emphysema and atelectasis, no masses or consolidation. CT scan of the abdomen and pelvis is negative for any acute findings    Currently patient remains confused and lethargic requiring restraints. CBC stable with mild leukocytosis likely from steroids. Oncology has been consulted for CUP with CNS mets, likely from prior melanoma.      Diagnostic Data:     Past Medical History:   Diagnosis Date    Atrial fibrillation ([address]     2018 pt states has a heart monitor, implantable    Carotid artery stenosis     History of cardiovascular stress test 11/13/13    lexiscan    Hyperlipidemia     Hypertension     Non-insulin dependent type 2 diabetes mellitus ([address]     Unspecified cerebral artery occlusion with cerebral infarction nov 11 2013    right face numb balance problems vision fades in and out  walks with walker       Patient Active Problem List    Diagnosis Date Noted    Carotid stenosis 11/12/2013     Priority: High    CVA (cerebral vascular accident) 11/15/2013     Priority: Medium    Melanoma ([facility] 02/26/2021    Hypokalemia 02/26/2021    Stage 3 chronic kidney disease 02/26/2021    COPD (chronic obstructive pulmonary disease) ([address] 02/26/2021    Constipation 02/26/2021    Renal cyst, left 02/26/2021    AMS (altered mental status) 02/26/2021    Dementia ([address] 02/26/2021    Seizure ([address] 02/26/2021    History of CVA (cerebrovascular accident) 02/26/2021    Atrial fibrillation ([address] 02/26/2021    Anticoagulated 02/26/2021    Brain metastasis ([address] 02/25/2021    Left cataract 11/03/2020    Leucocytosis 12/15/2013    Vitamin D deficiency 12/15/2013    Non-insulin dependent type 2 diabetes mellitus ([address]     Hypertension     Hyperlipidemia     Cerebral artery occlusion with cerebral infarction ([address] 11/11/2013        Past Surgical History:   Procedure Laterality Date    APPENDECTOMY      CAROTID ENDARTERECTOMY Left Dec 2013    Left Carotid Endarterectomy    ECHO COMPL W DOP COLOR FLOW  11/12/2013         INTRACAPSULAR CATARACT EXTRACTION Left 11/3/2020    LEFT EYE CATARACT EMULSIFICATION IOL IMPLANT performed by Ev Andrade MD at [facility]         Family History  Family History   Adopted: Yes       Social History    TOBACCO:   reports that he quit smoking about 20 months ago. His smoking use included cigarettes. He smoked 1.00 pack per day. He has never used smokeless tobacco.  ETOH:   reports current alcohol use of about 1.0 standard drinks of alcohol per week. Home Medications  Prior to Admission medications    Medication Sig Start Date End Date Taking?  Authorizing Provider   magnesium oxide (MAG-OX) 400 MG tablet Take 400 mg by mouth daily   Yes Historical Provider, MD   SITagliptin (JANUVIA) 50 MG tablet Take 50 mg by mouth daily   Yes Historical Provider, MD   insulin glargine (LANTUS) 100 UNIT/ML injection vial Inject 12 Units into the skin nightly   Yes Historical Provider, MD   dexamethasone (DECADRON) 4 MG tablet Take 6 mg by mouth daily   Yes Historical Provider, MD   divalproex (DEPAKOTE SPRINKLE) 125 MG capsule Take 125 mg by mouth 2 times daily   Yes Historical Provider, MD   melatonin 3 MG TABS tablet Take 5 mg by mouth nightly as needed   Yes Historical Provider, MD   NONFORMULARY Take 125 mcg by mouth 2 times daily tikosyn   Yes Historical Provider, MD   apixaban (ELIQUIS) 5 MG TABS tablet Take by mouth 2 times daily    Historical Provider, MD   hydrALAZINE (APRESOLINE) 50 MG tablet Take 25 mg by mouth 3 times daily     Historical Provider, MD   lamoTRIgine (LAMICTAL) 100 MG tablet Take 200 mg by mouth 2 times daily    Historical Provider, MD   metoprolol succinate (TOPROL XL) 50 MG extended release tablet Take 50 mg by mouth 2 times daily    Historical Provider, MD   potassium chloride (KLOR-CON M) 20 MEQ extended release tablet Take 20 mEq by mouth 2 times daily     Historical Provider, MD   glipiZIDE (GLUCOTROL) 5 MG tablet Take 10 mg by mouth 2 times daily    Historical Provider, MD   tamsulosin (FLOMAX) 0.4 MG capsule Take 0.4 mg by mouth nightly    Historical Provider, MD   gabapentin (NEURONTIN) 300 MG capsule Take 300 mg by mouth 2 times daily.     Historical Provider, MD   albuterol sulfate  (90 Base) MCG/ACT inhaler Inhale 2 puffs into the lungs every 6 hours as needed for Wheezing or Shortness of Breath    Historical Provider, MD   influenza virus trivalent vaccine (FLUZONE) injection Inject 0.5 mLs into the muscle once Given 10/2019    Historical Provider, MD   HYDROcodone-acetaminophen (NORCO) 5-325 MG per tablet Take 1 tablet by mouth every 6 hours as needed for Pain    Historical Provider, MD   cloNIDine (CATAPRES) 0.1 MG tablet Take 0.2 mg by mouth 2 times daily     Historical Provider, MD   metFORMIN (GLUCOPHAGE) 500 MG tablet Take 1,000 mg by mouth 2 times daily     Historical Provider, MD spironolactone (ALDACTONE) 25 MG tablet Take 1 tablet by mouth 2 times daily. Patient taking differently: Take 25 mg by mouth daily  11/18/13   Nate Ireland DO, FACOI   lisinopril (PRINIVIL;ZESTRIL) 20 MG tablet Take 20 mg by mouth every 12 hours     Historical Provider, MD   amLODIPine (NORVASC) 10 MG tablet Take 10 mg by mouth daily. Historical Provider, MD   Multiple Vitamins-Minerals (THERAPEUTIC MULTIVITAMIN-MINERALS) tablet Take 1 tablet by mouth daily. Historical Provider, MD   aspirin 81 MG tablet Take 81 mg by mouth daily. Historical Provider, MD       Allergies  No Known Allergies    Review of Systems:    Confused and unable to provide      Objective  BP (!) 173/79   Pulse 83   Temp 98.9 °F (37.2 °C) (Temporal)   Resp 16   SpO2 95%     Physical Exam:   Performance Status:  General: Confused, requiring restraint  Head and neck : PERRLA, EOMI . Sclera non icteric. Oropharynx : Clear  Neck: no JVD,  no adenopathy  LYMPHATICS : No LAD  Heart: Regular rate and regular rhythm, no murmur  Lungs: Clear to auscultation   Extremities: No edema,no cyanosis, no clubbing.    Abdomen: Soft, non-tender;no masses, no organomegaly  Skin:  No rash  Neurologic:Confused and in restraints    Recent Laboratory Data-   Lab Results   Component Value Date    WBC 14.5 (H) 02/28/2021    HGB 12.0 (L) 02/28/2021    HCT 36.7 (L) 02/28/2021    MCV 84.4 02/28/2021     02/28/2021    LYMPHOPCT 16.2 (L) 02/28/2021    RBC 4.35 02/28/2021    MCH 27.6 02/28/2021    MCHC 32.7 02/28/2021    RDW 15.3 (H) 02/28/2021    NEUTOPHILPCT 74.3 02/28/2021    MONOPCT 7.5 02/28/2021    BASOPCT 0.2 02/28/2021    NEUTROABS 10.80 (H) 02/28/2021    LYMPHSABS 2.36 02/28/2021    MONOSABS 1.09 (H) 02/28/2021    EOSABS 0.04 (L) 02/28/2021    BASOSABS 0.03 02/28/2021       Lab Results   Component Value Date     02/28/2021    K 2.9 (L) 02/28/2021     02/28/2021    CO2 30 (H) 02/28/2021    BUN 26 (H) 02/28/2021    CREATININE 1.2 02/28/2021    GLUCOSE 105 (H) 02/28/2021    CALCIUM 8.2 (L) 02/28/2021    PROT 7.5 01/31/2020    LABALBU 4.5 01/31/2020    BILITOT 0.5 01/31/2020    ALKPHOS 70 01/31/2020    AST 49 (H) 01/31/2020    ALT 41 (H) 01/31/2020    LABGLOM 59 02/28/2021    GFRAA >60 02/28/2021       No results found for: IRON, TIBC, FERRITIN        Radiology-    MRI C/ [address]   Final Result   1. Somewhat limited evaluation due to patient motion artifact, especially on   the postcontrast enhanced images. 2. Multiple intracranial METASTASES, most notably in the left cerebellar   hemisphere. Metastatic lesion is also seen in the region of the mammillary   bodies. 3. HYDROCEPHALUS, with dilation of the lateral and 3rd ventricles. The   etiology is unclear. Findings may be due to mass effect on the distal   aqueduct from vasogenic edema in the left cerebellar hemisphere. 4.  The findings were submitted to the Radiology Results [address]   at 13:41 on 2/27/2021  to in be communicated to a licensed caregiver. XR CHEST PORTABLE   Final Result   No radiographic evidence of acute abnormality            ASSESSMENT/PLAN :  75 yo male  History of melanoma s/p resection of back and forehead   A fib on eliquis  Multiple new CNS lesions, consistent with metastasis   AMS    - MRI personally reviewed. Highly concerning for metastatic neoplastic process  - Case discussed with Dr. Yas Reynoso. We are in agreement for CNS biopsy to confirm pathology. Likely represents recurrent melanoma  - [facility] Radiation oncology consult placed  - Will check BRAF, KIT, NTRK, NRAS assuming melanoma confirmed  - Targeted therapy if mutation detected. If not, recommend front line IO/IO with ipi/nivo  - Cont steroids  - Will follow    Thank you for this consult.  Please call with further questions or concerns      Electronically signed by Lily Friend MD on 2/28/2021 at 12:28 PM

## 2021-03-01 NOTE — PROGRESS NOTES
Telephone consent with patient's wife, Petros Rosado, for surgical biopsy/ Craniotomy with Dr Moshe Armenta scheduled for 3/2/21.

## 2021-03-01 NOTE — PROGRESS NOTES
Blood and [address] [place] [address]  Hematology/Oncology  Consult        Patient Name: Dolly English  YOB: 1945  PCP: Mariah Pickard DO   Referring Provider: No address on file     Reason for Consultation: No chief complaint on file. Subjective: Oriented to self only. Remains in restraints and intermittently agitated and combative. History of Present Illness: This pt is a 77 yo male w/ a PMH of hypertension, diabetes, dementia, seizure disorder, BPH, depression, atrial fibrillation on anticoagulation with Eliquis, chronic kidney disease, melanoma removed from the back and forehead, chronic back pain, trigeminal neuralgia, CVA with residual unsteady gait and carotid artery disease who presented to [facility] from [facility] 2/25/21 after presenting with AMS and confusion with irrational behavior. In the ER at [facility] ([facility]), an MRI of the brain revealed a heterogeneous enhancing mass in the left cerebral hemisphere suspicious for neoplasm and several foci of extra-axial parasellar cisterns and subependymal enhancement in the left lateral ventricle suspicious for metastatic disease. From [address] [place] [address], CT scan of the chest showed emphysema and atelectasis, no masses or consolidation. CT scan of the abdomen and pelvis is negative for any acute findings    Currently patient remains confused and lethargic requiring restraints. CBC stable with mild leukocytosis likely from steroids. Oncology has been consulted for CUP with CNS mets, likely from prior melanoma.      Diagnostic Data:     Past Medical History:   Diagnosis Date    Atrial fibrillation [facility])     2018 pt states has a heart monitor, implantable    Carotid artery stenosis     History of cardiovascular stress test 11/13/13    lexiscan    Hyperlipidemia     Hypertension     Non-insulin dependent type 2 diabetes mellitus ([facility]     Unspecified cerebral artery occlusion with cerebral infarction nov 11 2013    right face numb balance problems vision fades in and out  walks with walker       Patient Active Problem List    Diagnosis Date Noted    Carotid stenosis 11/12/2013     Priority: High    CVA (cerebral vascular accident) 11/15/2013     Priority: Medium    Melanoma ([address] 02/26/2021    Hypokalemia 02/26/2021    Stage 3 chronic kidney disease 02/26/2021    COPD (chronic obstructive pulmonary disease) ([address] 02/26/2021    Constipation 02/26/2021    Renal cyst, left 02/26/2021    AMS (altered mental status) 02/26/2021    Dementia ([address] 02/26/2021    Seizure ([address] 02/26/2021    History of CVA (cerebrovascular accident) 02/26/2021    Atrial fibrillation ([address] 02/26/2021    Anticoagulated 02/26/2021    Brain metastasis ([address] 02/25/2021    Left cataract 11/03/2020    Leucocytosis 12/15/2013    Vitamin D deficiency 12/15/2013    Non-insulin dependent type 2 diabetes mellitus ([address]     Hypertension     Hyperlipidemia     Cerebral artery occlusion with cerebral infarction ([address] 11/11/2013        Past Surgical History:   Procedure Laterality Date    APPENDECTOMY      CAROTID ENDARTERECTOMY Left Dec 2013    Left Carotid Endarterectomy    ECHO COMPL W DOP COLOR FLOW  11/12/2013         INTRACAPSULAR CATARACT EXTRACTION Left 11/3/2020    LEFT EYE CATARACT EMULSIFICATION IOL IMPLANT performed by Lien Whaley MD at [facility]         Family History  Family History   Adopted: Yes       Social History    TOBACCO:   reports that he quit smoking about 20 months ago. His smoking use included cigarettes. He smoked 1.00 pack per day. He has never used smokeless tobacco.  ETOH:   reports current alcohol use of about 1.0 standard drinks of alcohol per week. Home Medications  Prior to Admission medications    Medication Sig Start Date End Date Taking?  Authorizing Provider   magnesium oxide (MAG-OX) 400 MG tablet Take 400 mg by mouth daily   Yes Historical Provider, MD   SITagliptin (JANUVIA) 50 MG tablet Take 50 mg by mouth daily   Yes Historical Provider, MD   insulin glargine (LANTUS) 100 UNIT/ML injection vial Inject 12 Units into the skin nightly   Yes Historical Provider, MD   dexamethasone (DECADRON) 4 MG tablet Take 6 mg by mouth daily   Yes Historical Provider, MD   divalproex (DEPAKOTE SPRINKLE) 125 MG capsule Take 125 mg by mouth 2 times daily   Yes Historical Provider, MD   melatonin 3 MG TABS tablet Take 5 mg by mouth nightly as needed   Yes Historical Provider, MD   NONFORMULARY Take 125 mcg by mouth 2 times daily tikosyn   Yes Historical Provider, MD   apixaban (ELIQUIS) 5 MG TABS tablet Take by mouth 2 times daily    Historical Provider, MD   hydrALAZINE (APRESOLINE) 50 MG tablet Take 25 mg by mouth 3 times daily     Historical Provider, MD   lamoTRIgine (LAMICTAL) 100 MG tablet Take 200 mg by mouth 2 times daily    Historical Provider, MD   metoprolol succinate (TOPROL XL) 50 MG extended release tablet Take 50 mg by mouth 2 times daily    Historical Provider, MD   potassium chloride (KLOR-CON M) 20 MEQ extended release tablet Take 20 mEq by mouth 2 times daily     Historical Provider, MD   glipiZIDE (GLUCOTROL) 5 MG tablet Take 10 mg by mouth 2 times daily    Historical Provider, MD   tamsulosin (FLOMAX) 0.4 MG capsule Take 0.4 mg by mouth nightly    Historical Provider, MD   gabapentin (NEURONTIN) 300 MG capsule Take 300 mg by mouth 2 times daily.     Historical Provider, MD   albuterol sulfate  (90 Base) MCG/ACT inhaler Inhale 2 puffs into the lungs every 6 hours as needed for Wheezing or Shortness of Breath    Historical Provider, MD   influenza virus trivalent vaccine (FLUZONE) injection Inject 0.5 mLs into the muscle once Given 10/2019    Historical Provider, MD   HYDROcodone-acetaminophen (NORCO) 5-325 MG per tablet Take 1 tablet by mouth every 6 hours as needed for Pain    Historical Provider, MD   cloNIDine (CATAPRES) 0.1 MG tablet Take 0.2 mg by mouth 2 times daily     Historical Provider, MD   metFORMIN (GLUCOPHAGE) 500 MG tablet Take 1,000 mg by mouth 2 times daily     Historical Provider, MD   spironolactone (ALDACTONE) 25 MG tablet Take 1 tablet by mouth 2 times daily. Patient taking differently: Take 25 mg by mouth daily  11/18/13   Mayra Quevedo DO, FACOI   lisinopril (PRINIVIL;ZESTRIL) 20 MG tablet Take 20 mg by mouth every 12 hours     Historical Provider, MD   amLODIPine (NORVASC) 10 MG tablet Take 10 mg by mouth daily. Historical Provider, MD   Multiple Vitamins-Minerals (THERAPEUTIC MULTIVITAMIN-MINERALS) tablet Take 1 tablet by mouth daily. Historical Provider, MD   aspirin 81 MG tablet Take 81 mg by mouth daily. Historical Provider, MD       Allergies  No Known Allergies    Review of Systems:    Confused and unable to provide      Objective  BP (!) 160/100   Pulse 110   Temp 95.9 °F (35.5 °C) (Temporal)   Resp 22   SpO2 96%     Physical Exam:   Performance Status:  General: Confused, requiring restraint  Head and neck : PERRLA, EOMI . Sclera non icteric. Oropharynx : Clear  Neck: no JVD,  no adenopathy  LYMPHATICS : No LAD  Heart: Regular rate and regular rhythm, no murmur  Lungs: Clear to auscultation   Extremities: No edema,no cyanosis, no clubbing.    Abdomen: Soft, non-tender;no masses, no organomegaly  Skin:  No rash  Neurologic:Confused and in restraints    Recent Laboratory Data-   Lab Results   Component Value Date    WBC 19.4 (H) 03/01/2021    HGB 14.2 03/01/2021    HCT 42.1 03/01/2021    MCV 82.5 03/01/2021     03/01/2021    LYMPHOPCT 3.6 (L) 03/01/2021    RBC 5.10 03/01/2021    MCH 27.8 03/01/2021    MCHC 33.7 03/01/2021    RDW 15.3 (H) 03/01/2021    NEUTOPHILPCT 91.1 (H) 03/01/2021    MONOPCT 2.5 03/01/2021    BASOPCT 0.3 03/01/2021    NEUTROABS 17.65 (H) 03/01/2021    LYMPHSABS 0.70 (L) 03/01/2021    MONOSABS 0.49 03/01/2021    EOSABS 0.00 (L) 03/01/2021    BASOSABS 0.05 03/01/2021       Lab Results   Component Value Date     03/01/2021    K 3.4 (L) 03/01/2021 CL 99 03/01/2021    CO2 26 03/01/2021    BUN 19 03/01/2021    CREATININE 0.9 03/01/2021    GLUCOSE 232 (H) 03/01/2021    CALCIUM 9.1 03/01/2021    PROT 7.5 01/31/2020    LABALBU 4.5 01/31/2020    BILITOT 0.5 01/31/2020    ALKPHOS 70 01/31/2020    AST 49 (H) 01/31/2020    ALT 41 (H) 01/31/2020    LABGLOM >60 03/01/2021    GFRAA >60 03/01/2021       No results found for: IRON, TIBC, FERRITIN        Radiology-    MRI C/ [address]   Final Result   1. Somewhat limited evaluation due to patient motion artifact, especially on   the postcontrast enhanced images. 2. Multiple intracranial METASTASES, most notably in the left cerebellar   hemisphere. Metastatic lesion is also seen in the region of the mammillary   bodies. 3. HYDROCEPHALUS, with dilation of the lateral and 3rd ventricles. The   etiology is unclear. Findings may be due to mass effect on the distal   aqueduct from vasogenic edema in the left cerebellar hemisphere. 4.  The findings were submitted to the Radiology Results [address]   at 13:41 on 2/27/2021  to in be communicated to a licensed caregiver. XR CHEST PORTABLE   Final Result   No radiographic evidence of acute abnormality      CT HEAD W CONTRAST    (Results Pending)         ASSESSMENT/PLAN :  75 yo male  History of melanoma s/p resection of back and forehead   A fib on eliquis  Multiple new CNS lesions, consistent with metastasis   AMS    - MRI personally reviewed. Highly concerning for metastatic neoplastic process  - Case discussed with Dr. Andres Suazo. We are in agreement for CNS biopsy to confirm pathology. Likely represents recurrent melanoma  - Likely will benefit from XRT [facility]). Radiation oncology consult placed  - Will check BRAF, KIT, NTRK, NRAS assuming melanoma confirmed  - Targeted therapy if mutation detected.  If not, recommend front line IO/IO with ipi/nivo  - Cont steroids  - Will follow      3/1/21  - Patient to have a head CT with contrast today  - CNS biopsy tomorrow to confirm pathology which likely represents recurrent melanoma  - Likely will benefit from XRT . Radiation oncology consult placed  -If melanoma confirmed will check BRAF, KIT, NTRK, NRAS and targeted therapy if mutation detected. -If no mutation detected, recommend front line IO/IO with ipi/nivo  -Continue steroids    Thank you for this consult. Please call with further questions or concerns      Electronically signed by RUIZ Marte CNP on 3/1/2021 at 3:13 PM   Patient seen and examined. Note edited to reflect above.   Ron Frank MD

## 2021-03-01 NOTE — PROGRESS NOTES
Pt has been agitated all day with kicking, grabbing/squeezing, trying to bite, and spitting out food/meds, intermittently. One minute he is calm the next he is verbally abusive. There are no injuries noted on BUE/BLE. He is resting comfortably now, however, still confused and wants out of the restraints to go home.

## 2021-03-01 NOTE — PROGRESS NOTES
I sent a message to Dr. Daisy Ruby regarding his BP running all day. Last manual was 214/76. Is he appropriate for this med surge floor?

## 2021-03-01 NOTE — CONSULTS
without contrast, showed only atrophy and periventricular leukomalacia. Past Medical History:      Diagnosis Date    Atrial fibrillation ([facility]     2018 pt states has a heart monitor, implantable    Carotid artery stenosis     History of cardiovascular stress test 11/13/13    lexiscan    Hyperlipidemia     Hypertension     Non-insulin dependent type 2 diabetes mellitus ([facility] [address]     Unspecified cerebral artery occlusion with cerebral infarction nov 11 2013    right face numb balance problems vision fades in and out  walks with walker       Past Surgical History:      Procedure Laterality Date    APPENDECTOMY      CAROTID ENDARTERECTOMY Left Dec 2013    Left Carotid Endarterectomy    ECHO COMPL W DOP COLOR FLOW  11/12/2013         INTRACAPSULAR CATARACT EXTRACTION Left 11/3/2020    LEFT EYE CATARACT EMULSIFICATION IOL IMPLANT performed by Jayy Prince MD at [facility]         No Known Allergies    Medications:  Medications reviewed and reconciled.   Current Facility-Administered Medications   Medication Dose Route Frequency Provider Last Rate Last Admin    hydrALAZINE (APRESOLINE) injection 20 mg  20 mg Intravenous Q6H RUIZ Chamorro - CNP   Stopped at 03/01/21 0900    metoprolol (LOPRESSOR) injection 5 mg  5 mg Intravenous Q4H RUIZ Chamorro - CNP   5 mg at 03/01/21 1256    ceFAZolin (ANCEF) 1,000 mg in sterile water 10 mL IV syringe  1,000 mg Intravenous 60 Min Pre-Op Dave Rivera MD        LORazepam (ATIVAN) injection 0.5 mg  0.5 mg Intravenous Q4H PRN RUIZ Chamorro CNP        insulin lispro (HUMALOG) injection vial 0-18 Units  0-18 Units Subcutaneous TID WC RUIZ Chamorro CNP        insulin lispro (HUMALOG) injection vial 0-9 Units  0-9 Units Subcutaneous Nightly RUIZ Chamorro CNP        dextrose 5 % and 0.45 % NaCl with KCl 20 mEq infusion   Intravenous Continuous RUIZ Chamorro CNP 50 mL/hr at 03/01/21 1329 New Bag at 03/01/21 1329    levetiracetam (KEPPRA) 500 mg/100 mL IVPB  500 mg Intravenous Q12H Dub RUIZ Rico CNP   Stopped at 03/01/21 1315    dexamethasone (DECADRON) injection 2 mg  2 mg Intravenous Q8H Chandu Lloyd MD   2 mg at 03/01/21 1256    pantoprazole (PROTONIX) injection 20 mg  20 mg Intravenous Daily Chandu Lloyd MD   20 mg at 03/01/21 0818    And    sodium chloride (PF) 0.9 % injection 10 mL  10 mL Intravenous Daily Chandu Lloyd MD   10 mL at 03/01/21 0818    [Held by provider] hydrALAZINE (APRESOLINE) tablet 50 mg  50 mg Oral TID RUIZ Kwong CNP   50 mg at 02/28/21 1956    albuterol (PROVENTIL) nebulizer solution 2.5 mg  2.5 mg Nebulization Q4H PRN Edmund Dorsey MD        amLODIPine (NORVASC) tablet 10 mg  10 mg Oral Daily Edmund Dorsey MD   10 mg at 02/27/21 0853    cloNIDine (CATAPRES) tablet 0.2 mg  0.2 mg Oral BID Edmund Dorsey MD   0.2 mg at 02/28/21 1956    divalproex (DEPAKOTE SPRINKLE) capsule 125 mg  125 mg Oral BID Emdund Dorsey MD   125 mg at 02/28/21 1956    gabapentin (NEURONTIN) capsule 300 mg  300 mg Oral BID Edmund Dorsey MD   300 mg at 02/27/21 2230    HYDROcodone-acetaminophen (NORCO) 5-325 MG per tablet 1 tablet  1 tablet Oral Q6H PRN Edmund Dorsey MD   1 tablet at 02/26/21 2003    insulin glargine (LANTUS) injection vial 12 Units  12 Units Subcutaneous Nightly Edmund Dorsey MD   Stopped at 02/27/21 2238    lamoTRIgine (LAMICTAL) tablet 200 mg  200 mg Oral BID Edmund Dorsey MD   200 mg at 02/28/21 1956    magnesium oxide (MAG-OX) tablet 400 mg  400 mg Oral Daily Edmund Dorsey MD   400 mg at 02/27/21 0854    [Held by provider] metoprolol succinate (TOPROL XL) extended release tablet 50 mg  50 mg Oral BID Edmund Dorsey MD   50 mg at 02/27/21 2245    therapeutic multivitamin-minerals 1 tablet  1 tablet Oral Daily Edmund Dorsey MD   1 tablet at 02/27/21 0865    alogliptin (NESINA) tablet 6.25 mg  6.25 mg Oral Daily Elisabet Kearney MD   6.25 mg at 02/27/21 9701    spironolactone (ALDACTONE) tablet 25 mg  25 mg Oral Daily Elisabet Kearney MD   25 mg at 02/26/21 0827    tamsulosin (FLOMAX) capsule 0.4 mg  0.4 mg Oral Nightly Elisabet Kearney MD   0.4 mg at 02/27/21 2230    dofetilide (TIKOSYN) capsule 125 mcg  125 mcg Oral 2 times per day Elisabet Kearney MD   125 mcg at 02/28/21 1956    glucose (GLUTOSE) 40 % oral gel 15 g  15 g Oral PRN Elisabet Kearney MD        dextrose 50 % IV solution  12.5 g Intravenous PRN Elisabet Kearney MD        glucagon (rDNA) injection 1 mg  1 mg Intramuscular PRN Elisabet Kearney MD   1 mg at 02/27/21 1630    dextrose 5 % solution  100 mL/hr Intravenous PRN Elisabet Kearney  mL/hr at 02/27/21 1656 100 mL/hr at 02/27/21 1656    sodium chloride flush 0.9 % injection 10 mL  10 mL Intravenous 2 times per day Elisabet Kearney MD   10 mL at 03/01/21 1257    sodium chloride flush 0.9 % injection 10 mL  10 mL Intravenous PRN Elisabet Kearney MD   10 mL at 03/01/21 1330    polyethylene glycol (GLYCOLAX) packet 17 g  17 g Oral Daily PRN Elisabet Kearney MD        acetaminophen (TYLENOL) tablet 650 mg  650 mg Oral Q6H PRN Elisabet Kearney MD        Or    acetaminophen (TYLENOL) suppository 650 mg  650 mg Rectal Q6H PRN Elisabet Kearney MD   650 mg at 02/28/21 1218    lisinopril (PRINIVIL;ZESTRIL) tablet 20 mg  20 mg Oral Daily Elisabet Kearney MD   20 mg at 02/27/21 0854    melatonin disintegrating tablet 5 mg  5 mg Oral Nightly PRN Elisabet Kearney MD        docusate sodium (COLACE) capsule 100 mg  100 mg Oral BID Elisabet Kearney MD   100 mg at 02/27/21 2229    hydrALAZINE (APRESOLINE) injection 10 mg  10 mg Intravenous Q6H PRN RUIZ Bhakta - CNP   10 mg at 02/28/21 1749    labetalol (NORMODYNE;TRANDATE) injection 10 mg  10 mg Intravenous Q4H PRN RUIZ Bhakta CNP   10 mg at 03/01/21 0818       Family History:  Family History   Adopted: Yes       Social History:       reports that he quit smoking about 20 months ago. His smoking use included cigarettes. He smoked 1.00 pack per day. He has never used smokeless tobacco..   reports current alcohol use of about 1.0 standard drinks of alcohol per week. .   reports no history of drug use. Review of Systems:  Obtained from the patient, chart review and nursing assessment. (Not contributory/not evaluable)     Constitutional:  No fever, chills or night sweats. Denies recent weight loss.  Eyes:  No blurred or changes in vision. Denies discharge or pain.  ENT:  No headaches, hearing loss or vertigo. No mouth sores or sore throat. No change in taste or smell.  Cardiovascular:  No chest discomfort, dyspnea on exertion or palpitations.  Respiratory: Has no cough or wheezing. Has no sputum production or hemoptysis. Has no pleuritic pain.  Gastrointestinal: No abdominal pain, appetite loss or nausea. No change in bowel habits. No hematochezia or melena.  Genitourinary: Patient acknowledges no dysuria, trouble voiding, urgency or hematuria. No nocturia or increased frequency.  Musculoskeletal: No gait disturbance, weakness or joint complaints.  Integumentary: No rash or pruritis.  Neurological: No headache, diplopia, syncope, change in muscle strength, numbness or tingling. No change in gait, balance, coordination, mood, affect, memory, mentation, behavior.  Psychiatric: No anxiety, or depression.  Endocrine: No temperature intolerance. No excessive thirst, fluid intake, or urination. No tremor.  Hematologic/Lymphatic: No abnormal bruising or bleeding, blood clots or swollen lymph nodes.  Allergic/Immunologic: No nasal congestion or hives.         Physical examination:   Vitals:    03/01/21 0545 03/01/21 0730 03/01/21 1245 03/01/21 1531   BP: (!) 140/88 (!) 200/102 (!) 160/100 (!) 180/107   Pulse: 109 102 110 133   Resp: 18 18 22 18   Temp: 98.4 °F (36.9 °C) 97.5 °F (36.4 °C) recommendations. Procedures and process involved with CT simulation and daily radiation treatments were explained. Toxicities, both expected and less common, were reviewed in detail. Questions were answered to their apparent satisfaction. Thank you,  for allowing us to participate in the care of your patient.      Melodie Rojas MD  [place] [id number] Oncology    [place]:  [phone]              FAX: [phone]    [place]:      [phone]             FAX:  [phone]      CC:  Selwyn Hartshorn Potocki, DO , [place]  No address on file

## 2021-03-01 NOTE — PROGRESS NOTES
reports that he has developed a rash since starting haldol- will discontinue and change to Ativan. Records reviewed. Temp (24hrs), Av.8 °F (36.6 °C), Min:95.9 °F (35.5 °C), Max:98.5 °F (36.9 °C)    DIET: Diet NPO Effective Now Exceptions are: Sips of Water with Meds  Diet NPO, After Midnight Exceptions are: Sips of Water with Meds  CODE: Full Code    Intake/Output Summary (Last 24 hours) at 3/1/2021 1315  Last data filed at 3/1/2021 1304  Gross per 24 hour   Intake 2174.05 ml   Output 825 ml   Net 1349.05 ml       Review of Systems  Negative unless otherwise mentioned in HPI      OBJECTIVE:    BP (!) 160/100   Pulse 110   Temp 95.9 °F (35.5 °C) (Temporal)   Resp 22   SpO2 96%     General appearance: Lethargic, face flushed, no apparent distress no labored breathing  HEENT:  Conjunctivae/corneas clear. Neck: Supple. No jugular venous distention. Respiratory: symmetrical; clear to auscultation bilaterally; no wheezes; no rhonchi; no rales  Cardiovascular: rhythm regular; rate controlled; no murmurs  Abdomen: Soft, nontender, nondistended  Extremities:  peripheral pulses present; no peripheral edema; no ulcers  Musculoskeletal: No clubbing, cyanosis, no bilateral lower extremity edema. Brisk capillary refill.    Skin:  No rashes  on visible skin  Neurologic: Lethargic    Medications:  REVIEWED DAILY    Infusion Medications    dextrose 5% and 0.45% NaCl with KCl 20 mEq 50 mL/hr at 21 2351    dextrose 100 mL/hr (21 1656)     Scheduled Medications    hydrALAZINE  20 mg Intravenous Q6H    metoprolol  5 mg Intravenous Q4H    ceFAZolin  1,000 mg Intravenous 60 Min Pre-Op    levetiracetam  500 mg Intravenous Q12H    dexamethasone  2 mg Intravenous Q8H    pantoprazole  20 mg Intravenous Daily    And    sodium chloride (PF)  10 mL Intravenous Daily    [Held by provider] hydrALAZINE  50 mg Oral TID    amLODIPine  10 mg Oral Daily    cloNIDine  0.2 mg Oral BID    divalproex  125 mg Oral BID    gabapentin  300 mg Oral BID    insulin glargine  12 Units Subcutaneous Nightly    lamoTRIgine  200 mg Oral BID    magnesium oxide  400 mg Oral Daily    [Held by provider] metoprolol succinate  50 mg Oral BID    therapeutic multivitamin-minerals  1 tablet Oral Daily    alogliptin  6.25 mg Oral Daily    spironolactone  25 mg Oral Daily    tamsulosin  0.4 mg Oral Nightly    dofetilide  125 mcg Oral 2 times per day    insulin lispro  0-10 Units Subcutaneous 4x Daily WC    sodium chloride flush  10 mL Intravenous 2 times per day    lisinopril  20 mg Oral Daily    docusate sodium  100 mg Oral BID     PRN Meds: LORazepam, albuterol, HYDROcodone-acetaminophen, glucose, dextrose, glucagon (rDNA), dextrose, sodium chloride flush, polyethylene glycol, acetaminophen **OR** acetaminophen, melatonin, hydrALAZINE, labetalol    Labs:     Recent Labs     02/27/21  0633 02/28/21  0502 03/01/21  1054   WBC 15.0* 14.5* 19.4*   HGB 12.1* 12.0* 14.2   HCT 36.1* 36.7* 42.1    356 392       Recent Labs     02/28/21  0502 02/28/21  2317 03/01/21  1054    142 139   K 2.9* 4.4 3.4*    105 99   CO2 30* 22 26   BUN 26* 19 19   CREATININE 1.2 1.0 0.9   CALCIUM 8.2* 9.2 9.1       No results for input(s): PROT, ALB, ALKPHOS, ALT, AST, BILITOT, AMYLASE, LIPASE in the last 72 hours. No results for input(s): INR in the last 72 hours. No results for input(s): [employer] in the last 72 hours. Chronic labs:    Lab Results   Component Value Date    CHOL 78 12/13/2013    TRIG 40 12/13/2013    HDL 28.0 (A) 12/13/2013    LDLCALC 42 12/13/2013    TSH 0.692 12/13/2013    INR 1.2 02/26/2021    LABA1C 6.6 (H) 02/26/2021       Radiology: REVIEWED DAILY      ASSESSMENT and PLAN:    1. Brain tumor with suspected metastatic disease- may be related to prior melanoma. Neurosurgery following- MRI obtained 2/27/21 confirms metastases. Oncology and palliative consults placed. Holding aspirin and apixaban. however, inadvertent computerized transcription errors may be present.

## 2021-03-01 NOTE — PLAN OF CARE
Problem: Falls - Risk of:  Goal: Will remain free from falls  Description: Will remain free from falls  Outcome: Met This Shift  Goal: Absence of physical injury  Description: Absence of physical injury  Outcome: Met This Shift     Problem: Skin Integrity:  Goal: Will show no infection signs and symptoms  Description: Will show no infection signs and symptoms  Outcome: Met This Shift  Goal: Absence of new skin breakdown  Description: Absence of new skin breakdown  Outcome: Met This Shift     Problem: Pain:  Goal: Pain level will decrease  Description: Pain level will decrease  Outcome: Met This Shift  Goal: Control of acute pain  Description: Control of acute pain  Outcome: Met This Shift  Goal: Control of chronic pain  Description: Control of chronic pain  Outcome: Met This Shift     Problem: Non-Violent Restraints  Goal: No harm/injury to patient while restraints in use  3/1/2021 0006 by Priyanka Sofia RN  Outcome: Met This Shift  2/28/2021 1049 by Louise Cueto RN  Outcome: Met This Shift  Goal: Patient's dignity will be maintained  3/1/2021 0006 by Priyanka Sofia RN  Outcome: Met This Shift  2/28/2021 1049 by Louise Cueto RN  Outcome: Met This Shift     Problem: Non-Violent Restraints  Goal: Removal from restraints as soon as assessed to be safe  3/1/2021 0006 by Priyanka Sofia RN  Outcome: Ongoing  2/28/2021 1049 by Louise Cueto RN  Outcome: Not Met This Shift

## 2021-03-01 NOTE — PLAN OF CARE
Problem: Falls - Risk of:  Goal: Will remain free from falls  Description: Will remain free from falls  3/1/2021 0218 by Alma Kaufman RN  Outcome: Met This Shift  3/1/2021 0006 by Grace Pena RN  Outcome: Met This Shift  Goal: Absence of physical injury  Description: Absence of physical injury  3/1/2021 0006 by Grace Pena RN  Outcome: Met This Shift     Problem: Skin Integrity:  Goal: Will show no infection signs and symptoms  Description: Will show no infection signs and symptoms  3/1/2021 0006 by Grace Pena RN  Outcome: Met This Shift  Goal: Absence of new skin breakdown  Description: Absence of new skin breakdown  3/1/2021 0006 by Grace Pena RN  Outcome: Met This Shift     Problem: Pain:  Goal: Pain level will decrease  Description: Pain level will decrease  3/1/2021 0006 by Grace Pena RN  Outcome: Met This Shift  Goal: Control of acute pain  Description: Control of acute pain  3/1/2021 0006 by Grace Pena RN  Outcome: Met This Shift  Goal: Control of chronic pain  Description: Control of chronic pain  3/1/2021 0006 by Grace Pena RN  Outcome: Met This Shift     Problem: Non-Violent Restraints  Goal: No harm/injury to patient while restraints in use  3/1/2021 0218 by Alma Kaufman RN  Outcome: Met This Shift  3/1/2021 0006 by Grace Pena RN  Outcome: Met This Shift  Goal: Patient's dignity will be maintained  3/1/2021 0218 by Alma Kaufman RN  Outcome: Met This Shift  3/1/2021 0006 by Grace Pena RN  Outcome: Met This Shift

## 2021-03-01 NOTE — CONSULTS
Radiation oncology    3/1/2021    Patient admitted with confusion and other mental status changes. MRI reviewed. History of melanoma. Likely brain metastasis. Plan: Neurosurgery involved, tissue biopsy pending. I will consult the patient later today, post biopsy we will offer palliative WBRT. The mets are too numerous and scattered for SRS.     Caleb Sawant MD

## 2021-03-02 NOTE — PROGRESS NOTES
Restraints released for patient care,pt continues to grab at iv tubing and swinging at nurses,4 point soft restraints continued will monitor patient

## 2021-03-02 NOTE — PROGRESS NOTES
Spoke with Dr. Carmina Ruby regarding new consult & cardiac clearance for OR. Updated her on patient's afib now sustaining NSR with cardizem gtt running. Patient can go to surgery, but she may not seem him until later due to a surgical case. Per Dr. Damien whitaker gtt can continue running. Dr. Olga Valdovinos notified.

## 2021-03-02 NOTE — PROGRESS NOTES
Dr Angela Desouza wants called  once dr Joanna Jean Baptiste sees patient,surgery was planned for today

## 2021-03-02 NOTE — ANESTHESIA PRE PROCEDURE
gabapentin (NEURONTIN) 300 MG capsule Take 300 mg by mouth 2 times daily. Historical Provider, MD   albuterol sulfate  (90 Base) MCG/ACT inhaler Inhale 2 puffs into the lungs every 6 hours as needed for Wheezing or Shortness of Breath    Historical Provider, MD   influenza virus trivalent vaccine (FLUZONE) injection Inject 0.5 mLs into the muscle once Given 10/2019    Historical Provider, MD   HYDROcodone-acetaminophen (NORCO) 5-325 MG per tablet Take 1 tablet by mouth every 6 hours as needed for Pain    Historical Provider, MD   cloNIDine (CATAPRES) 0.1 MG tablet Take 0.2 mg by mouth 2 times daily     Historical Provider, MD   metFORMIN (GLUCOPHAGE) 500 MG tablet Take 1,000 mg by mouth 2 times daily     Historical Provider, MD   spironolactone (ALDACTONE) 25 MG tablet Take 1 tablet by mouth 2 times daily. Patient taking differently: Take 25 mg by mouth daily  11/18/13   Slim Hercules DO, FACOI   lisinopril (PRINIVIL;ZESTRIL) 20 MG tablet Take 20 mg by mouth every 12 hours     Historical Provider, MD   amLODIPine (NORVASC) 10 MG tablet Take 10 mg by mouth daily. Historical Provider, MD   Multiple Vitamins-Minerals (THERAPEUTIC MULTIVITAMIN-MINERALS) tablet Take 1 tablet by mouth daily. Historical Provider, MD   aspirin 81 MG tablet Take 81 mg by mouth daily.     Historical Provider, MD       Current medications:    Current Facility-Administered Medications   Medication Dose Route Frequency Provider Last Rate Last Admin    hydrALAZINE (APRESOLINE) injection 20 mg  20 mg Intravenous Q6H Caron RUIZ Barney - CNP   20 mg at 03/02/21 0534    metoprolol (LOPRESSOR) injection 5 mg  5 mg Intravenous Q4H Sunshine [place], APRN - CNP   5 mg at 03/02/21 0534    ceFAZolin (ANCEF) 1,000 mg in sterile water 10 mL IV syringe  1,000 mg Intravenous 60 Min Pre-Op Kyleigh Hathaway MD        LORazepam (ATIVAN) injection 0.5 mg  0.5 mg Intravenous Q4H PRN [facility], APRN - CNP  lamoTRIgine (LAMICTAL) tablet 200 mg  200 mg Oral BID Lola Martini MD   200 mg at 02/28/21 1956    magnesium oxide (MAG-OX) tablet 400 mg  400 mg Oral Daily Lola Martini MD   400 mg at 02/27/21 0854    [Held by provider] metoprolol succinate (TOPROL XL) extended release tablet 50 mg  50 mg Oral BID Lola Martini MD   50 mg at 02/27/21 2245    therapeutic multivitamin-minerals 1 tablet  1 tablet Oral Daily Lola Martini MD   1 tablet at 02/27/21 0854    alogliptin (NESINA) tablet 6.25 mg  6.25 mg Oral Daily Lola Martini MD   6.25 mg at 02/27/21 9829    spironolactone (ALDACTONE) tablet 25 mg  25 mg Oral Daily Lola Martini MD   25 mg at 02/26/21 0827    tamsulosin (FLOMAX) capsule 0.4 mg  0.4 mg Oral Nightly Lola Martini MD   0.4 mg at 03/01/21 2055    dofetilide (TIKOSYN) capsule 125 mcg  125 mcg Oral 2 times per day Lola Martini MD   125 mcg at 02/28/21 1956    glucose (GLUTOSE) 40 % oral gel 15 g  15 g Oral PRN Lola Martini MD        dextrose 50 % IV solution  12.5 g Intravenous PRN Lola Martini MD        glucagon (rDNA) injection 1 mg  1 mg Intramuscular PRN Lola Martini MD   1 mg at 02/27/21 1630    dextrose 5 % solution  100 mL/hr Intravenous PRN Lola Martini  mL/hr at 02/27/21 1656 100 mL/hr at 02/27/21 1656    sodium chloride flush 0.9 % injection 10 mL  10 mL Intravenous 2 times per day Lola Martini MD   10 mL at 03/01/21 1257    sodium chloride flush 0.9 % injection 10 mL  10 mL Intravenous PRN Lola Martini MD   10 mL at 03/01/21 1815    polyethylene glycol (GLYCOLAX) packet 17 g  17 g Oral Daily PRN Lola Martini MD        acetaminophen (TYLENOL) tablet 650 mg  650 mg Oral Q6H PRN Lola Martini MD        Or    acetaminophen (TYLENOL) suppository 650 mg  650 mg Rectal Q6H PRN Lola Martini MD   650 mg at 02/28/21 22 [id number]  lisinopril (PRINIVIL;ZESTRIL) tablet 20 mg  20 mg Oral Daily Maisha Jamison MD   20 mg at 02/27/21 0854    docusate sodium (COLACE) capsule 100 mg  100 mg Oral BID Maisha Jamison MD   100 mg at 02/27/21 2229    hydrALAZINE (APRESOLINE) injection 10 mg  10 mg Intravenous Q6H PRN Meseret Clackamas, APRN - CNP   10 mg at 02/28/21 1749    labetalol (NORMODYNE;TRANDATE) injection 10 mg  10 mg Intravenous Q4H PRN Meseret [place], APRN - CNP   10 mg at 03/01/21 9698       Allergies:  No Known Allergies    Problem List:    Patient Active Problem List   Diagnosis Code    Carotid stenosis I65.29    CVA (cerebral vascular accident) I63.9    Hypertension I10    Cerebral artery occlusion with cerebral infarction ([address] I63.50    Hyperlipidemia E78.5    Leucocytosis D72.829    Vitamin D deficiency E55.9    Non-insulin dependent type 2 diabetes mellitus ([address] E11.9    Left cataract H26.9    Brain metastasis ([address] C79.31    Melanoma ([address] C43.9    Hypokalemia E87.6    Stage 3 chronic kidney disease N18.30    COPD (chronic obstructive pulmonary disease) ([address] J44.9    Constipation K59.00    Renal cyst, left N28.1    AMS (altered mental status) R41.82    Dementia ([address] F03.90    Seizure ([address] R56.9    History of CVA (cerebrovascular accident) Z80.78    Atrial fibrillation ([address] I48.91    Anticoagulated Z79.01       Past Medical History:        Diagnosis Date    Atrial fibrillation ([address]     2018 pt states has a heart monitor, implantable    Carotid artery stenosis     History of cardiovascular stress test 11/13/13    lexiscan    Hyperlipidemia     Hypertension     Non-insulin dependent type 2 diabetes mellitus ([address]     Unspecified cerebral artery occlusion with cerebral infarction nov 11 2013    right face numb balance problems vision fades in and out  walks with walker       Past Surgical History:        Procedure Laterality Date    APPENDECTOMY      CAROTID ENDARTERECTOMY Left Dec 2013 Left Carotid Endarterectomy    ECHO COMPL W DOP COLOR FLOW  2013         INTRACAPSULAR CATARACT EXTRACTION Left 11/3/2020    LEFT EYE CATARACT EMULSIFICATION IOL IMPLANT performed by Dion Levy MD at [facility]         Social History:    Social History     Tobacco Use    Smoking status: Former Smoker     Packs/day: 1.00     Types: Cigarettes     Quit date: 2019     Years since quittin.6    Smokeless tobacco: Never Used   Substance Use Topics    Alcohol use: Yes     Alcohol/week: 1.0 standard drinks     Types: 1 Cans of beer per week     Comment: occ                                Counseling given: Not Answered      Vital Signs (Current):   Vitals:    21 0023 21 0230 21 0430 21 0630   BP: (!) 182/88 (!) 151/71 (!) 146/99 (!) 167/77   Pulse: 123 124 130 94   Resp: 16 16 16 16   Temp: 96.8 °F (36 °C) 97.4 °F (36.3 °C) 97.6 °F (36.4 °C) 97 °F (36.1 °C)   TempSrc: Temporal Temporal Temporal Temporal   SpO2: 97% 94% 96% 92%                                              BP Readings from Last 3 Encounters:   21 (!) 167/77   21 (!) 173/98   20 (!) 172/70       NPO Status: Time of last liquid consumption: >8. H                        Time of last solid consumption:                         Date of last liquid consumption: 21                        Date of last solid food consumption: 21    BMI:   Wt Readings from Last 3 Encounters:   21 149 lb (67.6 kg)   20 149 lb (67.6 kg)   20 150 lb (68 kg)     There is no height or weight on file to calculate BMI.    CBC:   Lab Results   Component Value Date    WBC 19.4 2021    RBC 5.10 2021    HGB 14.2 2021    HCT 42.1 2021    MCV 82.5 2021    RDW 15.3 2021     2021       CMP:   Lab Results   Component Value Date     2021    K 3.4 2021    CL 99 2021    CO2 26 2021 BUN 19 03/01/2021    CREATININE 0.9 03/01/2021    GFRAA >60 03/01/2021    LABGLOM >60 03/01/2021    GLUCOSE 232 03/01/2021    PROT 7.5 01/31/2020    CALCIUM 9.1 03/01/2021    BILITOT 0.5 01/31/2020    ALKPHOS 70 01/31/2020    AST 49 01/31/2020    ALT 41 01/31/2020       POC Tests: No results for input(s): POCGLU, POCNA, POCK, POCCL, POCBUN, POCHEMO, POCHCT in the last 72 hours. Coags:   Lab Results   Component Value Date    PROTIME 13.1 02/26/2021    INR 1.2 02/26/2021    APTT 33.2 12/11/2013       HCG (If Applicable): No results found for: PREGTESTUR, PREGSERUM, HCG, HCGQUANT     ABGs: No results found for: PHART, PO2ART, SLF7SNA, NCH6OJC, BEART, R4NCCBYX     Type & Screen (If Applicable):  No results found for: LABABO, LABRH    Drug/Infectious Status (If Applicable):  No results found for: HIV, HEPCAB    COVID-19 Screening (If Applicable):   Lab Results   Component Value Date    COVID19 Not Detected 02/26/2021    COVID19 Not Detected 10/28/2020         Anesthesia Evaluation  Patient summary reviewed no history of anesthetic complications:   Airway: Mallampati: III  TM distance: >3 FB   Neck ROM: full  Mouth opening: > = 3 FB Dental: normal exam         Pulmonary: breath sounds clear to auscultation  (+) COPD:      (-) not a current smoker (ex 1 ppd smoker)                           Cardiovascular:  Exercise tolerance: good (>4 METS),   (+) hypertension:, dysrhythmias: atrial fibrillation, hyperlipidemia      ECG reviewed  Rhythm: irregular  Rate: normal    Stress test reviewed             ROS comment: EKG 1/2020: NSR    Stress Test 2013:  Impression-  The ejection fraction is 67%. Evaluation of left ventricular contractility following the stress   portion of the examination reveals no global or segmental abnormality   of kinesis.  The left ventricle is not dilated.       Impression- No significant abnormality seen     Echo 2013:   Summary   Left ventricular size is grossly normal. Borderline left ventricular concentric hypertrophy noted. Ejection fraction is visually estimated at 76%. No evidence of left ventricular mass or thrombus noted. No regional wall motion abnormalities seen. The left atrium is mildly dilated. Interatrial septum appears intact. No evidence of thrombus within left atrium. No evidence of mass within left atrium. No evidence of atrial septal defect. Physiologic and/or trace mitral regurgitation is present. No evidence of mitral valve stenosis. Physiologic and/or trace tricuspid regurgitation. RVSP is 23.48 mmHg. Regular rhythm. PE comment: bradycardia   Neuro/Psych:   (+) seizures:, CVA:,              ROS comment: Melanoma with possible brain mets    Lack of motor in legs with several falls GI/Hepatic/Renal:   (+) renal disease (CKD stage III): CRI,          ROS comment: BPH. Endo/Other:    (+) DiabetesType II DM, , blood dyscrasia: anticoagulation therapy:., .                 Abdominal:         (-) obese     Vascular:   + PVD, aortic or cerebral (Left carotid stenosis s/p Left carotid endarterectomy), . Anesthesia Plan      general and TIVA     ASA 4       Induction: intravenous. MIPS: Postoperative opioids intended, Prophylactic antiemetics administered and Postoperative trial extubation. Anesthetic plan and risks discussed with patient. Plan discussed with CRNA.                 Nelly Shah DO   3/2/2021

## 2021-03-02 NOTE — CONSULTS
[facility] CONSULT NOTE    Reason for Consult: Worsening leukocytosis   Requested by: Dr. Aguila Medico     Chief complaint: Confusion    History Obtained From: EMR and patient     HISTORY OFPRESENT ILLNESS              The patient is a 76 y.o. male with history of DM, hypertension, atrial fibrillation, hyperlipidemia, stroke, dementia, seizure disorder, melanoma status post excision from back and forehead 7 years ago, transferred to [facility] on 02/25 from [facility] where he initially presented with confusion and abnormal behavior for 3 weeks, found to have MRI of the brain showing multiple intracranial metastasis most notably in the left cerebellar hemisphere, hydrocephalus with dilation of the lateral and third ventricles. On admission, he was afebrile and hemodynamically stable with leukocytosis of 14,000. Urinalysis showed no pyuria. SARS-CoV-2 PCR was negative. Chest x-ray was unremarkable. 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 Dexamethasone has been started since transfer. Leukocytosis increased to 24,000 on 03/02. ID service was subsequently consulted for further recommendations.     Past Medical History  Past Medical History:   Diagnosis Date    Atrial fibrillation ([address]     2018 pt states has a heart monitor, implantable    Carotid artery stenosis     History of cardiovascular stress test 11/13/13    lexiscan    Hyperlipidemia     Hypertension     Non-insulin dependent type 2 diabetes mellitus ([address]     Unspecified cerebral artery occlusion with cerebral infarction nov 11 2013    right face numb balance problems vision fades in and out  walks with walker       Current Facility-Administered Medications   Medication Dose Route Frequency Provider Last Rate Last Admin    dilTIAZem 100 mg in dextrose 5 % 100 mL infusion ([facility])  5-15 mg/hr Intravenous Continuous Aubrie Craven MD 5 mL/hr at 03/02/21 0905 5 mg/hr at 03/02/21 0905    dextrose 5 % and 0.45 % sodium chloride infusion   Intravenous Continuous Hodan Zimmerman MD        dexamethasone (DECADRON) injection 10 mg  10 mg Intravenous Q6H Dave Jaeger MD        ceFAZolin (ANCEF) 1,000 mg in sterile water 10 mL IV syringe  1,000 mg Intravenous Q8H Dave Jaeger MD        hydrALAZINE (APRESOLINE) injection 20 mg  20 mg Intravenous Q6H Dave Jaeger MD   20 mg at 03/02/21 0848    metoprolol (LOPRESSOR) injection 5 mg  5 mg Intravenous Q4H Dave Jaeger MD   5 mg at 03/02/21 1448    LORazepam (ATIVAN) injection 0.5 mg  0.5 mg Intravenous Q4H PRN Hodan Zimmerman MD        insulin lispro (HUMALOG) injection vial 0-18 Units  0-18 Units Subcutaneous TID WC Dave Jaeger MD   6 Units at 03/02/21 1451    insulin lispro (HUMALOG) injection vial 0-9 Units  0-9 Units Subcutaneous Nightly Hodan Zimmerman MD   2 Units at 03/01/21 2056    melatonin disintegrating tablet 10 mg  10 mg Oral Nightly Hodan Zimmerman MD        dextrose 5 % and 0.45 % NaCl with KCl 20 mEq infusion   Intravenous Continuous Hodan Zimmerman MD 50 mL/hr at 03/02/21 1440 50 mL/hr at 03/02/21 1440    levetiracetam (KEPPRA) 500 mg/100 mL IVPB  500 mg Intravenous Q12H Hodan Zimmerman MD   Stopped at 03/02/21 1513    pantoprazole (PROTONIX) injection 20 mg  20 mg Intravenous Daily Hodan Zimmerman MD   20 mg at 03/02/21 0848    And    sodium chloride (PF) 0.9 % injection 10 mL  10 mL Intravenous Daily Hodan Zimmerman MD   10 mL at 03/01/21 0818    [Held by provider] hydrALAZINE (APRESOLINE) tablet 50 mg  50 mg Oral TID Hodan Zimmerman MD   50 mg at 02/28/21 1956    albuterol (PROVENTIL) nebulizer solution 2.5 mg  2.5 mg Nebulization Q4H PRN Hodan Zimmerman MD        amLODIPine (NORVASC) tablet 10 mg  10 mg Oral Daily Hodan Zimmerman MD   10 mg at 02/27/21 0589    cloNIDine (CATAPRES) tablet 0.2 mg  0.2 mg Oral BID Hodan Zimmerman MD   0.2 mg at 21 0846    divalproex (DEPAKOTE SPRINKLE) capsule 125 mg  125 mg Oral BID Guille Barton MD   125 mg at 21 0847    gabapentin (NEURONTIN) capsule 300 mg  300 mg Oral BID Guille Barton MD   300 mg at 21 0847    HYDROcodone-acetaminophen ([place]) 5-325 MG per tablet 1 tablet  1 tablet Oral Q6H PRN Guille Barton MD   1 tablet at 21    insulin glargine (LANTUS) injection vial 12 Units  12 Units Subcutaneous Nightly Guille Barton MD   12 Units at 21    lamoTRIgine (LAMICTAL) tablet 200 mg  200 mg Oral BID Guille Barton MD   200 mg at 21 08    magnesium oxide (MAG-OX) tablet 400 mg  400 mg Oral Daily Guille Barton MD   400 mg at 21 08    [Held by provider] metoprolol succinate (TOPROL XL) extended release tablet 50 mg  50 mg Oral BID Guille Barton MD   50 mg at 21 2245    therapeutic multivitamin-minerals 1 tablet  1 tablet Oral Daily Guille Barton MD   1 tablet at 21 0847    alogliptin (NESINA) tablet 6.25 mg  6.25 mg Oral Daily Guille Barton MD   6.25 mg at 21 0854    spironolactone (ALDACTONE) tablet 25 mg  25 mg Oral Daily Guille Barton MD   25 mg at 21    tamsulosin (FLOMAX) capsule 0.4 mg  0.4 mg Oral Nightly Guille Barton MD   0.4 mg at 21    dofetilide (TIKOSYN) capsule 125 mcg  125 mcg Oral 2 times per day Guille Barton MD   125 mcg at 21 0847    glucose (GLUTOSE) 40 % oral gel 15 g  15 g Oral PRN Guille Barton MD        dextrose 50 % IV solution  12.5 g Intravenous PRN Guille Barton MD        glucagon (rDNA) injection 1 mg  1 mg Intramuscular PRN Guille Barton MD   1 mg at 21 1630    sodium chloride flush 0.9 % injection 10 mL  10 mL Intravenous 2 times per day Guille Barton MD   10 mL at 21 0848    sodium chloride flush 0.9 % injection 10 mL  10 mL Intravenous PRN Guille Barton MD   10 mL at 21 1814    polyethylene glycol (GLYCOLAX) packet 17 g  17 g Oral Daily PRN uCate Farmer MD        acetaminophen (TYLENOL) tablet 650 mg  650 mg Oral Q6H PRN Cuate Farmer MD        Or    acetaminophen (TYLENOL) suppository 650 mg  650 mg Rectal Q6H PRN Cuate Farmer MD   650 mg at 21 1218    lisinopril (PRINIVIL;ZESTRIL) tablet 20 mg  20 mg Oral Daily Cuate Farmer MD   20 mg at 21 0847    docusate sodium (COLACE) capsule 100 mg  100 mg Oral BID Cuate Farmer MD   100 mg at 21 0847    hydrALAZINE (APRESOLINE) injection 10 mg  10 mg Intravenous Q6H PRN Cuate Farmer MD   10 mg at 21 1749    labetalol (NORMODYNE;TRANDATE) injection 10 mg  10 mg Intravenous Q4H PRN Cuate Farmer MD   10 mg at 21 0818       No Known Allergies    Surgical History  Past Surgical History:   Procedure Laterality Date    APPENDECTOMY      CAROTID ENDARTERECTOMY Left Dec 2013    Left Carotid Endarterectomy    ECHO COMPL W DOP COLOR FLOW  2013         INTRACAPSULAR CATARACT EXTRACTION Left 11/3/2020    LEFT EYE CATARACT EMULSIFICATION IOL IMPLANT performed by Malgorzata Colby MD at [address] [place] [address] History  Social History     Socioeconomic History    Marital status:    Tobacco Use    Smoking status: Former Smoker     Packs/day: 1.00     Types: Cigarettes     Quit date: 2019     Years since quittin.6    Smokeless tobacco: Never Used   Substance and Sexual Activity    Alcohol use:  Yes     Alcohol/week: 1.0 standard drinks     Types: 1 Cans of beer per week     Comment: occ    Drug use: No       Family Medical History  Family History   Adopted: Yes       Review of Systems:  Constitutional: No fever, no chills  Eyes: No vision changes, no retroorbital pain  ENT: No hearing changes, no ear pain  Respiratory: No cough, no dyspnea  Cardiovascular: No chest pain, no palpitations  Gastrointestinal: No abdominal pain, no diarrhea  Genitourinary: No dysuria, no hematuria  Integumentary: No rash, no itching  Musculoskeletal: No muscle pain, no joint pain  Neurologic: No headache, no numbness in extremities    Physical Examination:  Vitals:    03/02/21 1400 03/02/21 1405 03/02/21 1420 03/02/21 1500   BP:  (!) 113/57     Pulse: 78 86 86 73   Resp: 12 12 15 16   Temp:  97.6 °F (36.4 °C) 97.8 °F (36.6 °C)    TempSrc:   Temporal    SpO2: 100%  100% 98%     Constitutional: Lethargic but easily awakens to voice, not in distress  Eyes: Sclerae anicteric, no conjunctival erythema  ENT: No buccal lesion, no pharyngeal exudates  Neck: No nuchal rigidity, no cervical adenopathy  Lungs: Clear breath sounds, no crackles, no wheezes  Heart: Regular rate and rhythm, no murmurs  Abdomen: Bowel sounds present, soft, nontender  Skin: Warm and dry, no active dermatoses  Musculoskeletal: No joint erythema, no joint swelling    Labs, imaging, and medical records/notes were personally reviewed. Assessment:  Leukocytosis, probably medication induced from steroids    Plan:  Monitor clinically off antibiotics for now. Thank you for involving me in the care of Conner Aden. I will continue to follow. Please do not hesitate to call for any questions or concerns.       Electronically signed by Sarajane Aschoff, MD on 3/2/2021 at 3:55 PM

## 2021-03-02 NOTE — PLAN OF CARE
Problem: Falls - Risk of:  Goal: Will remain free from falls  Description: Will remain free from falls  Outcome: Met This Shift     Problem: Falls - Risk of:  Goal: Absence of physical injury  Description: Absence of physical injury  Outcome: Met This Shift     Problem: Skin Integrity:  Goal: Will show no infection signs and symptoms  Description: Will show no infection signs and symptoms  Outcome: Met This Shift     Problem: Skin Integrity:  Goal: Absence of new skin breakdown  Description: Absence of new skin breakdown  Outcome: Met This Shift     Problem: Non-Violent Restraints  Goal: Removal from restraints as soon as assessed to be safe  Outcome: Ongoing     Problem: Non-Violent Restraints  Goal: No harm/injury to patient while restraints in use  Outcome: Ongoing     Problem: Non-Violent Restraints  Goal: Patient's dignity will be maintained  Outcome: Ongoing

## 2021-03-02 NOTE — ANESTHESIA PROCEDURE NOTES
Arterial Line:    An arterial line was placed using surface landmarks, in the OR for the following indication(s): continuous blood pressure monitoring and blood sampling needed. A 20 gauge (size), 1 and 3/8 inch (length), Arrow (type) catheter was placed, Seldinger technique not used, into the right radial artery, secured by tape. Anesthesia type: Local  Local infiltration: Injection    Events:  patient tolerated procedure well with no complications.   Anesthesiologist: Enrique Mascorro,   Performed: Anesthesiologist   Preanesthetic Checklist  Completed: patient identified, IV checked, site marked, risks and benefits discussed, surgical consent, monitors and equipment checked, pre-op evaluation, timeout performed, anesthesia consent given, oxygen available and patient being monitored

## 2021-03-02 NOTE — CONSULTS
Today's Date: 3/2/2021  Patient Name: Luis Rodriguez  Date of admission: 2/25/2021 10:19 PM  Patient's age: 76 y.o., 1945  Admission Dx: Brain metastasis ([facility] [address] [C79.31]    Reason for Consult:  atrial fibrillation,h/o syncope  Requesting Physician: Yovany Peoples DO    CHIEF COMPLAINT: Change in mental status    History Obtained From:  spouse, electronic medical record    HISTORY OF PRESENT ILLNESS:      The patient is a 76 y.o.  male who is admitted to the hospital for progressive change in mental status for the last several weeks. He is known to me for history of hypertension, diabetes and paroxysmal atrial fibrillation. He was initially seen for syncope and underwent a loop recorder insertion. In August 2020 atrial fibrillation was documented for the first time and the patient was placed on dofetilide therapy. His family has noticed a progressive change in his mental status over the last 3 to 4 weeks. He was therefore brought into the emergency room and initial work-up did reveal the presence of intracranial lesions suggestive of metastatic disease. The patient himself is disoriented and cannot give me a history. However there has been no recent cardiac history except for the presence of atrial fibrillation which has been paroxysmal.  He is now post stereotactic biopsy of the posterior cerebral mass. Transferred to the intensive care unit for monitoring postop      Past Medical History:   has a past medical history of Atrial fibrillation ([address] Carotid artery stenosis, History of cardiovascular stress test, Hyperlipidemia, Hypertension, Non-insulin dependent type 2 diabetes mellitus ([address] and Unspecified cerebral artery occlusion with cerebral infarction. Past Surgical History:   has a past surgical history that includes ECHO Compl W Dop Color Flow (11/12/2013); Tonsillectomy;  Appendectomy; Carotid endarterectomy (Left, Dec 2013); and Intracapsular cataract extraction (Left, Given 10/2019    Historical Provider, MD   HYDROcodone-acetaminophen (NORCO) 5-325 MG per tablet Take 1 tablet by mouth every 6 hours as needed for Pain    Historical Provider, MD   cloNIDine (CATAPRES) 0.1 MG tablet Take 0.2 mg by mouth 2 times daily     Historical Provider, MD   metFORMIN (GLUCOPHAGE) 500 MG tablet Take 1,000 mg by mouth 2 times daily     Historical Provider, MD   spironolactone (ALDACTONE) 25 MG tablet Take 1 tablet by mouth 2 times daily. Patient taking differently: Take 25 mg by mouth daily  11/18/13   Jose Elias Rausch DO, FACOI   lisinopril (PRINIVIL;ZESTRIL) 20 MG tablet Take 20 mg by mouth every 12 hours     Historical Provider, MD   amLODIPine (NORVASC) 10 MG tablet Take 10 mg by mouth daily. Historical Provider, MD   Multiple Vitamins-Minerals (THERAPEUTIC MULTIVITAMIN-MINERALS) tablet Take 1 tablet by mouth daily. Historical Provider, MD   aspirin 81 MG tablet Take 81 mg by mouth daily. Historical Provider, MD       Allergies:  Patient has no known allergies. Social History:   reports that he quit smoking about 20 months ago. His smoking use included cigarettes. He smoked 1.00 pack per day. He has never used smokeless tobacco. He reports current alcohol use of about 1.0 standard drinks of alcohol per week. He reports that he does not use drugs. Family History: family history is not on file. He was adopted. No h/o sudden cardiac death. No for premature CAD    REVIEW OF SYSTEMS:    · Constitutional: there has been no unanticipated weight loss. There's been Yes change in energy level, Yes change in activity level. · Eyes: No visual changes or diplopia. No scleral icterus. · ENT: No Headaches, hearing loss or vertigo. No mouth sores or sore throat. · Cardiovascular: No chest pain, No dyspnea on exertion, No palpitations or No loss of consciousness. No cough, hemoptysis, No pleuritic pain, or phlebitis. · Respiratory: No cough or wheezing, no sputum production.  No hematemesis. · Gastrointestinal: No abdominal pain, appetite loss, blood in stools. No change in bowel or bladder habits. · Genitourinary: No dysuria, trouble voiding, or hematuria. · Musculoskeletal:  No gait disturbance, Yes weakness or joint complaints. · Integumentary: No rash or pruritis. · Neurological: No headache, diplopia, change in muscle strength, numbness or tingling. No change in gait, balance, coordination, mood, affect, memory, mentation, behavior. · Psychiatric: No anxiety, or depression. · Endocrine: No temperature intolerance. No excessive thirst, fluid intake, or urination. No tremor. · Hematologic/Lymphatic: No abnormal bruising or bleeding, blood clots or swollen lymph nodes. · Allergic/Immunologic: No nasal congestion or hives. PHYSICAL EXAM:      BP (!) 159/58   Pulse 81   Temp 97.8 °F (36.6 °C) (Temporal)   Resp 10   SpO2 97%    Constitutional and General Appearance: Disoriented, uncooperative and appears stated age  [de-identified]: PERRL, no cervical lymphadenopathy. No masses palpable. Normal oral mucosa  Respiratory:  · Normal excursion and expansion without use of accessory muscles  · Resp Auscultation: Good respiratory effort. No for increased work of breathing. On auscultation: clear to auscultation bilaterally  Cardiovascular:  · The apical impulse is not displaced  · Heart tones are normal. regular S1 and S2.  · Jugular venous pulsation Normal  · The carotid upstroke is normal in amplitude and contour without delay or bruit  · Peripheral pulses are symmetrical and full  Abdomen:  · No masses or tenderness  · Bowel sounds present  Extremities:  ·  No Cyanosis or Clubbing  ·  Lower extremity edema: No  · Skin: Warm and dry  Neurological:  · Alert and oriented. · Moves all extremities well  · No abnormalities of mood, affect, memory, mentation, or behavior are noted    DATA:    Diagnostics:    EKG: normal sinus rhythm, unchanged from previous tracings. ECHO: reviewed.  2/20 and 8/20 at [address] [place] [address]  Ejection fraction:50- 55%  Stress Test: not obtained. Cardiac Angiography: not obtained. Labs:   CBC:   Recent Labs     03/01/21  1054 03/02/21  0544   WBC 19.4* 24.7*   HGB 14.2 14.5   HCT 42.1 44.4    424     BMP:   Recent Labs     03/01/21  1054 03/02/21  0544 03/02/21  1202    144  --    K 3.4* 2.9* 3.4*   CO2 26 24  --    BUN 19 27*  --    CREATININE 0.9 1.1  --    LABGLOM >60 >60  --    GLUCOSE 232* 211*  --      BNP: No results for input(s): BNP in the last 72 hours. PT/INR:   Recent Labs     03/02/21  0624   PROTIME 13.4*   INR 1.2     APTT:No results for input(s): APTT in the last 72 hours. CARDIAC ENZYMES:No results for input(s): CKTOTAL, CKMB, CKMBINDEX, TROPONINI in the last 72 hours. FASTING LIPID PANEL:  Lab Results   Component Value Date    HDL 28.0 12/13/2013    LDLCALC 42 12/13/2013    TRIG 40 12/13/2013     LIVER PROFILE:No results for input(s): AST, ALT, LABALBU in the last 72 hours. IMPRESSION:    Patient Active Problem List   Diagnosis    Carotid stenosis    Hypertension    Hyperlipidemia    Non-insulin dependent type 2 diabetes mellitus ([facility] [address]    Melanoma ([facility] [address]    Stage 3 chronic kidney disease    COPD (chronic obstructive pulmonary disease) ([facility] [address]    AMS (altered mental status)--resulting in evaluation in [address] [place] [address] and transferred here for work-up    History of CVA (cerebrovascular accident)    Atrial fibrillation (HCC)-paroxysmal.  Patient is on dofetilide and anticoagulation. Cardiac work-up in the past has shown low normal LV function and no major valvular heart disease.  Anticoagulated   Loop recorder in situ. Not a pacemaker  MRI of the brain suggestive of metastatic lesions. 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 Negative for features of metastatic malignancy.     RECOMMENDATIONS:    Continue dofetilide if patient is able to continue oral medications  Cardizem and or beta-blockade for rate control if needed for recurrent A. Fib  Further recommendations will depend on his progress in the hospital      Discussed with  Nurse.     Dot Nuñez MD,FACC

## 2021-03-02 NOTE — PLAN OF CARE
Problem: Non-Violent Restraints  Goal: Removal from restraints as soon as assessed to be safe  Outcome: Not Met This Shift  Goal: No harm/injury to patient while restraints in use  Outcome: Met This Shift  Goal: Patient's dignity will be maintained  Outcome: Met This Shift

## 2021-03-02 NOTE — PROGRESS NOTES
Patient continues to have outbursts of involuntary pulling at lines, IV site, and others. Patient attempting to bite at nursing staff when patient pulled tubing from IV pump. Continue restraints.

## 2021-03-02 NOTE — BRIEF OP NOTE
Brief Postoperative Note      Patient: Marleni Shannon  YOB: 1945  MRN: [id number]    Date of Procedure: 3/2/2021    Pre-Op Diagnosis: BRAIN TUMOR    Post-Op Diagnosis: Same-probably Glioma       Procedure(s):  CRANIOTOMY STEREOTATIC BIOPSY, FRAMELESS, CEREBRAL POSTERIOR FOSSA (NEEDS PATHOLOGY)    Surgeon(s):  Andre Bhakta MD    Assistant:  * No surgical staff found *    Anesthesia: General    Estimated Blood Loss (mL): 40    Complications: None    Specimens:   ID Type Source Tests Collected by Time Destination   A : [facility] BRAIN TUMOR Tissue Brain SURGICAL PATHOLOGY Andre Bhakta MD 3/2/2021 1153    B : [address] [place] [address], MD 3/2/2021 1231        Implants:  Implant Name Type Inv. Item Serial No.  Lot No. LRB No. Used Action   COVER BUR H [id number] CRANIOMAXILLOFACIAL PLT LO PROF W/ TAB  COVER BUR H [id number] CRANIOMAXILLOFACIAL PLT LO PROF W/ TAB  WILBERTO CRANIOMAXILLOFACIAL-WD  Left 1 Implanted   SCREW BONE L4MM DIA1. 5MM ST SELF CNTR AX STBL UNIII  SCREW BONE L4MM DIA1. 5MM ST SELF CNTR AX STBL UNIII  WILBERTO CESAR-WD  Left 5 Implanted         Drains:   Urethral Catheter Non-latex 16 fr (Active)       Findings: Not a Metastatic Tumor    Electronically signed by Andre Bhakta MD on 3/2/2021 at 12:56 PM

## 2021-03-02 NOTE — PROGRESS NOTES
INTRAOPERATIVE CONSULTATION (with FROZEN SECTION)    Brain tumor:  Hypercellular glial neoplasm. Negative for features of metastatic malignancy.

## 2021-03-02 NOTE — PROGRESS NOTES
Blood and [address] [place] [address]  Hematology/Oncology  Consult        Patient Name: Zuly Mathew  YOB: 1945  PCP: Vira Crenshaw DO   Referring Provider: No address on file     Reason for Consultation: No chief complaint on file. Subjective: Oriented to self only. Remains in restraints and intermittently agitated and combative. History of Present Illness: This pt is a 77 yo male w/ a PMH of hypertension, diabetes, dementia, seizure disorder, BPH, depression, atrial fibrillation on anticoagulation with Eliquis, chronic kidney disease, melanoma removed from the back and forehead, chronic back pain, trigeminal neuralgia, CVA with residual unsteady gait and carotid artery disease who presented to [facility] from [facility] 2/25/21 after presenting with AMS and confusion with irrational behavior. In the ER at [facility] ([facility]), an MRI of the brain revealed a heterogeneous enhancing mass in the left cerebral hemisphere suspicious for neoplasm and several foci of extra-axial parasellar cisterns and subependymal enhancement in the left lateral ventricle suspicious for metastatic disease. From [address] [place] [address], CT scan of the chest showed emphysema and atelectasis, no masses or consolidation. CT scan of the abdomen and pelvis is negative for any acute findings    Currently patient remains confused and lethargic requiring restraints. CBC stable with mild leukocytosis likely from steroids. Oncology has been consulted for CUP with CNS mets, likely from prior melanoma.      Diagnostic Data:     Past Medical History:   Diagnosis Date    Atrial fibrillation [facility])     2018 pt states has a heart monitor, implantable    Carotid artery stenosis     History of cardiovascular stress test 11/13/13    lexiscan    Hyperlipidemia     Hypertension     Non-insulin dependent type 2 diabetes mellitus ([facility] [address]     Unspecified cerebral artery occlusion with cerebral infarction nov 11 2013    right face numb balance problems vision fades in and out  walks with walker       Patient Active Problem List    Diagnosis Date Noted    Carotid stenosis 11/12/2013     Priority: High    CVA (cerebral vascular accident) 11/15/2013     Priority: Medium    Melanoma ([address] 02/26/2021    Hypokalemia 02/26/2021    Stage 3 chronic kidney disease 02/26/2021    COPD (chronic obstructive pulmonary disease) ([address] 02/26/2021    Constipation 02/26/2021    Renal cyst, left 02/26/2021    AMS (altered mental status) 02/26/2021    Dementia ([address] 02/26/2021    Seizure ([address] 02/26/2021    History of CVA (cerebrovascular accident) 02/26/2021    Atrial fibrillation ([address] 02/26/2021    Anticoagulated 02/26/2021    Brain metastasis ([address] 02/25/2021    Left cataract 11/03/2020    Leucocytosis 12/15/2013    Vitamin D deficiency 12/15/2013    Non-insulin dependent type 2 diabetes mellitus ([address]     Hypertension     Hyperlipidemia     Cerebral artery occlusion with cerebral infarction ([address] 11/11/2013        Past Surgical History:   Procedure Laterality Date    APPENDECTOMY      CAROTID ENDARTERECTOMY Left Dec 2013    Left Carotid Endarterectomy    ECHO COMPL W DOP COLOR FLOW  11/12/2013         INTRACAPSULAR CATARACT EXTRACTION Left 11/3/2020    LEFT EYE CATARACT EMULSIFICATION IOL IMPLANT performed by Neto Bazzi MD at [facility]         Family History  Family History   Adopted: Yes       Social History    TOBACCO:   reports that he quit smoking about 20 months ago. His smoking use included cigarettes. He smoked 1.00 pack per day. He has never used smokeless tobacco.  ETOH:   reports current alcohol use of about 1.0 standard drinks of alcohol per week. Home Medications  Prior to Admission medications    Medication Sig Start Date End Date Taking?  Authorizing Provider   magnesium oxide (MAG-OX) 400 MG tablet Take 400 mg by mouth daily   Yes Historical Provider, MD   SITagliptin (JANUVIA) 50 MG tablet Take 50 mg by mouth daily   Yes Historical Provider, MD   insulin glargine (LANTUS) 100 UNIT/ML injection vial Inject 12 Units into the skin nightly   Yes Historical Provider, MD   dexamethasone (DECADRON) 4 MG tablet Take 6 mg by mouth daily   Yes Historical Provider, MD   divalproex (DEPAKOTE SPRINKLE) 125 MG capsule Take 125 mg by mouth 2 times daily   Yes Historical Provider, MD   melatonin 3 MG TABS tablet Take 5 mg by mouth nightly as needed   Yes Historical Provider, MD   NONFORMULARY Take 125 mcg by mouth 2 times daily tikosyn   Yes Historical Provider, MD   apixaban (ELIQUIS) 5 MG TABS tablet Take by mouth 2 times daily    Historical Provider, MD   hydrALAZINE (APRESOLINE) 50 MG tablet Take 25 mg by mouth 3 times daily     Historical Provider, MD   lamoTRIgine (LAMICTAL) 100 MG tablet Take 200 mg by mouth 2 times daily    Historical Provider, MD   metoprolol succinate (TOPROL XL) 50 MG extended release tablet Take 50 mg by mouth 2 times daily    Historical Provider, MD   potassium chloride (KLOR-CON M) 20 MEQ extended release tablet Take 20 mEq by mouth 2 times daily     Historical Provider, MD   glipiZIDE (GLUCOTROL) 5 MG tablet Take 10 mg by mouth 2 times daily    Historical Provider, MD   tamsulosin (FLOMAX) 0.4 MG capsule Take 0.4 mg by mouth nightly    Historical Provider, MD   gabapentin (NEURONTIN) 300 MG capsule Take 300 mg by mouth 2 times daily.     Historical Provider, MD   albuterol sulfate  (90 Base) MCG/ACT inhaler Inhale 2 puffs into the lungs every 6 hours as needed for Wheezing or Shortness of Breath    Historical Provider, MD   influenza virus trivalent vaccine (FLUZONE) injection Inject 0.5 mLs into the muscle once Given 10/2019    Historical Provider, MD   HYDROcodone-acetaminophen (NORCO) 5-325 MG per tablet Take 1 tablet by mouth every 6 hours as needed for Pain    Historical Provider, MD   cloNIDine (CATAPRES) 0.1 MG tablet Take 0.2 mg by mouth 2 times daily     Historical Provider, MD   metFORMIN (GLUCOPHAGE) 500 MG tablet Take 1,000 mg by mouth 2 times daily     Historical Provider, MD   spironolactone (ALDACTONE) 25 MG tablet Take 1 tablet by mouth 2 times daily. Patient taking differently: Take 25 mg by mouth daily  11/18/13   Donna Huitron DO, FACOI   lisinopril (PRINIVIL;ZESTRIL) 20 MG tablet Take 20 mg by mouth every 12 hours     Historical Provider, MD   amLODIPine (NORVASC) 10 MG tablet Take 10 mg by mouth daily. Historical Provider, MD   Multiple Vitamins-Minerals (THERAPEUTIC MULTIVITAMIN-MINERALS) tablet Take 1 tablet by mouth daily. Historical Provider, MD   aspirin 81 MG tablet Take 81 mg by mouth daily. Historical Provider, MD       Allergies  No Known Allergies    Review of Systems:    Confused and unable to provide      Objective  BP (!) 113/57   Pulse 73   Temp 97.8 °F (36.6 °C) (Temporal)   Resp 16   SpO2 98%     Physical Exam:   Performance Status:  General: Confused, requiring restraint  Head and neck : PERRLA, EOMI . Sclera non icteric. Oropharynx : Clear  Neck: no JVD,  no adenopathy  LYMPHATICS : No LAD  Heart: Regular rate and regular rhythm, no murmur  Lungs: Clear to auscultation   Extremities: No edema,no cyanosis, no clubbing.    Abdomen: Soft, non-tender;no masses, no organomegaly  Skin:  No rash  Neurologic:Confused and in restraints    Recent Laboratory Data-   Lab Results   Component Value Date    WBC 24.7 (H) 03/02/2021    HGB 14.5 03/02/2021    HCT 44.4 03/02/2021    MCV 83.3 03/02/2021     03/02/2021    LYMPHOPCT 4.0 (L) 03/02/2021    RBC 5.33 03/02/2021    MCH 27.2 03/02/2021    MCHC 32.7 03/02/2021    RDW 15.9 (H) 03/02/2021    NEUTOPHILPCT 89.8 (H) 03/02/2021    MONOPCT 3.8 03/02/2021    BASOPCT 0.2 03/02/2021    NEUTROABS 22.15 (H) 03/02/2021    LYMPHSABS 1.00 (L) 03/02/2021    MONOSABS 0.94 03/02/2021    EOSABS 0.00 (L) 03/02/2021    BASOSABS 0.06 03/02/2021       Lab Results   Component Value Date     03/02/2021    K 3.4 (L) 03/02/2021  03/02/2021    CO2 24 03/02/2021    BUN 27 (H) 03/02/2021    CREATININE 1.1 03/02/2021    GLUCOSE 211 (H) 03/02/2021    CALCIUM 9.0 03/02/2021    PROT 7.5 01/31/2020    LABALBU 4.5 01/31/2020    BILITOT 0.5 01/31/2020    ALKPHOS 70 01/31/2020    AST 49 (H) 01/31/2020    ALT 41 (H) 01/31/2020    LABGLOM >60 03/02/2021    GFRAA >60 03/02/2021       No results found for: IRON, TIBC, FERRITIN        Radiology-    MRI C/ [address]   Final Result   1. Somewhat limited evaluation due to patient motion artifact, especially on   the postcontrast enhanced images. 2. Multiple intracranial METASTASES, most notably in the left cerebellar   hemisphere. Metastatic lesion is also seen in the region of the mammillary   bodies. 3. HYDROCEPHALUS, with dilation of the lateral and 3rd ventricles. The   etiology is unclear. Findings may be due to mass effect on the distal   aqueduct from vasogenic edema in the left cerebellar hemisphere. 4.  The findings were submitted to the Radiology Results [address]   at 13:41 on 2/27/2021  to in be communicated to a licensed caregiver. XR CHEST PORTABLE   Final Result   No radiographic evidence of acute abnormality      CT HEAD WO CONTRAST PORTABLE    (Results Pending)   CT HEAD WO CONTRAST    (Results Pending)         ASSESSMENT/PLAN :  77 yo male  History of melanoma s/p resection of back and forehead   A fib on eliquis  Multiple new CNS lesions, consistent with metastasis   AMS    - MRI personally reviewed. Highly concerning for metastatic neoplastic process  - Case discussed with Dr. Rupinder Beltrán. We are in agreement for CNS biopsy to confirm pathology. Likely represents recurrent melanoma  - Likely will benefit from XRT [facility]). Radiation oncology consult placed  - Will check BRAF, KIT, NTRK, NRAS assuming melanoma confirmed  - Targeted therapy if mutation detected.  If not, recommend front line IO/IO with ipi/nivo  - Cont steroids  - Will follow      3/1/21  -

## 2021-03-02 NOTE — PROGRESS NOTES
Per the nurse who spoke with Dr Junie Fulelr, the patient can go to surgery. The patient in Sinus Rhythm now.   Will proceed with craniotomy
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
## 2021-03-03 NOTE — PROGRESS NOTES
[facility] PROGRESS NOTE      Chief complaint: Follow-up of leukocytosis    The patient is a 76 y.o. male with history of DM, hypertension, atrial fibrillation, hyperlipidemia, stroke, dementia, seizure disorder, melanoma status post excision from back and forehead 7 years ago, transferred to [facility] on 02/25 from [facility] where he initially presented with confusion and abnormal behavior for 3 weeks, found to have MRI of the brain showing multiple intracranial metastasis most notably in the left cerebellar hemisphere, hydrocephalus with dilation of the lateral and third ventricles. On admission, he was afebrile and hemodynamically stable with leukocytosis of 14,000. Urinalysis showed no pyuria. SARS-CoV-2 PCR was negative. Chest x-ray was unremarkable. 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 Dexamethasone has been started since transfer. Leukocytosis increased to 24,000 on 03/02. He received cefazolin perioperatively. He pulled out his Hogan catheter on 03/02 causing penile bleeding. Subjective: Patient was seen and examined. No chills, no abdominal pain, no diarrhea, no rash, no itching. Objective:  BP (!) 154/53   Pulse 83   Temp 99 °F (37.2 °C) (Temporal)   Resp 23   Ht 5' 5\" (1.651 m)   Wt 138 lb 10.7 oz (62.9 kg)   SpO2 90%   BMI 23.08 kg/m²   Constitutional: Alert, not in distress  Respiratory: Clear breath sounds, no crackles, no wheezes  Cardiovascular: Regular rate and rhythm, no murmurs  Gastrointestinal: Bowel sounds present, soft, nontender  Skin: Warm and dry, no active dermatoses  Musculoskeletal: No joint swelling, no joint erythema    Labs, imaging, and medical records/notes were personally reviewed.     Assessment:  Leukocytosis, probably medication induced from steroids    Recommendations:  Monitor clinically off antibiotics for now.     Thank you for involving me in the care of Luis Amezquita. I will continue to follow. Please do not hesitate to call for any questions or concerns.     Electronically signed by Netta Edwards MD on 3/3/2021 at 9:44 AM

## 2021-03-03 NOTE — PROGRESS NOTES
Comprehensive Nutrition Assessment    Type and Reason for Visit:  Initial(ICU LOS)    Nutrition Recommendations/Plan: Advance nutrition as medically appropriate. Nutrition Assessment:  Pt at nutritional risk d/t current NPO status x 4 days admit w/ Brain tumor/suspected metastatic disease s/p crani. Noted hx DM, COPD, Dementia, CVA. Current AMS/agitation. Will monitor nutrition progression/provide recs as needed. Malnutrition Assessment:  Malnutrition Status:  Insufficient data    Context:  Acute Illness     Findings of the 6 clinical characteristics of malnutrition:  Energy Intake:  Mild decrease in energy intake (TYRELL PTA intake)  Weight Loss:    5% over 1 month  Body Fat Loss:  Unable to assess   Muscle Mass Loss:  Unable to assess  Fluid Accumulation:  No significant fluid accumulation   Strength:  Not Performed    Estimated Daily Nutrient Needs:  Energy (kcal):  MSJ 1288 x 1.3 SF= 2508-6569; Weight Used for Energy Requirements:  Current     Protein (g):  (1.5-1.8 g/kg); Weight Used for Protein Requirements:  Current        Fluid (ml/day):  per neuro    Nutrition Related Findings:  poor attention/agitation, MAP WNL, +I/O's, no edema, active BS, noted re-current melanoma      Wounds:  Surgical Incision       Current Nutrition Therapies:    No diet orders on file    Anthropometric Measures:  · Height: 5' 5\" (165.1 cm)  · Current Body Weight: 138 lb (62.6 kg)(3/2 actual)   · Admission Body Weight: 138 lb (62.6 kg)(first measured)    · Usual Body Weight: 149 lb (67.6 kg)(actual wt on 2/9 & 11/3 EMR encounters)     · Ideal Body Weight: 136 lbs; % Ideal Body Weight 101.5 %   · BMI: 23 BMI Categories: Normal Weight (BMI 18.5-24. 9)       Nutrition Diagnosis:   · Inadequate oral intake related to cognitive or neurological impairment as evidenced by NPO or clear liquid status due to medical condition    Nutrition Interventions:     Nutrition Education/Counseling:  Education not indicated Coordination of Nutrition Care:  Continue to monitor while inpatient    Goals:  Nutrition progression       Nutrition Monitoring and Evaluation:   Food/Nutrient Intake Outcomes:  Diet Advancement/Tolerance  Physical Signs/Symptoms Outcomes:  Biochemical Data, Nutrition Focused Physical Findings, Skin, Weight, GI Status, Fluid Status or Edema, Hemodynamic Status     Discharge Planning:     Too soon to determine     Electronically signed by Anam Fox RD, LD on 3/3/21 at 10:09 AM EST    Contact: Ext [phone]

## 2021-03-03 NOTE — FLOWSHEET NOTE
Patient penis continues to ooze bloody drainage; pressure applied to meatus. Pad applied to nelida area.

## 2021-03-03 NOTE — PLAN OF CARE
Problem: Falls - Risk of:  Goal: Will remain free from falls  Description: Will remain free from falls  Outcome: Met This Shift     Problem: Falls - Risk of:  Goal: Absence of physical injury  Description: Absence of physical injury  Outcome: Met This Shift     Problem: Non-Violent Restraints  Goal: No harm/injury to patient while restraints in use  3/2/2021 2208 by Zeinab Chase RN  Outcome: Met This Shift     Problem: Non-Violent Restraints  Goal: Patient's dignity will be maintained  3/2/2021 2208 by Zeinab Chase RN  Outcome: Met This Shift     Problem: Non-Violent Restraints  Goal: Removal from restraints as soon as assessed to be safe  3/2/2021 2208 by Zeinab Chase RN  Outcome: Not Met This Shift

## 2021-03-03 NOTE — PROGRESS NOTES
Surgical  Neuro Science Intensive Care Unit  Critical Care  Daily Progress Note 3/3/2021    Date of Admission: 02/27/2021  Date of ICU Admission; 03/02./21    CC: Follow up for craniotomy for brain mass. HOSPITAL COURSE/OVERNIGHT EVENTS:    02/27  Initially presented ot outside facility with AMS, confusion & erratic behavior. PMH include previous stroke with residual left sided weakness, dementia, HTN, HLD, chronic pain issues, CKD & melanoma. Diagnotic imaging reveal brain masses. Transferred to [facility] for ongoing evaluaitn & neurosurgery consult. CT chest/abdomen/pelvis no evidence of malignancy.    ---------------------  03/02 Underwent craniotomy for biopsy of intracranial mass. Glial neoplasm. Admitted to  ICU post--operatively. 03/03  No issues overnight. Did not require PRN antihypertensive agents. Required 12 units of insulin. Pulled loyola out during the night. Seen by speech recs dental soft diet with honey thick liquids. PHYSICAL EXAM:  BP (!) 154/53   Pulse 83   Temp 99 °F (37.2 °C) (Temporal)   Resp 23   Ht 5' 5\" (1.651 m)   Wt 138 lb 10.7 oz (62.9 kg)   SpO2 90%   BMI 23.08 kg/m²       Intake/Output Summary (Last 24 hours) at 3/3/2021 1146  Last data filed at 3/3/2021 0900  Gross per 24 hour   Intake 2345 ml   Output 870 ml   Net 1475 ml     General appearance:  Comfortable. Pain Description: none    NEUROLOGIC:   RASS Score:  0  GCS:    4 - Opens eyes on own   6 - Follows simple motor commands  5 - Alert and oriented     Pupil size:  Left 3 mm  Right 3 mm  Pupil reaction: Yes   PERRLA  Wiggles fingers: Left   No  Right Yes  Hand grasp:   Left: No     Right    Yes  Wiggles toes: Left   Yes Right  Yes  Plantar flexion: Left  Yes  Right   Yes  Facial droop:   none   Speech:  Garbled. Crani incision:  CDI    CONSTITUTIONAL: No acute distress, lying in hospital bed. CARDIOVASCULAR: S1 S2, regular rate, regular rhythm, no murmur/gallop/rub. Monitor: NSr.     PULMONARY: Bilaterally clear. No rhonchi/rales/wheezes, no use of accessory muscles. Room air. RENAL:  Voids. Pulled loyola out during the night. Fluid balance for previous 24 hours:  + 1.4 L. ABDOMEN: Soft, nontender, nondistended, nontympanic, normal bowel sounds. No reported nausea or vomiting. MUSCULOSKELETAL:  Left sided weakness from previous stroke. Able to show left thumb up to command. Unable to open hand. Able to lift left arm off bed. Able to wiggle left toes. Able to lift left legg off bed. SKIN/EXTREMITIES: No rashes/ecchymosis, no edema/clubbing, warm/dry, good capillary refill. LINES:   Peripheral     Left radial arterial line. Day 2. Recent Labs     03/01/21  1054 03/02/21  0544 03/03/21  0502   WBC 19.4* 24.7* 23.2*   HGB 14.2 14.5 11.2*   HCT 42.1 44.4 35.4*   MCV 82.5 83.3 86.6    424 352       Recent Labs     03/01/21  1054 03/02/21  0544 03/02/21  1202 03/03/21  0502    144  --  142   K 3.4* 2.9* 3.4* 3.6   CO2 26 24  --  22   PHOS  --   --   --  3.4   BUN 19 27*  --  39*   CREATININE 0.9 1.1  --  1.5*       Recent Labs     03/02/21  0624   INR 1.2       ASSESSMENT/PLAN:     Active Problems:    Leucocytosis    Non-insulin dependent type 2 diabetes mellitus (HCC)    Brain metastasis (HCC)    Melanoma (HCC)    Hypokalemia    Stage 3 chronic kidney disease    COPD (chronic obstructive pulmonary disease) (HCC)    Constipation    Renal cyst, left    AMS (altered mental status)    Dementia (HCC)    Seizure (HCC)    History of CVA (cerebrovascular accident)    Atrial fibrillation ([facility]    Anticoagulated  Resolved Problems:    * No resolved hospital problems. *    Neuro:  AMS. Brain mass. S/P Craniotomy for biopsy. Glial neoplasm. Hx of Seizures, dementia & stroke (residula left sided weakness). Monitor neuro status. Neurosurgery following. Keppra for seizure prophylaxis. Decadron. Gabapentin. Lamictal.  Depakote. CV:  No acute issues.   Hx of HTN, Hyperlipidemia and Afib    Monitor hemodynamics. BP goal systolic less than 165 mm Hg. PRN hydralzine & labetalol. Norvasc. Clonidine. Metoprolol. Lisinopruil  Tikosyn. Scheduled hydralazine. Statin. Pulm: At risk for respiratory insufficiency. Monitor RR & SpO2. O2 as needed. Prn albuterol. Encourage cough, SMI  & deep breathing. GI:  Dysphagia. . Monitor bowel function. Diet. Bowel regime. Renal:   CKD,. Hx of BPH. Monitor BUN & Cr, electrolytes & replace as needed. Monitor I & O.    Voids. Mag oxide  Spirolactone stopped. Flomax. ID: No acute issues  Endocrine: Hyperglycemia. Hx of diabetes    Monitor BS. Juan Rodrigez  ISS. MSK: Chronic left sided weakness. Deconditioned.   ROM. Turn & reposition. PT & OT when able;  Monitor for skin breakdown. Heme: No acute issues. Monitor CBC. Multivitamin       Bowel regime: Colace and Glycolax  Pain control/Sedation: Tylenol, Melatonin, Ativan and Norco.    DVT prophylaxis: SCD and No Lovenox/Heparin at this time due to recent craniotomy. GI prophylaxis: Protonix  Glucose protocol:  ISS  Ancillary consults:  Neurosurgery, Cardiology, Palliative medicine and Critical care, Radiation oncology, Oncology  Code status: Full code. Disposition:Admitted to ICU.       Electronically signed by Joyce Navarro RN MSN APRN-NP [facility] NP  CCNS CCRN 3/3/2021 12:01 PM

## 2021-03-03 NOTE — PLAN OF CARE
Problem: Non-Violent Restraints  Goal: No harm/injury to patient while restraints in use  3/3/2021 0541 by Maria Dowd RN  Outcome: Met This Shift     Problem: Non-Violent Restraints  Goal: Patient's dignity will be maintained  3/3/2021 0541 by Maria Dowd RN  Outcome: Met This Shift     Problem: Non-Violent Restraints  Goal: Removal from restraints as soon as assessed to be safe  3/3/2021 0541 by Maria Dowd RN  Outcome: Not Met This Shift

## 2021-03-03 NOTE — PROGRESS NOTES
artery occlusion with cerebral infarction ([address]    Hyperlipidemia    Leucocytosis    Vitamin D deficiency    Non-insulin dependent type 2 diabetes mellitus (HCC)    Left cataract    Brain metastasis (HCC)    Melanoma ([address]    Hypokalemia    Stage 3 chronic kidney disease    COPD (chronic obstructive pulmonary disease) (HCC)    Constipation    Renal cyst, left    AMS (altered mental status)    Dementia (HCC)    Seizure ([address]    History of CVA (cerebrovascular accident)    Atrial fibrillation ([address]    Anticoagulated

## 2021-03-03 NOTE — PROGRESS NOTES
Hospitalist Progress Note      SYNOPSIS: Patient admitted on 2021 for weakness. He had had altered mental status at home to associated with irrational behavior. MRI of the brain done showed a heterogeneous enhancing mass in the left cerebral hemisphere suspicious for neoplasm and several foci of extra-axial parasellar cisterns and subependymal enhancement of the left lateral ventricle suspicious for metastatic disease. CT of the chest showed no evidence of any malignancy and CT of the abdomen and pelvis also negative for any malignancy. Oncology and palliative medicine on board. SUBJECTIVE:    Patient seen and examined. He had biopsy of the brain mass today. He has no complaints. Review of symptoms otherwise negative. Records reviewed. Temp (24hrs), Av.5 °F (36.9 °C), Min:98 °F (36.7 °C), Max:99 °F (37.2 °C)    DIET: DIET GENERAL; Carb Control: 5 carb choices (75 gms)/meal; Dental Soft; Mildly Thick (Nectar)  CODE: Full Code    Intake/Output Summary (Last 24 hours) at 3/3/2021 1640  Last data filed at 3/3/2021 0900  Gross per 24 hour   Intake 1145 ml   Output 490 ml   Net 655 ml       OBJECTIVE:    BP (!) 169/62   Pulse 92   Temp 99 °F (37.2 °C) (Temporal)   Resp 15   Ht 5' 5\" (1.651 m)   Wt 138 lb 10.7 oz (62.9 kg)   SpO2 96%   BMI 23.08 kg/m²     General appearance: No apparent distress, appears stated age and cooperative. HEENT:  Conjunctivae/corneas clear. Neck: Supple. No jugular venous distention. Respiratory: Clear to auscultation bilaterally, normal respiratory effort  Cardiovascular: Regular rate rhythm, normal S1-S2  Abdomen: Soft, nontender, nondistended  Musculoskeletal: No clubbing, cyanosis, no bilateral lower extremity edema. Brisk capillary refill.    Skin:  No rashes  on visible skin  Neurologic: awake, alert and following commands     ASSESSMENT:     #Metastatic brain neoplasm with unclear primary  -Does have a history of prior melanoma which could be due to occult primary.  -Had biopsy of the brain done today. -Aspirin and Eliquis on hold. On Decadron.  -Oncology and neurosurgery on board. #Acute metabolic encephalopathy  -Was calm and oriented at time of review. However he was in four-point restraints.  -On Ativan as needed. Not on Haldol as he developed a rash after he started Haldol. #Type 2 diabetes mellitus with  Neuropathy:  - On insulin sliding scale. Accu-Cheks AC at bedtime.  -on gabapentin    #Hypertension: On amlodipine, hydralazine and metoprolol as well as lisinopril and clonidine. #A. fib: Currently rate controlled. On metoprolol and Tikosyn. Eliquis on hold. #History of melanoma:  - This was resected from his back and face. -Biopsy done of brain lesion and pathology pending to see if the brain lesions are due to melanoma.      #History of seizures  -on Keppra    DVT prophylaxis: on SCDs. eliquis on hold      DISPOSITION: to be determined    Medications:  REVIEWED DAILY    Infusion Medications    dilTIAZem (CARDIZEM) 100 mg in dextrose 5% 100 mL (ADD-[place]) 5 mg/hr (03/02/21 0905)    dextrose 5 % and 0.45 % NaCl       Scheduled Medications    dexamethasone  10 mg Intravenous Q6H    hydrALAZINE  20 mg Intravenous Q6H    metoprolol  5 mg Intravenous Q4H    insulin lispro  0-18 Units Subcutaneous TID WC    insulin lispro  0-9 Units Subcutaneous Nightly    melatonin  10 mg Oral Nightly    levetiracetam  500 mg Intravenous Q12H    pantoprazole  20 mg Intravenous Daily    And    sodium chloride (PF)  10 mL Intravenous Daily    [Held by provider] hydrALAZINE  50 mg Oral TID    amLODIPine  10 mg Oral Daily    cloNIDine  0.2 mg Oral BID    divalproex  125 mg Oral BID    gabapentin  300 mg Oral BID    insulin glargine  12 Units Subcutaneous Nightly    lamoTRIgine  200 mg Oral BID    magnesium oxide  400 mg Oral Daily    [Held by provider] metoprolol succinate  50 mg Oral BID    therapeutic multivitamin-minerals  1 tablet Oral Daily    alogliptin  6.25 mg Oral Daily    [Held by provider] spironolactone  25 mg Oral Daily    tamsulosin  0.4 mg Oral Nightly    dofetilide  125 mcg Oral 2 times per day    sodium chloride flush  10 mL Intravenous 2 times per day    lisinopril  20 mg Oral Daily    docusate sodium  100 mg Oral BID     PRN Meds: LORazepam, albuterol, HYDROcodone-acetaminophen, glucose, dextrose, glucagon (rDNA), sodium chloride flush, polyethylene glycol, acetaminophen **OR** acetaminophen, hydrALAZINE, labetalol    Labs:     Recent Labs     03/01/21  1054 03/02/21  0544 03/03/21  0502   WBC 19.4* 24.7* 23.2*   HGB 14.2 14.5 11.2*   HCT 42.1 44.4 35.4*    424 352       Recent Labs     03/01/21  1054 03/02/21  0544 03/02/21  1202 03/03/21  0502    144  --  142   K 3.4* 2.9* 3.4* 3.6   CL 99 104  --  111*   CO2 26 24  --  22   BUN 19 27*  --  39*   CREATININE 0.9 1.1  --  1.5*   CALCIUM 9.1 9.0  --  8.4*   PHOS  --   --   --  3.4       No results for input(s): PROT, ALB, ALKPHOS, ALT, AST, BILITOT, AMYLASE, LIPASE in the last 72 hours. Recent Labs     03/02/21  0624   INR 1.2       No results for input(s): [facility] in the last 72 hours. Chronic labs:    Lab Results   Component Value Date    CHOL 78 12/13/2013    TRIG 40 12/13/2013    HDL 28.0 (A) 12/13/2013    LDLCALC 42 12/13/2013    TSH 0.692 12/13/2013    INR 1.2 03/02/2021    LABA1C 6.6 (H) 02/26/2021       Radiology: REVIEWED DAILY    +++++++++++++++++++++++++++++++++++++++++++++++++  [place], [place]  +++++++++++++++++++++++++++++++++++++++++++++++++  NOTE: This report was transcribed using voice recognition software. Every effort was made to ensure accuracy; however, inadvertent computerized transcription errors may be present.

## 2021-03-03 NOTE — ANESTHESIA POSTPROCEDURE EVALUATION
Department of Anesthesiology  Postprocedure Note    Patient: Jaiden Sheffield  MRN: [id number]  [place]: 1945  Date of evaluation: 3/3/2021  Time:  6:30 AM     Procedure Summary     Date: 03/02/21 Room / Location: [address] [place] [address] 20 / [facility]    Anesthesia Start: 1036 Anesthesia Stop: 1343    Procedure: CRANIOTOMY STEREOTATIC BIOPSY, FRAMELESS, CEREBRAL POSTERIOR FOSSA (NEEDS PATHOLOGY) (N/A Head) Diagnosis: (BRAIN TUMOR)    Surgeons: Ruben Chu MD Responsible Provider: Radha Franklin DO    Anesthesia Type: general, TIVA ASA Status: 4          Anesthesia Type: general, TIVA    Maninder Phase I: Maninder Score: 5    Maninder Phase II:      Last vitals: Reviewed and per EMR flowsheets.        Anesthesia Post Evaluation    Patient location during evaluation: ICU  Patient participation: complete - patient participated  Level of consciousness: awake and alert  Airway patency: patent  Nausea & Vomiting: no nausea and no vomiting  Complications: no  Cardiovascular status: blood pressure returned to baseline  Respiratory status: acceptable  Hydration status: euvolemic

## 2021-03-03 NOTE — CARE COORDINATION
Transition of Care-Discharge plan remains [facility] at the [facility], no pre-cert required, can transfer when medically stable and restraint/sitter free for 24 hrs. Patient is POD#1-s/p suboccipital craniectomy for brain tumor, biopsy and debulking. HENS, ambulance form and envelope in soft chart.      Candance Gimenez BSN, RN  [facility]   [phone]

## 2021-03-03 NOTE — FLOWSHEET NOTE
Assess per flowsheet, a&o to self/place, slight garbled speech. GAMINO. Failed bedside swallow.   Will attempt again

## 2021-03-03 NOTE — FLOWSHEET NOTE
Patient becomes restless and not knowingly, starts to pull on loyola catheter and iv lines. Moving legs by end of bed/ rails. Reminded frequently but continues behavior. Bilateral soft wrist and soft ankle restraints applied for safety.

## 2021-03-03 NOTE — OP NOTE
[address]                  [address] [facility], [address] [place] [address]                                OPERATIVE REPORT    PATIENT NAME: Marita Ohara                      :        1945  MED REC NO:   [id number]                            ROOM:       3820  ACCOUNT NO:   [de-identified]                           ADMIT DATE: 2021  PROVIDER:     Geovanna Magallon MD    DATE OF PROCEDURE:  2021    SURGEON:  Geovanna Magallon MD    PREOPERATIVE DIAGNOSIS:  Left cerebellar lesion most probably  metastatic. 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 PROCEDURE:  Frameless stereotactic left suboccipital craniectomy for  brain tumor, biopsy and debulking. TECHNIQUE:  The patient was placed on the operating room table in supine  position and after satisfactory endotracheal general anesthesia had been  administered, the patient was turned into prone position and the head  was turned to the right side, so the left side was facing up. The  retromastoid area and the suboccipital area on the left side was shaved  and prepared with Betadine scrub and solution and routinely draped. The patient had undergone an MRI scan of the brain with contrast  enhancement with Bravo protocol. The information from the MRI scanner was  transferred to the SOLO computer in the operating room. The  registration of the skull and the facial marking was done thus  calibrating the probe of the Stealth, which was then used to define the  various structure and for navigation purposes. An incision was made in the retromastoid area on the left side which  extended from the upper part of the neck to the suboccipital and  retromastoid area. The incision measuring 7 cm was taken down through  the skin, subcutaneous tissue, and galea aponeurotica. The  subperiosteal dissection was carried out in the posterior temporal bone  and also the suboccipital bone. The edges of the scalp were retracted  thus exposing the bone. The incision was carried down through the upper  part of the neck thus exposing suboccipital bone. Again using the  probe, the lesion was defined. A xochilt hole was made in the suboccipital area, which was then enlarged  to the size of 2 cm x 2 cm. The dura was opened in a cruciate fashion. Again using the probe, the lesion was defined. Then, at the depth about  0.5 cm, the lesion was encountered. Biopsy of the lesion was taken by  retracting part of the exposed cerebellar hemisphere. The lesion  appeared to be slightly gray in color and somewhat discolored from the  normal brain. The biopsy report on frozen section was a possible  glioma, but not a metastatic lesion. More of the lesion was removed and probably subtotal excision of the  tumor was thus accomplished. After hemostasis had been secured, the cavity thus produced by the  removal of the tumor was covered with pieces of Surgicel. The area of  craniectomy in the suboccipital area was covered with pieces of Gelfoam.  A large xochilt hole cover was then used to cover the skull defect into the  suboccipital area using five screws. Then, self-retaining retractors were removed. The muscle layer of  suboccipital muscles were left fall into normal anatomic position. They  were approximated with 3-0 Surgilon sutures. The galea aponeurotica was  closed with 3-0 Surgilon sutures and skin edges approximated with 3-0  nylon suture. Dry dressing was applied over the incision. The patient  sent to recovery room in satisfactory state. Estimated blood loss was  about 40 mL. The specimen was the tumor.         Seble Moran MD    D: 03/02/2021 23:38:56       T: 03/03/2021 5:54:19     [email or web address]  Job#: [id number]     Doc#: [id number]    CC:

## 2021-03-03 NOTE — PROGRESS NOTES
Pt seen at bedside. Pt underwent left suboccipital craniectomy for  brain tumor, biopsy and debulking 3/2/21. Is awake and alert; calm at present with slight . Is oriented to person; not able to state place or year. Follows commands with sensation in all extremities. Has left sided weakness (from previous stroke). Continues on decadron, keppra, gabapentin and depakote. Message left for wife Angella Cannon to contact Palliative Medicine if need of update. Will await return call back. For now, goals are to continue his   current work-up and continue full support. Goals-continue current care; monitor neuro status; continue workup for plan of care. Will follow along.

## 2021-03-03 NOTE — PROGRESS NOTES
PO#1  FLS Post Fossa Craniotomy  Postoperative changes in the left suboccipital region/left lobe of the   cerebellum.  Tiny amount of hemorrhage in the surgical bed is similar to only   questionably slightly more pronounced and continued follow-up is recommended. No other significant change. Stable Neurosurgically.   Incision healthy  Awaiting final path report

## 2021-03-03 NOTE — PROGRESS NOTES
Per RN pt is unable to come to CT at this moment. Pt pulled loyola catheter out and is getting cleaned up. Contact #[id number] to reschedule a time to scan pt.

## 2021-03-03 NOTE — FLOWSHEET NOTE
Patient observed not kicking or moving legs to bedside for 3 hours.  Bilateral soft ankle restraints removed

## 2021-03-03 NOTE — FLOWSHEET NOTE
Patient being bathed; with L hand free, pulled out his loyola catheter. Blood oozing from meatus. Pressure held.

## 2021-03-04 NOTE — PROGRESS NOTES
Blood and [facility]  Hematology/Oncology  Consult        Patient Name: Terrance Carr  YOB: 1945  PCP: Jerri Sterling DO   Referring Provider: No address on file     Reason for Consultation: No chief complaint on file. Subjective: Patient resting comfortably and calm. History of Present Illness: This pt is a 75 yo male w/ a PMH of hypertension, diabetes, dementia, seizure disorder, BPH, depression, atrial fibrillation on anticoagulation with Eliquis, chronic kidney disease, melanoma removed from the back and forehead, chronic back pain, trigeminal neuralgia, CVA with residual unsteady gait and carotid artery disease who presented to [facility] from [facility] 2/25/21 after presenting with AMS and confusion with irrational behavior. In the ER at [facility] ([facility]), an MRI of the brain revealed a heterogeneous enhancing mass in the left cerebral hemisphere suspicious for neoplasm and several foci of extra-axial parasellar cisterns and subependymal enhancement in the left lateral ventricle suspicious for metastatic disease. From [facility], CT scan of the chest showed emphysema and atelectasis, no masses or consolidation. CT scan of the abdomen and pelvis is negative for any acute findings    Currently patient remains confused and lethargic requiring restraints. CBC stable with mild leukocytosis likely from steroids. Oncology has been consulted for CUP with CNS mets, likely from prior melanoma.      Diagnostic Data:     Past Medical History:   Diagnosis Date    Atrial fibrillation ([address]     2018 pt states has a heart monitor, implantable    Carotid artery stenosis     History of cardiovascular stress test 11/13/13    lexiscan    Hyperlipidemia     Hypertension     Non-insulin dependent type 2 diabetes mellitus ([address]     Unspecified cerebral artery occlusion with cerebral infarction nov 11 2013    right face numb balance problems vision fades in and out  walks with walker       Patient Active Problem List    Diagnosis Date Noted    Carotid stenosis 11/12/2013     Priority: High    CVA (cerebral vascular accident) 11/15/2013     Priority: Medium    Melanoma ([address] 02/26/2021    Hypokalemia 02/26/2021    Stage 3 chronic kidney disease 02/26/2021    COPD (chronic obstructive pulmonary disease) ([address] 02/26/2021    Constipation 02/26/2021    Renal cyst, left 02/26/2021    AMS (altered mental status) 02/26/2021    Dementia ([address] 02/26/2021    Seizure ([address] 02/26/2021    History of CVA (cerebrovascular accident) 02/26/2021    Atrial fibrillation ([address] 02/26/2021    Anticoagulated 02/26/2021    Brain metastasis ([address] 02/25/2021    Left cataract 11/03/2020    Leucocytosis 12/15/2013    Vitamin D deficiency 12/15/2013    Non-insulin dependent type 2 diabetes mellitus ([address]     Hypertension     Hyperlipidemia     Cerebral artery occlusion with cerebral infarction ([address] 11/11/2013        Past Surgical History:   Procedure Laterality Date    APPENDECTOMY      CAROTID ENDARTERECTOMY Left Dec 2013    Left Carotid Endarterectomy    CRANIOTOMY N/A 3/2/2021    CRANIOTOMY STEREOTATIC BIOPSY, FRAMELESS, CEREBRAL POSTERIOR FOSSA (NEEDS PATHOLOGY) performed by Meche Gaviria MD at [facility] ECHO COMPL W DOP COLOR FLOW  11/12/2013         INTRACAPSULAR CATARACT EXTRACTION Left 11/3/2020    LEFT EYE CATARACT EMULSIFICATION IOL IMPLANT performed by Arielle Su MD at [facility]         Family History  Family History   Adopted: Yes       Social History    TOBACCO:   reports that he quit smoking about 20 months ago. His smoking use included cigarettes. He smoked 1.00 pack per day. He has never used smokeless tobacco.  ETOH:   reports current alcohol use of about 1.0 standard drinks of alcohol per week. Home Medications  Prior to Admission medications    Medication Sig Start Date End Date Taking?  Authorizing Provider   magnesium oxide (MAG-OX) 400 MG tablet Take 400 mg by mouth daily   Yes Historical Provider, MD   SITagliptin (JANUVIA) 50 MG tablet Take 50 mg by mouth daily   Yes Historical Provider, MD   insulin glargine (LANTUS) 100 UNIT/ML injection vial Inject 12 Units into the skin nightly   Yes Historical Provider, MD   dexamethasone (DECADRON) 4 MG tablet Take 6 mg by mouth daily   Yes Historical Provider, MD   divalproex (DEPAKOTE SPRINKLE) 125 MG capsule Take 125 mg by mouth 2 times daily   Yes Historical Provider, MD   melatonin 3 MG TABS tablet Take 5 mg by mouth nightly as needed   Yes Historical Provider, MD   NONFORMULARY Take 125 mcg by mouth 2 times daily tikosyn   Yes Historical Provider, MD   apixaban (ELIQUIS) 5 MG TABS tablet Take by mouth 2 times daily    Historical Provider, MD   hydrALAZINE (APRESOLINE) 50 MG tablet Take 25 mg by mouth 3 times daily     Historical Provider, MD   lamoTRIgine (LAMICTAL) 100 MG tablet Take 200 mg by mouth 2 times daily    Historical Provider, MD   metoprolol succinate (TOPROL XL) 50 MG extended release tablet Take 50 mg by mouth 2 times daily    Historical Provider, MD   potassium chloride (KLOR-CON M) 20 MEQ extended release tablet Take 20 mEq by mouth 2 times daily     Historical Provider, MD   glipiZIDE (GLUCOTROL) 5 MG tablet Take 10 mg by mouth 2 times daily    Historical Provider, MD   tamsulosin (FLOMAX) 0.4 MG capsule Take 0.4 mg by mouth nightly    Historical Provider, MD   gabapentin (NEURONTIN) 300 MG capsule Take 300 mg by mouth 2 times daily.     Historical Provider, MD   albuterol sulfate  (90 Base) MCG/ACT inhaler Inhale 2 puffs into the lungs every 6 hours as needed for Wheezing or Shortness of Breath    Historical Provider, MD   influenza virus trivalent vaccine (FLUZONE) injection Inject 0.5 mLs into the muscle once Given 10/2019    Historical Provider, MD   HYDROcodone-acetaminophen (NORCO) 5-325 MG per tablet Take 1 tablet by mouth every 6 hours as needed for Pain    Historical Provider, MD cloNIDine (CATAPRES) 0.1 MG tablet Take 0.2 mg by mouth 2 times daily     Historical Provider, MD   metFORMIN (GLUCOPHAGE) 500 MG tablet Take 1,000 mg by mouth 2 times daily     Historical Provider, MD   spironolactone (ALDACTONE) 25 MG tablet Take 1 tablet by mouth 2 times daily. Patient taking differently: Take 25 mg by mouth daily  11/18/13   Nate Ireland DO, FACOI   lisinopril (PRINIVIL;ZESTRIL) 20 MG tablet Take 20 mg by mouth every 12 hours     Historical Provider, MD   amLODIPine (NORVASC) 10 MG tablet Take 10 mg by mouth daily. Historical Provider, MD   Multiple Vitamins-Minerals (THERAPEUTIC MULTIVITAMIN-MINERALS) tablet Take 1 tablet by mouth daily. Historical Provider, MD   aspirin 81 MG tablet Take 81 mg by mouth daily. Historical Provider, MD       Allergies  No Known Allergies    Review of Systems:    Confused and unable to provide      Objective  BP (!) 141/57   Pulse 100   Temp 98.2 °F (36.8 °C) (Temporal)   Resp 15   Ht 5' 5\" (1.651 m)   Wt 135 lb 12.9 oz (61.6 kg)   SpO2 94%   BMI 22.60 kg/m²     Physical Exam:   Performance Status:  General: Confused, requiring restraint  Head and neck : PERRLA, EOMI . Sclera non icteric. Oropharynx : Clear  Neck: no JVD,  no adenopathy  LYMPHATICS : No LAD  Heart: Regular rate and regular rhythm, no murmur  Lungs: Clear to auscultation   Extremities: No edema,no cyanosis, no clubbing.    Abdomen: Soft, non-tender;no masses, no organomegaly  Skin:  No rash  Neurologic:Confused and in restraints    Recent Laboratory Data-   Lab Results   Component Value Date    WBC 21.5 (H) 03/04/2021    HGB 10.4 (L) 03/04/2021    HCT 31.5 (L) 03/04/2021    MCV 85.6 03/04/2021     03/04/2021    LYMPHOPCT 0.9 (L) 03/04/2021    RBC 3.68 (L) 03/04/2021    MCH 28.3 03/04/2021    MCHC 33.0 03/04/2021    RDW 16.4 (H) 03/04/2021    NEUTOPHILPCT 98.3 (H) 03/04/2021    MONOPCT 2.2 03/04/2021    BASOPCT 0.1 03/04/2021    NEUTROABS 21.29 (H) 03/04/2021 LYMPHSABS 0.22 (L) 03/04/2021    MONOSABS 0.00 (L) 03/04/2021    EOSABS 0.00 (L) 03/04/2021    BASOSABS 0.00 03/04/2021       Lab Results   Component Value Date     03/04/2021    K 4.0 03/04/2021     (H) 03/04/2021    CO2 26 03/04/2021    BUN 46 (H) 03/04/2021    CREATININE 1.5 (H) 03/04/2021    GLUCOSE 231 (H) 03/04/2021    CALCIUM 8.2 (L) 03/04/2021    PROT 7.5 01/31/2020    LABALBU 4.5 01/31/2020    BILITOT 0.5 01/31/2020    ALKPHOS 70 01/31/2020    AST 49 (H) 01/31/2020    ALT 41 (H) 01/31/2020    LABGLOM 46 03/04/2021    GFRAA 55 03/04/2021       No results found for: IRON, TIBC, FERRITIN        Radiology-    CT HEAD WO CONTRAST   Final Result   Postoperative changes in the left suboccipital region/left lobe of the   cerebellum. Tiny amount of hemorrhage in the surgical bed is similar to only   questionably slightly more pronounced and continued follow-up is recommended. No other significant change. CT HEAD WO CONTRAST PORTABLE   Final Result   1   1. Postsurgical changes including left suboccipital craniotomy defect. 2. No evidence of acute intracranial abnormality. 3. Atrophy and chronic white matter ischemic changes. MRI BRAIN W WO CONTRAST   Final Result   1. Somewhat limited evaluation due to patient motion artifact, especially on   the postcontrast enhanced images. 2. Multiple intracranial METASTASES, most notably in the left cerebellar   hemisphere. Metastatic lesion is also seen in the region of the mammillary   bodies. 3. HYDROCEPHALUS, with dilation of the lateral and 3rd ventricles. The   etiology is unclear. Findings may be due to mass effect on the distal   aqueduct from vasogenic edema in the left cerebellar hemisphere. 4.  The findings were submitted to the Radiology Results [address]   at 13:41 on 2/27/2021  to in be communicated to a licensed caregiver.       XR CHEST PORTABLE   Final Result   No radiographic evidence of acute abnormality ASSESSMENT/PLAN :  77 yo male  History of melanoma s/p resection of back and forehead   A fib on eliquis  Multiple new CNS lesions, consistent with metastasis   AMS    - MRI personally reviewed. Highly concerning for metastatic neoplastic process  - Case discussed with Dr. Sidney Harrington. We are in agreement for CNS biopsy to confirm pathology. Likely represents recurrent melanoma  - Likely will benefit from XRT [facility]). Radiation oncology consult placed  - Will check BRAF, KIT, NTRK, NRAS assuming melanoma confirmed  - Targeted therapy if mutation detected. If not, recommend front line IO/IO with ipi/nivo  - Cont steroids  - Will follow      3/1/21  - Patient to have a head CT wo contrast today  - CNS biopsy tomorrow to confirm pathology which likely represents recurrent melanoma  - Likely will benefit from XRT . Radiation oncology consult placed  -If melanoma confirmed will check BRAF, KIT, NTRK, NRAS and targeted therapy if mutation detected. -If no mutation detected, recommend front line IO/IO with ipi/nivo  -Continue steroids    3/2/2021  - Head CT reviewed. - He is status post craniotomy stereotactic biopsy in the cerebral posterior fossa. Will follow pathology. - Radiation oncology recommend WBRT for palliative radiation therapy   - If melanoma confirmed will check BRAF, KIT, NTRK, NRAS and targeted therapy if mutation detected. If no mutation detected, recommend front line IO/IO with ipi/nivo  - Continue steroids    3/4/21  - CBC continues with leukocytosis related to steroid use and worsening anemia.  - Pathology remains pending.  - Rad onc recommends WBRT for palliative radiation therapy. Pending tissue dx  - Tentative treatment plan as above  - Continue on steroids    Electronically signed by RUIZ Doyle - CNP on 3/4/2021 at 10:51 AM   Patient seen and examined.   Note edited to reflect above  Trace Amaya MD

## 2021-03-04 NOTE — FLOWSHEET NOTE
Patient agitated and verbal. Bilateral soft wrist restraints continued for safety; attempting to pull arms and hands up to get out of bed. Reoriented to situation and time.  Continues agitation; see MAR

## 2021-03-04 NOTE — PROGRESS NOTES
[facility] PROGRESS NOTE      Chief complaint: Follow-up of leukocytosis    The patient is a 76 y.o. male with history of DM, hypertension, atrial fibrillation, hyperlipidemia, stroke, dementia, seizure disorder, melanoma status post excision from back and forehead 7 years ago, transferred to [facility] on 02/25 from [facility] where he initially presented with confusion and abnormal behavior for 3 weeks, found to have MRI of the brain showing multiple intracranial metastasis most notably in the left cerebellar hemisphere, hydrocephalus with dilation of the lateral and third ventricles. On admission, he was afebrile and hemodynamically stable with leukocytosis of 14,000. Urinalysis showed no pyuria. SARS-CoV-2 PCR was negative. Chest x-ray was unremarkable. 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 Dexamethasone has been started since transfer. Leukocytosis increased to 24,000 on 03/02. He received cefazolin perioperatively. He pulled out his Hogan catheter on 03/02 causing penile bleeding. Subjective: Patient was seen and examined. No chills, no abdominal pain, no diarrhea, no rash, no itching. Had an episode of hypotension and tachycardia this morning. Objective:  BP (!) 141/57   Pulse 100   Temp 98.2 °F (36.8 °C) (Temporal)   Resp 15   Ht 5' 5\" (1.651 m)   Wt 135 lb 12.9 oz (61.6 kg)   SpO2 94%   BMI 22.60 kg/m²   Constitutional: Alert, not in distress  Respiratory: Clear breath sounds, no crackles, no wheezes  Cardiovascular: Regular rate and rhythm, no murmurs  Gastrointestinal: Bowel sounds present, soft, nontender  Skin: Warm and dry, no active dermatoses  Musculoskeletal: No joint swelling, no joint erythema    Labs, imaging, and medical records/notes were personally reviewed.     Assessment:  Leukocytosis, probably medication induced from steroids    Recommendations: Monitor clinically off antibiotics for now.     Thank you for involving me in the care of Conner Aden. I will continue to follow. Please do not hesitate to call for any questions or concerns.     Electronically signed by Sarajane Aschoff, MD on 3/4/2021 at 10:39 AM

## 2021-03-04 NOTE — FLOWSHEET NOTE
Patient is cooperative but inpulsive; attempts to reach for IV lines on Rarm with L hand. Needs constant redirection.  Bilateral soft wrist restraints continued for safety

## 2021-03-04 NOTE — PLAN OF CARE
Problem: Non-Violent Restraints  Goal: No harm/injury to patient while restraints in use  3/4/2021 1252 by Lemuel Herring RN  Outcome: Met This Shift     Problem: Non-Violent Restraints  Goal: Patient's dignity will be maintained  3/4/2021 1726 by Lemuel Herring RN  Outcome: Met This Shift     Problem: Non-Violent Restraints  Goal: Removal from restraints as soon as assessed to be safe  3/4/2021 1252 by Lemuel Herring RN  Outcome: Met This Shift

## 2021-03-04 NOTE — CARE COORDINATION
Transition of Care-Discharge plan remains [facility] at the [facility], no pre-cert required, can transfer when medically stable and restraint/sitter free for 24 hrs. Patient is POD#2-s/p suboccipital craniectomy for brain tumor, biopsy and debulking. Patient remains in bilateral wrist restraints, requiring 6L NC, off Cardizem gtt. HENS, ambulance form and envelope in soft chart.      Elise STARKN, RN  [facility]   [phone]

## 2021-03-04 NOTE — PROGRESS NOTES
Palliative Care Department  [phone]  Palliative Care Initial Consult  Aaron Almanza [facility], [address]  [id number]  Hospital Day: 8    Date of Initial Consult: 2/28/2021  Referring Provider: Yue SALAZAR  Palliative Medicine was consulted for assistance with: Code status Discussion, Assist with goals of care, Symptom Management and Family Support      HPI:       Luis Amezquita is a 76 y.o. male with significant past medical history of hypertension, atrial fibrillation, seizure disorder, dementia, chronic kidney disease, as well as prior melanoma on his back and forehead status post resection, and chronic back pain as well as trigeminal neuralgia. He was admitted on 2/25/2021 with CHIEF complaint of AMS after he was found to have brain metastasis at an outside hospital.  He was transferred to [facility] for neurosurgical evaluation, and consultations have also been placed to medical and radiation oncology, with a high concern for malignant melanoma.     ASSESSMENT/PLAN:     Pertinent Hospital Diagnoses:  Current medical issues leading to Palliative Medicine involvement include   Active Hospital Problems     Diagnosis Date Noted    Melanoma ([facility] [C43.9] 02/26/2021    Hypokalemia [E87.6] 02/26/2021    Stage 3 chronic kidney disease [N18.30] 02/26/2021    COPD (chronic obstructive pulmonary disease) ([address] [J44.9] 02/26/2021    Constipation [K59.00] 02/26/2021    Renal cyst, left [N28.1] 02/26/2021    AMS (altered mental status) [R41.82] 02/26/2021    Dementia ([address] [F03.90] 02/26/2021    Seizure ([facility] [R56.9] 02/26/2021    History of CVA (cerebrovascular accident) [Z86.73] 02/26/2021    Atrial fibrillation ([address] [I48.91] 02/26/2021    Anticoagulated [Z79.01] 02/26/2021    Brain metastasis ([address] [C79.31] 02/25/2021    Leucocytosis [D72.829] 12/15/2013    Non-insulin dependent type 2 diabetes mellitus ([address] [E11.9]         Palliative Care Telephone Encounter by Yanci Frye at 09/06/18 07:31 AM     Author:  Yanci Frye Service:  (none) Author Type:       Filed:  09/06/18 07:32 AM Encounter Date:  9/6/2018 Status:  Signed     :  Yanci Frye ()              FRANK COLON    Patient Age: 54 year old    ACCT STATUS: COPAY  MESSAGE:[CT1.1T]   Hospitalist Dr. giang left instructions for this patient to be scheduled with [CT1.1C] Raheem[CT1.1M] in[CT1.1C] 2 weeks[CT1.1M] for a hospital follow-up.  Patient was discharged from [facility] on 9/5/18.  Please contact to schedule appointment[CT1.1C]     Next and Last Visit with Provider and Department  Next visit with KERRIE BROWNING is on No match found  Next visit with GASTROENTEROLOGY is on No match found  Last visit with KERRIE BROWNING was on 07/17/2018 at 10:00 AM in [facility]  Last visit with GASTROENTEROLOGY was on 07/17/2018 at 10:00 AM in [facility]     WEIGHT AND HEIGHT: As of 08/20/2018 weight is 227.625 lbs.(103.250 kg). Height is 6' 5\"(1.956 m).   BMI is 26.99 kg/(m^2) calculated from:     Height 6' 5\" (1.956 m) as of 8/20/18     Weight 227 lb 10 oz (103.25 kg) as of 8/20/18      No Known Allergies  Current outpatient prescriptions       Medication  Sig Dispense Refill   • ondansetron (ZOFRAN) 8 MG tablet Take 1 Tab by mouth every 8 (eight) hours as needed for Nausea. 30 Tab 5   • prochlorperazine (COMPAZINE) 10 MG tablet Take 1 Tab by mouth every 6 (six) hours as needed. 30 Tab 5   • doxycycline (VIBRAMYCIN) 50 MG Cap Take 1 Cap by mouth 2 (two) times daily for 28 days. 56 Cap 0   • metoprolol (TOPROL XL) 50 MG 24 hr tablet Take 1 Tab by mouth nightly. 90 Tab 3   • ferrous sulfate 325 (65 FE) MG tablet Take 1 Tab by mouth daily with breakfast. 30 Tab 0   • pantoprazole (PROTONIX) 40 MG tablet Take 1 Tab by mouth 2 (two) times daily. 60 Each 11   • fexofenadine (ALLEGRA ALLERGY) 180 MG tablet Take 180 mg by mouth  Encounter / Counseling Regarding Goals of Care  [address], Does Not have capacity for medical decision-making. Capacity is time limited and situation/question specific   During encounter wife Oracio Harris was surrogate medical decision-maker   Outcome of goals of care meeting: continue current care; awaiting surgical path report   Code status Full Code   Advanced Directives: no HPOA or Living Will noted in chart  · Surrogate/Legal NOK:              Jade Loco ([phone]) is the patient's wife  · Will follow and continued to discuss goals of care pending clinical progression. Details of Conversation:    3/4/21 Pt alert and awake; calm, oriented to person and place but still with some confusion to situation and time. Still with intermittent agitation at times; has ativan if needed (1 dose in 24 hrs). Pt ate well today. Follows commands. Wife, Oracio Harris and daughter, Perfecto Roy present at bedside. They were updated; support offered. They are aware surgical pathology is pending and takes a few days. Pt, wife and daughter have been told this may be a malignancy. Continues on keppra, Depakote, Lamictal.  Pt developed hematuria overnight; bladder scan is ordered. Oncology and Neurosurgery following. Support to pt and family. Spoke with staff. Goal- continue current care; await path results.     Spiritual assessment: no spiritual distress identified  Bereavement and grief: to be determined  Referrals to: none today    SUBJECTIVE:     Active Hospital Problems    Diagnosis Date Noted    Melanoma ([facility] [C43.9] 02/26/2021    Hypokalemia [E87.6] 02/26/2021    Stage 3 chronic kidney disease [N18.30] 02/26/2021    COPD (chronic obstructive pulmonary disease) ([facility] [J44.9] 02/26/2021    Constipation [K59.00] 02/26/2021    Renal cyst, left [N28.1] 02/26/2021    AMS (altered mental status) [R41.82] 02/26/2021    Dementia ([facility] [F03.90] 02/26/2021    Seizure ([facility] [R56.9] 02/26/2021    History of CVA (cerebrovascular accident) daily.     • Brimonidine Tartrate (MIRVASO) 0.33 % GEL Apply topically daily prn for redness 30 g 11   • Sildenafil Citrate 100 MG OR TABS 1 TABLET DAILY AS NEEDED 8 Tab 5      PHARMACY to use: Please ask patient if needed            Pharmacy preference(s) on file:    [facility] [address] [place]    CALL BACK INFO:[CT1.1T] DO NOT LEAVE A MESSAGE - contact patient directly[CT1.1M]  ROUTING:[CT1.1T] Patient's physician/staff[CT1.1M]        PCP: Alvaro Valdovinos DO         INS: Payor: [facility] / Plan: H [id number] / Product Type: *No Product type* / Note: This is the primary coverage, but no account was found for this location or the patient's primary location.   ADDRESS:  [address] [place] [id number][CT1.1T]       Revision History        User Key Date/Time User Provider Type Action    > CT1.1 09/06/18 07:32 AM Yanci Frye  Sign    C - Copied, M - Manual, T - Template             [Z86.73] 02/26/2021    Atrial fibrillation ([facility] [I48.91] 02/26/2021    Anticoagulated [Z79.01] 02/26/2021    Brain metastasis ([facility] [C79.31] 02/25/2021    Leucocytosis [D72.829] 12/15/2013    Non-insulin dependent type 2 diabetes mellitus (HCC) [E11.9]          OBJECTIVE:   Prognosis: unknown    Physical Exam:  BP (!) 141/57   Pulse 100   Temp 98.2 °F (36.8 °C) (Temporal)   Resp 15   Ht 5' 5\" (1.651 m)   Wt 135 lb 12.9 oz (61.6 kg)   SpO2 94%   BMI 22.60 kg/m²   Gen:  awake, alert; intermittent agitation; oriented to person/place but some confusion to situation and events  HEENT:  Normocephalic, atraumatic, mucosa moist, EOMI  Neck:  Supple, trachea midline, no JVD  Lungs:  CTA bilaterally, no audible rhonchi or wheezes noted, respirations unlabored  Heart:  A-fib per monitor; Abd:  Soft, non tender, non distended, bowel sounds present  :  Hogan catheter  Ext:  Left sided weakness from previous stroke;  no edema, pulses present  Skin:  Warm and dry  Neuro:  PERRL, Alert, oriented to person/place; following commands      Social History:   The patient currently lives at home  TOBACCO:  reports that he quit smoking about 20 months ago. His smoking use included cigarettes. He smoked 1.00 pack per day. He has never used smokeless tobacco.  ETOH:  reports current alcohol use of about 1.0 standard drinks of alcohol per week. Objective data reviewed: labs, images, records, medication use, vitals and chart    Discussed patient and the plan of care with the other IDT members: Palliative Medicine IDT Team    Time/Communication  Greater than 50% of time spent, total 15 minutes in counseling and coordination of care at the bedside regarding goals of care, symptom management, diagnosis and prognosis and see above. Thank you for allowing Palliative Medicine to participate in the care of Agnieszka BeaverDelilah MELCHOR-CNS, ALBERT

## 2021-03-04 NOTE — PLAN OF CARE
Problem: Falls - Risk of:  Goal: Will remain free from falls  Description: Will remain free from falls  Outcome: Met This Shift     Problem: Falls - Risk of:  Goal: Absence of physical injury  Description: Absence of physical injury  Outcome: Met This Shift     Problem: Non-Violent Restraints  Goal: No harm/injury to patient while restraints in use  Outcome: Met This Shift     Problem: Non-Violent Restraints  Goal: Patient's dignity will be maintained  Outcome: Met This Shift     Problem: Non-Violent Restraints  Goal: Removal from restraints as soon as assessed to be safe  Outcome: Not Met This Shift

## 2021-03-04 NOTE — FLOWSHEET NOTE
Patient is still non redirectable, and unable to follow all commands. Patient attempting to pull at IV lines. For the patients safety continued need for bilateral soft restraints are still indicated.  Will continue to monitor

## 2021-03-04 NOTE — PROGRESS NOTES
Surgical  Neuro Science Intensive Care Unit  Critical Care  Daily Progress Note 3/4/2021    Date of Admission: 02/27/2021  Date of ICU Admission; 03/02./21    CC: Follow up for craniotomy for brain mass. HOSPITAL COURSE/OVERNIGHT EVENTS:    02/27  Initially presented ot outside facility with AMS, confusion & erratic behavior. PMH include previous stroke with residual left sided weakness, dementia, HTN, HLD, chronic pain issues, CKD & melanoma. Diagnotic imaging reveal brain masses. Transferred to [facility] for ongoing evaluaitn & neurosurgery consult. CT chest/abdomen/pelvis no evidence of malignancy.    ---------------------  03/02 Underwent craniotomy for biopsy of intracranial mass. Glial neoplasm. Admitted to  ICU post--operatively. 03/03  No issues overnight. Did not require PRN antihypertensive agents. Required 12 units of insulin. Pulled loyola out during the night. Seen by speech recs dental soft diet with honey thick liquids. 3/4 hematuria overnight with bright red blood from penis. Bladder scan ordered    PHYSICAL EXAM:  /60   Pulse 100   Temp 98.2 °F (36.8 °C) (Temporal)   Resp 15   Ht 5' 5\" (1.651 m)   Wt 135 lb 12.9 oz (61.6 kg)   SpO2 94%   BMI 22.60 kg/m²       Intake/Output Summary (Last 24 hours) at 3/4/2021 0940  Last data filed at 3/4/2021 0600  Gross per 24 hour   Intake 1300 ml   Output    Net 1300 ml     General appearance:  Comfortable. Pain Description: none    NEUROLOGIC:   RASS Score:  0  GCS:    4 - Opens eyes on own   6 - Follows simple motor commands  5 - Alert and oriented       Pupil size:  Left 3 mm  Right 3 mm  Pupil reaction: Yes   PERRLA  Wiggles fingers: Left   No  Right Yes  Hand grasp:   Left: No     Right    Yes  Wiggles toes: Left   Yes Right  Yes  Plantar flexion: Left  Yes  Right   Yes  Facial droop:   none   Speech:  Garbled. Crani incision:  CDI    CONSTITUTIONAL: No acute distress, lying in hospital bed.     CARDIOVASCULAR: S1 S2, Gabapentin. Lamictal.  Depakote. CV:  No acute issues. Hx of HTN, Hyperlipidemia and Afib    Monitor hemodynamics. BP goal systolic less than 375 mm Hg. PRN hydralzine & labetalol. Norvasc. Clonidine. Metoprolol. Lisinopruil  Tikosyn. Scheduled hydralazine. Statin. Pulm: At risk for respiratory insufficiency. Monitor RR & SpO2. O2 as needed. Prn albuterol. Encourage cough, SMI  & deep breathing. GI:  Dysphagia. . Monitor bowel function. Diet. Bowel regime. Renal:   CKD,. Hx of BPH. Monitor BUN & Cr, electrolytes & replace as needed. Monitor I & O.    Voids. Mag oxide  Spirolactone stopped. Flomax. ID: No acute issues  Endocrine: Hyperglycemia. Hx of diabetes    Monitor BS. Juan Rodrigez  ISS. MSK: Chronic left sided weakness. Deconditioned.   ROM. Turn & reposition. PT & OT when able;  Monitor for skin breakdown. Heme: leukocytosis, probably reactive   Monitor CBC. Multivitamin       Bowel regime: Colace and Glycolax  Pain control/Sedation: Tylenol, Melatonin, Ativan and Norco.    DVT prophylaxis: SCD and No Lovenox/Heparin at this time due to recent craniotomy. GI prophylaxis: Protonix  Glucose protocol:  ISS  Ancillary consults:  Neurosurgery, Cardiology, Palliative medicine and Critical care, Radiation oncology, Oncology  Code status: Full code. Disposition:Admitted to ICU.       Electronically signed by Vicente Siemens, APRN - CNS, CNP   3/4/2021 9:40 AM

## 2021-03-04 NOTE — PROGRESS NOTES
Hospitalist Progress Note      SYNOPSIS: Patient admitted on 2021 for weakness. He had had altered mental status at home to associated with irrational behavior. MRI of the brain done showed a heterogeneous enhancing mass in the left cerebral hemisphere suspicious for neoplasm and several foci of extra-axial parasellar cisterns and subependymal enhancement of the left lateral ventricle suspicious for metastatic disease. CT of the chest showed no evidence of any malignancy and CT of the abdomen and pelvis also negative for any malignancy. Oncology and palliative medicine on board.         SUBJECTIVE:    Patient seen and examined. Wife and daughter were by his bedside. He had no complaints and said he was very happy because his family was by his bedside. Wife was concerned because he was tachycardic. Review of systems is otherwise negative. Records reviewed. Wife asked what the diagnosis was, and I did tell he that the brain biopsy was done yesterday, and results are pending, but it was most likely malignancy. Wife stated that the neurosurgeon told her it wasn't cancer, but daughter clarified that the neurosurgeon had told them that it was likely cancer, but that the biopsy results were not in yet. Temp (24hrs), Av.6 °F (37 °C), Min:98.2 °F (36.8 °C), Max:99.4 °F (37.4 °C)    DIET: DIET GENERAL; Carb Control: 5 carb choices (75 gms)/meal; Dental Soft; Mildly Thick (Nectar)  CODE: Full Code    Intake/Output Summary (Last 24 hours) at 3/4/2021 0940  Last data filed at 3/4/2021 0600  Gross per 24 hour   Intake 1300 ml   Output    Net 1300 ml       OBJECTIVE:    /60   Pulse 100   Temp 98.2 °F (36.8 °C) (Temporal)   Resp 15   Ht 5' 5\" (1.651 m)   Wt 135 lb 12.9 oz (61.6 kg)   SpO2 94%   BMI 22.60 kg/m²     General appearance: No apparent distress, appears stated age and cooperative. HEENT:  Conjunctivae/corneas clear. Neck: Supple. No jugular venous distention.    Respiratory: Clear to auscultation bilaterally, normal respiratory effort  Cardiovascular: Regular rate rhythm, normal S1-S2  Abdomen: Soft, nontender, nondistended  Musculoskeletal: No clubbing, cyanosis, no bilateral lower extremity edema. Brisk capillary refill. Skin:  No rashes  on visible skin  Neurologic: awake, alert and following commands. In 4 point restraints    ASSESSMENT:  #Metastatic brain neoplasm with unclear primary  -Does have a history of prior melanoma which could be due to occult primary.  -Had biopsy of the brain done; awaiting results  -Aspirin and Eliquis on hold. On Decadron.  -Oncology and neurosurgery on board.     #Acute metabolic encephalopathy  -Was calm and oriented at time of review. However he was in four-point restraints.  -On Ativan as needed. Not on Haldol as he developed a rash after he started Haldol.     #Type 2 diabetes mellitus with  Neuropathy:  - On insulin sliding scale. Accu-Cheks AC at bedtime.  -on gabapentin     #Hypertension:  - On amlodipine, hydralazine and metoprolol as well as lisinopril and clonidine.     #A. Fib:  - currently tachycardic  -started on cardizem drip by neurocritical care o/a of tachycardia  -On metoprolol and Tikosyn. Eliquis on hold.       #History of melanoma:  - This was resected from his back and face. -Biopsy done of brain lesion and pathology pending to see if the brain lesions are due to melanoma. #History of seizures  -on Keppra     DVT prophylaxis: on SCDs. eliquis on hold. Resume eliquis when [id number] Susan Monroy with surgery team     DISPOSITION: to be determined.     Medications:  REVIEWED DAILY    Infusion Medications    dilTIAZem (CARDIZEM) 100 mg in dextrose 5% 100 mL ([facility]) Stopped (03/04/21 0758)     Scheduled Medications    dexamethasone  10 mg Intravenous Q6H    hydrALAZINE  20 mg Intravenous Q6H    metoprolol  5 mg Intravenous Q4H    insulin lispro  0-18 Units Subcutaneous TID WC    insulin lispro  0-9 Units Subcutaneous Nightly    melatonin 10 mg Oral Nightly    levetiracetam  500 mg Intravenous Q12H    pantoprazole  20 mg Intravenous Daily    And    sodium chloride (PF)  10 mL Intravenous Daily    [Held by provider] hydrALAZINE  50 mg Oral TID    amLODIPine  10 mg Oral Daily    cloNIDine  0.2 mg Oral BID    divalproex  125 mg Oral BID    gabapentin  300 mg Oral BID    insulin glargine  12 Units Subcutaneous Nightly    lamoTRIgine  200 mg Oral BID    magnesium oxide  400 mg Oral Daily    [Held by provider] metoprolol succinate  50 mg Oral BID    therapeutic multivitamin-minerals  1 tablet Oral Daily    [Held by provider] alogliptin  6.25 mg Oral Daily    [Held by provider] spironolactone  25 mg Oral Daily    tamsulosin  0.4 mg Oral Nightly    dofetilide  125 mcg Oral 2 times per day    sodium chloride flush  10 mL Intravenous 2 times per day    lisinopril  20 mg Oral Daily    docusate sodium  100 mg Oral BID     PRN Meds: LORazepam, albuterol, HYDROcodone-acetaminophen, glucose, dextrose, glucagon (rDNA), sodium chloride flush, polyethylene glycol, acetaminophen **OR** acetaminophen, hydrALAZINE, labetalol    Labs:     Recent Labs     03/02/21  0544 03/03/21  0502 03/04/21  0510   WBC 24.7* 23.2* 21.5*   HGB 14.5 11.2* 10.4*   HCT 44.4 35.4* 31.5*    352 315       Recent Labs     03/02/21  0544 03/02/21  1202 03/03/21  0502 03/04/21  0510     --  142 145   K 2.9* 3.4* 3.6 4.0     --  111* 113*   CO2 24  --  22 26   BUN 27*  --  39* 46*   CREATININE 1.1  --  1.5* 1.5*   CALCIUM 9.0  --  8.4* 8.2*   PHOS  --   --  3.4 3.6       No results for input(s): PROT, ALB, ALKPHOS, ALT, AST, BILITOT, AMYLASE, LIPASE in the last 72 hours. Recent Labs     03/02/21  0624   INR 1.2       No results for input(s): Mckenzie Arrieta in the last 72 hours.     Chronic labs:    Lab Results   Component Value Date    CHOL 78 12/13/2013    TRIG 40 12/13/2013    HDL 28.0 (A) 12/13/2013    LDLCALC 42 12/13/2013    TSH 0.692 12/13/2013 INR 1.2 03/02/2021    LABA1C 6.6 (H) 02/26/2021       Radiology: REVIEWED DAILY    +++++++++++++++++++++++++++++++++++++++++++++++++  [place], [place]  +++++++++++++++++++++++++++++++++++++++++++++++++  NOTE: This report was transcribed using voice recognition software. Every effort was made to ensure accuracy; however, inadvertent computerized transcription errors may be present.

## 2021-03-04 NOTE — FLOWSHEET NOTE
Bladder scanned for 300, patient then voided 100 of bloody urine. Patient BP then decreased, called Neuro NP to bedside to evaluate the patient.  See orders

## 2021-03-05 PROBLEM — N18.9 ACUTE KIDNEY INJURY SUPERIMPOSED ON CKD (HCC): Status: ACTIVE | Noted: 2021-01-01

## 2021-03-05 PROBLEM — N17.9 ACUTE KIDNEY INJURY SUPERIMPOSED ON CKD (HCC): Status: ACTIVE | Noted: 2021-01-01

## 2021-03-05 NOTE — PROGRESS NOTES
Electrophysiology progress note         Date:  3/5/2021  Patient: Marilee Bennett  Admission:  2/25/2021 10:19 PM  Admit DX: Brain metastasis ([address] [C79.31]  Age:  76 y.o., 1945     LOS: 8 days     Reason for evaluation:   atrial fibrillation, hypertension and intracranial tumor      SUBJECTIVE:    The patient was seen and examined. Notes and labs reviewed. There were no complications over night. Patient is somewhat somnolent but arousable    OBJECTIVE:    Telemetry: Sinus rhythm now. AF with rapid rate overnight  BP (!) 170/67   Pulse 79   Temp 99.7 °F (37.6 °C) (Axillary)   Resp 19   Ht 5' 5\" (1.651 m)   Wt 135 lb 12.9 oz (61.6 kg)   SpO2 95%   BMI 22.60 kg/m²     Intake/Output Summary (Last 24 hours) at 3/5/2021 1332  Last data filed at 3/5/2021 1200  Gross per 24 hour   Intake 880 ml   Output 705 ml   Net 175 ml       EXAM:   CONSTITUTIONAL: Arousable, cooperative, no apparent distress, and appears stated age. HEENT: Normal jugular venous pulsations, Head is atraumatic, normocephalic. Eyes and oral mucosa are normal.  LUNGS: Good respiratory effort. No for increased work of breathing. On auscultation: clear to auscultation bilaterally  CARDIOVASCULAR:   regular rate and rhythm, normal S1 and S2, no S3   ABDOMEN: Soft, nontender, nondistended. SKIN: Warm and dry. EXTREMITIES: No lower extremity edema. Motor movement grossly intact. No cyanosis or clubbing.     Current Inpatient Medications:   polyethylene glycol  17 g Oral Daily    senna  1 tablet Oral Nightly    lidocaine PF  5 mL Intradermal Once    heparin flush  3 mL Intravenous 2 times per day    ampicillin-sulbactam  3,000 mg Intravenous Q6H    amiodarone  200 mg Oral Q8H    metoprolol  5 mg Intravenous Q6H    insulin lispro  0-18 Units Subcutaneous TID WC    insulin lispro  0-9 Units Subcutaneous Nightly    melatonin  10 mg Oral Nightly    pantoprazole  20 mg Intravenous Daily    And    sodium chloride (PF)  10 mL Intravenous Daily    divalproex  125 mg Oral BID    gabapentin  300 mg Oral BID    insulin glargine  12 Units Subcutaneous Nightly    lamoTRIgine  200 mg Oral BID    [Held by provider] magnesium oxide  400 mg Oral Daily    [Held by provider] metoprolol succinate  50 mg Oral BID    [Held by provider] therapeutic multivitamin-minerals  1 tablet Oral Daily    tamsulosin  0.4 mg Oral Nightly    sodium chloride flush  10 mL Intravenous 2 times per day    [Held by provider] lisinopril  20 mg Oral Daily    docusate sodium  100 mg Oral BID       IV Infusions (if any):   sodium chloride 50 mL/hr at 03/05/21 0857    amiodarone 450mg/250ml D5W infusion 0.5 mg/min (03/05/21 1320)       Diagnostics:    EKG: normal sinus rhythm, unchanged from previous tracings. ECHO: reviewed. Ejection fraction: 55%  Stress Test: not obtained. Cardiac Angiography: not obtained. Labs:   CBC:   Recent Labs     03/04/21  1515 03/05/21  0450   WBC 28.7* 38.1*   HGB 11.3* 10.1*   HCT 34.2* 30.8*    347     BMP:   Recent Labs     03/04/21  0510 03/05/21  0450    147*   K 4.0 4.0   CO2 26 24   BUN 46* 54*   CREATININE 1.5* 1.6*   LABGLOM 46 42   GLUCOSE 231* 281*     BNP: No results for input(s): BNP in the last 72 hours. PT/INR: No results for input(s): PROTIME, INR in the last 72 hours. APTT:No results for input(s): APTT in the last 72 hours. CARDIAC ENZYMES:No results for input(s): CKTOTAL, CKMB, CKMBINDEX, TROPONINI in the last 72 hours. FASTING LIPID PANEL:  Lab Results   Component Value Date    HDL 28.0 12/13/2013    LDLCALC 42 12/13/2013    TRIG 40 12/13/2013     LIVER PROFILE:No results for input(s): AST, ALT, LABALBU in the last 72 hours.     ASSESSMENT:    Patient Active Problem List   Diagnosis    Hypertension    [facility]    Non-insulin dependent type 2 diabetes mellitus (HCC)    [facility]    Stage 3 chronic kidney disease-serum creatinine has increased from 1.1-1.5 during this hospitalization. Calculated GFR of less than 50mL/min    COPD (chronic obstructive pulmonary disease) [facility]    AMS (altered mental status)--on presentation to the hospital.  This has improved.  Seizure ([address]    Atrial fibrillation (HCC)--patient now in sinus rhythm on IV and oral amiodarone       Recommendations    Continue oral and IV amiodarone  Blood pressure control as needed  Will administer 1 dose of IV diuretic in view of the worsening renal function and low urine output   Further recommendations to follow    Please see orders. Discussed with patient and nursing.     Isaac Tilley MD,FACC

## 2021-03-05 NOTE — PROGRESS NOTES
Hospitalist Progress Note      SYNOPSIS: Patient admitted on 2021 for weakness. Lupe Vásquez had had altered mental status at home to associated with irrational behavior.  MRI of the brain done showed a heterogeneous enhancing mass in the left cerebral hemisphere suspicious for neoplasm and several foci of extra-axial parasellar cisterns and subependymal enhancement of the left lateral ventricle suspicious for metastatic disease.  CT of the chest showed no evidence of any malignancy and CT of the abdomen and pelvis also negative for any malignancy.  Oncology and palliative medicine on board.         SUBJECTIVE:    Patient seen and examined. Daughter was by his bedside. He had hematuria overnight after he pulled his loyola catheter. Urology was consulted and he had the loyola replaced, and had bladder irrigation done. He was also hypotensive last night, o/a of cardizem drip, so amiodarone was started. Cardiology now on board. He has no complaints today. He was noted to be febrile this morning, and also aspirated yesterday, so speech therapy was consulted. He did require oxygen up to 15L yesterday, but is now down to 8L   Records reviewed. Temp (24hrs), Av.4 °F (36.9 °C), Min:97.6 °F (36.4 °C), Max:99.7 °F (37.6 °C)    DIET: Diet NPO Effective Now Exceptions are: Sips of Water with Meds  CODE: Full Code    Intake/Output Summary (Last 24 hours) at 3/5/2021 1109  Last data filed at 3/5/2021 0900  Gross per 24 hour   Intake 883.25 ml   Output 540 ml   Net 343.25 ml       OBJECTIVE:    BP (!) 170/67   Pulse 120   Temp 99.7 °F (37.6 °C) (Axillary)   Resp 16   Ht 5' 5\" (1.651 m)   Wt 135 lb 12.9 oz (61.6 kg)   SpO2 96%   BMI 22.60 kg/m²     General appearance: No apparent distress, appears stated age and cooperative. HEENT:  Conjunctivae/corneas clear. Neck: Supple. No jugular venous distention.    Respiratory: Clear to auscultation bilaterally, normal respiratory effort  Cardiovascular: Regular rate rhythm, normal S1-S2  Abdomen: Soft, nontender, nondistended  Musculoskeletal: No clubbing, cyanosis, no bilateral lower extremity edema. Brisk capillary refill. Skin:  No rashes  on visible skin  Neurologic: awake, alert and following commands     ASSESSMENT:  #Metastatic brain neoplasm with unclear primary  -Does have a history of prior melanoma which could be due to occult primary.  -Had biopsy of the brain done; awaiting results  -Aspirin and Eliquis on hold.  On Decadron.  -Oncology and neurosurgery on board.     #Acute metabolic encephalopathy  -still in 4 point restraints  -On Ativan as needed.  Not on Haldol as he developed a rash after he started Haldol.     #Acute hypoxic respiratory failure  -now on 8L of oxygen. -he was thought to have aspirated yesterday, and with his increased oxygen requirements, he very likely has an aspiration pneumonia, as he is also febrile today  -pulmonology consulted  -titrate oxygen to maintain sats >90%  -breathing treatment with bronchodilators  -start IV antibiotics; defer to ID      #Probable aspiration pneumonia  -wbc is elevated. Also has a fever and aspirated yesterday  -to start on IV antimicrobials; defer to ID    #Dysphagia  -high risk of aspiration  -speech therapy on board  -now on soft diet with honey thick liquids  -aspiration precautions    #Hematuria  -now resolved. -urology on board. Hogan catheter replaced    #Type 2 diabetes mellitus with  Neuropathy:  - On insulin sliding scale.  Accu-Cheks AC at bedtime.  -on gabapentin   -started on lantus 12 units daily    #Hypertension:  - BP meds currently on hold due to hypotension overnight     #A. Fib:  - cardizem drip stopped o/a of hypotension. Now on amiodarone drip. Electophysiology on board  Metoprolol and tikosyn on hold. -   #History of melanoma:  - This was resected from his back and face.   -Biopsy done of brain lesion and pathology pending to see if the brain lesions are due to melanoma.     #History of seizures  -on Keppra     DVT prophylaxis: on SCDs. eliquis on hold. Resume eliquis when [id number] [address]roy with surgery team       Disposition: to be determined.         Medications:  REVIEWED DAILY    Infusion Medications    sodium chloride 50 mL/hr at 03/05/21 0857    amiodarone 450mg/250ml D5W infusion 1 mg/min (03/05/21 0514)    dilTIAZem (CARDIZEM) 100 mg in dextrose 5% 100 mL (ADD-Lavelle) Stopped (03/04/21 1907)     Scheduled Medications    polyethylene glycol  17 g Oral Daily    senna  1 tablet Oral Nightly    metoprolol  5 mg Intravenous Q6H    amiodarone  200 mg Oral BID    insulin lispro  0-18 Units Subcutaneous TID WC    insulin lispro  0-9 Units Subcutaneous Nightly    melatonin  10 mg Oral Nightly    pantoprazole  20 mg Intravenous Daily    And    sodium chloride (PF)  10 mL Intravenous Daily    divalproex  125 mg Oral BID    gabapentin  300 mg Oral BID    insulin glargine  12 Units Subcutaneous Nightly    lamoTRIgine  200 mg Oral BID    [Held by provider] magnesium oxide  400 mg Oral Daily    [Held by provider] metoprolol succinate  50 mg Oral BID    [Held by provider] therapeutic multivitamin-minerals  1 tablet Oral Daily    tamsulosin  0.4 mg Oral Nightly    sodium chloride flush  10 mL Intravenous 2 times per day    [Held by provider] lisinopril  20 mg Oral Daily    docusate sodium  100 mg Oral BID     PRN Meds: LORazepam, albuterol, HYDROcodone-acetaminophen, glucose, dextrose, glucagon (rDNA), sodium chloride flush, acetaminophen **OR** acetaminophen, hydrALAZINE, labetalol    Labs:     Recent Labs     03/04/21  0510 03/04/21  1515 03/05/21  0450   WBC 21.5* 28.7* 38.1*   HGB 10.4* 11.3* 10.1*   HCT 31.5* 34.2* 30.8*    348 347       Recent Labs     03/03/21  0502 03/04/21  0510 03/05/21  0450    145 147*   K 3.6 4.0 4.0   * 113* 114*   CO2 22 26 24   BUN 39* 46* 54*   CREATININE 1.5* 1.5* 1.6*   CALCIUM 8.4* 8.2* 8.6   PHOS 3.4 3.6 3.4       No results for input(s): PROT, ALB, ALKPHOS, ALT, AST, BILITOT, AMYLASE, LIPASE in the last 72 hours. No results for input(s): INR in the last 72 hours. No results for input(s): Nino Speaks in the last 72 hours. Chronic labs:    Lab Results   Component Value Date    CHOL 78 12/13/2013    TRIG 40 12/13/2013    HDL 28.0 (A) 12/13/2013    LDLCALC 42 12/13/2013    TSH 0.692 12/13/2013    INR 1.2 03/02/2021    LABA1C 6.6 (H) 02/26/2021       Radiology: REVIEWED DAILY    +++++++++++++++++++++++++++++++++++++++++++++++++  [place], [place]  +++++++++++++++++++++++++++++++++++++++++++++++++  NOTE: This report was transcribed using voice recognition software. Every effort was made to ensure accuracy; however, inadvertent computerized transcription errors may be present.

## 2021-03-05 NOTE — PROGRESS NOTES
When giving patient Amiodarone tabs at 2015, patient seemingly choked on tablets despite being given with thickened liquids. Per day shift, patient seemed to have aspirated on food being given by patient's family as respiratory status had declined after. 2100 meds held at this time. SROC aware.

## 2021-03-05 NOTE — PROGRESS NOTES
3/5/2021 1:15 PM  Service: Urology  Group: [facility] ([facility])    Paola Murcia  [phone]    Subjective:    Remains in the ICU  Daughter at the bedside  His loyola is draining yellow urine   Eyes closed, open to verbal stimuli      Review of Systems  Constitutional: No fever or chills   Respiratory: negative for cough and hemoptysis  Cardiovascular: negative for chest pain and dyspnea  Gastrointestinal: negative for abdominal pain, diarrhea, nausea and vomiting   : See above  Derm: negative for rash and skin lesion(s)  Neurological: negative for seizures and tremors  Musculoskeletal: Negative    Psychiatric: Negative   All other reviews are negative      Scheduled Meds:   polyethylene glycol  17 g Oral Daily    senna  1 tablet Oral Nightly    lidocaine PF  5 mL Intradermal Once    heparin flush  3 mL Intravenous 2 times per day    ampicillin-sulbactam  3,000 mg Intravenous Q6H    metoprolol  5 mg Intravenous Q6H    amiodarone  200 mg Oral BID    insulin lispro  0-18 Units Subcutaneous TID WC    insulin lispro  0-9 Units Subcutaneous Nightly    melatonin  10 mg Oral Nightly    pantoprazole  20 mg Intravenous Daily    And    sodium chloride (PF)  10 mL Intravenous Daily    divalproex  125 mg Oral BID    gabapentin  300 mg Oral BID    insulin glargine  12 Units Subcutaneous Nightly    lamoTRIgine  200 mg Oral BID    [Held by provider] magnesium oxide  400 mg Oral Daily    [Held by provider] metoprolol succinate  50 mg Oral BID    [Held by provider] therapeutic multivitamin-minerals  1 tablet Oral Daily    tamsulosin  0.4 mg Oral Nightly    sodium chloride flush  10 mL Intravenous 2 times per day    [Held by provider] lisinopril  20 mg Oral Daily    docusate sodium  100 mg Oral BID       Objective:  Vitals:    03/05/21 1200   BP:    Pulse: 79   Resp: 19   Temp:    SpO2: 95%         Allergies: Patient has no known allergies.     General Appearance: Eyes closed, open to verbal stimuli, lethargic   skin: no rash or erythema  Head: normocephalic and atraumatic  Pulmonary/Chest: normal air movement, no respiratory distress  Abdomen: soft, non-tender, non-distended  Genitourinary:  Loyola intact catheter draining yellow urine  Extremities: no cyanosis, clubbing or edema         Labs:     Recent Labs     03/05/21  0450   *   K 4.0   *   CO2 24   BUN 54*   CREATININE 1.6*   GLUCOSE 281*   CALCIUM 8.6       Lab Results   Component Value Date    HGB 10.1 03/05/2021    HCT 30.8 03/05/2021         Assessment/Plan:  Gross hematuria secondary to traumatic Loyola removal  Difficult loyola insertion s/p cystoscopic placement of 18F [place] catheter  Posterior False Passage     Cont the ICU care  Cont the loyola catheter  Irrigate PRN to maintain patency  Leave loyola indwelling for at least one week and voiding trial can be done only if ambulatory at that time   No further  interventions are planned at this time  Please call with further questions or concerns    David Trotter, APRN - CNP   [facility]

## 2021-03-05 NOTE — PLAN OF CARE
Problem: Falls - Risk of:  Goal: Will remain free from falls  Description: Will remain free from falls  Outcome: Met This Shift     Problem: Skin Integrity:  Goal: Will show no infection signs and symptoms  Description: Will show no infection signs and symptoms  Outcome: Met This Shift     Problem: Pain:  Goal: Pain level will decrease  Description: Pain level will decrease  Outcome: Met This Shift     Problem: Non-Violent Restraints  Goal: Removal from restraints as soon as assessed to be safe  Outcome: Met This Shift  Goal: No harm/injury to patient while restraints in use  Outcome: Met This Shift  Goal: Patient's dignity will be maintained  Outcome: Met This Shift

## 2021-03-05 NOTE — CONSULTS
INPATIENT CONSULTATION RECORD FOR  3/4/2021      [facility]  [address] [facility]. [facility], [address] [facility]  [phone]        REASON FOR CONSULTATION:      Gross hematuria after traumatic loyola removal    HISTORY OF PRESENT ILLNESS:      The patient is a 76 y.o. male patient who presents with Gross hematuria and malpositioned loyola after traumatic loyola removal.   Pt admited for altered mental status. Found to have lesion on his brain. Pt now in CVICU. Pt traumatically removed loyola earlier in the day. Began having gross hematuria after this. Nursing and general surgery attempted 3 way loyola placement but was unsuccessful. Pt is pleasantly confused at this time. States he may have seen a urologist in the past in [address] Poor historian. I was unable to place a 18 F coude. It did seem that he had somewhat of a stricture in the distal urethra. Pt also had false passage  Unable to place catheter with guide wire. At that time The patient was prepped and drapped in semi sterile fashion. Flexible cystoscopy was then performed which did demonstrate a posterior false passage. I was able to place a 0.035 guidewire through the scope. A 18 F [place] catheter was placed over the wire for light pink to dark yellow urine. This was irrigated then to very light pink without clots.    20 cc were placed into the balloon      Past Medical History:   Diagnosis Date    Atrial fibrillation ([address]     2018 pt states has a heart monitor, implantable    Carotid artery stenosis     History of cardiovascular stress test 11/13/13    lexiscan    Hyperlipidemia     Hypertension     Non-insulin dependent type 2 diabetes mellitus ([address]     Unspecified cerebral artery occlusion with cerebral infarction nov 11 2013    right face numb balance problems vision fades in and out  walks with walker         Past Surgical History:   Procedure Laterality Date    APPENDECTOMY      CAROTID ENDARTERECTOMY Left Dec 2013    Left Carotid Endarterectomy    CRANIOTOMY N/A 3/2/2021    CRANIOTOMY STEREOTATIC BIOPSY, FRAMELESS, CEREBRAL POSTERIOR FOSSA (NEEDS PATHOLOGY) performed by Bryce Angel MD at [place] [address] ECHO COMPL W DOP COLOR FLOW  11/12/2013         INTRACAPSULAR CATARACT EXTRACTION Left 11/3/2020    LEFT EYE CATARACT EMULSIFICATION IOL IMPLANT performed by Ara Du MD at [facility]         Medications Prior to Admission:    Medications Prior to Admission: magnesium oxide (MAG-OX) 400 MG tablet, Take 400 mg by mouth daily  SITagliptin (JANUVIA) 50 MG tablet, Take 50 mg by mouth daily  insulin glargine (LANTUS) 100 UNIT/ML injection vial, Inject 12 Units into the skin nightly  dexamethasone (DECADRON) 4 MG tablet, Take 6 mg by mouth daily  divalproex (DEPAKOTE SPRINKLE) 125 MG capsule, Take 125 mg by mouth 2 times daily  melatonin 3 MG TABS tablet, Take 5 mg by mouth nightly as needed  NONFORMULARY, Take 125 mcg by mouth 2 times daily tikosyn  apixaban (ELIQUIS) 5 MG TABS tablet, Take by mouth 2 times daily  hydrALAZINE (APRESOLINE) 50 MG tablet, Take 25 mg by mouth 3 times daily   lamoTRIgine (LAMICTAL) 100 MG tablet, Take 200 mg by mouth 2 times daily  metoprolol succinate (TOPROL XL) 50 MG extended release tablet, Take 50 mg by mouth 2 times daily  potassium chloride (KLOR-CON M) 20 MEQ extended release tablet, Take 20 mEq by mouth 2 times daily   glipiZIDE (GLUCOTROL) 5 MG tablet, Take 10 mg by mouth 2 times daily  tamsulosin (FLOMAX) 0.4 MG capsule, Take 0.4 mg by mouth nightly  gabapentin (NEURONTIN) 300 MG capsule, Take 300 mg by mouth 2 times daily.   albuterol sulfate  (90 Base) MCG/ACT inhaler, Inhale 2 puffs into the lungs every 6 hours as needed for Wheezing or Shortness of Breath  influenza virus trivalent vaccine (FLUZONE) injection, Inject 0.5 mLs into the muscle once Given 10/2019  HYDROcodone-acetaminophen (NORCO) 5-325 MG per tablet, Take 1 tablet by mouth every 6 hours as needed for Pain  cloNIDine (CATAPRES) 0.1 MG tablet, Take 0.2 mg by mouth 2 times daily   metFORMIN (GLUCOPHAGE) 500 MG tablet, Take 1,000 mg by mouth 2 times daily   spironolactone (ALDACTONE) 25 MG tablet, Take 1 tablet by mouth 2 times daily. (Patient taking differently: Take 25 mg by mouth daily )  lisinopril (PRINIVIL;ZESTRIL) 20 MG tablet, Take 20 mg by mouth every 12 hours   amLODIPine (NORVASC) 10 MG tablet, Take 10 mg by mouth daily. Multiple Vitamins-Minerals (THERAPEUTIC MULTIVITAMIN-MINERALS) tablet, Take 1 tablet by mouth daily. aspirin 81 MG tablet, Take 81 mg by mouth daily. Allergies:    Patient has no known allergies. Social History:    reports that he quit smoking about 20 months ago. His smoking use included cigarettes. He smoked 1.00 pack per day. He has never used smokeless tobacco. He reports current alcohol use of about 1.0 standard drinks of alcohol per week. He reports that he does not use drugs. Family History:   Non-contributory to this Urological problem  family history is not on file. He was adopted. REVIEW OF SYSTEMS:  Respiratory: negative for cough and hemoptysis  Cardiovascular: negative for chest pain and dyspnea  Gastrointestinal: negative for abdominal pain, diarrhea, nausea and vomiting  Derm: negative for rash and skin lesion(s)  Neurological: negative for seizures and tremors  Endocrine: negative for diabetic symptoms including polydipsia and polyuria  : As above in the HPI, otherwise negative  All other systems negative    PHYSICAL EXAM:    Vitals:  /63   Pulse 128   Temp 98 °F (36.7 °C)   Resp (!) 35   Ht 5' 5\" (1.651 m)   Wt 135 lb 12.9 oz (61.6 kg)   SpO2 (!) 88%   BMI 22.60 kg/m²     General:  Awake, alert, oriented X 1. Well developed, well nourished, No apparent distress. HEENT:  Normocephalic, atraumatic. Pupils equal, round. No scleral icterus. No conjunctival injection. Normal lips, teeth, and gums.   No nasal discharge. Neck:  Supple, no masses. Heart:  RRR  Lungs: On a non rebreather  Abdomen:  soft, nontender, no masses, no organomegaly, no peritoneal signs  Extremities:  No clubbing, cyanosis, or edema  Skin:  Warm and dry, no open lesions or rashes  Rectal: deferred  Genitalia:  Circumcised phallus, bilateral descended testicles    LABS:    Lab Results   Component Value Date    WBC 28.7 (H) 03/04/2021    HGB 11.3 (L) 03/04/2021    HCT 34.2 (L) 03/04/2021    MCV 85.7 03/04/2021     03/04/2021       Lab Results   Component Value Date    CREATININE 1.5 (H) 03/04/2021       No results found for: PSA    No results found for: LABURIN    No results found for: BC    No results found for: BLOODCULT2    ASSESSMENT:   77 y/o M gross hematuria with loyola malposition after traumatic loyola removal  S/p Cystoscopic placement of 25 F [place] due to posterior false passage    PLAN:      Maintain loyola catheter at this time. Will need a void trial possibly prior to dc depending on how long the patient remains in the hospital  Loyola needs to be left in for at least 1 week. Irrigate every 4 hours to maintain patency.    Will follow        JESSICA Boswell PM  3/4/2021

## 2021-03-05 NOTE — PROGRESS NOTES
Electrophysiology progress note         Date:  3/4/2021  Patient: Marylen Eye  Admission:  2/25/2021 10:19 PM  Admit DX: Brain metastasis ([facility] [address] [C79.31]  Age:  76 y.o., 1945     LOS: 7 days     Reason for evaluation:   atrial fibrillation,ILR in situ      SUBJECTIVE:    The patient was seen and examined. Notes and labs reviewed. He is alert and oriented today, however his sensorium changes constantly per nursing staff      OBJECTIVE:    Telemetry: Atrial fibrillation with  rapid ventricular rate  /63   Pulse 128   Temp 98 °F (36.7 °C)   Resp 17   Ht 5' 5\" (1.651 m)   Wt 135 lb 12.9 oz (61.6 kg)   SpO2 92%   BMI 22.60 kg/m²     Intake/Output Summary (Last 24 hours) at 3/4/2021 1939  Last data filed at 3/4/2021 1616  Gross per 24 hour   Intake 1602.92 ml   Output 100 ml   Net 1502.92 ml       EXAM:   CONSTITUTIONAL:  awake, alert, cooperative, no apparent distress, and appears stated age. HEENT: Normal jugular venous pulsations, Head is atraumatic, normocephalic. Eyes and oral mucosa are normal.  LUNGS: Good respiratory effort. No for increased work of breathing. On auscultation: clear to auscultation bilaterally  CARDIOVASCULAR:   irregular rate and rhythm, normal S1 and S2, no S3   ABDOMEN: Soft, nontender, nondistended. SKIN: Warm and dry. EXTREMITIES: No lower extremity edema. Motor movement grossly intact. No cyanosis or clubbing.     Current Inpatient Medications:   metoprolol  5 mg Intravenous Q6H    amiodarone bolus  150 mg Intravenous Once    amiodarone  200 mg Oral BID    insulin lispro  0-18 Units Subcutaneous TID WC    insulin lispro  0-9 Units Subcutaneous Nightly    melatonin  10 mg Oral Nightly    pantoprazole  20 mg Intravenous Daily    And    sodium chloride (PF)  10 mL Intravenous Daily    divalproex  125 mg Oral BID    gabapentin  300 mg Oral BID    insulin glargine  12 Units Subcutaneous Nightly    lamoTRIgine  200 mg Oral BID    magnesium oxide  400 mg Oral Daily    [Held by provider] metoprolol succinate  50 mg Oral BID    therapeutic multivitamin-minerals  1 tablet Oral Daily    tamsulosin  0.4 mg Oral Nightly    sodium chloride flush  10 mL Intravenous 2 times per day    [Held by provider] lisinopril  20 mg Oral Daily    docusate sodium  100 mg Oral BID       IV Infusions (if any):   amiodarone 450mg/250ml D5W infusion      dilTIAZem (CARDIZEM) 100 mg in dextrose 5% 100 mL (ADD-Brooten) Stopped (03/04/21 1907)       Diagnostics:    EKG: atrial fibrillation, rate 100 to 120/min. ECHO: reviewed. Ejection fraction: 50-55%  Stress Test: not obtained. Cardiac Angiography: not obtained. Labs:   CBC:   Recent Labs     03/04/21  0510 03/04/21  1515   WBC 21.5* 28.7*   HGB 10.4* 11.3*   HCT 31.5* 34.2*    348     BMP:   Recent Labs     03/03/21  0502 03/04/21  0510    145   K 3.6 4.0   CO2 22 26   BUN 39* 46*   CREATININE 1.5* 1.5*   LABGLOM 46 46   GLUCOSE 243* 231*     BNP: No results for input(s): BNP in the last 72 hours. PT/INR:   Recent Labs     03/02/21  0624   PROTIME 13.4*   INR 1.2     APTT:No results for input(s): APTT in the last 72 hours. CARDIAC ENZYMES:No results for input(s): CKTOTAL, CKMB, CKMBINDEX, TROPONINI in the last 72 hours. FASTING LIPID PANEL:  Lab Results   Component Value Date    HDL 28.0 12/13/2013    LDLCALC 42 12/13/2013    TRIG 40 12/13/2013     LIVER PROFILE:No results for input(s): AST, ALT, LABALBU in the last 72 hours. ASSESSMENT:    Patient Active Problem List   Diagnosis    Carotid stenosis    CVA (cerebral vascular accident)    Hypertension    Cerebral artery occlusion with cerebral infarction ([facility] [address]    Non-insulin dependent type 2 diabetes mellitus (HCC)    Melanoma ([facility] [address]    Stage 3 chronic kidney disease-serum creatinine has increased from 1.1-1.5 during this hospitalization.   Calculated GFR of less than 50mL/min    COPD (chronic obstructive pulmonary disease) ([facility] [address]    AMS (altered mental status)    Seizure ([facility] [address]    Atrial fibrillation (HCC)--persistent since last night. Patient was in sinus rhythm on presentation 2 days ago    Anticoagulated    Palliative care by specialist       PLAN//recommendation    Start IV and oral amiodarone loading to attempt to restore and maintain sinus rhythm  Discontinue dofetilide  Further recommendations will depend on his response to the same      Please see orders. Discussed with patient and nursing.     Evie Mcarthur MD,FACC

## 2021-03-05 NOTE — PROGRESS NOTES
Palliative Care Department  [phone]  Palliative Care Progress Note  Provider Les Zhang PA-C    Leo Olivas  [id number]  Hospital Day: 9    Referring Provider: Mikhail MELCHOR-CNP  Palliative Medicine was consulted for assistance with: Code status Discussion, Assist with goals of care, Symptom Management and Family Support     Chief Complaint: Leo Olivas is a 76 y.o. male with chief complaint of AMS    Brief summary: Leo Olivas is a 76 y.o. male with significant past medical history of hypertension, atrial fibrillation, seizure disorder, dementia, chronic kidney disease, as well as prior melanoma on his back and forehead status post resection, and chronic back pain as well as trigeminal neuralgia. He was admitted on 2/25/2021 after he was found to have brain metastasis at an outside hospital.  He was transferred to [facility] for neurosurgical evaluation, and consultations have also been placed to medical and radiation oncology, with a high concern for malignant melanoma. ASSESSMENT/PLAN:     Pertinent hospital diagnoses:  1. Brain metastasis  -History of melanoma  -POD #4  -Path pending  -Hematology oncology following  -Radiation oncology following  -neurosurgery following  2. Atrial fibrillation with RVR  -EP consulted and following  3. Trigeminal neuralgia  4. History of CVA  5. Chronic kidney disease  6. Dementia  7. Aspiration-ampicillin/sulbactam started    [address]  - Outcome of goals of care meeting:   - no change  - Capacity: At this time, Leo Olivas, Does Not have capacity for medical decision-making.   Capacity is time limited and situation/question specific  - Surrogate decision maker/Legal NOK: Paual Mercedes ([phone]) is the patient's wife  - Code Status:   full  - Advanced directives: none  - Spiritual assessment: no needs identified  - Bereavement and grief: no needs identified    - DISPO: awaiting PATHOLOGY    Referrals to: none today Subjective   Chart reviewed  Atrial fibrillation overnight  CT - constipation  Hemoccult positive stool  H/H trending down  Reported to have aspiration overnight  Patient responsive and posted restraints but is unable to be redirected    Inpatient medications reviewed: yes  Home Medications reviewed: yes    OBJECTIVE:   Prognosis: unknown    Physical Exam:  BP (!) 170/67   Pulse 79   Temp 99.7 °F (37.6 °C) (Axillary)   Resp 19   Ht 5' 5\" (1.651 m)   Wt 135 lb 12.9 oz (61.6 kg)   SpO2 95%   BMI 22.60 kg/m²   Gen: No acute distress  HEENT:  scalp dressing. NG tube  Neck:  Supple, trachea midline  Lungs: 8 L via mask, respirations unlabored  Heart[de-identified] Normal sinus rhythm currently reported atrial fibrillation intermittently  Abd: BM documented 3/6   : Hogan catheter  Ext:  Moving all extremities  Skin: without rash, bruising  Neuro: Wakes, difficult to be redirected at times    Objective data reviewed: labs, images, records, medication use, vitals and chart  Relevant data:   Results for Fiorella Kinney (MRN [id number]) as of 3/7/2021 14:53   Ref.  Range 3/7/2021 05:58   Sodium Latest Ref Range: 132 - 146 mmol/L 148 (H)   Potassium Latest Ref Range: 3.5 - 5.0 mmol/L 3.6   Chloride Latest Ref Range: 98 - 107 mmol/L 111 (H)   CO2 Latest Ref Range: 22 - 29 mmol/L 30 (H)   BUN Latest Ref Range: 8 - 23 mg/dL 42 (H)   Creatinine Latest Ref Range: 0.7 - 1.2 mg/dL 1.1   Anion Gap Latest Ref Range: 7 - 16 mmol/L 7   Calcium, Ion Latest Ref Range: 1.15 - 1.33 mmol/L 1.22   GFR Non-African American Latest Ref Range: >=60 mL/min/1.73 >60   GFR African American Unknown >60   Magnesium Latest Ref Range: 1.6 - 2.6 mg/dL 2.4   Glucose Latest Ref Range: 74 - 99 mg/dL 259 (H)   Calcium Latest Ref Range: 8.6 - 10.2 mg/dL 8.0 (L)   Phosphorus Latest Ref Range: 2.5 - 4.5 mg/dL 1.9 (L)   WBC Latest Ref Range: 4.5 - 11.5 E9/L 31.9 (H)   RBC Latest Ref Range: 3.80 - 5.80 E12/L 3.00 (L)   Hemoglobin Quant Latest Ref Range: 12.5 - 16.5 g/dL 8.4 (L)   Hematocrit Latest Ref Range: 37.0 - 54.0 % 26.2 (L)   MCV Latest Ref Range: 80.0 - 99.9 fL 87.3   MCH Latest Ref Range: 26.0 - 35.0 pg 28.0   MCHC Latest Ref Range: 32.0 - 34.5 % 32.1   MPV Latest Ref Range: 7.0 - 12.0 fL 10.8   RDW Latest Ref Range: 11.5 - 15.0 fL 16.8 (H)   Platelet Count Latest Ref Range: 130 - 450 E9/L 221   Neutrophils % Latest Ref Range: 43.0 - 80.0 % 93.8 (H)   Lymphocyte % Latest Ref Range: 20.0 - 42.0 % 4.4 (L)   Monocytes % Latest Ref Range: 2.0 - 12.0 % 1.8 (L)   Eosinophils % Latest Ref Range: 0.0 - 6.0 % 0.0   Basophils % Latest Ref Range: 0.0 - 2.0 % 0.3   Neutrophils Absolute Latest Ref Range: 1.80 - 7.30 E9/L 29.99 (H)   Lymphocytes Absolute Latest Ref Range: 1.50 - 4.00 E9/L 1.28 (L)   Monocytes Absolute Latest Ref Range: 0.10 - 0.95 E9/L 0.64   Eosinophils Absolute Latest Ref Range: 0.05 - 0.50 E9/L 0.00 (L)   Basophils Absolute Latest Ref Range: 0.00 - 0.20 E9/L 0.00   Nucleated Red Blood Cells Latest Units: /100 WBC 1.8       Time/Communication  Greater than 50% of time spent, total 15 minutes in counseling and coordination of care at the bedside/over the telephone regarding see above. Thank you for allowing Palliative Medicine to participate in the care of Oniel Díaz. Provider Reed Jay PA-C  Palliative Medicine    Note: This report was completed using computerNovogy voiced recognition software. Every effort has been made to ensure accuracy; however, inadvertent computerized transcription errors may be present.

## 2021-03-05 NOTE — FLOWSHEET NOTE
IV team perfect served for PICC line insertion. Consent obtained via phone consent by Wife Hailey Romeo. Called for blood cultures at this time as well.

## 2021-03-05 NOTE — PROGRESS NOTES
SPEECH/LANGUAGE PATHOLOGY  BEDSIDE SWALLOWING EVALUATION    PATIENT NAME:  Luis Rodriguez      :  1945          TODAY'S DATE:  3/5/2021 ROOM:  3820/3820-A      SUMMARY OF EVALUATION     DYSPHAGIA DIAGNOSIS:  Marked pharyngeal dysphagia      DIET RECOMMENDATIONS:  NPO (nothing by mouth including oral meds)       FEEDING RECOMMENDATIONS:     Assistance level:  Not applicable      Compensatory strategies recommended: Not applicable    THERAPY RECOMMENDATIONS:      Dysphagia therapy is recommended 3-5 times per week for LOS or when goals are met with emphasis on the following:  Pt will complete BOTR strength/ ROM exercises to reduce pharyngeal residuals and improve epiglottic inversion . Pt will complete laryngeal strength/ ROM therapeutic exercises to improve airway protection for the least restrictive PO diet . PROCEDURE     Consistencies Administered During the Evaluation   Liquids: thin liquid   Solids:  pureed foods      Method of Intake:   cup, straw, spoon  Fed by clinician      Position:   Seated, upright                  RESULTS     Oral Stage: The oral stage of swallowing was within functional limits      Pharyngeal Stage:      Immediate wet cough was noted after presentation of thin liquid, Latent wet cough was noted after presentation of pureed foods, Wet respirations were noted after presentation of all consistencies administered, Wet/gurgly vocal quality was noted after presentation of all consistencies administered and Multiple swallows were noted after presentation of pureed foods                  Prognosis for improvements is good  This plan will be re-evaluated and revised in 1 week  if warranted.    Patient stated goals: Agreed with above  Treatment goals discussed with Patient   The Patient understand(s) the diagnosis, prognosis and plan of care       CPT code:  12141  bedside swallow eval      [x]The admitting diagnosis and active problem list, as listed below have been reviewed prior to initiation of this evaluation.      ADMITTING DIAGNOSIS: Brain metastasis ([facility] [address] [C79.31]     ACTIVE PROBLEM LIST:   Patient Active Problem List   Diagnosis    Carotid stenosis    CVA (cerebral vascular accident)    Hypertension    Cerebral artery occlusion with cerebral infarction ([address]    Hyperlipidemia    Leucocytosis    Vitamin D deficiency    Non-insulin dependent type 2 diabetes mellitus ([address]    Left cataract    Brain metastasis (HCC)    Melanoma ([address]    Hypokalemia    Stage 3 chronic kidney disease    COPD (chronic obstructive pulmonary disease) (HCC)    Constipation    Renal cyst, left    AMS (altered mental status)    Dementia (HCC)    Seizure ([address]    History of CVA (cerebrovascular accident)    Atrial fibrillation ([address]    Anticoagulated    Palliative care by specialist    Acute kidney injury superimposed on CKD ([address]

## 2021-03-05 NOTE — PROGRESS NOTES
Intensive Care Unit  Critical Care Consult  Daily Progress Note 3/5/2021    Date of Admission: 2/27    EVENTS:   02/27  Initially presented ot outside facility with AMS, confusion & erratic behavior. PMH include previous stroke with residual left sided weakness, dementia, HTN, HLD, chronic pain issues, CKD & melanoma. Diagnotic imaging reveal brain masses. Transferred to [address] [place] [address] for ongoing evaluaitn & neurosurgery consult. CT chest/abdomen/pelvis no evidence of malignancy.    ---------------------  03/02 Underwent craniotomy for biopsy of intracranial mass. Glial neoplasm. Admitted to  ICU post--operatively. 03/03  No issues overnight. Did not require PRN antihypertensive agents. Required 12 units of insulin. Pulled loyola out during the night. Seen by speech recs dental soft diet with honey thick liquids. 3/4 hematuria overnight with bright red blood from penis. Bladder scan ordered  3/5 episode of hypotension yesterday responded to IVFs. Cardiology consulted by medicine, HTN medications adjusted now on amiodarone gtt and metoprolol. Norvasc, clonidine, tikosyn and hydralazine stopped. 3-way catheter attempted last evening with difficulty. Urology consulted; 18 Fr coude inserted via cystoscopy. Patient more alert today and following commands    PHYSICAL EXAM:    /62   Pulse 120   Temp 99.7 °F (37.6 °C) (Axillary)   Resp 16   Ht 5' 5\" (1.651 m)   Wt 135 lb 12.9 oz (61.6 kg)   SpO2 96%   BMI 22.60 kg/m²     General appearance:  Comfortable.          GCS:    4 - Opens eyes on own   6 - Follows simple motor commands  5 - Alert and oriented    Pupil size: Left 3 mm  Right 3 mm  Pupil reaction: Yes  Wiggles fingers: Left Yes Right Yes  Hand grasp:   Left normal     Right normal  Wiggles toes: Left Yes    Right Yes  Plantar flexion:  Left normal    Right normal    CONSTITUTIONAL: no acute distress, lying in hospital bed  NEUROLOGIC: PERRL, oriented x 4  CARDIOVASCULAR: S1 S2, regular rate, regular rhythm, no murmur/gallop/rub. Monitor: a fib with RVR  PULMONARY: no rhonchi/rales/wheezes, no use of accessory muscles  RENAL: loyola to gravity, clear yellow urine  ABDOMEN: soft, nontender, nondistended, nontympanic, normal bowel sounds   MUSCULOSKELETAL: moves all extremities purposefully, 5/5 strength   SKIN/EXTREMITIES: no rashes/ecchymosis, no edema/clubbing, warm/dry, good capillary refill     LINES: PIV   Art line    CONSULTS:   Medicine  Urology  Electrophysiology   Neurosurgery  Palliative  ID  Hem/Onc    Past Medical History:   Diagnosis Date    Atrial fibrillation ([facility] [address]     2018 pt states has a heart monitor, implantable    Carotid artery stenosis     History of cardiovascular stress test 11/13/13    lexiscan    Hyperlipidemia     Hypertension     Non-insulin dependent type 2 diabetes mellitus ([facility] [address]     Unspecified cerebral artery occlusion with cerebral infarction nov 11 2013    right face numb balance problems vision fades in and out  walks with walker       ASSESSMENT/PLAN:       Neuro:  AMS. Brain mass. S/P Craniotomy for biopsy. Glial neoplasm. Hx of Seizures, dementia & stroke (residula left sided weakness). Monitor neuro status. Neurosurgery following. Keppra for seizure prophylaxis. Decadron. Gabapentin. Lamictal.  Depakote. CV:  No acute issues. Hx of HTN, Hyperlipidemia and Afib              Monitor hemodynamics. BP goal systolic less than 653 mm Hg. PRN hydralzine & labetalol. .   Metoprolol. Lisinopril on hold  Amiodarone gtt and scheduled      Pulm: At risk for respiratory insufficiency. Monitor RR & SpO2. O2 as needed. Prn albuterol. Encourage cough, SMI  & deep breathing. GI:  Dysphagia. . Monitor bowel function. Diet. Bowel regimen  Coude to gravity  NGT     Renal:   CKD,. Hx of BPH. Monitor BUN & Cr, electrolytes & replace as needed. Monitor I & O.    Voids. Mag oxide  Spirolactone stopped. Flomax.       ID: No acute issues    Endocrine: Hyperglycemia. Hx of diabetes    Monitor BS. Juan Rodrigez  ISS. MSK: Chronic left sided weakness. Deconditioned.   ROM. Turn & reposition. PT & OT when able;  Monitor for skin breakdown. Heme: leukocytosis, probably reactive/medicine induced  Monitor CBC. Multivitamin  Radiation therapy per Hem/Onc    Bowel regime: Glycolax . Last BM not documented  Pain control/Sedation: Tylenol, norco  DVT prophylaxis: SCDs. No Lovenox/heparin until ok with neurosurgery. GI prophylaxis:  Protonix, NGT  Hogan: Keep in place for critical care monitoring of fluid balance.     Family update: will update when available  Code status:  full  Disposition:  ICU    Electronically signed by Spring Almodoavr CNP on 3/5/2021 at 10:26 AM

## 2021-03-05 NOTE — PROGRESS NOTES
Since start of shift at [address], patient's loyola had not been draining despite CBI running in wide open. Per day shift, this was the same for her. I attempted to manually irrigate multiple times with no success. The first bladder scan showed >600 cc in bladder. I spoke with Dr. Rodo Murphy regarding situation. He came over to assess patient. He attempted to manually irrigate bladder. After approx 3 hours of attempting to manually irrigate by both Dr. Rodo Murphy and myself and a catheter exchange (from a 3-way 16 Fr. to a 3-way 22 Fr.) done by Dr. Rodo Murphy no success was achieved. Order was placed to consult Urology. Dr. Ramone Camejo was paged via answering service and said he would come in to evaluate. Urology cart obtained and Dr. Ramone Camejo at bedside.

## 2021-03-05 NOTE — PROGRESS NOTES
picc Placement 3/5/2021    Product number: [id number]   Lot Number: [id number]      Ultrasound: yes   Right Basilic vein:                Upper Arm Circumference: 34cm    Size: 5.5fr    Exposed Length: 4cm    Internal Length: 40cm   Cut: 11cm   Vein Measurement: 0.54cm    Sachin Felix  3/5/2021                   Sylvia obtained   picc in 1/3 distal svc or atrial caval junction  12:27 PM

## 2021-03-05 NOTE — PROGRESS NOTES
03/05/2021     03/05/2021     Lab Results   Component Value Date    NEUTROABS 36.58 (H) 03/05/2021    NEUTROABS 26.29 (H) 03/04/2021    NEUTROABS 21.29 (H) 03/04/2021     No results found for: CRP  No results found for: CRPHS  No results found for: SEDRATE  Lab Results   Component Value Date    ALT 41 (H) 01/31/2020    AST 49 (H) 01/31/2020    ALKPHOS 70 01/31/2020    BILITOT 0.5 01/31/2020     Lab Results   Component Value Date     03/05/2021    K 4.0 03/05/2021     03/05/2021    CO2 24 03/05/2021    BUN 54 03/05/2021    CREATININE 1.6 03/05/2021    CREATININE 1.5 03/04/2021    CREATININE 1.5 03/03/2021    GFRAA 51 03/05/2021    LABGLOM 42 03/05/2021    GLUCOSE 281 03/05/2021    PROT 7.5 01/31/2020    LABALBU 4.5 01/31/2020    CALCIUM 8.6 03/05/2021    BILITOT 0.5 01/31/2020    ALKPHOS 70 01/31/2020    AST 49 01/31/2020    ALT 41 01/31/2020       Radiology: CXR (03/04): Essentially unremarkable      Microbiology:     Blood cx  Pending      Assessment:  Leukocytosis, probably medication induced from steroids (dexamethasone 10 mg q6h) vs aspiration pneumonia/pneumonitis    Recommendations:  Start ampicillin-sulbactam 3g q6h. Check blood cultures. Trend CBC with steroids just discontinued on 03/04.     Thank you for involving me in the care of Arnie Ley. Dr. Arjun Villa will continue to follow. Please do not hesitate to call for any questions or concerns.     Electronically signed by Maria Guadalupe James MD on 3/5/2021 at 10:11 AM

## 2021-03-05 NOTE — PROGRESS NOTES
Palliative Care Department  [phone]  Palliative Care Progress Note  Provider Winston Galindo PA-C    Simone Marcial  [id number]  Hospital Day: 9    Referring Provider: Bj MELCHOR-CNP  Palliative Medicine was consulted for assistance with: Code status Discussion, Assist with goals of care, Symptom Management and Family Support     Chief Complaint: Simone Marcial is a 76 y.o. male with chief complaint of AMS    Brief summary: Simone Marcial is a 76 y.o. male with significant past medical history of hypertension, atrial fibrillation, seizure disorder, dementia, chronic kidney disease, as well as prior melanoma on his back and forehead status post resection, and chronic back pain as well as trigeminal neuralgia. He was admitted on 2/25/2021 after he was found to have brain metastasis at an outside hospital.  He was transferred to [facility] for neurosurgical evaluation, and consultations have also been placed to medical and radiation oncology, with a high concern for malignant melanoma. ASSESSMENT/PLAN:     Pertinent hospital diagnoses:  1. Brain metastasis  -History of melanoma  -POD #3  -Path pending  -Hematology oncology following  -Radiation oncology following  2. Atrial fibrillation with RVR  -EP consulted and following  3. Trigeminal neuralgia  4. History of CVA  5. Chronic kidney disease  6. Dementia    PALLIATIVE CARE ENCOUNTER  - Outcome of goals of care meeting:   - n/a  - Capacity: At this time, Simone Marcial, Does Not have capacity for medical decision-making.   Capacity is time limited and situation/question specific  - Surrogate decision maker/Legal NOK: Dee Hogue ([phone]) is the patient's wife  - Code Status:   full  - Advanced directives: none  - Spiritual assessment: no needs identified  - Bereavement and grief: no needs identified    - DISPO: Path pending    Referrals to: none today    Subjective   Chart reviewed  Path pending, patient noted to be more responsive Inpatient medications reviewed: yes  Home Medications reviewed: yes    OBJECTIVE:   Prognosis: unknown    Physical Exam:  BP (!) 170/67   Pulse 79   Temp 99.7 °F (37.6 °C) (Axillary)   Resp 19   Ht 5' 5\" (1.651 m)   Wt 135 lb 12.9 oz (61.6 kg)   SpO2 95%   BMI 22.60 kg/m²   Gen:  Elderly, NAD, sleeping but responds to stimuli, wrist restraints  HEENT:  Normocephalic, atraumatic, cheeks slightly erythematous patches bilaterally, NG, O2 via nasal cannula  Neck:  Supple, trachea midline  Lungs:  respirations unlabored  Heart[de-identified]  RRR  Abd: No BM documented since admission  : Hogan  Ext: moves all extremities, wrist restraints  Skin:  Warm and dry without rash, bruising, petechiae  Neuro: Sleeping currently but reported as awake alert and following commands today    Objective data reviewed: labs, images, records, medication use, vitals and chart    Time/Communication  Greater than 50% of time spent, total 5 minutes in counseling and coordination of care at the bedside/over the telephone regarding see above. Thank you for allowing Palliative Medicine to participate in the care of Rafael Espino. Provider Jamie Pinzon PA-C  Palliative Medicine    Note: This report was completed using computerize voiced recognition software. Every effort has been made to ensure accuracy; however, inadvertent computerized transcription errors may be present.

## 2021-03-06 NOTE — PROGRESS NOTES
Intensive Care Unit  Critical Care Consult  Daily Progress Note 3/6/2021    Date of Admission: 2/27    EVENTS:   02/27  Initially presented ot outside facility with AMS, confusion & erratic behavior. PMH include previous stroke with residual left sided weakness, dementia, HTN, HLD, chronic pain issues, CKD & melanoma. Diagnotic imaging reveal brain masses. Transferred to [facility] for ongoing evaluaitn & neurosurgery consult. CT chest/abdomen/pelvis no evidence of malignancy.    ---------------------  03/02 Underwent craniotomy for biopsy of intracranial mass. Glial neoplasm. Admitted to  ICU post--operatively. 03/03  No issues overnight. Did not require PRN antihypertensive agents. Required 12 units of insulin. Pulled loyola out during the night. Seen by speech recs dental soft diet with honey thick liquids. 3/4 hematuria overnight with bright red blood from penis. Bladder scan ordered  3/5 episode of hypotension yesterday responded to IVFs. Cardiology consulted by medicine, HTN medications adjusted now on amiodarone gtt and metoprolol. Norvasc, clonidine, tikosyn and hydralazine stopped. 3-way catheter attempted last evening with difficulty. Urology consulted; 18 Fr coude inserted via cystoscopy. Patient more alert today and following commands  3/6 alert, following commands, speech less clear this morning. Hg trending down. Cardene started by primary team discontinued. Restart home BP meds    PHYSICAL EXAM:    BP (!) 183/59   Pulse 119   Temp 99.3 °F (37.4 °C) (Temporal)   Resp 20   Ht 5' 5\" (1.651 m)   Wt 135 lb 12.9 oz (61.6 kg)   SpO2 93%   BMI 22.60 kg/m²     General appearance:  Comfortable.          GCS:    4 - Opens eyes on own   6 - Follows simple motor commands  5 - Alert and oriented    Pupil size: Left 3 mm  Right 3 mm  Pupil reaction: Yes  Wiggles fingers: Left Yes Right Yes  Hand grasp:   Left normal     Right normal  Wiggles toes: Left Yes    Right Yes  Plantar flexion:  Left normal Right normal    CONSTITUTIONAL: no acute distress, lying in hospital bed  NEUROLOGIC: PERRL, oriented x 4  CARDIOVASCULAR: S1 S2, regular rate, regular rhythm, no murmur/gallop/rub. Monitor: a fib with RVR  PULMONARY: no rhonchi/rales/wheezes, no use of accessory muscles  RENAL: loyola to gravity, clear yellow urine  ABDOMEN: soft, nontender, nondistended, nontympanic, normal bowel sounds   MUSCULOSKELETAL: moves all extremities purposefully, 5/5 strength   SKIN/EXTREMITIES: no rashes/ecchymosis, no edema/clubbing, warm/dry, good capillary refill     LINES: PIV   Art line    CONSULTS:   Medicine  Urology  Electrophysiology   Neurosurgery  Palliative  ID  Hem/Onc    Past Medical History:   Diagnosis Date    Atrial fibrillation ([facility] [address]     2018 pt states has a heart monitor, implantable    Carotid artery stenosis     History of cardiovascular stress test 11/13/13    lexiscan    Hyperlipidemia     Hypertension     Non-insulin dependent type 2 diabetes mellitus ([facility] [address]     Unspecified cerebral artery occlusion with cerebral infarction nov 11 2013    right face numb balance problems vision fades in and out  walks with walker       ASSESSMENT/PLAN:       Neuro:  AMS. Brain mass. S/P Craniotomy for biopsy. Glial neoplasm. Hx of Seizures, dementia & stroke (residual left sided weakness). Monitor neuro status. Neurosurgery following. Decadron. Gabapentin. Lamictal.  Depakote. Ativan PRN    CV:  No acute issues. Hx of HTN, Hyperlipidemia and Afib              Monitor hemodynamics. BP goal systolic less than 423 mm Hg. PRN hydralzine & labetalol. .   Metoprolol. Lisinopril   Amiodarone gtt and scheduled      Pulm: At risk for respiratory insufficiency. Monitor RR & SpO2. O2 as needed. NC 2L   Prn albuterol. Encourage cough, SMI  & deep breathing. GI:  Dysphagia. . Monitor bowel function. Diet. NPO  Tube feeding  Bowel regimen  Coude to gravity  NGT     Renal:   CKD,.  Hx of BPH.  Hypernatremia  Monitor BUN & Cr, electrolytes & replace as needed. Monitor I & O.    Mag oxide  Spirolactone stopped. Flomax. Free water flushes    ID: Unasyn per ID for possible aspiration PNA    Endocrine: Hyperglycemia. Hx of diabetes    Monitor BS. Lantus  ISS. MSK: Chronic left sided weakness. Deconditioned.   ROM. Turn & reposition. PT & OT when able;  Monitor for skin breakdown. Heme: leukocytosis, probably reactive/medicine induced; anemia  Monitor CBC. Multivitamin  Radiation therapy per Hem/Onc    Bowel regime: Glycolax, dulcolax. Last BM not documented  Pain control/Sedation: Tylenol, norco  DVT prophylaxis: SCDs. No Lovenox/heparin until ok with neurosurgery. GI prophylaxis:  Protonix, NGT  Hogan: Keep in place for critical care monitoring of fluid balance.     Family update: will update when available  Code status:  full  Disposition:  ICU    Electronically signed by Cady Clarke CNP on 3/6/2021 at 9:20 AM

## 2021-03-06 NOTE — PROGRESS NOTES
[facility] PROGRESS NOTE      Chief complaint: Follow-up of leukocytosis    The patient is a 76 y.o. male with history of DM, hypertension, atrial fibrillation, hyperlipidemia, stroke, dementia, seizure disorder, melanoma status post excision from back and forehead 7 years ago, transferred to [facility] on 02/25 from [facility] where he initially presented with confusion and abnormal behavior for 3 weeks, found to have MRI of the brain showing multiple intracranial metastasis most notably in the left cerebellar hemisphere, hydrocephalus with dilation of the lateral and third ventricles. On admission, he was afebrile and hemodynamically stable with leukocytosis of 14,000. Urinalysis showed no pyuria. SARS-CoV-2 PCR was negative. Chest x-ray was unremarkable. 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 Dexamethasone has been started since transfer. Leukocytosis increased to 24,000 on 03/02. He received cefazolin perioperatively. He pulled out his Hogan catheter on 03/02 causing penile bleeding. Subjective: Patient was seen and examined. He is lethargic today while having PICC line inserted. He had an episode of aspiration last night.     Objective:  BP (!) 183/59   Pulse 119   Temp 99.3 °F (37.4 °C) (Temporal)   Resp 20   Ht 5' 5\" (1.651 m)   Wt 135 lb 12.9 oz (61.6 kg)   SpO2 93%   BMI 22.60 kg/m²   Constitutional: Lethargic, not in distress  Respiratory: Clear breath sounds, no crackles, no wheezes  Cardiovascular: Regular rate and rhythm, no murmurs  Gastrointestinal: Bowel sounds present, soft, nontender  Skin: Warm and dry, no active dermatoses  Musculoskeletal: No joint swelling, no joint erythema    Laboratory:  Lab Results   Component Value Date    WBC 31.6 (H) 03/06/2021    WBC 38.1 (H) 03/05/2021    WBC 28.7 (H) 03/04/2021    HGB 9.1 (L) 03/06/2021    HCT 29.1 (L) 03/06/2021    MCV 87.4 03/06/2021     03/06/2021     Lab Results   Component Value Date    NEUTROABS 29.39 (H) 03/06/2021    NEUTROABS 36.58 (H) 03/05/2021    NEUTROABS 26.29 (H) 03/04/2021     No results found for: CRP  No results found for: CRPHS  No results found for: SEDRATE  Lab Results   Component Value Date    ALT 41 (H) 01/31/2020    AST 49 (H) 01/31/2020    ALKPHOS 70 01/31/2020    BILITOT 0.5 01/31/2020     Lab Results   Component Value Date     03/06/2021    K 3.0 03/06/2021     03/06/2021    CO2 28 03/06/2021    BUN 38 03/06/2021    CREATININE 1.2 03/06/2021    CREATININE 1.6 03/05/2021    CREATININE 1.5 03/04/2021    GFRAA >60 03/06/2021    LABGLOM 59 03/06/2021    GLUCOSE 146 03/06/2021    PROT 7.5 01/31/2020    LABALBU 4.5 01/31/2020    CALCIUM 8.2 03/06/2021    BILITOT 0.5 01/31/2020    ALKPHOS 70 01/31/2020    AST 49 01/31/2020    ALT 41 01/31/2020       Radiology: CXR (03/04): Essentially unremarkable      Microbiology:     Blood cx  Pending      Assessment:  Leukocytosis, probably medication induced from steroids (dexamethasone 10 mg q6h) vs aspiration pneumonia/pneumonitis  WBC 31,600    Recommendations:  Start ampicillin-sulbactam 3g q6h. Check blood cultures. Trend CBC with steroids just discontinued on 03/04. Long talk with family   All notes noted      Thank you for involving me in the care of Marleni Shannon. Dr. Hale Hopes will continue to follow. Please do not hesitate to call for any questions or concerns.     Electronically signed by Deepa Gunn MD on 3/6/2021 at 9:41 AM

## 2021-03-06 NOTE — PROGRESS NOTES
[facility]  PROGRESS NOTE    Chief Complaint:   Gross hematuria/Traumatic loyola removal/Difficult loyola insertion/Posterior urethral false passage     HPI:   He is stable in the cardiac ICU. His urine reportedly was clear yellow and he is having hematuria currently. He is off anticoagulants at this time. The nursing staff have been irrigating his catheter without difficulty and no clots are being noticed. He is not in any distress    Vitals:    03/06/21 1500   BP:    Pulse: 82   Resp: 30   Temp:    SpO2: 95%       Allergies: Patient has no known allergies.     PAST MEDICAL HISTORY:   Past Medical History:   Diagnosis Date    Atrial fibrillation ([address]     2018 pt states has a heart monitor, implantable    Carotid artery stenosis     History of cardiovascular stress test 11/13/13    lexiscan    Hyperlipidemia     Hypertension     Non-insulin dependent type 2 diabetes mellitus ([address]     Unspecified cerebral artery occlusion with cerebral infarction nov 11 2013    right face numb balance problems vision fades in and out  walks with walker       PAST SURGICAL HISTORY:   Past Surgical History:   Procedure Laterality Date    APPENDECTOMY      CAROTID ENDARTERECTOMY Left Dec 2013    Left Carotid Endarterectomy    CRANIOTOMY N/A 3/2/2021    CRANIOTOMY STEREOTATIC BIOPSY, FRAMELESS, CEREBRAL POSTERIOR FOSSA (NEEDS PATHOLOGY) performed by Aimee Cordero MD at [facility] 22 ECHO COMPL W DOP COLOR FLOW  11/12/2013         INTRACAPSULAR CATARACT EXTRACTION Left 11/3/2020    LEFT EYE CATARACT EMULSIFICATION IOL IMPLANT performed by Dion Levy MD at [address]:    Family History   Adopted: Yes       PAST SOCIAL HISTORY:    Social History     Socioeconomic History    Marital status:      Spouse name: None    Number of children: None    Years of education: None    Highest education level: None   Occupational History    None   Social Needs    Financial resource strain: None    Food insecurity     Worry: None     Inability: None    Transportation needs     Medical: None     Non-medical: None   Tobacco Use    Smoking status: Former Smoker     Packs/day: 1.00     Types: Cigarettes     Quit date: 2019     Years since quittin.6    Smokeless tobacco: Never Used   Substance and Sexual Activity    Alcohol use:  Yes     Alcohol/week: 1.0 standard drinks     Types: 1 Cans of beer per week     Comment: occ    Drug use: No    Sexual activity: None   Lifestyle    Physical activity     Days per week: None     Minutes per session: None    Stress: None   Relationships    Social connections     Talks on phone: None     Gets together: None     Attends Latter day service: None     Active member of club or organization: None     Attends meetings of clubs or organizations: None     Relationship status: None    Intimate partner violence     Fear of current or ex partner: None     Emotionally abused: None     Physically abused: None     Forced sexual activity: None   Other Topics Concern    None   Social History Narrative    None       IV:    amiodarone 450mg/250ml D5W infusion 0.5 mg/min (21 0800)       PRN: sodium chloride flush, heparin flush, hydrALAZINE, labetalol, opium-belladonna, albuterol, glucose, dextrose, glucagon (rDNA), sodium chloride flush, acetaminophen **OR** acetaminophen    Scheduled:    potassium bicarb-citric acid  40 mEq Oral BID    polyethylene glycol  17 g Oral Daily    senna  1 tablet Oral Nightly    heparin flush  3 mL Intravenous 2 times per day    ampicillin-sulbactam  3,000 mg Intravenous Q6H    amiodarone  200 mg Oral Q8H    metoprolol  5 mg Intravenous Q6H    insulin lispro  0-18 Units Subcutaneous TID WC    insulin lispro  0-9 Units Subcutaneous Nightly    melatonin  10 mg Oral Nightly    pantoprazole  20 mg Intravenous Daily    And    sodium chloride (PF)  10 mL Intravenous Daily    divalproex  125 mg Oral BID    gabapentin  300 mg Oral BID    insulin glargine  12 Units Subcutaneous Nightly    lamoTRIgine  200 mg Oral BID    [Held by provider] magnesium oxide  400 mg Oral Daily    [Held by provider] metoprolol succinate  50 mg Oral BID    [Held by provider] therapeutic multivitamin-minerals  1 tablet Oral Daily    tamsulosin  0.4 mg Oral Nightly    sodium chloride flush  10 mL Intravenous 2 times per day    [Held by provider] lisinopril  20 mg Oral Daily    docusate sodium  100 mg Oral BID       Lab Results   Component Value Date     03/06/2021    K 3.0 03/06/2021    BUN 38 03/06/2021    CREATININE 1.2 03/06/2021        Lab Results   Component Value Date    HGB 9.1 03/06/2021    HCT 29.1 03/06/2021       No results found for: PSA    Review Of Systems:  Obtunded and not able to be obtained at this time    Physical Exam:  Skin is dry, and without rashes  Respirations are non-labored, intact  Abdomen is soft, non-tender, non-distended. Active bowel sounds are present. No rebound or guarding  Obtunded. No focal motor/sensory deficits  Hogan catheter is draining clear, red-colored urine. No clots    Assessment and Plan:  POD#2--S/P Cysto/Hogan insertion  -Continue manual Hogan irrigations every 4 hours and as needed  -Contniue to hold anticoagulation if possible  -Continue Flomax  -Continue the Hogan catheter for minimum of a week.   He is likely to be given a voiding trial as an outpatient  -We will follow him during his hospital stay    Lianne Hanna MD  3/6/2021  3:50 PM

## 2021-03-06 NOTE — PROGRESS NOTES
Palliative medicine    Chart reviewed  Patient still unable to be redirected and pulling at lines requiring restraints for safety per nursing note. HTN/tachycardia overnight treated with cardene  Primary service spoke with family this am  Pathology pending  Additional [address] discussions delayed until pathology final to be able to discuss prognosis and treatment options more optimally. Palliative medicine will continue to follow.      RAVEN SHI PA-C

## 2021-03-06 NOTE — PROGRESS NOTES
PO#3  PROCEDURE:  Frameless stereotactic left suboccipital craniectomy for  brain tumor, biopsy and debulking. The patient is stable neurosurgically as he has been yesterday & day before. Having pulmonary issues. Incision is healthy. Does follow commands at times. The CT scan of brain post op was reviewed. Spoke with Pathologist yesterday. Possibility of Metastasis is not ruled out yet.   BP (!) 153/67   Pulse 83   Temp 98.4 °F (36.9 °C) (Temporal)   Resp 22   Ht 5' 5\" (1.651 m)   Wt 135 lb 12.9 oz (61.6 kg)   SpO2 94%   BMI 22.60 kg/m²

## 2021-03-06 NOTE — PROGRESS NOTES
Hospitalist Progress Note      SYNOPSIS: Patient admitted on 2021 for weakness. [facility] had had altered mental status at home to associated with irrational behavior.  MRI of the brain done showed a heterogeneous enhancing mass in the left cerebral hemisphere suspicious for neoplasm and several foci of extra-axial parasellar cisterns and subependymal enhancement of the left lateral ventricle suspicious for metastatic disease.  CT of the chest showed no evidence of any malignancy and CT of the abdomen and pelvis also negative for any malignancy.  Oncology and palliative medicine on board.         SUBJECTIVE:    Patient seen and examined. No family at bedside. He has NG tube and tube feed started with free water flushes. Complaining of dry mouth. Bedside nurse has been doing frequent mouth care. Patient asking when he can leave. Remains in soft wrist restraints at this time. Down to 2L O2 with SaO2 93%. Per nurse family waiting for biopsy results before making decision on goals of care. Records reviewed. Temp (24hrs), Av.1 °F (37.3 °C), Min:98.4 °F (36.9 °C), Max:99.7 °F (37.6 °C)    DIET: Diet NPO Effective Now Exceptions are: Sips of Water with Meds  CODE: Full Code    Intake/Output Summary (Last 24 hours) at 3/6/2021 0759  Last data filed at 3/6/2021 0600  Gross per 24 hour   Intake 2242.7 ml   Output 3810 ml   Net -1567.3 ml       OBJECTIVE:    BP (!) 183/59   Pulse 79   Temp 99.1 °F (37.3 °C) (Temporal)   Resp 22   Ht 5' 5\" (1.651 m)   Wt 135 lb 12.9 oz (61.6 kg)   SpO2 94%   BMI 22.60 kg/m²     General appearance: No apparent distress, appears documented age, cooperative. HEENT:  Conjunctivae/corneas clear. EOMI. Mucous membranes dry. NG tube and nasal cannula in place. Neck: trachea midline. No jugular venous distention.    Respiratory: Clear to auscultation bilaterally, normal respiratory effort  Cardiovascular: Regular rate rhythm, normal S1-S2  Abdomen: Soft, nontender, nondistended  Musculoskeletal: No clubbing, cyanosis, no bilateral lower extremity edema. Skin:  No rashes on visible skin  Neurologic: awake, calm     ASSESSMENT:  #Metastatic brain neoplasm with unclear primary  -Has a history of prior melanoma   -Had left suboccipital craniectomy for brain tumor biopsy and debulking done 3/2; awaiting results, neurosurgery spoke with pathologist yesterday and metastasis not yet ruled out  -Aspirin and Eliquis on hold.  On Decadron.  -Oncology and neurosurgery on board.     #Acute metabolic encephalopathy  -still in 4 point restraints  -On Ativan as needed.  Not on Haldol as he developed a rash after he started Haldol.     #Acute hypoxic respiratory failure  -now on 8L of oxygen. -he was thought to have aspirated 3/4, and with his increased oxygen requirements, he very likely has an aspiration pneumonia, as he was also febrile 3/5  -pulmonology consulted  -titrate oxygen to maintain sats >90%, weaned down to 2L now  -breathing treatment with bronchodilators  -start IV antibiotics; defer to ID      #Probable aspiration pneumonia  -wbc is elevated. Also had a fever and aspirated   -to start on IV antimicrobials; defer to ID    #Dysphagia  -high risk of aspiration  -speech therapy on board  -NPO with tube feed now  -aspiration precautions    #Hematuria  -now resolved. -urology on board. Hogan catheter replaced    #Type 2 diabetes mellitus with  Neuropathy:  - On insulin sliding scale.  Accu-Cheks AC at bedtime.  -on gabapentin   -started on lantus 12 units daily    #Hypertension:  - BP meds currently on hold due to hypotension overnight     #A. Fib:  - cardizem drip stopped due to hypotension, amiodarone drip as well as oral amiodarone ordered. - Electophysiology on board  -Metoprolol and tikosyn on hold. #History of melanoma:  - This was resected from his back and face.   -Biopsy done of brain lesion and pathology pending to see if the brain lesions are due to melanoma.    #History of seizures  -on Keppra     DVT prophylaxis: on SCDs. eliquis on hold.  Resume eliquis when [id number] Susan Monroy with surgery team     Disposition: to be determined, family waiting on biopsy results before making a decision on goals of care    Medications:  REVIEWED DAILY    Infusion Medications    niCARdipene (CARDENE) 50 mg in 250 mL 0.9 % sodium chloride infusion 5 mg/hr (03/06/21 0612)    sodium chloride 50 mL/hr at 03/05/21 0857    amiodarone 450mg/250ml D5W infusion 0.5 mg/min (03/05/21 1354)     Scheduled Medications    potassium bicarb-citric acid  40 mEq Oral BID    polyethylene glycol  17 g Oral Daily    senna  1 tablet Oral Nightly    heparin flush  3 mL Intravenous 2 times per day    ampicillin-sulbactam  3,000 mg Intravenous Q6H    amiodarone  200 mg Oral Q8H    metoprolol  5 mg Intravenous Q6H    insulin lispro  0-18 Units Subcutaneous TID WC    insulin lispro  0-9 Units Subcutaneous Nightly    melatonin  10 mg Oral Nightly    pantoprazole  20 mg Intravenous Daily    And    sodium chloride (PF)  10 mL Intravenous Daily    divalproex  125 mg Oral BID    gabapentin  300 mg Oral BID    insulin glargine  12 Units Subcutaneous Nightly    lamoTRIgine  200 mg Oral BID    [Held by provider] magnesium oxide  400 mg Oral Daily    [Held by provider] metoprolol succinate  50 mg Oral BID    [Held by provider] therapeutic multivitamin-minerals  1 tablet Oral Daily    tamsulosin  0.4 mg Oral Nightly    sodium chloride flush  10 mL Intravenous 2 times per day    lisinopril  20 mg Oral Daily    docusate sodium  100 mg Oral BID     PRN Meds: sodium chloride flush, heparin flush, hydrALAZINE, labetalol, opium-belladonna, LORazepam, albuterol, HYDROcodone-acetaminophen, glucose, dextrose, glucagon (rDNA), sodium chloride flush, acetaminophen **OR** acetaminophen    Labs:     Recent Labs     03/04/21  1515 03/05/21  0450 03/06/21  0405   WBC 28.7* 38.1* 31.6*   HGB 11.3* 10.1* 9.1*   HCT 34.2* 30.8* 29.1*  347 255       Recent Labs     03/04/21  0510 03/05/21  0450 03/06/21  0405    147* 152*   K 4.0 4.0 3.0*   * 114* 114*   CO2 26 24 28   BUN 46* 54* 38*   CREATININE 1.5* 1.6* 1.2   CALCIUM 8.2* 8.6 8.2*   PHOS 3.6 3.4 3.0       No results for input(s): PROT, ALB, ALKPHOS, ALT, AST, BILITOT, AMYLASE, LIPASE in the last 72 hours. No results for input(s): INR in the last 72 hours. No results for input(s): Maggi Lacks in the last 72 hours.     Chronic labs:    Lab Results   Component Value Date    CHOL 78 12/13/2013    TRIG 40 12/13/2013    HDL 28.0 (A) 12/13/2013    LDLCALC 42 12/13/2013    TSH 0.692 12/13/2013    INR 1.2 03/02/2021    LABA1C 6.6 (H) 02/26/2021       Radiology: REVIEWED DAILY    +++++++++++++++++++++++++++++++++++++++++++++++++  [address], [place]  +++++++++++++++++++++++++++++++++++++++++++++++++

## 2021-03-06 NOTE — PLAN OF CARE
Problem: Falls - Risk of:  Goal: Will remain free from falls  Description: Will remain free from falls  3/6/2021 1243 by Louise Oliveros RN  Outcome: Met This Shift     Problem: Skin Integrity:  Goal: Will show no infection signs and symptoms  Description: Will show no infection signs and symptoms  3/6/2021 1243 by Louise Oliveros RN  Outcome: Met This Shift     Problem: Pain:  Goal: Pain level will decrease  Description: Pain level will decrease  3/6/2021 1243 by Louise Oliveros RN  Outcome: Met This Shift     Problem: Non-Violent Restraints  Goal: Removal from restraints as soon as assessed to be safe  3/6/2021 1243 by Louise Oliveros RN  Outcome: Met This Shift     Problem: Non-Violent Restraints  Goal: No harm/injury to patient while restraints in use  3/6/2021 1243 by Louise Oliveros RN  Outcome: Met This Shift     Problem: Non-Violent Restraints  Goal: Patient's dignity will be maintained  3/6/2021 1243 by Louise Oliveros RN  Outcome: Met This Shift

## 2021-03-07 NOTE — PLAN OF CARE
Problem: Falls - Risk of:  Goal: Will remain free from falls  Description: Will remain free from falls  Outcome: Met This Shift     Problem: Skin Integrity:  Goal: Will show no infection signs and symptoms  Description: Will show no infection signs and symptoms  Outcome: Met This Shift     Problem: Pain:  Goal: Control of acute pain  Description: Control of acute pain  Outcome: Met This Shift     Problem: Non-Violent Restraints  Goal: Removal from restraints as soon as assessed to be safe  3/7/2021 0929 by Radha Perkins RN  Outcome: Met This Shift     Problem: Non-Violent Restraints  Goal: No harm/injury to patient while restraints in use  3/7/2021 0929 by Radha Perkins RN  Outcome: Met This Shift     Problem: Non-Violent Restraints  Goal: Patient's dignity will be maintained  3/7/2021 0929 by Radha Perkins RN  Outcome: Met This Shift

## 2021-03-07 NOTE — PROGRESS NOTES
Intensive Care Unit  Critical Care Consult  Daily Progress Note 3/7/2021    Date of Admission: 2/27    EVENTS:   02/27  Initially presented ot outside facility with AMS, confusion & erratic behavior. PMH include previous stroke with residual left sided weakness, dementia, HTN, HLD, chronic pain issues, CKD & melanoma. Diagnotic imaging reveal brain masses. Transferred to [facility] for ongoing evaluaitn & neurosurgery consult. CT chest/abdomen/pelvis no evidence of malignancy.    ---------------------  03/02 Underwent craniotomy for biopsy of intracranial mass. Glial neoplasm. Admitted to  ICU post--operatively. 03/03  No issues overnight. Did not require PRN antihypertensive agents. Required 12 units of insulin. Pulled loyola out during the night. Seen by speech recs dental soft diet with honey thick liquids. 3/4 hematuria overnight with bright red blood from penis. Bladder scan ordered  3/5 episode of hypotension yesterday responded to IVFs. Cardiology consulted by medicine, HTN medications adjusted now on amiodarone gtt and metoprolol. Norvasc, clonidine, tikosyn and hydralazine stopped. 3-way catheter attempted last evening with difficulty. Urology consulted; 18 Fr coude inserted via cystoscopy. Patient more alert today and following commands  3/6 alert, following commands, speech less clear this morning. Hg trending down. Cardene started by primary team discontinued. Restart home BP meds  3/7 following commands. Hg continues to trend down, CT abdomen ordered. Start norvasc, metoprolol PO. Continues on amiodarone. On venturi mask through the night. PHYSICAL EXAM:    BP (!) 157/54   Pulse 73   Temp 99.1 °F (37.3 °C) (Axillary)   Resp 16   Ht 5' 5\" (1.651 m)   Wt 139 lb 12.4 oz (63.4 kg)   SpO2 97%   BMI 23.26 kg/m²     General appearance:  Comfortable.          GCS:    4 - Opens eyes on own   6 - Follows simple motor commands  5 - Alert and oriented    Pupil size: Left 3 mm  Right 3 mm  Pupil reaction: Yes  Wiggles fingers: Left Yes Right Yes  Hand grasp:   Left normal     Right normal  Wiggles toes: Left Yes    Right Yes  Plantar flexion:  Left normal    Right normal    CONSTITUTIONAL: no acute distress, lying in hospital bed  NEUROLOGIC: PERRL, oriented x 4  CARDIOVASCULAR: S1 S2, regular rate, regular rhythm, no murmur/gallop/rub. Monitor: a fib with RVR  PULMONARY: no rhonchi/rales/wheezes, no use of accessory muscles  RENAL: loyola to gravity, clear yellow urine  ABDOMEN: soft, nontender, nondistended, nontympanic, normal bowel sounds   MUSCULOSKELETAL: moves all extremities purposefully, 5/5 strength   SKIN/EXTREMITIES: no rashes/ecchymosis, no edema/clubbing, warm/dry, good capillary refill     LINES: PIV   Art line    CONSULTS:   Medicine  Urology  Electrophysiology   Neurosurgery  Palliative  ID  Hem/Onc    Past Medical History:   Diagnosis Date    Atrial fibrillation ([address]     2018 pt states has a heart monitor, implantable    Carotid artery stenosis     History of cardiovascular stress test 11/13/13    lexiscan    Hyperlipidemia     Hypertension     Non-insulin dependent type 2 diabetes mellitus [address] [address]     Unspecified cerebral artery occlusion with cerebral infarction nov 11 2013    right face numb balance problems vision fades in and out  walks with walker       ASSESSMENT/PLAN:       Neuro:  AMS. Brain mass. S/P Craniotomy for biopsy. Glial neoplasm. Hx of Seizures, dementia & stroke (residual left sided weakness). Monitor neuro status. Neurosurgery following. Decadron. Gabapentin. Lamictal.  Depakote. Ativan PRN    CV:  No acute issues. Hx of HTN, Hyperlipidemia and Afib              Monitor hemodynamics. BP goal systolic less than 358 mm Hg. PRN hydralzine & labetalol. .   Metoprolol. norvasc  Amiodarone gtt and scheduled      Pulm: At risk for respiratory insufficiency. Monitor RR & SpO2. O2 as needed. Venturi mask,   Prn albuterol.    Encourage cough, ANIBAL HA[facility]  & deep breathing. GI:  Dysphagia. BMI 23.3    Monitor bowel function. Diet. NPO  Tube feeding  Bowel regimen  Coude to gravity  NGT     Renal:   CKD,. Hx of BPH. Hypernatremia  Monitor BUN & Cr, electrolytes & replace as needed. Monitor I & O.    Mag oxide  Spirolactone stopped. Flomax. Free water flushes    ID: Unasyn per ID for possible aspiration PNA    Endocrine: Hyperglycemia. Hx of diabetes    Monitor BS. Lantus increased to 15 BID  ISS.       MSK: Chronic left sided weakness. Deconditioned.   ROM. Turn & reposition. PT & OT when able;  Monitor for skin breakdown. Heme: leukocytosis, probably reactive/medicine induced; anemia  Monitor CBC. Multivitamin  Radiation therapy per Hem/Onc  CT abdomen    Bowel regime: Glycolax, dulcolax. Last BM 3/7  Pain control/Sedation: Tylenol, norco  DVT prophylaxis: SCDs. No Lovenox/heparin until ok with neurosurgery. GI prophylaxis:  Protonix, NGT  Hogan: Keep in place for critical care monitoring of fluid balance.     Family update: will update when available  Code status:  full  Disposition:  ICU    Electronically signed by Alexia Platt CNP on 3/7/2021 at 10:27 AM

## 2021-03-07 NOTE — PLAN OF CARE
Problem: Falls - Risk of:  Goal: Will remain free from falls  Description: Will remain free from falls  3/6/2021 1243 by Radha Perkins RN  Outcome: Met This Shift     Problem: Skin Integrity:  Goal: Will show no infection signs and symptoms  Description: Will show no infection signs and symptoms  3/6/2021 1243 by Radha Perkins RN  Outcome: Met This Shift     Problem: Pain:  Goal: Pain level will decrease  Description: Pain level will decrease  3/6/2021 1243 by Radha Perkins RN  Outcome: Met This Shift     Problem: Non-Violent Restraints  Goal: No harm/injury to patient while restraints in use  3/7/2021 0045 by Yashira Osborne RN  Outcome: Met This Shift  3/6/2021 2247 by Yashira Osborne RN  Outcome: Met This Shift  3/6/2021 1243 by Radha Perkins RN  Outcome: Met This Shift  Goal: Patient's dignity will be maintained  3/7/2021 0045 by Yashira Osborne RN  Outcome: Met This Shift  3/6/2021 2247 by Yashira Osborne RN  Outcome: Met This Shift  3/6/2021 1243 by Radha Perkins RN  Outcome: Met This Shift     Problem: Non-Violent Restraints  Goal: Removal from restraints as soon as assessed to be safe  3/7/2021 0045 by Yashira Osborne RN  Outcome: Not Met This Shift  3/6/2021 2247 by Yashira Osborne RN  Outcome: Not Met This Shift  3/6/2021 1243 by Radha Perkins RN  Outcome: Met This Shift

## 2021-03-07 NOTE — PROGRESS NOTES
[facility] PROGRESS NOTE      Chief complaint: Follow-up of leukocytosis    The patient is a 76 y.o. male with history of DM, hypertension, atrial fibrillation, hyperlipidemia, stroke, dementia, seizure disorder, melanoma status post excision from back and forehead 7 years ago, transferred to [facility] on 02/25 [place] [facility] [place] [facility] [place] he initially presented with confusion and abnormal behavior for 3 weeks, found to have MRI of the brain showing multiple intracranial metastasis most notably in the left cerebellar hemisphere, hydrocephalus with dilation of the lateral and third ventricles. On admission, he was afebrile and hemodynamically stable with leukocytosis of 14,000. Urinalysis showed no pyuria. SARS-CoV-2 PCR was negative. Chest x-ray was unremarkable. 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 Dexamethasone has been started since transfer. Leukocytosis increased to 24,000 on 03/02. He received cefazolin perioperatively. He pulled out his Hogan catheter on 03/02 causing penile bleeding. Subjective: Patient was seen and examined. He is lethargic today while having PICC line inserted. He had an episode of aspiration last night.     Objective:  BP (!) 157/54   Pulse 73   Temp 99.1 °F (37.3 °C) (Axillary)   Resp 16   Ht 5' 5\" (1.651 m)   Wt 139 lb 12.4 oz (63.4 kg)   SpO2 97%   BMI 23.26 kg/m²   Constitutional: Lethargic, not in distress  Respiratory: Clear breath sounds, no crackles, no wheezes  Cardiovascular: Regular rate and rhythm, no murmurs  Gastrointestinal: Bowel sounds present, soft, nontender  Skin: Warm and dry, no active dermatoses  Musculoskeletal: No joint swelling, no joint erythema    Laboratory:  Lab Results   Component Value Date    WBC 31.9 (H) 03/07/2021    WBC 31.6 (H) 03/06/2021    WBC 38.1 (H) 03/05/2021    HGB 8.4 (L) 03/07/2021    HCT 26.2 (L) 03/07/2021    MCV 87.3 03/07/2021     03/07/2021     Lab Results   Component Value Date    NEUTROABS 29.99 (H) 03/07/2021    NEUTROABS 29.39 (H) 03/06/2021    NEUTROABS 36.58 (H) 03/05/2021     No results found for: CRP  No results found for: CRPHS  No results found for: SEDRATE  Lab Results   Component Value Date    ALT 41 (H) 01/31/2020    AST 49 (H) 01/31/2020    ALKPHOS 70 01/31/2020    BILITOT 0.5 01/31/2020     Lab Results   Component Value Date     03/07/2021    K 3.6 03/07/2021     03/07/2021    CO2 30 03/07/2021    BUN 42 03/07/2021    CREATININE 1.1 03/07/2021    CREATININE 1.2 03/06/2021    CREATININE 1.6 03/05/2021    GFRAA >60 03/07/2021    LABGLOM >60 03/07/2021    GLUCOSE 259 03/07/2021    PROT 7.5 01/31/2020    LABALBU 4.5 01/31/2020    CALCIUM 8.0 03/07/2021    BILITOT 0.5 01/31/2020    ALKPHOS 70 01/31/2020    AST 49 01/31/2020    ALT 41 01/31/2020       Radiology: CXR (03/04): Essentially unremarkable      Microbiology:     Blood cx  Pending      Assessment:  Leukocytosis, probably medication induced from steroids (dexamethasone 10 mg q6h) vs aspiration pneumonia/pneumonitis  WBC 31,900  Still elevated off steroids  Reviewed cultures     Recommendations:  Start ampicillin-sulbactam 3g q6h. Check blood cultures. Trend CBC with steroids just discontinued on 03/04. Long talk with family   All notes noted   Neurosurgery note noted  frameless stererotactic left suboccipital craniectomy for brain tumor, bx and debulking     Thank you for involving me in the care of Jason Yao. Dr. Eliva Montero will continue to follow. Please do not hesitate to call for any questions or concerns.     Electronically signed by Spring Dolan MD on 3/7/2021 at 9:07 AM

## 2021-03-07 NOTE — PROGRESS NOTES
PO#4  PROCEDURE:  Frameless stereotactic left suboccipital craniectomy for  brain tumor, biopsy and debulking. The patient is stable neurosurgically as he has been yesterday & day before. Having pulmonary issues. Incision is healthy. Does follow commands at times.   Awaiting pathreport  Spoke with family

## 2021-03-07 NOTE — PROGRESS NOTES
3/7/2021 1:21 PM  Service: Urology  Group: [facility] (Rich/Simeon/Canelo)    Ivelisse Mendoza  [phone]    Subjective:    Remains in the ICU  He is in soft restraints  No family is present  His Hogan catheter is currently draining yellow urine in tubing    Review of Systems  Unable to obtain due to confusion and obtunded      Scheduled Meds:   potassium & sodium phosphates  2 packet Per NG tube 4x Daily    metoprolol tartrate  50 mg Oral BID    amLODIPine  5 mg Oral Daily    docusate sodium  1 enema Rectal BID    insulin glargine  10 Units Subcutaneous BID    potassium bicarb-citric acid  40 mEq Oral BID    polyethylene glycol  17 g Oral Daily    senna  1 tablet Oral Nightly    heparin flush  3 mL Intravenous 2 times per day    ampicillin-sulbactam  3,000 mg Intravenous Q6H    amiodarone  200 mg Oral Q8H    insulin lispro  0-18 Units Subcutaneous TID WC    insulin lispro  0-9 Units Subcutaneous Nightly    melatonin  10 mg Oral Nightly    pantoprazole  20 mg Intravenous Daily    And    sodium chloride (PF)  10 mL Intravenous Daily    divalproex  125 mg Oral BID    gabapentin  300 mg Oral BID    lamoTRIgine  200 mg Oral BID    [Held by provider] magnesium oxide  400 mg Oral Daily    [Held by provider] therapeutic multivitamin-minerals  1 tablet Oral Daily    tamsulosin  0.4 mg Oral Nightly    sodium chloride flush  10 mL Intravenous 2 times per day    [Held by provider] lisinopril  20 mg Oral Daily    docusate sodium  100 mg Oral BID       Objective:  Vitals:    03/07/21 1300   BP:    Pulse: 75   Resp: 20   Temp:    SpO2: 96%         Allergies: Patient has no known allergies. General Appearance: Eyes open, does not follow commands, obtunded   skin: no rash or erythema  Head: normocephalic and atraumatic  Pulmonary/Chest: normal air movement, no respiratory distress  Abdomen: soft, non-tender, non-distended  Genitourinary:  Hogan catheter intact with yellow urine in the tubing  Extremities: no cyanosis, clubbing or edema         Labs:     Recent Labs     03/07/21  0558   *   K 3.6   *   CO2 30*   BUN 42*   CREATININE 1.1   GLUCOSE 259*   CALCIUM 8.0*       Lab Results   Component Value Date    HGB 8.4 03/07/2021    HCT 26.2 03/07/2021     Narrative   EXAMINATION:   CT OF THE ABDOMEN AND PELVIS WITHOUT CONTRAST 3/7/2021 10:07 am       TECHNIQUE:   CT of the abdomen and pelvis was performed without the administration of   intravenous contrast. Multiplanar reformatted images are provided for review. Dose modulation, iterative reconstruction, and/or weight based adjustment of   the mA/kV was utilized to reduce the radiation dose to as low as reasonably   achievable.       COMPARISON:   None.       HISTORY:   ORDERING SYSTEM PROVIDED HISTORY: traumatic loyola insertion, rule out bleeding   TECHNOLOGIST PROVIDED HISTORY:   Reason for exam:->traumatic loyola insertion, rule out bleeding   Additional Contrast?->None   What reading provider will be dictating this exam?->CRC       FINDINGS:   Loyola catheter is in the bladder lumen.  There is no evidence for hemorrhage   along the trajectory of the prostatic, bulbar, and penile urethra is.       The bladder is not distended.  There is some thickening of the bladder wall. Air in the bladder lumen relates with presence of the Loyola catheter.  Delete       Kidneys have preserved size and cortical thickness.  Vascular calcifications   seen both kidneys.  The there is a large parapelvic cyst in the right kidney. There is no renal calculus or obstruction of the kidneys.  The ureters are   not dilated.       Calcified atheromatous changes are seen in the abdominal aorta with mild   fusiform dilatation of the infrarenal segment measuring up to 2.9 by 2 cm.    Delete       There is a hypodense nodular enlargement for the left adrenal gland the   hypodensity indicates a nonfunctioning adenoma more likely.  The right   adrenal gland appear unremarkable.     There are normal size and density for the liver and spleen.  Delete no focal   lesions were seen.  The gallbladder has a hyperdensity which can represent a   milk of calcium.  Can correlate with gallbladder ultrasound.  The biliary   tree and pancreatic duct systems are not dilated.  The pancreas demonstrates   atrophy and fat replacement of the parenchyma.       No acute inflammatory changes seen the mid mesentery fat planes, free   intraperitoneal air or ascites.       There are semi solid fecal contents throughout the entire colon and there are   findings for fecal rectal retention/impaction. Daved Jubilee is a pattern of severe   constipation of the colon.       There is no midline retroperitoneal adenopathy.       Areas of multi segmental consolidation of the right lower lobe is seen with   overall patent central airways also observed in the left lower lobe in lesser   degree.  The bi basilar infiltrates are considered a on basis of pneumonia.       NG tube is in the area of the stomach.       Heart has upper normal size.  The there is central venous catheter in the   right atrium.       No significant changes seen in the thoracolumbar spine visualized on this   study.  Facet hypertrophy seen predominant in L4-L5 level.           Impression   Hogan catheter is in the bladder in proper position.  No indication for   hemorrhage along the trajectory of the Hogan catheter or conspicuous   hemorrhage in the bladder lumen.  The bladder is not distended.       Pattern of severe constipation with fecal rectal retention.       Bi basilar infiltrates more prominent on the right side, pneumonia considered.                                 Assessment/Plan:  POD#3--S/P Cysto/Hogan insertion  Gross hematuria, resolving  Posterior False Passage   Right Parapelvic Cyst     UA and Urine Cx ordered  Antibiotics per ID   His urine seems to be clearing, yellow urine in tubing today  Cont manual irrigations as needed for gross hematuria CT abdomen pelvis reviewed and shows a Loyola catheter to be properly positioned within the bladder. There is no evidence of obstructive uropathy. Cont the Flomax  Cont the loyola  Loyola should be left in for at least one week  Voiding trial will need to be given once ambulatory.  This will likely need to occur as an outpatient       RUIZ Muller - CNP   [facility]

## 2021-03-07 NOTE — PROGRESS NOTES
Pt attempting to pull at critical lines, cannot be redirected.  Bilateral soft wrist restraints remain for pt safety

## 2021-03-07 NOTE — PROGRESS NOTES
Hospitalist Progress Note      SYNOPSIS: Patient admitted on 2021 for weakness. [facility] had had altered mental status at home to associated with irrational behavior.  MRI of the brain done showed a heterogeneous enhancing mass in the left cerebral hemisphere suspicious for neoplasm and several foci of extra-axial parasellar cisterns and subependymal enhancement of the left lateral ventricle suspicious for metastatic disease.  CT of the chest showed no evidence of any malignancy and CT of the abdomen and pelvis also negative for any malignancy.  Oncology and palliative medicine on board.         SUBJECTIVE:    Patient seen and examined. No family at bedside. Speech unintelligible. Oxygen increased to VM overnight. CT abd/pelv shows severe constipation. Remains in wrist restraints. Temp (24hrs), Av.4 °F (37.4 °C), Min:98.4 °F (36.9 °C), Max:100.3 °F (37.9 °C)    DIET: DIET TUBE FEED CONTINUOUS/CYCLIC NPO; Diabetic; Nasogastric; 20; 50; Exceptions are: Sips of Water with Meds  CODE: Full Code    Intake/Output Summary (Last 24 hours) at 3/7/2021 0753  Last data filed at 3/7/2021 0700  Gross per 24 hour   Intake 3113.22 ml   Output 1680 ml   Net 1433.22 ml       OBJECTIVE:    BP (!) 157/53   Pulse 69   Temp 99.1 °F (37.3 °C) (Axillary)   Resp 16   Ht 5' 5\" (1.651 m)   Wt 139 lb 12.4 oz (63.4 kg)   SpO2 97%   BMI 23.26 kg/m²     General appearance: No apparent distress, appears documented age, calm  HEENT:  Conjunctivae/corneas clear. EOMI. Mucous membranes dry. NG tube and venti mask in place. Neck: trachea midline. No jugular venous distention. Respiratory: Clear to auscultation bilaterally, normal respiratory effort  Cardiovascular: Regular rate rhythm, normal S1-S2  Abdomen: Soft, nontender, nondistended  Musculoskeletal: No clubbing, cyanosis, no bilateral lower extremity edema.   Soft wrist restraints  Skin:  No rashes on visible skin  Neurologic: awake, calm     ASSESSMENT:  #Metastatic brain neoplasm with unclear primary  -Has a history of prior melanoma   -Had left suboccipital craniectomy for brain tumor biopsy and debulking done 3/2; awaiting results, neurosurgery spoke with pathologist yesterday and metastasis not yet ruled out  -Aspirin and Eliquis on hold.  On Decadron.  -Oncology and neurosurgery on board.     #Acute metabolic encephalopathy  -still in soft wrist restraints  -developed a rash after he started Haldol.  - scheduled depakote/neurontin/lamictal     #Acute hypoxic respiratory failure  -now on 8L of oxygen. -he was thought to have aspirated 3/4, and with his increased oxygen requirements, he very likely has an aspiration pneumonia, as he was also febrile 3/5  -pulmonology consulted  -titrate oxygen to maintain sats >90%, weaned down to 2L now  -breathing treatment with bronchodilators  -start IV antibiotics; defer to ID    #Probable aspiration pneumonia  -wbc is elevated. Also had a fever and aspirated   -on Unasyn, ID following    #Dysphagia  -high risk of aspiration  -speech therapy on board  -NPO with tube feed now  -aspiration precautions    #Hematuria  -now resolved. -urology on board. Hogan catheter replaced    #Type 2 diabetes mellitus with  Neuropathy:  - On insulin sliding scale.  Accu-Cheks AC at bedtime.  -on gabapentin   - lantus increased to 10 units BID    #Hypertension:  - metoprolol and amiodarone and amlodipine  - prn hydralazine and labetalol     #A. Fib:  - cardizem drip stopped due to hypotension  -amiodarone drip as well as oral amiodarone ordered. - Electophysiology on board  -Metoprolol restarted     #History of melanoma:  - This was resected from his back and face. -Biopsy done of brain lesion and pathology pending to see if the brain lesions are due to melanoma.     #History of seizures  -on Keppra     DVT prophylaxis: on SCDs. eliquis on hold.  Resume eliquis when [id number] Susan Monroy with surgery team     Disposition: to be determined, family waiting on biopsy results before making a decision on goals of care    Medications:  REVIEWED DAILY    Infusion Medications    amiodarone 450mg/250ml D5W infusion 0.5 mg/min (03/07/21 0545)     Scheduled Medications    potassium & sodium phosphates  2 packet Per NG tube 4x Daily    potassium bicarb-citric acid  40 mEq Oral BID    polyethylene glycol  17 g Oral Daily    senna  1 tablet Oral Nightly    heparin flush  3 mL Intravenous 2 times per day    ampicillin-sulbactam  3,000 mg Intravenous Q6H    amiodarone  200 mg Oral Q8H    metoprolol  5 mg Intravenous Q6H    insulin lispro  0-18 Units Subcutaneous TID WC    insulin lispro  0-9 Units Subcutaneous Nightly    melatonin  10 mg Oral Nightly    pantoprazole  20 mg Intravenous Daily    And    sodium chloride (PF)  10 mL Intravenous Daily    divalproex  125 mg Oral BID    gabapentin  300 mg Oral BID    insulin glargine  12 Units Subcutaneous Nightly    lamoTRIgine  200 mg Oral BID    [Held by provider] magnesium oxide  400 mg Oral Daily    [Held by provider] metoprolol succinate  50 mg Oral BID    [Held by provider] therapeutic multivitamin-minerals  1 tablet Oral Daily    tamsulosin  0.4 mg Oral Nightly    sodium chloride flush  10 mL Intravenous 2 times per day    [Held by provider] lisinopril  20 mg Oral Daily    docusate sodium  100 mg Oral BID     PRN Meds: sodium chloride flush, heparin flush, hydrALAZINE, labetalol, opium-belladonna, albuterol, glucose, dextrose, glucagon (rDNA), sodium chloride flush, acetaminophen **OR** acetaminophen    Labs:     Recent Labs     03/05/21  0450 03/06/21  0405 03/07/21  0558   WBC 38.1* 31.6* 31.9*   HGB 10.1* 9.1* 8.4*   HCT 30.8* 29.1* 26.2*    255 221       Recent Labs     03/05/21  0450 03/06/21  0405 03/07/21  0558   * 152* 148*   K 4.0 3.0* 3.6   * 114* 111*   CO2 24 28 30*   BUN 54* 38* 42*   CREATININE 1.6* 1.2 1.1   CALCIUM 8.6 8.2* 8.0*   PHOS 3.4 3.0 1.9*       No results for input(s): PROT, ALB, ALKPHOS, ALT, AST, BILITOT, AMYLASE, LIPASE in the last 72 hours. No results for input(s): INR in the last 72 hours. No results for input(s): Efrain Mims in the last 72 hours.     Chronic labs:    Lab Results   Component Value Date    CHOL 78 12/13/2013    TRIG 40 12/13/2013    HDL 28.0 (A) 12/13/2013    LDLCALC 42 12/13/2013    TSH 0.692 12/13/2013    INR 1.2 03/02/2021    LABA1C 6.6 (H) 02/26/2021       Radiology: REVIEWED DAILY    +++++++++++++++++++++++++++++++++++++++++++++++++  [address] [place], [place]  +++++++++++++++++++++++++++++++++++++++++++++++++

## 2021-03-07 NOTE — PROGRESS NOTES
Hemoccult positive, MD aware. No plan for scope unless gross bleeding/bright red blood per rectum.     Vicente Siemens, APRN - CNS, CNP  03/07/21

## 2021-03-07 NOTE — PLAN OF CARE
Problem: Falls - Risk of:  Goal: Will remain free from falls  Description: Will remain free from falls  3/6/2021 1243 by Ismael Abbott RN  Outcome: Met This Shift     Problem: Skin Integrity:  Goal: Will show no infection signs and symptoms  Description: Will show no infection signs and symptoms  3/6/2021 1243 by Ismael Abbott RN  Outcome: Met This Shift     Problem: Pain:  Goal: Pain level will decrease  Description: Pain level will decrease  3/6/2021 1243 by Ismael Abbott RN  Outcome: Met This Shift     Problem: Non-Violent Restraints  Goal: No harm/injury to patient while restraints in use  3/6/2021 2247 by Bridget Shah RN  Outcome: Met This Shift  3/6/2021 1243 by Ismael Abbott RN  Outcome: Met This Shift  Goal: Patient's dignity will be maintained  3/6/2021 2247 by Bridget Shah RN  Outcome: Met This Shift  3/6/2021 1243 by Ismael Abbott RN  Outcome: Met This Shift     Problem: Non-Violent Restraints  Goal: Removal from restraints as soon as assessed to be safe  3/6/2021 2247 by Bridget Shah RN  Outcome: Not Met This Shift  3/6/2021 1243 by Ismael Abbott RN  Outcome: Met This Shift

## 2021-03-08 NOTE — PROGRESS NOTES
Date: 3/8/2021    Time: 12:53 AM    Patient Placed On BIPAP/CPAP/ Non-Invasive Ventilation? No    If no must comment. Currently on from previous shift  Facial area red/color change? No           If YES are Blister/Lesion present? No   If yes must notify nursing staff  BIPAP/CPAP skin barrier?   Yes    Skin barrier type:mepilexlite       Comments:        Segundo Marshall

## 2021-03-08 NOTE — PROGRESS NOTES
[facility]   INTENSIVE CARE UNIT     CRITICAL CARE ATTENDING PROGRESS NOTE    I have examined the patient, reviewed the record, and discussed the case with the APN/  Resident. I have reviewed all relevant labs and imaging data. Please refer to the  APN/ resident's note. I agree with the  assessment and plan with the following corrections/ additions. The following summarizes my clinical findings and independent assessment. CC: Critical care management after craniotomy for intracranial mass    HOSPITAL COURSE:  3/2 underwent craniotomy for biopsy of intracranial mass    EXAM:  GCS 14, scalp dressed  Lethargic  Lungs clear  On intermittent BiPAP  Left side weak from prior CVA  Abdomen soft nontender nondistended  Hogan with clear urine    ASSESSMENT:  Active Problems:    Leucocytosis    Non-insulin dependent type 2 diabetes mellitus (HCC)    Secondary cancer of brain (HCC)    Melanoma (HCC)    Hypokalemia    Stage 3 chronic kidney disease    COPD (chronic obstructive pulmonary disease) (HCC)    Constipation    Renal cyst, left    AMS (altered mental status)    Dementia (HCC)    Seizure (HCC)    History of CVA (cerebrovascular accident)    Atrial fibrillation ([address]    Anticoagulated    Palliative care by specialist    Acute kidney injury superimposed on CKD ([facility]  Resolved Problems:    * No resolved hospital problems. *       PLAN:  Status post craniotomy for biopsyfinal path pending  History of seizures --on Lamictal and Depakote    Pulmonary: Acute respiratory insufficiencywean BiPAP as able    Atrial fibrillation--on Lopressor, amlodipine, amiodarone    GI: Dysphagia continue tube feeds    FEN: Hypernatremia 152continue monitor    Materiawith false passageappreciate urology input. Continue Hogan catheter. Voiding trials once ambulating as outpatient per urology    ID: On Unasyn for aspiration   Endo: Monitor Blood Sugars. Target blood glucose less than 180 in the ICU.       DVT prophylaxis--SCDS, heparin  GI Prophylaxis--PPI     CODE: FULL    DISPOSITION-Continue ICU Care    Critical care time exclusive of teaching and procedures = 32 min     Pt needs continuous ICU monitoring because the patient is at risk for deterioration from a neurological standpoint.     Casa Fox MD, FACS  3/8/2021  2:36 PM

## 2021-03-08 NOTE — CONSULTS
Radiation Oncology          -see previous consult note  -imaging reviewed  -WBRT indicated (4027)  -sim 2-3 weeks post crani as OP  [RN CHAUNCEY]        ---N/C        Jen Esposito.  Margot Almodovar MD [place] [address] Oncology  Cell: [phone]    [facility]:  [phone]   FAX: [phone]  [facility]:  [address] [place] [address] Avenue:    [phone]  [facility]:  [address]:  [phone]

## 2021-03-08 NOTE — PROGRESS NOTES
97%   BMI 24.40 kg/m²     Intake/Output Summary (Last 24 hours) at 3/8/2021 1012  Last data filed at 3/8/2021 0800  Gross per 24 hour   Intake 2436.2 ml   Output 1355 ml   Net 1081.2 ml     General appearance:  Comfortable. Pain Description: none    NEUROLOGIC:   RASS Score:  0  GCS:    3 - Opens eyes to loud noise or command   6 - Follows simple motor commands  4 - Seems confused, disoriented       Pupil size:  Left 3 mm  Right 3 mm  Pupil reaction: Yes   PERRLA  Wiggles fingers: Left  No Right Yes  Hand grasp:   Left: Yes     Right    Yes  Wiggles toes: Left   Yes Right  Yes  Plantar flexion: Left  No  Right   Yes  Crani incision:  CDI    CONSTITUTIONAL: No acute distress, lying in hospital bed. CARDIOVASCULAR: S1 S2, regular rate, regular rhythm, no murmur/gallop/rub. Monitor: NSR. PULMONARY: Bilaterally course but clear. No rhonchi/rales/wheezes, no use of accessory muscles. BiPap at night. O2 at 60% PF ratio this am  166. RENAL:  Hogan to gravity, clear yellow urine. Fluid balance for previous 24 hours:  + 1.0 L.    ABDOMEN: Soft, nontender, nondistended, nontympanic, normal bowel sounds. NGT. Tube feedings:  Diabetic formula at 50 ml per hour. No reported nausea or vomiting. Last BM today. MUSCULOSKELETAL:  Moves all extremities purposefully. Has limited movement of left fingers. Able to do left thumbs up. Wiggles left toes. Able to lift leg off bed. SKIN/EXTREMITIES: No rashes/ecchymosis, no edema/clubbing, warm/dry, good capillary refill. LINES:   Peripheral    ` Left radial arterial line. Day 7. Good curve. Right basilic PICC. Day 4. No palpable cord.       Recent Labs     03/06/21  0405 03/07/21 0558 03/08/21  0438   WBC 31.6* 31.9* 28.3*   HGB 9.1* 8.4* 8.5*   HCT 29.1* 26.2* 27.0*   MCV 87.4 87.3 88.2    221 282       Recent Labs     03/06/21  0405 03/07/21 0558 03/08/21  0438   * 148* 152*   K 3.0* 3.6 4.0   CO2 28 30* 34*   PHOS 3.0 1.9* 3.5 BUN 38* 42* 43*   CREATININE 1.2 1.1 1.1     ASSESSMENT/PLAN:     Active Problems:    Leucocytosis    Non-insulin dependent type 2 diabetes mellitus (HCC)    Brain metastasis (HCC)    Melanoma (HCC)    Hypokalemia    Stage 3 chronic kidney disease    COPD (chronic obstructive pulmonary disease) (HCC)    Constipation    Renal cyst, left    AMS (altered mental status)    Dementia (HCC)    Seizure (HCC)    History of CVA (cerebrovascular accident)    Atrial fibrillation ([address]    Anticoagulated    Palliative care by specialist    Acute kidney injury superimposed on CKD ([address]  Resolved Problems:    * No resolved hospital problems. *    Neuro:  AMS. Brain mass. S/P Craniotomy for biopsy. Glial neoplasm. Hx of Seizures, dementia & stroke (residual left sided weakness). Monitor neuro status. Neurosurgery following. Depokote  Lamictal    Gabapentin. Decadron stopped on 03/04. CV:  No acute issues. Hx of HTN, Hyperlipidemia and Afib    Monitor hemodynamics. BP goal systolic less than 638 mm Hg. PRN hydralzine & labetalol. Amiodarone infusion. Amiodarone scheduled. Norvasc. Metoprolol. Resume home Lisinopril. Resume home hydralazine. Cardiology/EP following. .   Pulm: Acute respiratory failure. Possible aspiration. Monitor RR & SpO2. BIPap  O2 as needed. Albuterol. PRN. GI:    Dysphagia. BMI 24. Hx of constipation. Monitor bowel function. NPO.    NGT. Tube feeds: Diabetic formula at 50 ml per hour. Bowel regime. Renal: SOCORRO. CKD Stage 3. Oulled loyola catheter out developed hematuria. Monitor BUN & Cr, electrolytes & replace as needed. Monitor I & O. Loyola. Flomax. Urology follwoing  ID:   Leukocytosis. Possible aspiration. ID following. Unasyn Day 4. Endocrine: Hyperglycemia. Hx of diabetes     Monitor BS.    ISS. Lantus  MSK: Deconditioned. Previous stroke with left sided weakness. ROM. Turn & reposition.    PT & OT when able  Monitor for skin breakdown. Heme: Multifactorial anemia. Cranial bx-glial neoplasm, Hx of melanoma. Monitor CBC. Radiation oncology following  Oncology following. Bowel regime: Enemez. Scheduled  Colace, Glycolax & senna. Pain control/Sedation: Tylenol. B&O suppositories. Melatonin. DVT prophylaxis: SCD. GI prophylaxis: Protonix. TF.    Glucose protocol:  ISS  Hogan: Keep in place for critical care monitoring of fluid balance. Ancillary consults:  Medicine. Neurosurgery. Critical care. ID. Radiation Oncology. Oncology. Urology. Patient/Family update: Wife Paris Ramírez at bedside. Questions answered. Support given. Dr. Sapna Barber at bedside earlier. .  Code status:  Full code. Disposition:  Continue ICU.       Electronically signed by Cuate Butts RN MSN APRN-NP [facility] NP  CCNS CCRN 3/8/2021 3:32 PM

## 2021-03-08 NOTE — PATIENT INSTRUCTIONS
FU OP    WBRT      Annabelle Forte. Emerald James MD [place] [address] Oncology  Cell: [phone]    [facility]:  [phone]   FAX: [phone]  [facility]:  [address] [place] [address] Avenue:    [phone]  [address] [place] [address] Road:  [phone]   FAX:  [phone]  Email: [email or web address]

## 2021-03-08 NOTE — PROGRESS NOTES
Comprehensive Nutrition Assessment    Type and Reason for Visit:  Reassess     Nutrition Recommendations/Plan:  Continue NPO    TF rec when resumed: Diabetic @ 55 ml/hr + 1 protein modular daily. Will provide: 1320 ml tv, 1584 kcals, 79 gm pro (1684 kcals & 105 gm pro w/ mod), 1062 ml free water  Current hypernatremia- will defer water flushes to critical care team     Nutrition Assessment:  Pt status remains unchanged admit w/ brain tumor/mets s/p crani. Noted hx DM, COPD, Dementia, prev CVA. Noted ongoing AMS. EN support initiated d/t failed swallow eval but on hold d/t respiratory status change- will provide updated recs when needed. Malnutrition Assessment:  Malnutrition Status:  Insufficient data    Context:  Acute Illness     Findings of the 6 clinical characteristics of malnutrition:  Energy Intake:  Mild decrease in energy intake (TYRELL PTA intake)  Weight Loss:  1 - 5% over 1 month     Body Fat Loss:  Unable to assess   Muscle Mass Loss:  Unable to assess  Fluid Accumulation:  No significant fluid accumulation   Strength:  Not Performed    Estimated Daily Nutrient Needs:  Energy (kcal):  MSJ REE 1288 x 1.3 SF= 9149-9224; Weight Used for Energy Requirements:  Admission   Protein (g):  (1.5-1.8 g/kg);  Weight Used for Protein Requirements:  Admission        Fluid (ml/day):  per neuro    Nutrition Related Findings:  disoriented, MAP WNL, +I/O's 5L, +1 edema, active BS, noted constipation, dysphagia, NGT (TF on hold), hypernatremia      Wounds:  Surgical Incision       Current Nutrition Therapies:    Current Tube Feeding (TF) Orders:  · Feeding Route: Nasogastric(TF held per doc flow x 1 day)  · Formula: Diabetic   · Schedule: Continuous(50 ml/hr = 1200 ml tv)  · Goal TF & Flush Orders Provides: 1440 kcals, 72 gm pro, 966 ml free water    Anthropometric Measures:  · Height: 5' 5\" (165.1 cm)  · Current Body Weight: 138 lb (62.6 kg)(3/2 admit wt as CBW elevated)   · Admission Body Weight: 138 lb (62.6 kg)(first measured)    · Usual Body Weight: 149 lb (67.6 kg)(actual wt on 2/9 & 11/3 EMR encounters)     · Ideal Body Weight: 136 lbs; % Ideal Body Weight 101.5 %   · BMI: 23 BMI Categories: Normal Weight (BMI 18.5-24. 9)       Nutrition Diagnosis:   · Inadequate oral intake related to cognitive or neurological impairment as evidenced by NPO or clear liquid status due to medical condition    Nutrition Interventions:   Nutrition Education/Counseling:  Education not indicated   Coordination of Nutrition Care:  Continue to monitor while inpatient    Goals:  Resume EN when appropriate       Nutrition Monitoring and Evaluation:   Food/Nutrient Intake Outcomes:  Diet Advancement/Tolerance, Enteral Nutrition Intake/Tolerance  Physical Signs/Symptoms Outcomes:  Biochemical Data, Nutrition Focused Physical Findings, Skin, Weight, Constipation, GI Status, Fluid Status or Edema, Hemodynamic Status     Discharge Planning:     Too soon to determine     Electronically signed by Mynor Quiroga RD, LD on 3/8/21 at 4:28 PM EST    Contact: Ext [phone]

## 2021-03-08 NOTE — PROGRESS NOTES
Electrophysiology progress note         Date:  3/8/2021  Patient: Marilee Bennett  Admission:  2/25/2021 10:19 PM  Admit DX: Brain metastasis ([address] [C79.31]  Age:  76 y.o., 1945     LOS: 11 days     Reason for evaluation:   Paroxysmal atrial fibrillation, monitoring of antiarrhythmic drug therapy      SUBJECTIVE:    The patient was seen and examined. Notes and labs reviewed. Mr. Cole Canas is currently on BiPAP. He has been on high flow oxygen during the day but develops worsening hypoxemia in the evening hours requiring BiPAP support    OBJECTIVE:    Telemetry: Sinus rhythm  BP (!) 146/50   Pulse 65   Temp 98.1 °F (36.7 °C) (Temporal)   Resp 19   Ht 5' 5\" (1.651 m)   Wt 146 lb 9.7 oz (66.5 kg)   SpO2 95%   BMI 24.40 kg/m²     Intake/Output Summary (Last 24 hours) at 3/8/2021 1833  Last data filed at 3/8/2021 1600  Gross per 24 hour   Intake 1827.9 ml   Output 1320 ml   Net 507.9 ml       EXAM:   CONSTITUTIONAL:  somnolent, no apparent distress, and appears stated age. HEENT: Normal jugular venous pulsations,  Head is atraumatic, normocephalic. Eyes and oral mucosa are normal.  LUNGS:  Yes for increased work of breathing. On auscultation: clear to auscultation bilaterally  CARDIOVASCULAR:  regular rate and rhythm, normal S1 and S2, no S3   ABDOMEN: Soft, nontender, nondistended. SKIN: Warm and dry. EXTREMITIES: No lower extremity edema. Motor movement grossly intact. No cyanosis or clubbing.     Current Inpatient Medications:   hydrALAZINE  25 mg Per NG tube 3 times per day    lisinopril  20 mg Per NG tube Daily    amiodarone  200 mg Per NG tube Q8H    [START ON 3/9/2021] amLODIPine  5 mg Per NG tube Daily    gabapentin  300 mg Per NG tube 2 times per day    lamoTRIgine  200 mg Per NG tube BID    melatonin  10 mg Per NG tube Nightly    metoprolol tartrate  50 mg Per NG tube BID    [START ON 3/9/2021] polyethylene glycol  17 g Per NG tube Daily    sennosides  5 mL Per NG tube Nightly    docusate sodium  100 mg Per NG tube BID    [START ON 3/9/2021] lansoprazole  15 mg Per NG tube QAM AC    potassium & sodium phosphates  2 packet Per NG tube 4x Daily    insulin glargine  10 Units Subcutaneous BID    heparin flush  3 mL Intravenous 2 times per day    ampicillin-sulbactam  3,000 mg Intravenous Q6H    insulin lispro  0-18 Units Subcutaneous TID WC    insulin lispro  0-9 Units Subcutaneous Nightly    divalproex  125 mg Oral BID    [Held by provider] magnesium oxide  400 mg Oral Daily    [Held by provider] therapeutic multivitamin-minerals  1 tablet Oral Daily    [Held by provider] tamsulosin  0.4 mg Oral Nightly    sodium chloride flush  10 mL Intravenous 2 times per day       IV Infusions (if any):   amiodarone 450mg/250ml D5W infusion Stopped (03/08/21 0800)       Diagnostics:    EKG: normal sinus rhythm  ECHO: reviewed. Ejection fraction: 55%  Stress Test: not obtained. Cardiac Angiography: not obtained. Labs:   CBC:   Recent Labs     03/07/21  0558 03/08/21  0438   WBC 31.9* 28.3*   HGB 8.4* 8.5*   HCT 26.2* 27.0*    282     BMP:   Recent Labs     03/07/21  0558 03/08/21  0438   * 152*   K 3.6 4.0   CO2 30* 34*   BUN 42* 43*   CREATININE 1.1 1.1   LABGLOM >60 >60   GLUCOSE 259* 221*     BNP: No results for input(s): BNP in the last 72 hours. PT/INR: No results for input(s): PROTIME, INR in the last 72 hours. APTT:No results for input(s): APTT in the last 72 hours. CARDIAC ENZYMES:No results for input(s): CKTOTAL, CKMB, CKMBINDEX, TROPONINI in the last 72 hours. FASTING LIPID PANEL:  Lab Results   Component Value Date    HDL 28.0 12/13/2013    LDLCALC 42 12/13/2013    TRIG 40 12/13/2013     LIVER PROFILE:No results for input(s): AST, ALT, LABALBU in the last 72 hours.     ASSESSMENT:    Patient Active Problem List   Diagnosis    Hypertension    Cerebral artery occlusion with [facility] infarction ([facility]    Hyperlipidemia    Non-insulin dependent type 2 diabetes mellitus ([facility]    [facility]    Stage 3 chronic kidney disease    COPD (chronic obstructive pulmonary disease) (HCC)    AMS (altered mental status)    Seizure ([facility]    History of CVA (cerebrovascular accident)    Atrial fibrillation (HCC)--paroxysmal.  Patient now on amiodarone therapy    Anticoagulated    Palliative care by specialist   Multiple intracranial lesions? Glioma. Surgical pathology awaited    PLAN:    Continue oral amiodarone loading  Continue antihypertensive medications  Chest x-ray in a.m. Further recommendations will be based on his progress      Please see orders. Discussed with patient and nursing.     Norma Cespedes MD,FACC

## 2021-03-08 NOTE — PROGRESS NOTES
3/8/2021 12:05 PM  Service: Urology  Group: [facility] (Rich/Simeon/Canelo)    Malcolm Hager  [id number]    Subjective:    Remains in the ICU  Obtunded  Does not follow commands  No family present   He is in soft restraints  His Hogan catheter is currently draining yellow urine in tubing    Review of Systems  Unable to obtain due to confusion and obtunded      Scheduled Meds:   potassium & sodium phosphates  2 packet Per NG tube 4x Daily    metoprolol tartrate  50 mg Oral BID    amLODIPine  5 mg Oral Daily    insulin glargine  10 Units Subcutaneous BID    polyethylene glycol  17 g Oral Daily    senna  1 tablet Oral Nightly    heparin flush  3 mL Intravenous 2 times per day    ampicillin-sulbactam  3,000 mg Intravenous Q6H    amiodarone  200 mg Oral Q8H    insulin lispro  0-18 Units Subcutaneous TID WC    insulin lispro  0-9 Units Subcutaneous Nightly    melatonin  10 mg Oral Nightly    pantoprazole  20 mg Intravenous Daily    And    sodium chloride (PF)  10 mL Intravenous Daily    divalproex  125 mg Oral BID    gabapentin  300 mg Oral BID    lamoTRIgine  200 mg Oral BID    [Held by provider] magnesium oxide  400 mg Oral Daily    [Held by provider] therapeutic multivitamin-minerals  1 tablet Oral Daily    tamsulosin  0.4 mg Oral Nightly    sodium chloride flush  10 mL Intravenous 2 times per day    [Held by provider] lisinopril  20 mg Oral Daily    docusate sodium  100 mg Oral BID       Objective:  Vitals:    03/08/21 0900   BP:    Pulse: 76   Resp: 13   Temp:    SpO2: 97%         Allergies: Patient has no known allergies. General Appearance: eyes closed, does not follow commands, obtunded   skin: no rash or erythema  Head: normocephalic and atraumatic  Pulmonary/Chest: normal air movement, no respiratory distress  Abdomen: soft, non-tender, non-distended  Genitourinary:  Hogan catheter intact with yellow urine in the tubing  Extremities: no cyanosis, clubbing or edema         Labs: Recent Labs     03/08/21  0438   *   K 4.0   *   CO2 34*   BUN 43*   CREATININE 1.1   GLUCOSE 221*   CALCIUM 7.9*       Lab Results   Component Value Date    HGB 8.5 03/08/2021    HCT 27.0 03/08/2021                       Assessment/Plan:  POD#4--S/P Cysto/Loyola insertion  Gross hematuria, resolving  Posterior False Passage   Right Parapelvic Cyst     Urine cx pending  Cont antibiotics per ID   Urine remains clear  Manually irrigate PRN gross hematuria    CT abdomen pelvis reviewed and shows a Loyola catheter to be properly positioned within the bladder  Cont the Flomax  Cont the loyola  Loyola should be left in for at least one week  Voiding trial will need to be given once ambulatory.  This will likely need to occur as an outpatient   No further  interventions are planned at this time  Please call with any further questions or concerns  Thank you for allowing us to participate in his care       Maxwell Hodgkins, [address] [facility]  Urology

## 2021-03-08 NOTE — PLAN OF CARE
Problem: Falls - Risk of:  Goal: Will remain free from falls  Description: Will remain free from falls  Outcome: Met This Shift  Goal: Absence of physical injury  Description: Absence of physical injury  Outcome: Met This Shift     Problem: Skin Integrity:  Goal: Will show no infection signs and symptoms  Description: Will show no infection signs and symptoms  Outcome: Met This Shift  Goal: Absence of new skin breakdown  Description: Absence of new skin breakdown  Outcome: Met This Shift     Problem: Pain:  Goal: Pain level will decrease  Description: Pain level will decrease  Outcome: Met This Shift  Goal: Control of acute pain  Description: Control of acute pain  Outcome: Met This Shift  Goal: Control of chronic pain  Description: Control of chronic pain  Outcome: Met This Shift     Problem: Non-Violent Restraints  Goal: Removal from restraints as soon as assessed to be safe  Outcome: Not Met This Shift  Goal: No harm/injury to patient while restraints in use  Outcome: Met This Shift  Goal: Patient's dignity will be maintained  Outcome: Met This Shift

## 2021-03-08 NOTE — PROGRESS NOTES
Pt having irregular breathing pattern. Dr. Ron Burns notified via perfect serve, came bedside. ABG drawn, CPAP placed on pt by RT.

## 2021-03-08 NOTE — CARE COORDINATION
Cm  transition of care: Pod #6. Awaiting final path report. Still requiring wrist restraints for pt safety. ID following d/t leukocytosis. Started iv ampicillin q 6 on 3/5. Dc plan is for [facility] at the [facility] when medically stable and restraint free. Paperwork in soft chart.

## 2021-03-08 NOTE — PROGRESS NOTES
Date: 3/8/2021    Time: 3:53 AM    Patient Placed On BIPAP/CPAP/ Non-Invasive Ventilation? No    If no must comment. Remains on from previous shift  Facial area red/color change? No           If YES are Blister/Lesion present? No   If yes must notify nursing staff  BIPAP/CPAP skin barrier?   Yes    Skin barrier type:mepilexlite       Comments:        Smiley Fajardo

## 2021-03-08 NOTE — PLAN OF CARE
Problem: Falls - Risk of:  Goal: Will remain free from falls  Description: Will remain free from falls  3/7/2021 0929 by Vivek James RN  Outcome: Met This Shift     Problem: Skin Integrity:  Goal: Will show no infection signs and symptoms  Description: Will show no infection signs and symptoms  3/7/2021 0929 by Vivek James RN  Outcome: Met This Shift     Problem: Pain:  Goal: Control of acute pain  Description: Control of acute pain  3/7/2021 0929 by Vivek James RN  Outcome: Met This Shift     Problem: Non-Violent Restraints  Goal: No harm/injury to patient while restraints in use  3/7/2021 2045 by Antoinette Rutledge RN  Outcome: Met This Shift  3/7/2021 0929 by Vivek James RN  Outcome: Met This Shift  Goal: Patient's dignity will be maintained  3/7/2021 2045 by Antoinette Rutledge RN  Outcome: Met This Shift  3/7/2021 0929 by Vivek James RN  Outcome: Met This Shift     Problem: Non-Violent Restraints  Goal: Removal from restraints as soon as assessed to be safe  3/7/2021 2045 by Antoinette Rutledge RN  Outcome: Not Met This Shift  3/7/2021 0929 by Vivek James RN  Outcome: Met This Shift

## 2021-03-08 NOTE — PROGRESS NOTES
Radiation Oncology          -see IP note        [place].  Logan Bang MD [place] [address] Oncology  Cell: [phone]    [facility]:  [phone]   FAX: [phone] [address] [facility]:  [address] [place] [address] Avenue:    [phone]  [address] [place] [address] Road:  [address] [place] [address] Road:  [phone]

## 2021-03-08 NOTE — PROGRESS NOTES
Hospitalist Progress Note      PCP: Vira Crenshaw DO    Date of Admission: 2/25/2021        Hospital Course: This is a 75-year-old male who presented to the hospital for weakness. [facility] had had altered mental status at home to associated with irrational behavior.  MRI of the brain done showed a heterogeneous enhancing mass in the left cerebral hemisphere suspicious for neoplasm and several foci of extra-axial parasellar cisterns and subependymal enhancement of the left lateral ventricle suspicious for metastatic disease.  CT of the chest showed no evidence of any malignancy and CT of the abdomen and pelvis also negative for any malignancy.  Oncology and palliative medicine on board. Subjective: Pt was seen and examined at bedside.  No acute event overnight; unable to participate in ROS due to unintelligible speech    Medications:  Reviewed    Infusion Medications    amiodarone 450mg/250ml D5W infusion Stopped (03/08/21 0800)     Scheduled Medications    hydrALAZINE  25 mg Per NG tube 3 times per day    lisinopril  20 mg Per NG tube Daily    amiodarone  200 mg Per NG tube Q8H    [START ON 3/9/2021] amLODIPine  5 mg Per NG tube Daily    gabapentin  300 mg Per NG tube 2 times per day    lamoTRIgine  200 mg Per NG tube BID    melatonin  10 mg Per NG tube Nightly    metoprolol tartrate  50 mg Per NG tube BID    [START ON 3/9/2021] polyethylene glycol  17 g Per NG tube Daily    sennosides  5 mL Per NG tube Nightly    docusate sodium  100 mg Per NG tube BID    [START ON 3/9/2021] lansoprazole  15 mg Per NG tube QAM AC    potassium & sodium phosphates  2 packet Per NG tube 4x Daily    insulin glargine  10 Units Subcutaneous BID    heparin flush  3 mL Intravenous 2 times per day    ampicillin-sulbactam  3,000 mg Intravenous Q6H    insulin lispro  0-18 Units Subcutaneous TID WC    insulin lispro  0-9 Units Subcutaneous Nightly    divalproex  125 mg Oral BID    [Held by provider] magnesium oxide  400 mg Oral Daily    [Held by provider] therapeutic multivitamin-minerals  1 tablet Oral Daily    [Held by provider] tamsulosin  0.4 mg Oral Nightly    sodium chloride flush  10 mL Intravenous 2 times per day     PRN Meds: acetaminophen **OR** acetaminophen, sodium chloride flush, heparin flush, hydrALAZINE, labetalol, opium-belladonna, albuterol, glucose, dextrose, glucagon (rDNA), sodium chloride flush      Intake/Output Summary (Last 24 hours) at 3/8/2021 1844  Last data filed at 3/8/2021 1600  Gross per 24 hour   Intake 1827.9 ml   Output 1320 ml   Net 507.9 ml       Exam:    BP (!) 146/50   Pulse 65   Temp 98.1 °F (36.7 °C) (Temporal)   Resp 19   Ht 5' 5\" (1.651 m)   Wt 146 lb 9.7 oz (66.5 kg)   SpO2 95%   BMI 24.40 kg/m²     General appearance: Lying comfortably in bed. No apparent distress. Respiratory: Clear to auscultation bilaterally. Cardiovascular: Normal S1/S2. Regular rhythm and rate. Abdomen: Soft, non-tender, non-distended with normal bowel sounds. Musculoskeletal: No clubbing, cyanosis or edema bilaterally. Skin: Skin color, texture, turgor normal.  No rashes or lesions. Neurologic:    Psychiatric: Alert and oriented x 0  Peripheral Pulses: +2 palpable, equal bilaterally       Labs:   Recent Labs     03/06/21  0405 03/07/21  0558 03/08/21  0438   WBC 31.6* 31.9* 28.3*   HGB 9.1* 8.4* 8.5*   HCT 29.1* 26.2* 27.0*    221 282     Recent Labs     03/06/21  0405 03/07/21  0558 03/08/21  0438   * 148* 152*   K 3.0* 3.6 4.0   * 111* 112*   CO2 28 30* 34*   BUN 38* 42* 43*   CREATININE 1.2 1.1 1.1   CALCIUM 8.2* 8.0* 7.9*   PHOS 3.0 1.9* 3.5     No results for input(s): AST, ALT, BILIDIR, BILITOT, ALKPHOS in the last 72 hours. No results for input(s): INR in the last 72 hours. No results for input(s): Renetta Foster in the last 72 hours.     Radiology:  CT ABDOMEN PELVIS WO CONTRAST Additional Contrast? None   Final Result   Hogan catheter is in the bladder in proper position. No indication for   hemorrhage along the trajectory of the Hogan catheter or conspicuous   hemorrhage in the bladder lumen. The bladder is not distended. Pattern of severe constipation with fecal rectal retention. Bi basilar infiltrates more prominent on the right side, pneumonia considered. XR ABDOMEN FOR NG/OG/NE TUBE PLACEMENT   Final Result   NG tube appears in satisfactory position         XR CHEST PORTABLE   Final Result   Increased left greater than right basilar opacities relative to prior   comparison which may be secondary to atelectasis, edema, or developing   infection in the appropriate clinical setting. CT HEAD WO CONTRAST   Final Result   Postoperative changes in the left suboccipital region/left lobe of the   cerebellum. Tiny amount of hemorrhage in the surgical bed is similar to only   questionably slightly more pronounced and continued follow-up is recommended. No other significant change. CT HEAD WO CONTRAST PORTABLE   Final Result   1   1. Postsurgical changes including left suboccipital craniotomy defect. 2. No evidence of acute intracranial abnormality. 3. Atrophy and chronic white matter ischemic changes. MRI BRAIN W WO CONTRAST   Final Result   1. Somewhat limited evaluation due to patient motion artifact, especially on   the postcontrast enhanced images. 2. Multiple intracranial METASTASES, most notably in the left cerebellar   hemisphere. Metastatic lesion is also seen in the region of the mammillary   bodies. 3. HYDROCEPHALUS, with dilation of the lateral and 3rd ventricles. The   etiology is unclear. Findings may be due to mass effect on the distal   aqueduct from vasogenic edema in the left cerebellar hemisphere. 4.  The findings were submitted to the Radiology Results [address]   at 13:41 on 2/27/2021  to in be communicated to a licensed caregiver.       XR CHEST PORTABLE   Final Result   No radiographic fever and aspirated   -on Unasyn, ID following     #Dysphagia  -high risk of aspiration  -speech therapy on board  -NPO with tube feed now  -aspiration precautions     #Hematuria  -now resolved. -urology on board. Hogan catheter replaced     #Type 2 diabetes mellitus with  Neuropathy:  - On insulin sliding scale.  Accu-Cheks AC at bedtime.  -on gabapentin   - lantus increased to 10 units BID     #Hypertension:  - metoprolol and amiodarone and amlodipine  - prn hydralazine and labetalol     #A. Fib:  - cardizem drip stopped due to hypotension  -amiodarone drip as well as oral amiodarone ordered. - Electophysiology on board  -Metoprolol restarted      #History of melanoma:  - This was resected from his back and face. -Biopsy done of brain lesion and pathology pending to see if the brain lesions are due to melanoma.     #History of seizures  -on Keppra     DVT prophylaxis: on SCDs. eliquis on hold.  Resume eliquis when [id number] Susan Monroy with surgery team    Diet: DIET TUBE FEED CONTINUOUS/CYCLIC NPO; Diabetic; Nasogastric; Continuous; 20; 55; 24  Diet Tube Feed Modular: Protein Modular  Code Status: Full Code    PT/OT Eval Status: N/A    Dispo -TBD    Thien Varma MD 3/8/2021 6:44 PM

## 2021-03-08 NOTE — PROGRESS NOTES
LSW met with pts wife at bedside to offer support and continued support to discuss goals of care. PT remains in icu, not as responsive today during visit. Mrs Juan Juarez denies needs and says  thank you, not engaging on conversation. JENNIFERW will follow.

## 2021-03-08 NOTE — PROGRESS NOTES
[facility] PROGRESS NOTE      Chief complaint: Follow-up of leukocytosis    The patient is a 76 y.o. male with history of DM, hypertension, atrial fibrillation, hyperlipidemia, stroke, dementia, seizure disorder, melanoma status post excision from back and forehead 7 years ago, transferred to [facility] on 02/25 from [facility] where he initially presented with confusion and abnormal behavior for 3 weeks, found to have MRI of the brain showing multiple intracranial metastasis most notably in the left cerebellar hemisphere, hydrocephalus with dilation of the lateral and third ventricles. On admission, he was afebrile and hemodynamically stable with leukocytosis of 14,000. Urinalysis showed no pyuria. SARS-CoV-2 PCR was negative. Chest x-ray was unremarkable. 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 Dexamethasone has been started since transfer. Leukocytosis increased to 24,000 on 03/02. He received cefazolin perioperatively. He pulled out his Hogan catheter on 03/02 causing penile bleeding. Subjective: Patient was seen and examined. He is lethargic today while having PICC line inserted. He had an episode of aspiration last night.     Objective:  BP (!) 172/57   Pulse 76   Temp 97.2 °F (36.2 °C) (Temporal)   Resp 13   Ht 5' 5\" (1.651 m)   Wt 146 lb 9.7 oz (66.5 kg)   SpO2 97%   BMI 24.40 kg/m²   Constitutional: Lethargic, not in distress  Respiratory: Clear breath sounds, no crackles, no wheezes  Cardiovascular: Regular rate and rhythm, no murmurs  Gastrointestinal: Bowel sounds present, soft, nontender  Skin: Warm and dry, no active dermatoses  Musculoskeletal: No joint swelling, no joint erythema    Laboratory:  Lab Results   Component Value Date    WBC 28.3 (H) 03/08/2021    WBC 31.9 (H) 03/07/2021    WBC 31.6 (H) 03/06/2021    HGB 8.5 (L) 03/08/2021    HCT 27.0 (L) 03/08/2021    MCV 88.2 03/08/2021     03/08/2021     Lab Results   Component Value Date    NEUTROABS 26.60 (H) 03/08/2021    NEUTROABS 29.99 (H) 03/07/2021    NEUTROABS 29.39 (H) 03/06/2021     No results found for: CRP  No results found for: CRPHS  No results found for: SEDRATE  Lab Results   Component Value Date    ALT 41 (H) 01/31/2020    AST 49 (H) 01/31/2020    ALKPHOS 70 01/31/2020    BILITOT 0.5 01/31/2020     Lab Results   Component Value Date     03/08/2021    K 4.0 03/08/2021     03/08/2021    CO2 34 03/08/2021    BUN 43 03/08/2021    CREATININE 1.1 03/08/2021    CREATININE 1.1 03/07/2021    CREATININE 1.2 03/06/2021    GFRAA >60 03/08/2021    LABGLOM >60 03/08/2021    GLUCOSE 221 03/08/2021    PROT 7.5 01/31/2020    LABALBU 4.5 01/31/2020    CALCIUM 7.9 03/08/2021    BILITOT 0.5 01/31/2020    ALKPHOS 70 01/31/2020    AST 49 01/31/2020    ALT 41 01/31/2020       Radiology: CXR (03/04): Essentially unremarkable      Microbiology:     Blood cx  Pending      Assessment:  Leukocytosis, probably medication induced from steroids (dexamethasone 10 mg q6h) vs aspiration pneumonia/pneumonitis  WBC 28,000  Still elevated off steroids  Reviewed cultures   Neurosurgery note noted  Follows some commands  CT of abd pelvis noted  Repeat resp culture    Recommendations:  Start ampicillin-sulbactam 3g q6h. Check blood cultures. Trend CBC with steroids just discontinued on 03/04. Long talk with family   All notes noted   Neurosurgery note noted  frameless stererotactic left suboccipital craniectomy for brain tumor, bx and debulking     Thank you for involving me in the care of Grisel Tadeo. Dr. Jasson Cruz will continue to follow. Please do not hesitate to call for any questions or concerns.     Electronically signed by Sammi Olmstead MD on 3/8/2021 at 10:13 AM

## 2021-03-08 NOTE — PROGRESS NOTES
Palliative Care Department  [phone]  Palliative Care Progress Note  Provider Rainer Aguilar PA-C    Nadeen Dailey  [id number]  Hospital Day: 12    Referring Provider: Amena SALAZAR  Palliative Medicine was consulted for assistance with: Code status Discussion, Assist with goals of care, Symptom Management and Family Support     Chief Complaint: Nadeen Dailey is a 76 y.o. male with chief complaint of AMS    Brief summary: Nadeen Dailey is a 76 y.o. male with significant past medical history of hypertension, atrial fibrillation, seizure disorder, dementia, chronic kidney disease, as well as prior melanoma on his back and forehead status post resection, and chronic back pain as well as trigeminal neuralgia. He was admitted on 2/25/2021 after he was found to have brain metastasis at an outside hospital.  He was transferred to [facility] for neurosurgical evaluation, and consultations have also been placed to medical and radiation oncology, with a high concern for malignant melanoma. 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 ASSESSMENT/PLAN:     Pertinent hospital diagnoses:  1. Brain metastasis  -History of melanoma  -s/p crani with biopsy  -Path pending, ? Glial neoplasm  -Hematology oncology following  -Radiation oncology following  -neurosurgery following  2. Atrial fibrillation with RVR  -EP consulted and following  3. Aspiration-ampicillin/sulbactam started    [address]  - Outcome of goals of care meeting:   - no change, continue current plan of care  - Capacity: At this time, Nadeen Dailey, Does Not have capacity for medical decision-making.   Capacity is time limited and situation/question specific  - Surrogate decision maker/Legal NOK: Tal Saleh ([phone]) is the patient's wife  - Code Status:   full  - Advanced directives: none  - Spiritual assessment: no needs identified  - Bereavement and grief: no needs identified    - DISPO: awaiting PATHOLOGY Referrals to: none today    Subjective   Chart reviewed  Lethargic today, on Bipap, respiratory appear labored  Discussed with bedside nurse  No immediate PM needs identified    OBJECTIVE:   Prognosis: Guarded    Physical Exam:  BP (!) 146/50   Pulse 65   Temp 98.1 °F (36.7 °C) (Temporal)   Resp 19   Ht 5' 5\" (1.651 m)   Wt 146 lb 9.7 oz (66.5 kg)   SpO2 95%   BMI 24.40 kg/m²   Gen: ill appearing  HEENT:  scalp dressing. NG tube  Neck:  Supple, trachea midline  Lungs: on bipap, mild labored appearance of respirations  Heart: Normal sinus rhythm currently  Abd: non-distended  : Hogan catheter  Ext:  Moving all extremities  Skin: without rash, bruising  Neuro: lethargic    Objective data reviewed: labs, images, records, medication use, vitals and chart    Inpatient medications reviewed: yes  Home Medications reviewed: yes    Time/Communication  Greater than 50% of time spent, total 15 minutes in counseling and coordination of care at the bedside/over the telephone regarding see above. Thank you for allowing Palliative Medicine to participate in the care of Irvin Proctor. Provider Martina MELCHOR-CNP  Palliative Medicine    Note: This report was completed using computerGungroo voiced recognition software. Every effort has been made to ensure accuracy; however, inadvertent computerized transcription errors may be present.

## 2021-03-09 NOTE — PROGRESS NOTES
Hospitalist Progress Note      PCP: Stephanie Salazar DO    Date of Admission: 2/25/2021        Hospital Course: This is a 70-year-old male who presented to the hospital for weakness. Nguyen Hanson had had altered mental status at home to associated with irrational behavior.  MRI of the brain done showed a heterogeneous enhancing mass in the left cerebral hemisphere suspicious for neoplasm and several foci of extra-axial parasellar cisterns and subependymal enhancement of the left lateral ventricle suspicious for metastatic disease.  CT of the chest showed no evidence of any malignancy and CT of the abdomen and pelvis also negative for any malignancy. He was started on Dexamethasone, evaluated by   neurosurgeon, underwent frameless service static left suboccipital craniotomy for brain tumor biopsy and debulking; pathology report pending, Oncology and palliative care on board. Hospital course had been complicated by worsening respiratory status, requiring CPAP for acute respiratory failure with hypoxia which improved with 15 L high flow nasal cannula, Ventimask. Infectious disease on board, antibiotics coverage broadened with Merrem, vancomycin. Subjective: Pt was seen and examined at bedside. No acute event overnight; denies any chest pain, shortness of breath, palpitation with any movement.     Medications:  Reviewed    Infusion Medications    dextrose      amiodarone 450mg/250ml D5W infusion 0.5 mg/min (03/09/21 0941)     Scheduled Medications    insulin lispro  0-18 Units Subcutaneous Q4H    hydrALAZINE  25 mg Per NG tube 3 times per day    lisinopril  20 mg Per NG tube Daily    amiodarone  200 mg Per NG tube Q8H    amLODIPine  5 mg Per NG tube Daily    gabapentin  300 mg Per NG tube 2 times per day    lamoTRIgine  200 mg Per NG tube BID    melatonin  10 mg Per NG tube Nightly    metoprolol tartrate  50 mg Per NG tube BID    polyethylene glycol  17 g Per NG tube Daily    sennosides  5 mL Per NG tube Nightly    docusate sodium  100 mg Per NG tube BID    lansoprazole  15 mg Per NG tube QAM AC    insulin glargine  10 Units Subcutaneous BID    heparin flush  3 mL Intravenous 2 times per day    ampicillin-sulbactam  3,000 mg Intravenous Q6H    divalproex  125 mg Oral BID    [Held by provider] magnesium oxide  400 mg Oral Daily    [Held by provider] therapeutic multivitamin-minerals  1 tablet Oral Daily    [Held by provider] tamsulosin  0.4 mg Oral Nightly    sodium chloride flush  10 mL Intravenous 2 times per day     PRN Meds: acetaminophen **OR** acetaminophen, sodium chloride flush, heparin flush, hydrALAZINE, labetalol, opium-belladonna, albuterol, glucose, dextrose, glucagon (rDNA), sodium chloride flush      Intake/Output Summary (Last 24 hours) at 3/9/2021 1005  Last data filed at 3/9/2021 1000  Gross per 24 hour   Intake 989.7 ml   Output 1570 ml   Net -580.3 ml       Exam:    BP (!) 155/63   Pulse 99   Temp 97.4 °F (36.3 °C) (Temporal)   Resp 27   Ht 5' 5\" (1.651 m)   Wt 143 lb 8.3 oz (65.1 kg)   SpO2 99%   BMI 23.88 kg/m²     General appearance: Lying comfortably in bed. No apparent distress. Respiratory: Clear to auscultation bilaterally. Cardiovascular: Normal S1/S2. Regular rhythm and rate. Abdomen: Soft, non-tender, non-distended with normal bowel sounds. Musculoskeletal: No clubbing, cyanosis or edema bilaterally. Skin: Skin color, texture, turgor normal.  No rashes or lesions. Neurologic: More awake and alert, following simple commands.   Psychiatric: Alert and oriented x 2  Peripheral Pulses: +2 palpable, equal bilaterally       Labs:   Recent Labs     03/07/21  0558 03/08/21  0438 03/09/21  0450   WBC 31.9* 28.3* 30.0*   HGB 8.4* 8.5* 8.6*   HCT 26.2* 27.0* 27.4*    282 328     Recent Labs     03/07/21  0558 03/08/21  0438 03/09/21  0450   * 152* 158*   K 3.6 4.0 3.8   * 112* 115*   CO2 30* 34* 35*   BUN 42* 43* 45*   CREATININE 1.1 1.1 1.1   CALCIUM 8. 0* 7.9* 8.1*   PHOS 1.9* 3.5 4.4     No results for input(s): AST, ALT, BILIDIR, BILITOT, ALKPHOS in the last 72 hours. No results for input(s): INR in the last 72 hours. No results for input(s): Larinda Zimmerman in the last 72 hours. Radiology:  XR CHEST PORTABLE   Final Result   Bilateral lower lobe pneumonia. CT ABDOMEN PELVIS WO CONTRAST Additional Contrast? None   Final Result   Hogan catheter is in the bladder in proper position. No indication for   hemorrhage along the trajectory of the Hogan catheter or conspicuous   hemorrhage in the bladder lumen. The bladder is not distended. Pattern of severe constipation with fecal rectal retention. Bi basilar infiltrates more prominent on the right side, pneumonia considered. XR ABDOMEN FOR NG/OG/NE TUBE PLACEMENT   Final Result   NG tube appears in satisfactory position         XR CHEST PORTABLE   Final Result   Increased left greater than right basilar opacities relative to prior   comparison which may be secondary to atelectasis, edema, or developing   infection in the appropriate clinical setting. CT HEAD WO CONTRAST   Final Result   Postoperative changes in the left suboccipital region/left lobe of the   cerebellum. Tiny amount of hemorrhage in the surgical bed is similar to only   questionably slightly more pronounced and continued follow-up is recommended. No other significant change. CT HEAD WO CONTRAST PORTABLE   Final Result   1   1. Postsurgical changes including left suboccipital craniotomy defect. 2. No evidence of acute intracranial abnormality. 3. Atrophy and chronic white matter ischemic changes. MRI BRAIN W WO CONTRAST   Final Result   1. Somewhat limited evaluation due to patient motion artifact, especially on   the postcontrast enhanced images. 2. Multiple intracranial METASTASES, most notably in the left cerebellar   hemisphere.   Metastatic lesion is also seen in the region of the mammillary   bodies. 3. HYDROCEPHALUS, with dilation of the lateral and 3rd ventricles. The   etiology is unclear. Findings may be due to mass effect on the distal   aqueduct from vasogenic edema in the left cerebellar hemisphere. 4.  The findings were submitted to the Radiology Results [address]   at 13:41 on 2/27/2021  to in be communicated to a licensed caregiver. XR CHEST PORTABLE   Final Result   No radiographic evidence of acute abnormality                Active Hospital Problems    Diagnosis Date Noted    Acute kidney injury superimposed on CKD ([address] [N17.9, N18.9] 03/05/2021    Palliative care by specialist [Z51.5]     Melanoma ([address] [C43.9] 02/26/2021    Hypokalemia [E87.6] 02/26/2021    Stage 3 chronic kidney disease [N18.30] 02/26/2021    COPD (chronic obstructive pulmonary disease) ([address] [J44.9] 02/26/2021    Constipation [K59.00] 02/26/2021    Renal cyst, left [N28.1] 02/26/2021    AMS (altered mental status) [R41.82] 02/26/2021    Dementia ([address] [F03.90] 02/26/2021    Seizure ([address] [R56.9] 02/26/2021    History of CVA (cerebrovascular accident) [Z86.73] 02/26/2021    Atrial fibrillation ([address] [I48.91] 02/26/2021    Anticoagulated [Z79.01] 02/26/2021    Secondary cancer of brain ([address] [C79.31] 02/25/2021    Leucocytosis [D72.829] 12/15/2013    Non-insulin dependent type 2 diabetes mellitus ([address] [E11.9]        Assessment  Brain mass  suspicious for metastatic brain neoplasm with unclear primary.   Had underwent frameless service static left suboccipital craniotomy for brain tumor biopsy and debulking on 3/2  Acute metabolic encephalopathy  likely secondary to above, improving  Acute respiratory failure with hypoxia  secondary to below, now on CPAP  Suspected aspiration pneumonia  Paroxysmal atrial fibrillation   in sinus rhythm  Dysphagia  Hyponatremia  likely secondary to poor oral intake  Type 2 diabetes mellitus with  Neuropathy  Hematuria  Seizure disorder Hypertension  History of melanoma  Constipation    Plan:  Supplemental oxygen to keep saturation above 92%  Continue with Decadron  Awaiting pathology report  Antibiotics switched to Merrem, vancomycin per ID  Follow respiratory cultures  On amiodarone  Continue with Norvasc, lisinopril, hydralazine, Lopressor  Started on IV D5W; continue with free fluid via NG tube  Monitor renal function, replete electrolytes as needed  Continue with Lamictal  Basal and corrective bolus insulin regimen  Advance bowel regimen    DVT prophylaxis: SCDs    Diet: DIET TUBE FEED CONTINUOUS/CYCLIC NPO; Diabetic; Nasogastric; Continuous; 20; 55; 24  Diet Tube Feed Modular: Protein Modular  Code Status: Full Code    PT/OT Eval Status: N/A    Dispo -TBD    Nicolas Munoz MD 3/9/2021 10:05 AM

## 2021-03-09 NOTE — PROCEDURES
Conner Aden is a 76 y.o. male patient. 1. Acute respiratory failure with hypoxia and hypercapnia (HCC)      Past Medical History:   Diagnosis Date    Atrial fibrillation ([address]     2018 pt states has a heart monitor, implantable    Carotid artery stenosis     History of cardiovascular stress test 11/13/13    lexiscan    Hyperlipidemia     Hypertension     Non-insulin dependent type 2 diabetes mellitus ([address]     Unspecified cerebral artery occlusion with cerebral infarction nov 11 2013    right face numb balance problems vision fades in and out  walks with walker     Blood pressure (!) 155/63, pulse 68, temperature 99.8 °F (37.7 °C), temperature source Temporal, resp. rate 8, height 5' 5\" (1.651 m), weight 143 lb 8.3 oz (65.1 kg), SpO2 100 %. Central Line    Date/Time: 3/9/2021 5:59 PM  Performed by: Wilder Bhat MD  Authorized by: Wilder Bhat MD   Consent: Written consent obtained. Risks and benefits: risks, benefits and alternatives were discussed  Consent given by: power of   Required items: required blood products, implants, devices, and special equipment available  Time out: Immediately prior to procedure a \"time out\" was called to verify the correct patient, procedure, equipment, support staff and site/side marked as required.   Indications: vascular access  Anesthesia: local infiltration    Anesthesia:  Local Anesthetic: lidocaine 1% with epinephrine  Anesthetic total: 5 mL    Sedation:  Patient sedated: yes  Sedatives: propofol    Preparation: skin prepped with 2% chlorhexidine  Skin prep agent dried: skin prep agent completely dried prior to procedure  Sterile barriers: all five maximum sterile barriers used - cap, mask, sterile gown, sterile gloves, and large sterile sheet  Hand hygiene: hand hygiene performed prior to central venous catheter insertion  Location details: left femoral  Patient position: flat  Catheter type: triple lumen  Catheter size: 7 Fr  Pre-procedure: landmarks identified  Ultrasound guidance: yes  Sterile ultrasound techniques: sterile gel and sterile probe covers were used  Number of attempts: 1  Successful placement: yes  Post-procedure: line sutured and dressing applied  Assessment: free fluid flow and blood return through all ports  Patient tolerance: patient tolerated the procedure well with no immediate complications          Susan Pérez MD  3/9/2021

## 2021-03-09 NOTE — PROGRESS NOTES
Pharmacy Consultation Note  (Antibiotic Dosing and Monitoring)    Initial consult date: 3/9/21  Consulting physician/provider: Dr Brian Cash  Drug(s): Vancomycin  Indication: Empiric antibiotics for possible aspiration    Ht Readings from Last 1 Encounters:   21 5' 5\" (1.651 m)     Wt Readings from Last 1 Encounters:   21 143 lb 8.3 oz (65.1 kg)       Age/Gender Actual BW IBW DW  Allergy Information   76 y.o. male 65.1 kg 57 kg 65.1 kg  Patient has no known allergies. Date  WBC BUN/CR Drug/Dose Time   Given Level(s)   (Time) Comments   3/9  Day #1 30 45/1.1 Vancomycin 1250 mg IV q12h 1132 Procalcitonin 0.33       Vancomycin 1500 mg IV q24h 0900                           Estimated Creatinine Clearance: 50 mL/min (based on SCr of 1.1 mg/dL). Intake/Output Summary (Last 24 hours) at 3/9/2021 1118  Last data filed at 3/9/2021 1100  Gross per 24 hour   Intake 1419.7 ml   Output 1930 ml   Net -510.3 ml     Urine output over the last 24 hours: 1 mL/kg/hr (1570 mL total)    Diuretics ordered in the last 24 hours: Furosemide 20 mg IV x1 today      Temp max: Temp (24hrs), Av.2 °F (36.8 °C), Min:97 °F (36.1 °C), Max:99 °F (37.2 °C)      Cultures:  available culture and sensitivity results were reviewed in [facility]   COVID19 - Not Detected  3/5 Blood cx's - Prelim no growth  3/7 Urine cx - No growth final  3/9 Respiratory cx (sputum expectorated) - Pending    Assessment:  · 75 yo M admitted  with cerebral posterior fossa mass s/p craniotomy stereotactic biopsy  · ID following - pt currently on antibiotics for possible aspiration - Meropenem and Vancomycin day #1  · Consulted by Dr. Brian Cash to dose/monitor Vancomycin. · Goal AUC/GAURI = 400-600 mg/L-hr. · SCr 1.1 today    Plan:  · Pt received Vancomycin 1250 mg IV x1 dose today.   Will order Vancomycin 1500 mg IV q24h to begin tomorrow  · Will order a Vancomycin trough level once pt reaches steady state and will adjust dose as needed  · Will monitor renal function closely    Will continue to follow. Thank you for the consult.     Frank Odonnell, PharmD, BCPS, BCCCP  3/9/2021  3:27 PM  Pager: [phone]

## 2021-03-09 NOTE — PROGRESS NOTES
Date: 3/9/2021    Time: 1:22 AM    Patient Placed On BIPAP/CPAP/ Non-Invasive Ventilation? No    If no must comment. Remains on from previous shift  Facial area red/color change? Yes           If YES are Blister/Lesion present? No   If yes must notify nursing staff  BIPAP/CPAP skin barrier?   Yes    Skin barrier type:mepilexlite       Comments:        Sarah Munguia

## 2021-03-09 NOTE — PROCEDURES
Nadeen Dailey is a 76 y.o. male patient. No diagnosis found. Past Medical History:   Diagnosis Date    At[address]     2018 pt states has a heart monitor, implantable    Carotid artery stenosis     History of cardiovascular stress test 11/13/13    lexiscan    Hyperlipidemia     Hypertension     Non-insulin dependent type 2 diabetes mellitus ([facility] [address] [facility]     Unspecified cerebral artery occlusion with cerebral infarction nov 11 2013    right face numb balance problems vision fades in and out  walks with walker     Blood pressure (!) 155/63, pulse 90, temperature 99.8 °F (37.7 °C), temperature source Temporal, resp. rate 19, height 5' 5\" (1.651 m), weight 143 lb 8.3 oz (65.1 kg), SpO2 94 %. Intubation    Date/Time: 3/9/2021 3:46 PM  Performed by: Lino Saldivar MD  Authorized by: Lino Saldivar MD   Consent: The procedure was performed in an emergent situation. Patient identity confirmed: provided demographic data  Indications: respiratory failure and  respiratory distress  Intubation method: fiberoptic oral  Patient status: paralyzed (RSI)  Preoxygenation: nonrebreather mask  Sedatives: propofol  Paralytic: succinylcholine  Tube size: 8.0 mm  Tube type: cuffed  Number of attempts: 1  Ventilation between attempts: BVM  Cords visualized: yes  Post-procedure assessment: chest rise and ETCO2 monitor  Breath sounds: equal  Cuff inflated: yes  ETT to lip: 24 cm  Tube secured with: ETT joseph  Chest x-ray interpreted by me. Chest x-ray findings: endotracheal tube in appropriate position  Patient tolerance: patient tolerated the procedure well with no immediate complications      Dr. David Milton was present for this procedure.      Ger Brooke MD  3/9/2021

## 2021-03-09 NOTE — PROGRESS NOTES
[facility]   INTENSIVE CARE UNIT     CRITICAL CARE ATTENDING PROGRESS NOTE    I have examined the patient, reviewed the record, and discussed the case with the APN/  Resident. I have reviewed all relevant labs and imaging data. Please refer to the  APN/ resident's note. I agree with the  assessment and plan with the following corrections/ additions. The following summarizes my clinical findings and independent assessment. CC: Critical care management after craniotomy for intracranial mass    S. Pt is obtunded now. On bipap with desaturations    HOSPITAL COURSE:  3/2 underwent craniotomy for biopsy of intracranial mass    EXAM:  unresponsive, scalp dressed   Lethargic  Lungs clear  On intermittent BiPAP  Left side weak from prior CVA  Abdomen soft nontender nondistended  Hogan with clear urine    ASSESSMENT:  Active Problems:    Leucocytosis    Non-insulin dependent type 2 diabetes mellitus (HCC)    Secondary cancer of brain (HCC)    Melanoma (HCC)    Hypokalemia    Stage 3 chronic kidney disease    COPD (chronic obstructive pulmonary disease) (HCC)    Constipation    Renal cyst, left    AMS (altered mental status)    Dementia (HCC)    Seizure (HCC)    History of CVA (cerebrovascular accident)    Atrial fibrillation ([address]    Anticoagulated    Palliative care by specialist    Acute kidney injury superimposed on CKD ([address]  Resolved Problems:    * No resolved hospital problems. *       PLAN:  Status post craniotomy for biopsyfinal path pending  History of seizures --on Lamictal and Depakote    Pulmonary: Acute respiratory insufficiencydesaturating and unresponsive. In respiratory distress  Plan on emergency intubation    Atrial fibrillation--on Lopressor, amlodipine, amiodarone    GI: Dysphagia continue tube feeds    FEN: Hypernatremia 158. Materiawith false passageappreciate urology input. Continue Hogan catheter.   Voiding trials once ambulating as outpatient per urology    ID: WBC 30  abx broadened to meropenem and vancomycin  resp cx pending     Endo: Monitor Blood Sugars. Target blood glucose less than 180 in the ICU. DVT prophylaxis--SCDS, heparin  GI Prophylaxis--PPI     CODE: FULL    DISPOSITION-Continue ICU Care    Critical care time exclusive of teaching and procedures = 35 min     Pt needs continuous ICU monitoring because the patient is at risk for deterioration from a neurological/ respiratory standpoint. Hal Garza MD, FACS  3/9/2021  3:50 PM    NOTE: This report was transcribed using voice recognition software. Every effort was made to ensure accuracy; however, inadvertent computerized transcription errors may be present.

## 2021-03-09 NOTE — PROGRESS NOTES
NG to noted to have gastric output, attached to LIWS. Moderate output noted. Terrell Bales notified, verbal order to discontinue suction, start trickle tube feeds, give free water boluses as ordered.

## 2021-03-09 NOTE — PROGRESS NOTES
Pt trailed on Hi-Flow nasal cannula, noted to be de-satting to 89%. Pt mouth breathing, so placed on venturi-mask. Pt continued to desat to 83%. CPAP placed back on pt, RT called and notified, Vanessa Cason notified and bedside. D/t pts need for positive airway pressure and recent history of aspiration, TF on hold for pt safety. Vanessa Cason notified.

## 2021-03-09 NOTE — PROGRESS NOTES
Pt noted to have decreasing O2 saturations. NT suctioned with no response. Pt's CPAP placed on 100% O2 with slight increase in O2. Pt had AMS, only opening eyes to sternal rub, not following commands. Pt's BP steadily trended down. Antoni Companion called and perfect served. Antoni Andres and Dr. Zaria Napier notified face to face. Dr. Zaria Napier and Dr. Johnston Cr bedside to intubate.

## 2021-03-09 NOTE — PROGRESS NOTES
Date: 3/9/2021    Time: 3:56 AM    Patient Placed On BIPAP/CPAP/ Non-Invasive Ventilation? No    If no must comment. Remains on  Facial area red/color change? Yes           If YES are Blister/Lesion present? No   If yes must notify nursing staff  BIPAP/CPAP skin barrier?   Yes    Skin barrier type:mepilexlite       Comments:        Pineda Coffey

## 2021-03-09 NOTE — PROGRESS NOTES
Palliative Care Department  [phone]  Palliative Care Progress Note  Provider Shon MELCHOR-STACIE    Malcolm Hager  [id number]  Hospital Day: 13    Referring Provider: Rachel MELCHOR-STACIE  Palliative Medicine was consulted for assistance with: Code status Discussion, Assist with goals of care, Symptom Management and Family Support     Chief Complaint: Malcolm Hager is a 76 y.o. male with chief complaint of AMS    Brief summary: Malcolm Hager is a 76 y.o. male with significant past medical history of hypertension, atrial fibrillation, seizure disorder, dementia, chronic kidney disease, as well as prior melanoma on his back and forehead status post resection, and chronic back pain as well as trigeminal neuralgia. He was admitted on 2/25/2021 after he was found to have brain metastasis at an outside hospital.  He was transferred to [facility] for neurosurgical evaluation, and consultations have also been placed to medical and radiation oncology, with a high concern for malignant melanoma. 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 ASSESSMENT/PLAN:     Pertinent hospital diagnoses:  1. Brain metastasis  -History of melanoma  -s/p crani with biopsy  -Path pending, ? Glial neoplasm  -Hematology oncology following  -Radiation oncology following  -neurosurgery following  2. Atrial fibrillation with RVR  -EP consulted and following  3. Respiratory failure/? Aspiration/on High O2 and Bipap    PALLIATIVE CARE ENCOUNTER  - Outcome of goals of care meeting:   - no change, continue current plan of care  - Capacity: At this time, Malcolm Hager, Does Not have capacity for medical decision-making.   Capacity is time limited and situation/question specific  - Surrogate decision maker/Legal NOK: Tal Saleh ([phone]) is the patient's wife  - Code Status:   full  - Advanced directives: none  - Spiritual assessment: no needs identified  - Bereavement and grief: no needs identified    - DISPO: awaiting PATHOLOGY    Referrals to: none today    Subjective   Chart reviewed  Lethargic today, on Bipap  Spoke with wife via phone  Discuss concerns regarding potential poor prognosis, related to decrease performance status and continued respiratory failure  She wants him to remain full code and wants intubation and ventilation if required  Otherwise she wishes to await pathology before making any further decisions    OBJECTIVE:   Prognosis: Guarded    Physical Exam:  BP (!) 155/63   Pulse 94   Temp 99.8 °F (37.7 °C) (Temporal)   Resp 19   Ht 5' 5\" (1.651 m)   Wt 143 lb 8.3 oz (65.1 kg)   SpO2 96%   BMI 23.88 kg/m²   Gen: ill appearing  HEENT:  scalp dressing. NG tube  Neck:  Supple, trachea midline  Lungs: on bipap, mild labored appearance of respirations  Heart: Normal sinus rhythm currently  Abd: non-distended  : Hogan catheter  Ext:  Moving all extremities  Skin: without rash, bruising  Neuro: lethargic    Objective data reviewed: labs, images, records, medication use, vitals and chart    Inpatient medications reviewed: yes  Home Medications reviewed: yes    Time/Communication  Greater than 50% of time spent, total 15 minutes in counseling and coordination of care at the bedside/over the telephone regarding see above. Thank you for allowing Palliative Medicine to participate in the care of Marilee Bennett. Provider Taylor MELCHOR-CNP  Palliative Medicine    Note: This report was completed using computerize voiced recognition software. Every effort has been made to ensure accuracy; however, inadvertent computerized transcription errors may be present.

## 2021-03-09 NOTE — PROGRESS NOTES
Surgical  Neuro Science Intensive Care Unit  Critical Care  Daily Progress Note 3/9/2021     Date of Admission: 02/27/2021  Date of ICU Admission; 03/02./21     CC: Follow up for craniotomy for brain mass.       HOSPITAL COURSE/OVERNIGHT EVENTS:    02/27  Initially presented ot outside facility with AMS, confusion & erratic behavior. PMH include previous stroke with residual left sided weakness, dementia, HTN, HLD, chronic pain issues, CKD & melanoma. Diagnotic imaging reveal brain masses. Transferred to [facility] for ongoing evaluaitn & neurosurgery consult. CT chest/abdomen/pelvis no evidence of malignancy.    ---------------------  03/02 Underwent craniotomy for biopsy of intracranial mass. Glial neoplasm. Admitted to  ICU post--operatively. 03/03  No issues overnight. Did not require PRN antihypertensive agents. Required 12 units of insulin. Pulled loyola out during the night. Seen by speech recs dental soft diet with honey thick liquids.   03/04 Still on diltiazem this am  pulled own loyola out and had hematuria    03/05  Episode of hypotension yesterday responded to IVFs. Cardiology consulted by medicine, HTN medications adjusted now on amiodarone gtt and metoprolol. Norvasc, clonidine, tikosyn and hydralazine stopped. 3-way catheter attempted last evening with difficulty. Urology consulted; 18 Fr coude inserted via cystoscopy. Patient more alert today and following commands  03/06  Last night Cardene started by primary because of hypertension but he became tachycardic and went back into A fib Stopped this am   03/07 Hb down trending will do hemoccult , CT scan - constipation   03/08  No issues overnight. Remains on Amiodarone & Propofol. Required more than 18 doses of antihypertensive agents. Required 24 units of insulin. Bipap during the night. Amiodarone stopped yesterday. 03/09  This am BiPap removed. Tolerated O2 per cannula for about 20 minutes. Placed back on BiPap.   Required 2 doses of antihypertensive agents. WBC elevated. Back into Afib this am.  Amiodarone to be restarted. PHYSICAL EXAM:  BP (!) 155/63   Pulse 100   Temp 97.4 °F (36.3 °C) (Temporal)   Resp 18   Ht 5' 5\" (1.651 m)   Wt 143 lb 8.3 oz (65.1 kg)   SpO2 96%   BMI 23.88 kg/m²     Intake/Output Summary (Last 24 hours) at 3/9/2021 0905  Last data filed at 3/9/2021 0800  Gross per 24 hour   Intake 989.7 ml   Output 1515 ml   Net -525.3 ml     General appearance:  Comfortable. Pain Description: none    NEUROLOGIC:   RASS Score:  0  GCS:    3 - Opens eyes to loud noise or command   6 - Follows simple motor commands  4 - Seems confused, disoriented       Pupil size:  Left 3 mm  Right 3 mm  Pupil reaction: Yes   PERRLA  Wiggles fingers: Left  No Right Yes  Hand grasp:   Left: Yes     Right    Yes  Wiggles toes: Left   Yes Right  Yes  Plantar flexion: Left  No  Right   Yes  Crani incision:  CDI    CONSTITUTIONAL: No acute distress, lying in hospital bed. CARDIOVASCULAR: S1 S2, regular rate, regular rhythm, no murmur/gallop/rub. Monitor: NSR. PULMONARY: Bilaterally course but clear. No rhonchi/rales/wheezes, no use of accessory muscles. BiPap at night. O2 at 70% PF ratio this am  166. RENAL:  Hogan to gravity, clear yellow urine. Fluid balance for previous 24 hours:  - 525 ml. ABDOMEN: Soft, nontender, nondistended, nontympanic, normal bowel sounds. NGT. Tube feedings:  Diabetic formula at 50 ml per hour ordered on hold due to BiPap. .  Daily protein supplements. No reported nausea or vomiting. Last BM today. MUSCULOSKELETAL:  Moves all extremities purposefully. Has limited movement of left fingers. Able to do left thumbs up. Wiggles left toes. Able to lift leg off bed. SKIN/EXTREMITIES: No rashes/ecchymosis, no edema/clubbing, warm/dry, good capillary refill. LINES:   Peripheral    ` Left radial arterial line. Day 8. Good curve. Right basilic PICC. Day 5. No palpable cord. Recent Labs     03/07/21  0558 03/08/21  0438 03/09/21  0450   WBC 31.9* 28.3* 30.0*   HGB 8.4* 8.5* 8.6*   HCT 26.2* 27.0* 27.4*   MCV 87.3 88.2 89.0    282 328       Recent Labs     03/07/21  0558 03/08/21  0438 03/09/21  0450   * 152* 158*   K 3.6 4.0 3.8   CO2 30* 34* 35*   PHOS 1.9* 3.5 4.4   BUN 42* 43* 45*   CREATININE 1.1 1.1 1.1     ASSESSMENT/PLAN:     Active Problems:    Leucocytosis    Non-insulin dependent type 2 diabetes mellitus (HCC)    Secondary cancer of brain (HCC)    Melanoma (HCC)    Hypokalemia    Stage 3 chronic kidney disease    COPD (chronic obstructive pulmonary disease) (HCC)    Constipation    Renal cyst, left    AMS (altered mental status)    Dementia (HCC)    Seizure (HCC)    History of CVA (cerebrovascular accident)    Atrial fibrillation ([facility] [address]    Anticoagulated    Palliative care by specialist    Acute kidney injury superimposed on CKD ([facility] [address]  Resolved Problems:    * No resolved hospital problems. *    Neuro:  AMS. Brain mass. S/P Craniotomy for biopsy. Glial neoplasm. Hx of Seizures, dementia & stroke (residual left sided weakness). Monitor neuro status. Neurosurgery following. Depokote  Lamictal    Gabapentin. Decadron stopped on 03/04. CV:  No acute issues. Hx of HTN, Hyperlipidemia and Afib    Monitor hemodynamics. BP goal systolic less than 184 mm Hg. PRN hydralzine & labetalol. Amiodarone infusion. Amiodarone scheduled. Norvasc. Metoprolol. Resumed home Lisinopril. Resumed home hydralazine. Cardiology/EP following. .   Pulm: Acute respiratory failure. Possible aspiration. Monitor RR & SpO2. BIPap  O2 as needed. Albuterol. PRN. GI:    Dysphagia. BMI 24. Hx of constipation. Monitor bowel function. NPO.    NGT. Tube feeds: Diabetic formula at 50 ml per hour. Bowel regime. Renal: SOCORRO. CKD Stage 3. Oulled loyola catheter out developed hematuria. Monitor BUN & Cr, electrolytes & replace as needed. Monitor I & O. Hogan. Flomax. Urology following  Free water. ID:   Leukocytosis. Possible aspiration. ID following. Unasyn Day 5. Endocrine: Hyperglycemia. Hypernatremia. Hx of diabetes     Monitor BS.    ISS. Lantus  MSK: Deconditioned. Previous stroke with left sided weakness. ROM. Turn & reposition. PT & OT when able  Monitor for skin breakdown. Heme: Multifactorial anemia. Cranial bx-glial neoplasm, Hx of melanoma. Monitor CBC. Radiation oncology following  Oncology following. Bowel regime: Enemez. Scheduled  Colace, Glycolax & senna. Pain control/Sedation: Tylenol. B&O suppositories. Melatonin. DVT prophylaxis: SCD. GI prophylaxis: Protonix. TF.    Glucose protocol:  ISS  Hogan: Keep in place for critical care monitoring of fluid balance. Ancillary consults:  Medicine. Neurosurgery. Critical care. ID. Radiation Oncology. Oncology. Urology. Patient/Family update: Wife Marysol Verma at bedside. Questions answered. Support given. Dr. Olga Valdovinos at bedside earlier. .  Code status:  Full code. Disposition:  Continue ICU.       Electronically signed by Aminta Hirsch RN MSN APRN-NP [facility] NP  CCNS CCRN 3/9/2021 9:05 AM

## 2021-03-09 NOTE — PLAN OF CARE
Problem: Non-Violent Restraints  Goal: Removal from restraints as soon as assessed to be safe  3/8/2021 2120 by Yan Chapman RN  Outcome: Not Met This Shift  3/8/2021 1709 by Jaki Jensen RN  Outcome: Not Met This Shift     Problem: Non-Violent Restraints  Goal: No harm/injury to patient while restraints in use  3/8/2021 2120 by Yan Chapman RN  Outcome: Met This Shift  3/8/2021 1709 by Jaki Jensen RN  Outcome: Met This Shift     Problem: Non-Violent Restraints  Goal: Patient's dignity will be maintained  3/8/2021 2120 by Yan Chapman RN  Outcome: Met This Shift  3/8/2021 1709 by Jaki Jensen RN  Outcome: Met This Shift

## 2021-03-09 NOTE — PLAN OF CARE
Problem: Falls - Risk of:  Goal: Will remain free from falls  Description: Will remain free from falls  3/9/2021 0341 by Desiree Mccracken RN  Outcome: Met This Shift  Goal: Absence of physical injury  Description: Absence of physical injury  3/9/2021 0341 by Desiree Mccracken RN  Outcome: Met This Shift     Problem: Skin Integrity:  Goal: Will show no infection signs and symptoms  Description: Will show no infection signs and symptoms  3/9/2021 0341 by Desiree Mccracken RN  Outcome: Met This Shift  Goal: Absence of new skin breakdown  Description: Absence of new skin breakdown  3/9/2021 0341 by Desiree Mccracken RN  Outcome: Met This Shift     Problem: Pain:  Goal: Control of acute pain  Description: Control of acute pain  3/9/2021 0341 by Desiree Mccracken RN  Outcome: Met This Shift     Problem: Non-Violent Restraints  Goal: No harm/injury to patient while restraints in use  3/9/2021 1008 by Cesar Eddy RN  Outcome: Met This Shift  3/9/2021 0341 by Desiree Mccracken RN  Outcome: Met This Shift  3/8/2021 2120 by Vicki Garcia RN  Outcome: Met This Shift  Goal: Patient's dignity will be maintained  3/9/2021 1008 by Cesar Eddy RN  Outcome: Met This Shift  3/9/2021 0341 by Desiree Mccracken RN  Outcome: Met This Shift  3/8/2021 2120 by Vicki Garcia RN  Outcome: Met This Shift     Problem: Pain:  Goal: Pain level will decrease  Description: Pain level will decrease  3/9/2021 0341 by Desiree Mccracken RN  Outcome: Ongoing  Goal: Control of chronic pain  Description: Control of chronic pain  3/9/2021 0341 by Desiree Mccracken RN  Outcome: Ongoing     Problem: Non-Violent Restraints  Goal: Removal from restraints as soon as assessed to be safe  3/9/2021 1008 by Cesar Eddy RN  Outcome: Not Met This Shift  3/9/2021 0341 by Desiree Mccracken RN  Outcome: Not Met This Shift  3/8/2021 2120 by Vicki Garcia RN  Outcome: Not Met This Shift

## 2021-03-09 NOTE — PROGRESS NOTES
[facility] PROGRESS NOTE      Chief complaint: Follow-up of leukocytosis    The patient is a 76 y.o. male with history of DM, hypertension, atrial fibrillation, hyperlipidemia, stroke, dementia, seizure disorder, melanoma status post excision from back and forehead 7 years ago, transferred to [facility] on 02/25 from [facility] where he initially presented with confusion and abnormal behavior for 3 weeks, found to have MRI of the brain showing multiple intracranial metastasis most notably in the left cerebellar hemisphere, hydrocephalus with dilation of the lateral and third ventricles. On admission, he was afebrile and hemodynamically stable with leukocytosis of 14,000. Urinalysis showed no pyuria. SARS-CoV-2 PCR was negative. Chest x-ray was unremarkable. 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 Dexamethasone has been started since transfer. Leukocytosis increased to 24,000 on 03/02. He received cefazolin perioperatively. He pulled out his Hogan catheter on 03/02 causing penile bleeding. Subjective: Patient was seen and examined. He is lethargic today while having PICC line inserted. He had an episode of aspiration last night.     Objective:  BP (!) 155/63   Pulse 99   Temp 97.4 °F (36.3 °C) (Temporal)   Resp 27   Ht 5' 5\" (1.651 m)   Wt 143 lb 8.3 oz (65.1 kg)   SpO2 99%   BMI 23.88 kg/m²   Constitutional: Lethargic, not in distress  Respiratory: Clear breath sounds, no crackles, no wheezes  Cardiovascular: Regular rate and rhythm, no murmurs  Gastrointestinal: Bowel sounds present, soft, nontender  Skin: Warm and dry, no active dermatoses  Musculoskeletal: No joint swelling, no joint erythema    Laboratory:  Lab Results   Component Value Date    WBC 30.0 (H) 03/09/2021    WBC 28.3 (H) 03/08/2021    WBC 31.9 (H) 03/07/2021    HGB 8.6 (L) 03/09/2021    HCT 27.4 (L) 03/09/2021    MCV 89.0 03/09/2021     03/09/2021     Lab Results   Component Value Date    NEUTROABS 28.20 (H) 03/09/2021    NEUTROABS 26.60 (H) 03/08/2021    NEUTROABS 29.99 (H) 03/07/2021     No results found for: CRP  No results found for: CRPHS  No results found for: SEDRATE  Lab Results   Component Value Date    ALT 41 (H) 01/31/2020    AST 49 (H) 01/31/2020    ALKPHOS 70 01/31/2020    BILITOT 0.5 01/31/2020     Lab Results   Component Value Date     03/09/2021    K 3.8 03/09/2021     03/09/2021    CO2 35 03/09/2021    BUN 45 03/09/2021    CREATININE 1.1 03/09/2021    CREATININE 1.1 03/08/2021    CREATININE 1.1 03/07/2021    GFRAA >60 03/09/2021    LABGLOM >60 03/09/2021    GLUCOSE 144 03/09/2021    PROT 7.5 01/31/2020    LABALBU 4.5 01/31/2020    CALCIUM 8.1 03/09/2021    BILITOT 0.5 01/31/2020    ALKPHOS 70 01/31/2020    AST 49 01/31/2020    ALT 41 01/31/2020       Radiology: CXR (03/04): Essentially unremarkable      Microbiology:     Blood cx  Pending      Assessment:  Leukocytosis, probably medication induced from steroids (dexamethasone 10 mg q6h) vs aspiration pneumonia/pneumonitis  WBC 28,000  Still elevated off steroids  Reviewed cultures   Neurosurgery note noted  Follows some commands  CT of abd pelvis noted  Repeat resp culture    Recommendations:  Stop unasyn and start merrem and vanco  CXR showing bilateral lower lobe pneumonia  Intensify antibiotics  Respiratory culture sent   Check blood cultures. Trend CBC with steroids just discontinued on 03/04. Long talk with family   All notes noted   Neurosurgery note noted  frameless stererotactic left suboccipital craniectomy for brain tumor, bx and debulking     Thank you for involving me in the care of Rubye Phlegm. Dr. Radha Granado will continue to follow. Please do not hesitate to call for any questions or concerns.     Electronically signed by Zhanna Khan MD on 3/9/2021 at 10:29 AM

## 2021-03-09 NOTE — PLAN OF CARE
Problem: Falls - Risk of:  Goal: Will remain free from falls  Description: Will remain free from falls  3/9/2021 0341 by Daryl Flores RN  Outcome: Met This Shift  3/8/2021 1709 by Zac Santos RN  Outcome: Met This Shift  Goal: Absence of physical injury  Description: Absence of physical injury  3/9/2021 0341 by Daryl Flores RN  Outcome: Met This Shift  3/8/2021 1709 by Zac Santos RN  Outcome: Met This Shift     Problem: Skin Integrity:  Goal: Will show no infection signs and symptoms  Description: Will show no infection signs and symptoms  3/9/2021 0341 by Daryl Flores RN  Outcome: Met This Shift  3/8/2021 1709 by Zac Santos RN  Outcome: Met This Shift  Goal: Absence of new skin breakdown  Description: Absence of new skin breakdown  3/9/2021 0341 by Daryl Flores RN  Outcome: Met This Shift  3/8/2021 1709 by Zac Santos RN  Outcome: Met This Shift     Problem: Pain:  Goal: Control of acute pain  Description: Control of acute pain  3/9/2021 0341 by Daryl Flores RN  Outcome: Met This Shift  3/8/2021 1709 by Zac Santos RN  Outcome: Met This Shift     Problem: Non-Violent Restraints  Goal: No harm/injury to patient while restraints in use  3/9/2021 0341 by Daryl Flores RN  Outcome: Met This Shift  3/8/2021 2120 by Jimmy Mathew RN  Outcome: Met This Shift  3/8/2021 1709 by Zac Santos RN  Outcome: Met This Shift  Goal: Patient's dignity will be maintained  3/9/2021 0341 by Daryl Flores RN  Outcome: Met This Shift  3/8/2021 2120 by Jimmy Mathew RN  Outcome: Met This Shift  3/8/2021 1709 by Zac Santos RN  Outcome: Met This Shift     Problem: Inadequate oral food/beverage intake (NI-2.1)  Goal: Food and/or Nutrient Delivery  Description: Individualized approach for food/nutrient provision.   3/8/2021 1628 by Freddie Partida RD, LD  Outcome: Met This Shift     Problem: Pain:  Goal: Pain level will decrease  Description: Pain level will decrease 3/9/2021 0341 by Daryl Flores RN  Outcome: Ongoing  3/8/2021 1709 by Zac Santos RN  Outcome: Met This Shift  Goal: Control of chronic pain  Description: Control of chronic pain  3/9/2021 0341 by Daryl Flores RN  Outcome: Ongoing  3/8/2021 1709 by Zac Santos RN  Outcome: Met This Shift     Problem: Non-Violent Restraints  Goal: Removal from restraints as soon as assessed to be safe  3/9/2021 0341 by Daryl Flores RN  Outcome: Not Met This Shift  3/8/2021 2120 by Jimmy Mathew RN  Outcome: Not Met This Shift  3/8/2021 1709 by Zac Santos RN  Outcome: Not Met This Shift

## 2021-03-09 NOTE — PROGRESS NOTES
PO#6  PROCEDURE:  Frameless stereotactic left suboccipital craniectomy for  brain tumor, biopsy and debulking. The patient is stable neurosurgically as he has been yesterday   Having pulmonary issues. Incision is healthy. Does follow commands & moves all extremities.   Awaiting pathreport

## 2021-03-09 NOTE — PROGRESS NOTES
Spoke with Dr Bladimir Palomo Re: Path report. Sent to [facility] for consultation.

## 2021-03-09 NOTE — PLAN OF CARE
Problem: Falls - Risk of:  Goal: Will remain free from falls  Description: Will remain free from falls  3/9/2021 0341 by Merline Reynoso RN  Outcome: Met This Shift  Goal: Absence of physical injury  Description: Absence of physical injury  3/9/2021 0341 by Merline Reynoso RN  Outcome: Met This Shift     Problem: Skin Integrity:  Goal: Will show no infection signs and symptoms  Description: Will show no infection signs and symptoms  3/9/2021 0341 by Merline Reynoso RN  Outcome: Met This Shift  Goal: Absence of new skin breakdown  Description: Absence of new skin breakdown  3/9/2021 0341 by Merline Reynoso RN  Outcome: Met This Shift     Problem: Pain:  Goal: Control of acute pain  Description: Control of acute pain  3/9/2021 0341 by Merline Reynoso RN  Outcome: Met This Shift     Problem: Non-Violent Restraints  Goal: No harm/injury to patient while restraints in use  3/9/2021 1640 by Donald Vega RN  Outcome: Met This Shift  3/9/2021 1008 by Donald Vega RN  Outcome: Met This Shift  3/9/2021 0341 by Merline Reynoso RN  Outcome: Met This Shift  Goal: Patient's dignity will be maintained  3/9/2021 1640 by Donald Vega RN  Outcome: Met This Shift  3/9/2021 1008 by Donald Vega RN  Outcome: Met This Shift  3/9/2021 0341 by Merline Reynoso RN  Outcome: Met This Shift     Problem: Pain:  Goal: Pain level will decrease  Description: Pain level will decrease  3/9/2021 0341 by Merline Reynoso RN  Outcome: Ongoing  Goal: Control of chronic pain  Description: Control of chronic pain  3/9/2021 0341 by Merline Reynoso RN  Outcome: Ongoing     Problem: Non-Violent Restraints  Goal: Removal from restraints as soon as assessed to be safe  3/9/2021 1640 by Donald Vega RN  Outcome: Not Met This Shift  3/9/2021 1008 by Donald Vega RN  Outcome: Not Met This Shift  3/9/2021 0341 by Merline Reynoso RN  Outcome: Not Met This Shift

## 2021-03-10 NOTE — PROCEDURES
[address] [place] [address]  BRONCHOSCOPY PROCEDURE NOTE    Procedure Date: 3/10/2021    Pre-op Diagnosis: Right lower lobe consolidation    Post-op Diagnosis: Right-sided mucous plugging    Procedure:  Flexible bronchoscopy     Attending: Kelley Bennett M.D. Assistant: Chen Niño  PGY-3    Specimen: Sent purulent fluid    Complications: None    Condition: Critical    Procedure: At the bedside in the ICU. Heart rate, blood pressure, respiratory rate, and oxygen saturation were monitored. The bronchoscope was inserted via the endotracheal tube. First the right main stem and segmental bronchi were viewed. There is noted to be thick mucinous secretions in the right mainstem which were thinned using saline irrigation and suctioned out. Respiratory cultures were taken from the right mainstem bronchus. Next the right subsegmental bronchi were viewed noted to be full of mucous plugs that were thinned using saline irrigation and suctioned out using the bronchoscope. .  The scope was slowly withdrawn and passed into the left mainstem and segmental bronchi. There is noted to be minimal mucous plugs on the left side that were irrigated and removed with suction. The bronchoscope was completely withdrawn. The patient tolerated the procedure well with no readily apparent complications. Dr. Cedric Ledesma was present for the procedure.     Chen Niño

## 2021-03-10 NOTE — PROGRESS NOTES
PO#7  PROCEDURE:  Frameless stereotactic left suboccipital craniectomy for  brain tumor, biopsy and debulking. The patient is stable neurosurgically   Having pulmonary issues so intubated  Incision is healthy.   Awaiting pathreport

## 2021-03-10 NOTE — PROGRESS NOTES
Pt requiring frequent suctioning. Saturation drop to 91%, saturation improved to 97% with suctioning. RT notified. Cuate Carrier notified.

## 2021-03-10 NOTE — PLAN OF CARE
Problem: Falls - Risk of:  Goal: Will remain free from falls  Description: Will remain free from falls  3/9/2021 2016 by Christine Kelley RN  Outcome: Met This Shift  Goal: Absence of physical injury  Description: Absence of physical injury  3/9/2021 2016 by Christine Kelley RN  Outcome: Met This Shift     Problem: Skin Integrity:  Goal: Will show no infection signs and symptoms  Description: Will show no infection signs and symptoms  3/9/2021 2016 by Christine Kelley RN  Outcome: Met This Shift  Goal: Absence of new skin breakdown  Description: Absence of new skin breakdown  3/9/2021 2016 by Christine Kelley RN  Outcome: Met This Shift     Problem: Non-Violent Restraints  Goal: No harm/injury to patient while restraints in use  3/10/2021 0717 by Mandy Sin RN  Outcome: Met This Shift  3/9/2021 2016 by Christine Kelley RN  Outcome: Met This Shift  Goal: Patient's dignity will be maintained  3/10/2021 0717 by Mandy Sin RN  Outcome: Met This Shift  3/9/2021 2016 by Christine Kelley RN  Outcome: Met This Shift     Problem: Non-Violent Restraints  Goal: Removal from restraints as soon as assessed to be safe  3/10/2021 0717 by Mandy Sin RN  Outcome: Not Met This Shift  3/9/2021 2016 by Christine Kelley RN  Outcome: Not Met This Shift

## 2021-03-10 NOTE — PLAN OF CARE
Problem: Falls - Risk of:  Goal: Will remain free from falls  Outcome: Met This Shift     Problem: Falls - Risk of:  Goal: Absence of physical injury  Outcome: Met This Shift     Problem: Skin Integrity:  Goal: Will show no infection signs and symptoms  Outcome: Met This Shift     Problem: Skin Integrity:  Goal: Absence of new skin breakdown  Outcome: Met This Shift     Problem: Non-Violent Restraints  Goal: No harm/injury to patient while restraints in use  3/9/2021 2016 by Isaiah Olivier RN  Outcome: Met This Shift     Problem: Non-Violent Restraints  Goal: Patient's dignity will be maintained  3/9/2021 2016 by Isaiah Olivier RN  Outcome: Met This Shift     Problem: Non-Violent Restraints  Goal: Removal from restraints as soon as assessed to be safe  3/9/2021 2016 by Isaiah Olivier RN  Outcome: Not Met This Shift  3/9/2021 1640 by Donald Vega RN  Outcome: Not Met This Shift  3/9/2021 1008 by Donald Vega RN  Outcome: Not Met This Shift

## 2021-03-10 NOTE — PROGRESS NOTES
[facility] PROGRESS NOTE      Chief complaint: Follow-up of leukocytosis    The patient is a 76 y.o. male with history of DM, hypertension, atrial fibrillation, hyperlipidemia, stroke, dementia, seizure disorder, melanoma status post excision from back and forehead 7 years ago, transferred to [facility] on 02/25 from [facility] where he initially presented with confusion and abnormal behavior for 3 weeks, found to have MRI of the brain showing multiple intracranial metastasis most notably in the left cerebellar hemisphere, hydrocephalus with dilation of the lateral and third ventricles. On admission, he was afebrile and hemodynamically stable with leukocytosis of 14,000. Urinalysis showed no pyuria. SARS-CoV-2 PCR was negative. Chest x-ray was unremarkable. 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 Dexamethasone has been started since transfer. Leukocytosis increased to 24,000 on 03/02. He received cefazolin perioperatively. He pulled out his Hogan catheter on 03/02 causing penile bleeding. He was intubated on 03/09 due to respiratory failure. Ampicillin-sulbactam started on 03/05 for suspected aspiration pneumonia was changed to meropenem and vancomycin on 03/09. Subjective: Patient was seen and examined. He remains in critical condition, intubated. Objective:  BP (!) 108/39   Pulse 71   Temp 99 °F (37.2 °C) (Axillary)   Resp 17   Ht 5' 5\" (1.651 m)   Wt 143 lb 8.3 oz (65.1 kg)   SpO2 97%   BMI 23.88 kg/m²   Constitutional: Intubated, no MV dyssynchrony  Respiratory: Clear breath sounds, no crackles, no wheezes  Cardiovascular: Regular rate and rhythm, no murmurs  Gastrointestinal: Bowel sounds present, soft, nontender  Skin: Warm and dry, no active dermatoses  Musculoskeletal: No joint swelling, no joint erythema    Labs, imaging, and records were personally reviewed.       Assessment: Sepsis  HAP/Aspiration pneumonia    Recommendations:  Continue meropenem 1g q8h and vancomycin dosed according to trough per Pharmacy for now. Check MRSA nares culture. Follow up respiratory and blood cultures.     Thank you for involving me in the care of Rafael Espino. I will continue to follow. Please do not hesitate to call for any questions or concerns.     Electronically signed by Reji Alas MD on 3/10/2021 at 10:32 AM

## 2021-03-10 NOTE — PROGRESS NOTES
antihypertensive agents. WBC elevated. Back into Afib this am.  Amiodarone to be restarted. Hypernatremia. Vancomycin & merepenum initiated. In the afternoon, developed AMS with respiratory distress, intubated. Placed on Propofol. Levophed for brief period of time. 03/10  T max 99.4 F. Follow commands this am.  Did not require any prn antihypertensive agents or insulin coverage. Sodium level 144    PHYSICAL EXAM:  BP (!) 152/56   Pulse 75   Temp 99.4 °F (37.4 °C) (Axillary)   Resp 16   Ht 5' 5\" (1.651 m)   Wt 143 lb 8.3 oz (65.1 kg)   SpO2 98%   BMI 23.88 kg/m²     Intake/Output Summary (Last 24 hours) at 3/10/2021 0811  Last data filed at 3/10/2021 0600  Gross per 24 hour   Intake 3455.05 ml   Output 2200 ml   Net 1255.05 ml     General appearance:  Comfortable. Pain Description: none    NEUROLOGIC:   RASS Score:  0  GCS:  10T.    3 - Opens eyes to loud noise or command   6 - Follows simple motor commands  1 - Makes no noise. Intubated. Pupil size:  Left 3 mm  Right 3 mm  Pupil reaction: Yes   PERRLA  Wiggles fingers: Left  No Right Yes  Hand grasp:   Left: Yes     Right    Yes  Wiggles toes: Left   Yes Right  Yes  Plantar flexion: Left  No  Right   Yes  Crani incision:  CDI    CONSTITUTIONAL: No acute distress, lying in hospital bed. CARDIOVASCULAR: S1 S2, regular rate, regular rhythm, no murmur/gallop/rub. Monitor: NSR. PULMONARY: Bilaterally course but clear. No rhonchi/rales/wheezes, no use of accessory muscles. Intubated. Mechanical ventilation:   ml. FiO2 70%. AC 16. Peep 8 cm. Secretions thick dark brownish. RENAL:  Hogan to gravity, clear yellow urine. Fluid balance for previous 24 hours:  + 1.2 L. .    ABDOMEN: Soft, nontender, nondistended, nontympanic, normal bowel sounds. NGT. Tube feedings:  Diabetic formula at 20 ml per hour ordered on hold due to BiPap. .  Daily protein supplements. No reported nausea or vomiting. Last BM 03/09.     MUSCULOSKELETAL: Wiggles bilateral fingers & toers. \  SKIN/EXTREMITIES: No rashes/ecchymosis, no edema/clubbing, warm/dry, good capillary refill. LINES:   Peripheral    ` Left radial arterial line. Day 9. Good curve. Right basilic PICC. Day 6. No palpable cord. Left CVC femoral TLC. Day 2. Recent Labs     03/08/21  0438 03/09/21  0450 03/10/21  0100   WBC 28.3* 30.0* 30.3*   HGB 8.5* 8.6* 7.2*   HCT 27.0* 27.4* 23.2*   MCV 88.2 89.0 88.5    328 324       Recent Labs     03/08/21  0438 03/09/21  0450 03/09/21  2028 03/10/21  0100   * 158*  --  144   K 4.0 3.8 3.12* 2.9*   CO2 34* 35*  --  33*   PHOS 3.5 4.4  --  4.0   BUN 43* 45*  --  47*   CREATININE 1.1 1.1  --  1.4*     ASSESSMENT/PLAN:     Active Problems:    Leucocytosis    Non-insulin dependent type 2 diabetes mellitus (HCC)    Secondary cancer of brain (HCC)    Melanoma (HCC)    Hypokalemia    Stage 3 chronic kidney disease    COPD (chronic obstructive pulmonary disease) (HCC)    Constipation    Renal cyst, left    AMS (altered mental status)    Dementia (HCC)    Seizure (HCC)    History of CVA (cerebrovascular accident)    Atrial fibrillation ([facility] [address]    Anticoagulated    Palliative care by specialist    Acute kidney injury superimposed on CKD ([facility] [address]  Resolved Problems:    * No resolved hospital problems. *    Neuro:  AMS. Brain mass. S/P Craniotomy for biopsy. Glial neoplasm. Hx of Seizures, dementia & stroke (residual left sided weakness). Monitor neuro status. Neurosurgery following. Depokote  Lamictal    Gabapentin. Decadron stopped on 03/04. CV:  No acute issues. Hx of HTN, Hyperlipidemia and Afib    Monitor hemodynamics. BP goal systolic less than 267 mm Hg. PRN hydralzine & labetalol. Amiodarone infusion. Amiodarone scheduled. Norvasc. Metoprolol. Lisinopril. Hydralazine. Cardiology/EP following. .   Pulm: Acute respiratory failure. Possible aspiration. Monitor RR & SpO2. Intubated.

## 2021-03-10 NOTE — CARE COORDINATION
Cm transition of care: required intubation last evening. Remains on vent. Iv amio and propofol drips cont. Levo off as of this am. Iv Merrem and vanc cont for asp pna. Vm left for wife to discuss ltac when stable prior to Chs at the [facility]. [facility] following.

## 2021-03-10 NOTE — FLOWSHEET NOTE
Pt continues to reach for ETT and other invasive lines when unrestrained despite redirection efforts. Education provided with no change in behavior. Bilateral soft wrist restraints continued for patient safety.

## 2021-03-10 NOTE — PROGRESS NOTES
Evaluated patient at bedside, intubated/sedated, not waking to voice during exam.  Spoke with ICU APRN, who had spoken with wife this morning. Wife has other engagements today. Surgical path not yet back. Will continue to follow along.     [address]

## 2021-03-10 NOTE — PLAN OF CARE
Problem: Non-Violent Restraints  Goal: No harm/injury to patient while restraints in use  3/10/2021 1558 by Donald Vega RN  Outcome: Met This Shift  3/10/2021 0717 by Donald Vega RN  Outcome: Met This Shift  Goal: Patient's dignity will be maintained  3/10/2021 1558 by Donald Vega RN  Outcome: Met This Shift  3/10/2021 0717 by Donald Vega RN  Outcome: Met This Shift     Problem: Non-Violent Restraints  Goal: Removal from restraints as soon as assessed to be safe  3/10/2021 1558 by Donald Vega RN  Outcome: Not Met This Shift  3/10/2021 0717 by Donald Vega RN  Outcome: Not Met This Shift

## 2021-03-10 NOTE — PROGRESS NOTES
Hospitalist Progress Note      PCP: Sang Díaz DO    Date of Admission: 2/25/2021        Hospital Course: This is a 79-year-old male who presented to the hospital for weakness. Ching Romero had had altered mental status at home to associated with irrational behavior.  MRI of the brain done showed a heterogeneous enhancing mass in the left cerebral hemisphere suspicious for neoplasm and several foci of extra-axial parasellar cisterns and subependymal enhancement of the left lateral ventricle suspicious for metastatic disease.  CT of the chest showed no evidence of any malignancy and CT of the abdomen and pelvis also negative for any malignancy. He was started on Dexamethasone, evaluated by neurosurgeon, underwent frameless service static left suboccipital craniotomy for brain tumor biopsy and debulking; pathology report pending, Oncology and palliative care on board. Hospital course had been complicated by worsening respiratory status, requiring CPAP for acute respiratory failure with hypoxia which improved with 15 L high flow nasal cannula, Ventimask. Infectious disease on board, antibiotics coverage broadened with Merrem, vancomycin. Subjective: Pt was seen and examined at bedside. Overnight events noted; he had worsening respiratory status requiring intubation. Unable to participate in review of systems due to intubated, sedated state.     Medications:  Reviewed    Infusion Medications    propofol 10 mcg/kg/min (03/10/21 0738)    norepinephrine Stopped (03/10/21 0612)    amiodarone 450mg/250ml D5W infusion 0.5 mg/min (03/10/21 1059)     Scheduled Medications    ipratropium-albuterol  1 ampule Inhalation Q4H WA    [START ON 3/11/2021] famotidine (PEPCID) injection  20 mg Intravenous Daily    insulin lispro  0-18 Units Subcutaneous Q4H    vancomycin  1,500 mg Intravenous Q24H    meropenem  1,000 mg Intravenous Q8H    sodium chloride  33.3 mL Intravenous Q8H    chlorhexidine  15 mL Mouth/Throat BID    hydrALAZINE  25 mg Per NG tube 3 times per day    lisinopril  20 mg Per NG tube Daily    amiodarone  200 mg Per NG tube Q8H    amLODIPine  5 mg Per NG tube Daily    gabapentin  300 mg Per NG tube 2 times per day    lamoTRIgine  200 mg Per NG tube BID    melatonin  10 mg Per NG tube Nightly    metoprolol tartrate  50 mg Per NG tube BID    polyethylene glycol  17 g Per NG tube Daily    sennosides  5 mL Per NG tube Nightly    docusate sodium  100 mg Per NG tube BID    insulin glargine  10 Units Subcutaneous BID    heparin flush  3 mL Intravenous 2 times per day    divalproex  125 mg Oral BID    [Held by provider] magnesium oxide  400 mg Oral Daily    [Held by provider] therapeutic multivitamin-minerals  1 tablet Oral Daily    [Held by provider] tamsulosin  0.4 mg Oral Nightly    sodium chloride flush  10 mL Intravenous 2 times per day     PRN Meds: artificial tears **OR** polyvinyl alcohol, acetaminophen **OR** acetaminophen, sodium chloride flush, heparin flush, hydrALAZINE, labetalol, opium-belladonna, albuterol, glucose, dextrose, glucagon (rDNA), sodium chloride flush      Intake/Output Summary (Last 24 hours) at 3/10/2021 1332  Last data filed at 3/10/2021 1300  Gross per 24 hour   Intake 3275.05 ml   Output 2004 ml   Net 1271.05 ml       Exam:    BP (!) 155/51   Pulse 71   Temp 98.3 °F (36.8 °C) (Axillary)   Resp 16   Ht 5' 5\" (1.651 m)   Wt 143 lb 8.3 oz (65.1 kg)   SpO2 92%   BMI 23.88 kg/m²     General appearance: Intubated, on full vent support. No apparent distress. Respiratory: Clear to auscultation bilaterally. Cardiovascular: Normal S1/S2. Regular rhythm and rate. Abdomen: Soft, non-tender, non-distended with normal bowel sounds. Musculoskeletal: No clubbing, cyanosis or edema bilaterally. Skin: Skin color, texture, turgor normal.  No rashes or lesions.   Neurologic: Sedated  Peripheral Pulses: +2 palpable, equal bilaterally       Labs:   Recent Labs     03/08/21  6210 03/09/21  0450 03/10/21  0100   WBC 28.3* 30.0* 30.3*   HGB 8.5* 8.6* 7.2*   HCT 27.0* 27.4* 23.2*    328 324     Recent Labs     03/08/21  0438 03/09/21  0450 03/09/21  2028 03/10/21  0100 03/10/21  1221   * 158*  --  144 144   K 4.0 3.8 3.12* 2.9* 3.6   * 115*  --  105 108*   CO2 34* 35*  --  33* 29   BUN 43* 45*  --  47* 37*   CREATININE 1.1 1.1  --  1.4* 1.3*   CALCIUM 7.9* 8.1*  --  7.3* 7.6*   PHOS 3.5 4.4  --  4.0  --      No results for input(s): AST, ALT, BILIDIR, BILITOT, ALKPHOS in the last 72 hours. No results for input(s): INR in the last 72 hours. No results for input(s): [place] Rob in the last 72 hours. Radiology:  XR CHEST PORTABLE   Final Result   Moderate persistent elevation right hemidiaphragm         XR CHEST PORTABLE   Final Result   New nonspecific hazy confluent opacity in the right lung base may reflect   atelectasis and/or pneumonitis         XR CHEST PORTABLE   Final Result   1. Satisfactory position of the endotracheal tube and NG tube. XR CHEST PORTABLE   Final Result   Bilateral lower lobe pneumonia. CT ABDOMEN PELVIS WO CONTRAST Additional Contrast? None   Final Result   Hogan catheter is in the bladder in proper position. No indication for   hemorrhage along the trajectory of the Hogan catheter or conspicuous   hemorrhage in the bladder lumen. The bladder is not distended. Pattern of severe constipation with fecal rectal retention. Bi basilar infiltrates more prominent on the right side, pneumonia considered. XR ABDOMEN FOR NG/OG/NE TUBE PLACEMENT   Final Result   NG tube appears in satisfactory position         XR CHEST PORTABLE   Final Result   Increased left greater than right basilar opacities relative to prior   comparison which may be secondary to atelectasis, edema, or developing   infection in the appropriate clinical setting.          CT HEAD WO CONTRAST   Final Result   Postoperative changes in the left suboccipital region/left lobe of the   cerebellum. Tiny amount of hemorrhage in the surgical bed is similar to only   questionably slightly more pronounced and continued follow-up is recommended. No other significant change. CT HEAD WO CONTRAST PORTABLE   Final Result   1   1. Postsurgical changes including left suboccipital craniotomy defect. 2. No evidence of acute intracranial abnormality. 3. Atrophy and chronic white matter ischemic changes. MRI BRAIN W WO CONTRAST   Final Result   1. Somewhat limited evaluation due to patient motion artifact, especially on   the postcontrast enhanced images. 2. Multiple intracranial METASTASES, most notably in the left cerebellar   hemisphere. Metastatic lesion is also seen in the region of the mammillary   bodies. 3. HYDROCEPHALUS, with dilation of the lateral and 3rd ventricles. The   etiology is unclear. Findings may be due to mass effect on the distal   aqueduct from vasogenic edema in the left cerebellar hemisphere. 4.  The findings were submitted to the Radiology Results [address]   at 13:41 on 2/27/2021  to in be communicated to a licensed caregiver.       XR CHEST PORTABLE   Final Result   No radiographic evidence of acute abnormality      XR CHEST PORTABLE    (Results Pending)   XR CHEST PORTABLE    (Results Pending)             Active Hospital Problems    Diagnosis Date Noted    Acute kidney injury superimposed on CKD ([address] [N17.9, N18.9] 03/05/2021    Palliative care by specialist [Z51.5]     Melanoma ([address] [C43.9] 02/26/2021    Hypokalemia [E87.6] 02/26/2021    Stage 3 chronic kidney disease [N18.30] 02/26/2021    COPD (chronic obstructive pulmonary disease) ([address] [J44.9] 02/26/2021    Constipation [K59.00] 02/26/2021    Renal cyst, left [N28.1] 02/26/2021    AMS (altered mental status) [R41.82] 02/26/2021    Dementia ([address] [F03.90] 02/26/2021    Seizure ([address] [R56.9] 02/26/2021    History of CVA (cerebrovascular accident) [Z86.73] 02/26/2021    Atrial fibrillation ([facility] [address] [I48.91] 02/26/2021    Anticoagulated [Z79.01] 02/26/2021    Secondary cancer of brain ([facility] [address] [C79.31] 02/25/2021    Leucocytosis [D72.829] 12/15/2013    Non-insulin dependent type 2 diabetes mellitus ([facility] [address] [E11.9]        Assessment  Brain mass  suspicious for metastatic brain neoplasm with unclear primary.   Had underwent frameless service static left suboccipital craniotomy for brain tumor biopsy and debulking on 3/2  Acute metabolic encephalopathy  likely secondary to above, improving  Acute respiratory failure with hypoxia  now intubated; on full vent support  Suspected aspiration pneumonia  Paroxysmal atrial fibrillation   in sinus rhythm  Dysphagia  Hyponatremia  likely secondary to poor oral intake, corrected   Type 2 diabetes mellitus with  Neuropathy  Hematuria  Seizure disorder  Hypertension  History of melanoma  Constipation    Plan:  Vent management and critical care per intensivist  On [place], vancomycin per ID  Continue with Decadron  Awaiting pathology report  Follow respiratory cultures, NGTD  Continue with Norvasc, lisinopril, hydralazine, Lopressor  On amiodarone  Monitor renal function, replete electrolytes as needed  Continue with Lamictal  Basal and corrective bolus insulin regimen  Tube feed with free fluid via NG tube    DVT prophylaxis: SCDs    Diet: DIET TUBE FEED CONTINUOUS/CYCLIC NPO; Diabetic; Nasogastric; Continuous; 20; 20; 24  Code Status: Full Code    PT/OT Eval Status: N/A    Dispo -TBD    Akosua Chapman MD 3/10/2021 1:32 PM

## 2021-03-11 NOTE — PROGRESS NOTES
Electrophysiology progress note         Date:  3/10/2021  Patient: Luis Masters  Admission:  2/25/2021 10:19 PM  Admit DX: Brain metastasis ([facility] [address] [C79.31]  Age:  76 y.o., 1945     LOS: 13 days     Reason for evaluation:   atrial fibrillation, hypertension and Intracranial tumor      SUBJECTIVE:    The patient was seen and examined. Notes and labs reviewed. He was intubated yesterday for worsening respiratory distress and mental status changes  Atrial fibrillation recurred and he was placed back on IV amiodarone      OBJECTIVE:    Telemetry: Sinus rhythm  BP (!) 160/50   Pulse 78   Temp 99.4 °F (37.4 °C) (Axillary)   Resp 17   Ht 5' 5\" (1.651 m)   Wt 143 lb 8.3 oz (65.1 kg)   SpO2 99%   BMI 23.88 kg/m²     Intake/Output Summary (Last 24 hours) at 3/10/2021 2018  Last data filed at 3/10/2021 1800  Gross per 24 hour   Intake 6070.28 ml   Output 1845 ml   Net 4225.28 ml       EXAM:   CONSTITUTIONAL: Sedated and intubated, no apparent distress, and appears stated age. HEENT:  Head is atraumatic, normocephalic. Eyes and oral mucosa are normal.  LUNGS:  No for increased work of breathing. On auscultation: Bilateral equal breath sounds, reduced  CARDIOVASCULAR:   regular rate and rhythm, normal S1 and S2, no S3   ABDOMEN: Soft, nontender, nondistended. SKIN: Warm and dry. EXTREMITIES: No lower extremity edema. Motor movement grossly intact. No cyanosis or clubbing.     Current Inpatient Medications:   ipratropium-albuterol  1 ampule Inhalation Q4H WA    [START ON 3/11/2021] famotidine (PEPCID) injection  20 mg Intravenous Daily    heparin (porcine)  5,000 Units Subcutaneous 3 times per day    insulin lispro  0-18 Units Subcutaneous Q4H    vancomycin  1,500 mg Intravenous Q24H    meropenem  1,000 mg Intravenous Q8H    sodium chloride  33.3 mL Intravenous Q8H    chlorhexidine  15 mL Mouth/Throat BID    hydrALAZINE  25 mg Per NG tube 3 times per day    lisinopril  20 mg Per NG tube Daily    amiodarone  200 mg Per NG tube Q8H    amLODIPine  5 mg Per NG tube Daily    gabapentin  300 mg Per NG tube 2 times per day    lamoTRIgine  200 mg Per NG tube BID    melatonin  10 mg Per NG tube Nightly    metoprolol tartrate  50 mg Per NG tube BID    polyethylene glycol  17 g Per NG tube Daily    sennosides  5 mL Per NG tube Nightly    docusate sodium  100 mg Per NG tube BID    insulin glargine  10 Units Subcutaneous BID    heparin flush  3 mL Intravenous 2 times per day    divalproex  125 mg Oral BID    [Held by provider] magnesium oxide  400 mg Oral Daily    [Held by provider] therapeutic multivitamin-minerals  1 tablet Oral Daily    [Held by provider] tamsulosin  0.4 mg Oral Nightly    sodium chloride flush  10 mL Intravenous 2 times per day       IV Infusions (if any):   propofol 5 mcg/kg/min (03/10/21 1620)    amiodarone 450mg/250ml D5W infusion 0.5 mg/min (03/10/21 1059)       Diagnostics:    EKG: normal sinus rhythm, unchanged from previous tracings. ECHO: reviewed. Ejection fraction: 55%  Stress Test: not obtained. Cardiac Angiography: not obtained. Labs:   CBC:   Recent Labs     03/09/21  0450 03/10/21  0100   WBC 30.0* 30.3*   HGB 8.6* 7.2*   HCT 27.4* 23.2*    324     BMP:   Recent Labs     03/10/21  0100 03/10/21  1221    144   K 2.9* 3.6   CO2 33* 29   BUN 47* 37*   CREATININE 1.4* 1.3*   LABGLOM 49 54   GLUCOSE 143* 130*     BNP: No results for input(s): BNP in the last 72 hours. PT/INR: No results for input(s): PROTIME, INR in the last 72 hours. APTT:No results for input(s): APTT in the last 72 hours. CARDIAC ENZYMES:No results for input(s): CKTOTAL, CKMB, CKMBINDEX, TROPONINI in the last 72 hours. FASTING LIPID PANEL:  Lab Results   Component Value Date    HDL 28.0 12/13/2013    LDLCALC 42 12/13/2013    TRIG 40 12/13/2013     LIVER PROFILE:No results for input(s): AST, ALT, LABALBU in the last 72 hours.     ASSESSMENT:    Patient Active Problem List   Diagnosis

## 2021-03-11 NOTE — FLOWSHEET NOTE
Pt is intubated and sedated. Pt is impulsive with poor judgement. bilat soft wrist restraints remain on for pt's safety.

## 2021-03-11 NOTE — FLOWSHEET NOTE
Sedation has been turned off pt has decreased movement on the left. Restraints removed. Pt moves right hand but has not attempted to reach up to ett or vent tubing. Will continue to assess.

## 2021-03-11 NOTE — PROGRESS NOTES
Pharmacy Consultation Note  (Antibiotic Dosing and Monitoring)    Initial consult date: 3/9/21  Consulting physician/provider: Dr Mele Myers  Drug(s): Vancomycin  Indication: Empiric antibiotics for possible aspiration    Ht Readings from Last 1 Encounters:   21 5' 5\" (1.651 m)     Wt Readings from Last 1 Encounters:   21 143 lb 8.3 oz (65.1 kg)       Age/Gender Actual BW IBW DW  Allergy Information   76 y.o. male 65.1 kg 57 kg 65.1 kg  Patient has no known allergies. Date  WBC BUN/CR Drug/Dose Time   Given Level(s)   (Time) Comments   3/9  Day #1 30 45/1.1 Vancomycin 1250 mg IV q12h 1132  Procalcitonin 0.33   3/10  #2 30.3 47/1.4 Vancomycin 1500 mg IV q24h 0900       3/11  #3 22.5 36/1.4 Vancomycin 1500 mg IV q24h 1028 Vanco trough 9 mcg/mL @0815       Vancomycin 750 mg IV q12h <0600>       Estimated Creatinine Clearance: 40 mL/min (A) (based on SCr of 1.4 mg/dL (H)). Intake/Output Summary (Last 24 hours) at 3/11/2021 1045  Last data filed at 3/11/2021 1000  Gross per 24 hour   Intake 2797.38 ml   Output 1674 ml   Net 1123.38 ml     Urine output over the last 24 hours: 1.2 mL/kg/hr (1890 mL total)    Diuretics ordered in the last 24 hours: N/A      Temp max: Temp (24hrs), Av.2 °F (37.3 °C), Min:98.3 °F (36.8 °C), Max:99.5 °F (37.5 °C)      Cultures:  available culture and sensitivity results were reviewed in [facility]   COVID19 - Not Detected  3/5 Blood cx's - Prelim no growth  3/7 Urine cx - No growth final  3/9 Respiratory cx (sputum expectorated) - Pending; gram stain: abundant PMNs, no epithelial cells, rare yeast    Assessment:  · 75 yo M admitted  with cerebral posterior fossa mass s/p craniotomy stereotactic biopsy  · ID following - pt currently on antibiotics for possible aspiration - Meropenem and Vancomycin day #3  · Consulted by Dr. Mele Myers to dose/monitor Vancomycin  · Vanco trough 9 mcg/mL today; Goal AUC/GAURI = 400-600 mg/L-hr.   · Pt with SOCORRO - SCr remains 1.4 today Plan:  · Will adjust dose to Vancomycin 750 mg IV q12h to begin tomorrow  · Will continue to order Vancomycin trough levels and will adjust dose as needed  · Will monitor renal function closely    Will continue to follow.       Frank Odonnell PharmD, BCPS, BCCCP  3/11/2021  10:49 AM  Pager: [phone]

## 2021-03-11 NOTE — PROGRESS NOTES
Hospitalist Progress Note      PCP: Clarence Carmona DO    Date of Admission: 2/25/2021        Hospital Course: This is a 40-year-old male who presented to the hospital for weakness. [facility] had had altered mental status at home to associated with irrational behavior.  MRI of the brain done showed a heterogeneous enhancing mass in the left cerebral hemisphere suspicious for neoplasm and several foci of extra-axial parasellar cisterns and subependymal enhancement of the left lateral ventricle suspicious for metastatic disease.  CT of the chest showed no evidence of any malignancy and CT of the abdomen and pelvis also negative for any malignancy. He was started on Dexamethasone, evaluated by neurosurgeon, underwent frameless service static left suboccipital craniotomy for brain tumor biopsy and debulking; pathology report pending, Oncology and palliative care on board. Hospital course had been complicated by worsening respiratory status, requiring CPAP for acute respiratory failure with hypoxia which improved with 15 L high flow nasal cannula, Ventimask. Infectious disease on board, antibiotics coverage broadened with Merrem, vancomycin. Subjective: Pt was seen and examined at bedside. No acute event overnight; still unable to participate in review of systems due to intubated, sedated state.     Medications:  Reviewed    Infusion Medications    sodium chloride      norepinephrine Stopped (03/11/21 0230)    propofol 5 mcg/kg/min (03/11/21 0505)    amiodarone 450mg/250ml D5W infusion 0.5 mg/min (03/10/21 1059)     Scheduled Medications    potassium chloride  20 mEq Intravenous Q1H    lansoprazole  15 mg Per NG tube QAM AC    ipratropium-albuterol  1 ampule Inhalation Q4H WA    heparin (porcine)  5,000 Units Subcutaneous 3 times per day    insulin lispro  0-18 Units Subcutaneous Q4H    vancomycin  1,500 mg Intravenous Q24H    meropenem  1,000 mg Intravenous Q8H    sodium chloride  33.3 mL Intravenous Q8H    chlorhexidine  15 mL Mouth/Throat BID    hydrALAZINE  25 mg Per NG tube 3 times per day    lisinopril  20 mg Per NG tube Daily    amiodarone  200 mg Per NG tube Q8H    amLODIPine  5 mg Per NG tube Daily    gabapentin  300 mg Per NG tube 2 times per day    lamoTRIgine  200 mg Per NG tube BID    melatonin  10 mg Per NG tube Nightly    metoprolol tartrate  50 mg Per NG tube BID    polyethylene glycol  17 g Per NG tube Daily    sennosides  5 mL Per NG tube Nightly    docusate sodium  100 mg Per NG tube BID    insulin glargine  10 Units Subcutaneous BID    heparin flush  3 mL Intravenous 2 times per day    divalproex  125 mg Oral BID    [Held by provider] magnesium oxide  400 mg Oral Daily    [Held by provider] therapeutic multivitamin-minerals  1 tablet Oral Daily    [Held by provider] tamsulosin  0.4 mg Oral Nightly    sodium chloride flush  10 mL Intravenous 2 times per day     PRN Meds: sodium chloride, artificial tears **OR** polyvinyl alcohol, acetaminophen **OR** acetaminophen, sodium chloride flush, heparin flush, hydrALAZINE, labetalol, opium-belladonna, albuterol, glucose, dextrose, glucagon (rDNA), sodium chloride flush      Intake/Output Summary (Last 24 hours) at 3/11/2021 0930  Last data filed at 3/11/2021 0700  Gross per 24 hour   Intake 2797.38 ml   Output 1657 ml   Net 1140.38 ml       Exam:    BP (!) 151/44   Pulse 71   Temp 99.5 °F (37.5 °C) (Axillary)   Resp 16   Ht 5' 5\" (1.651 m)   Wt 143 lb 8.3 oz (65.1 kg)   SpO2 94%   BMI 23.88 kg/m²     General appearance: Intubated, on full vent support. No apparent distress. Respiratory: Clear to auscultation bilaterally. Cardiovascular: Normal S1/S2. Regular rhythm and rate. Abdomen: Soft, non-tender, non-distended with normal bowel sounds. Musculoskeletal: No clubbing, cyanosis or edema bilaterally. Skin: Skin color, texture, turgor normal.  No rashes or lesions.   Neurologic: Sedated  Peripheral Pulses: +2 palpable, equal bilaterally       Labs:   Recent Labs     03/09/21  0450 03/10/21  0100 03/11/21  0445   WBC 30.0* 30.3* 22.5*   HGB 8.6* 7.2* 6.3*   HCT 27.4* 23.2* 20.6*    324 231     Recent Labs     03/09/21  0450 03/09/21  0450 03/10/21  0100 03/10/21  1221 03/11/21  0445   *  --  144 144 146   K 3.8   < > 2.9* 3.6 3.3*   *  --  105 108* 109*   CO2 35*  --  33* 29 33*   BUN 45*  --  47* 37* 36*   CREATININE 1.1  --  1.4* 1.3* 1.4*   CALCIUM 8.1*  --  7.3* 7.6* 7.8*   PHOS 4.4  --  4.0  --  3.4    < > = values in this interval not displayed. No results for input(s): AST, ALT, BILIDIR, BILITOT, ALKPHOS in the last 72 hours. No results for input(s): INR in the last 72 hours. No results for input(s): Rios Scrivener in the last 72 hours. Radiology:  XR CHEST PORTABLE   Final Result   Clearing of right basilar atelectasis/infiltrate         XR CHEST PORTABLE   Final Result   Patchy right lung base atelectasis or infiltrate         XR CHEST PORTABLE   Final Result   Moderate persistent elevation right hemidiaphragm         XR CHEST PORTABLE   Final Result   New nonspecific hazy confluent opacity in the right lung base may reflect   atelectasis and/or pneumonitis         XR CHEST PORTABLE   Final Result   1. Satisfactory position of the endotracheal tube and NG tube. XR CHEST PORTABLE   Final Result   Bilateral lower lobe pneumonia. CT ABDOMEN PELVIS WO CONTRAST Additional Contrast? None   Final Result   Hogan catheter is in the bladder in proper position. No indication for   hemorrhage along the trajectory of the Hogan catheter or conspicuous   hemorrhage in the bladder lumen. The bladder is not distended. Pattern of severe constipation with fecal rectal retention. Bi basilar infiltrates more prominent on the right side, pneumonia considered.          XR ABDOMEN FOR NG/OG/NE TUBE PLACEMENT   Final Result   NG tube appears in satisfactory position         XR CHEST PORTABLE   Final Result   Increased left greater than right basilar opacities relative to prior   comparison which may be secondary to atelectasis, edema, or developing   infection in the appropriate clinical setting. CT HEAD WO CONTRAST   Final Result   Postoperative changes in the left suboccipital region/left lobe of the   cerebellum. Tiny amount of hemorrhage in the surgical bed is similar to only   questionably slightly more pronounced and continued follow-up is recommended. No other significant change. CT HEAD WO CONTRAST PORTABLE   Final Result   1   1. Postsurgical changes including left suboccipital craniotomy defect. 2. No evidence of acute intracranial abnormality. 3. Atrophy and chronic white matter ischemic changes. MRI BRAIN W WO CONTRAST   Final Result   1. Somewhat limited evaluation due to patient motion artifact, especially on   the postcontrast enhanced images. 2. Multiple intracranial METASTASES, most notably in the left cerebellar   hemisphere. Metastatic lesion is also seen in the region of the mammillary   bodies. 3. HYDROCEPHALUS, with dilation of the lateral and 3rd ventricles. The   etiology is unclear. Findings may be due to mass effect on the distal   aqueduct from vasogenic edema in the left cerebellar hemisphere. 4.  The findings were submitted to the Radiology Results [address]   at 13:41 on 2/27/2021  to in be communicated to a licensed caregiver.       XR CHEST PORTABLE   Final Result   No radiographic evidence of acute abnormality      XR CHEST PORTABLE    (Results Pending)             Active Hospital Problems    Diagnosis Date Noted    Acute respiratory failure with hypoxia and hypercapnia (HCC) [J96.01, J96.02]     Acute kidney injury superimposed on CKD ([facility] [N17.9, N18.9] 03/05/2021    Palliative care by specialist [Z51.5]     Melanoma ([facility] [address] [C43.9] 02/26/2021    Hypokalemia [E87.6] 02/26/2021    Stage 3 chronic kidney disease [N18.30] 02/26/2021    COPD (chronic obstructive pulmonary disease) ([facility] [address] [J44.9] 02/26/2021    Constipation [K59.00] 02/26/2021    Renal cyst, left [N28.1] 02/26/2021    AMS (altered mental status) [R41.82] 02/26/2021    Dementia ([facility] [address] [F03.90] 02/26/2021    Seizure ([facility] [address] [R56.9] 02/26/2021    History of CVA (cerebrovascular accident) [Z86.73] 02/26/2021    Atrial fibrillation ([facility] [address] [I48.91] 02/26/2021    Anticoagulated [Z79.01] 02/26/2021    Secondary cancer of brain ([facility] [address] [C79.31] 02/25/2021    Leucocytosis [D72.829] 12/15/2013    Non-insulin dependent type 2 diabetes mellitus ([facility] [address] [E11.9]        Assessment  Brain mass  suspicious for metastatic brain neoplasm with unclear primary.   Had underwent frameless service static left suboccipital craniotomy for brain tumor biopsy and debulking on 3/2  Acute metabolic encephalopathy  likely secondary to above, improving  Acute respiratory failure with hypoxia  now intubated; on full vent support  Suspected aspiration pneumonia  Paroxysmal atrial fibrillation   in sinus rhythm  Dysphagia  Hyponatremia  likely secondary to poor oral intake, corrected   Acute on chronic anemia  Type 2 diabetes mellitus with  Neuropathy  Hematuria  Seizure disorder  Hypertension  History of melanoma  Constipation    Plan:  Vent management and critical care per intensivist  On Merrem, vancomycin per ID  Continue with Decadron  Awaiting pathology report  Agree with PRBC transfusion  Monitor H&H, transfuse as needed to keep hemoglobin above 7 g/dL  Follow respiratory cultures, NGTD  Continue with Norvasc, lisinopril, hydralazine, Lopressor  On Amiodarone gtt  Monitor renal function, replete electrolytes as needed  Continue with Lamictal  Basal and corrective bolus insulin regimen  Tube feed with free fluid via NG tube    DVT prophylaxis: SCDs    Diet: DIET TUBE FEED CONTINUOUS/CYCLIC NPO; Diabetic; Nasogastric; Continuous; 20; 55; 24  Code Status: Full Code    PT/OT Eval Status: N/A    Dispo -TBD    Akosua Gruber MD 3/11/2021 9:30 AM

## 2021-03-11 NOTE — PROGRESS NOTES
Palliative Care Department  [phone]  Palliative Care Progress Note  Provider Fele[address]  [phone]  Hospital Day: 15    Referring Provider: Antonio SALAZAR  Palliative Medicine was consulted for assistance with: Code status Discussion, Assist with goals of care, Symptom Management and Family Support     Chief Complaint: Agnieszka Beaver is a 76 y.o. male with chief complaint of AMS    Brief summary: Agnieszka Beaver is a 76 y.o. male with significant past medical history of hypertension, atrial fibrillation, seizure disorder, dementia, chronic kidney disease, as well as prior melanoma on his back and forehead status post resection, and chronic back pain as well as trigeminal neuralgia. He was admitted on 2/25/2021 after he was found to have brain metastasis at an outside hospital.  He was transferred to [facility] for neurosurgical evaluation, and consultations have also been placed to medical and radiation oncology, with a high concern for malignant melanoma. 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 ASSESSMENT/PLAN:     Pertinent hospital diagnoses:  1. Brain metastasis  -History of melanoma  -s/p crani with biopsy  -Path pending, ? Glial neoplasm  -Hematology oncology following  -Radiation oncology following  -neurosurgery following  2. Atrial fibrillation with RVR  -EP consulted and following  3. Respiratory failure/? Aspiration/on High O2 and Bipap    PALLIATIVE CARE ENCOUNTER  - Outcome of goals of care meeting:   - no change, continue current plan of care  - Capacity: At this time, Agnieszka Beaver, Does Not have capacity for medical decision-making.   Capacity is time limited and situation/question specific  - Surrogate decision maker/Legal Mohinder Craven ([phone] home, [phone] cell) wife  - Code Status:   full  - Advanced directives: none  - Spiritual assessment: no needs identified  - Bereavement and grief: no needs identified    - DISPO: awaiting PATHOLOGY    Referrals to: none today    Subjective   Chart reviewed. Spoke with bedside nurse and ICU team.  Path sent to [facility] for further review.  spoke with wife via phone today regarding LTAC choices. Called patient's wife- both numbers went to voicemail. Left message. OBJECTIVE:   Prognosis: Guarded    Physical Exam:  BP (!) 151/44   Pulse 71   Temp 99.5 °F (37.5 °C) (Axillary)   Resp 16   Ht 5' 5\" (1.651 m)   Wt 143 lb 8.3 oz (65.1 kg)   SpO2 94%   BMI 23.88 kg/m²   Gen: ill appearing  HEENT:  scalp dressing. ETT in place. Mucous membranes moist.  Neck:  No JVD, trachea midline  Lungs: mechanical respirations, scattered rhonchi  Heart: RRR, no murmur/rub or gallop  Abd: non-distended, soft, non-tender  : Hogan catheter  Ext:  Moved right hand and right toes, no movement of left extremities noted  Skin: pale, warm, dry  Neuro: follows simple commands right side, no movement left sided extremities during encounter    Objective data reviewed: labs, images, records, medication use, vitals and chart    Inpatient medications reviewed: yes  Home Medications reviewed: yes    Time/Communication  Greater than 50% of time spent, total 15 minutes in counseling and coordination of care at the bedside/over the telephone regarding see above. Thank you for allowing Palliative Medicine to participate in the care of Luis Rodriguez.     Provider [facility]

## 2021-03-11 NOTE — PROGRESS NOTES
PO#8  PROCEDURE:  Frameless stereotactic left suboccipital craniectomy for  brain tumor, biopsy and debulking. The patient is stable neurosurgically   Having pulmonary issues so intubated  Incision is healthy.   Pathology consult with [facility] per Pathologist

## 2021-03-11 NOTE — CARE COORDINATION
Transition of care: Remains intubated on vent , propofol drip, amio drip and oral amio loading. Levo now off. Remains on Merrem and vanc for asp. Pna. Spoke with wife re: Ltac choice if needed prior to pranav at [employer] [facility]. [facility] Will cont to follow to determine ltac vs pranav when medically stable.

## 2021-03-11 NOTE — PROGRESS NOTES
----- Message from Venkat Jackson MD sent at 5/11/2017 10:58 AM CDT -----  [facility]. Thanks.   Surgical  Neuro Science Intensive Care Unit  Critical Care  Daily Progress Note 3/11/2021     Date of Admission: 02/27/2021  Date of ICU Admission; 03/02./21     CC: Follow up for craniotomy for brain mass.       HOSPITAL COURSE/OVERNIGHT EVENTS:    02/27  Initially presented ot outside facility with AMS, confusion & erratic behavior. PMH include previous stroke with residual left sided weakness, dementia, HTN, HLD, chronic pain issues, CKD & melanoma. Diagnotic imaging reveal brain masses. Transferred to [facility] for ongoing evaluaitn & neurosurgery consult. CT chest/abdomen/pelvis no evidence of malignancy.    ---------------------  03/02 Underwent craniotomy for biopsy of intracranial mass. Glial neoplasm. Admitted to  ICU post--operatively. 03/03  No issues overnight. Did not require PRN antihypertensive agents. Required 12 units of insulin. Pulled loyola out during the night. Seen by speech recs dental soft diet with honey thick liquids.   03/04 Still on diltiazem this am  pulled own loyola out and had hematuria    03/05  Episode of hypotension yesterday responded to IVFs. Cardiology consulted by medicine, HTN medications adjusted now on amiodarone gtt and metoprolol. Norvasc, clonidine, tikosyn and hydralazine stopped. 3-way catheter attempted last evening with difficulty. Urology consulted; 18 Fr coude inserted via cystoscopy. Patient more alert today and following commands  03/06  Last night Cardene started by primary because of hypertension but he became tachycardic and went back into A fib Stopped this am   03/07 Hb down trending will do hemoccult , CT scan - constipation   03/08  No issues overnight. Remains on Amiodarone & Propofol. Required more than 18 doses of antihypertensive agents. Required 24 units of insulin. Bipap during the night. Amiodarone stopped yesterday. 03/09  This am BiPap removed. Tolerated O2 per cannula for about 20 minutes. Placed back on BiPap.   Required 2 doses of antihypertensive agents. WBC elevated. Back into Afib this am.  Amiodarone to be restarted. Hypernatremia. Vancomycin & merepenum initiated. In the afternoon, developed AMS with respiratory distress, intubated. Placed on Propofol. Levophed for brief period of time. 03/10  T max 99.4 F. Follow commands this am.  Did not require any prn antihypertensive agents or insulin coverage. Sodium level 144. Bronchoscopy done for thick secretions. 03/11  T max 99.5 F. Na level 146 today. Did not require any antihypertensive agents. PHYSICAL EXAM:  BP (!) 111/41   Pulse 53   Temp 99.4 °F (37.4 °C) (Axillary)   Resp 16   Ht 5' 5\" (1.651 m)   Wt 143 lb 8.3 oz (65.1 kg)   SpO2 98%   BMI 23.88 kg/m²     Intake/Output Summary (Last 24 hours) at 3/11/2021 1044  Last data filed at 3/11/2021 1000  Gross per 24 hour   Intake 2797.38 ml   Output 1674 ml   Net 1123.38 ml     General appearance:  Comfortable. Pain Description: none    NEUROLOGIC:   RASS Score:  0  GCS:    3 - Opens eyes to loud noise or command   4 - Moves part of body but does not remove noxious stimulus  1 - Makes no noise. Intubated. Pupil size:  Left 3 mm  Right 3 mm  Pupil reaction: Yes   PERRLA  Wiggles fingers: Left  No Right Yes  Hand grasp:   Left: Yes     Right    Yes  Wiggles toes: Left   Yes Right  Yes  Plantar flexion: Left  No  Right   Yes  Crani incision:  CDI    CONSTITUTIONAL: No acute distress, lying in hospital bed. CARDIOVASCULAR: S1 S2, regular rate, regular rhythm, no murmur/gallop/rub. Monitor: NSR. PULMONARY: Bilaterally course but clear. No rhonchi/rales/wheezes, no use of accessory muscles. Intubated. Mechanical ventilation:   ml. FiO2 70%. AC 16. Peep 8 cm. Secretions thick dark brownish. RENAL:  Hogan to gravity, clear yellow urine. Fluid balance for previous 24 hours:  + 1.1 L. .    ABDOMEN: Soft, nontender, nondistended, nontympanic, normal bowel sounds. NGT.  Tube feedings: Diabetic formula at 20 ml per hour ordered on hold due to BiPap. .  Daily protein supplements. No reported nausea or vomiting. Having liquid BM   MUSCULOSKELETAL:  Wiggles bilateral fingers & toers. \  SKIN/EXTREMITIES: No rashes/ecchymosis, no edema/clubbing, warm/dry, good capillary refill. LINES:   Peripheral    ` Left radial arterial line. Day 9. Good curve. Right basilic PICC. Day 6. No palpable cord. Left CVC femoral TLC. Day 2. Recent Labs     03/09/21  0450 03/10/21  0100 03/11/21  0445   WBC 30.0* 30.3* 22.5*   HGB 8.6* 7.2* 6.3*   HCT 27.4* 23.2* 20.6*   MCV 89.0 88.5 87.7    324 231       Recent Labs     03/09/21 0450 03/09/21 0450 03/10/21  0100 03/10/21  1221 03/11/21  0445   *  --  144 144 146   K 3.8   < > 2.9* 3.6 3.3*   CO2 35*  --  33* 29 33*   PHOS 4.4  --  4.0  --  3.4   BUN 45*  --  47* 37* 36*   CREATININE 1.1  --  1.4* 1.3* 1.4*    < > = values in this interval not displayed. ASSESSMENT/PLAN:     Active Problems:    Leucocytosis    Non-insulin dependent type 2 diabetes mellitus (HCC)    Secondary cancer of brain ([address]    Melanoma (HCC)    Hypokalemia    Stage 3 chronic kidney disease    COPD (chronic obstructive pulmonary disease) (HCC)    Constipation    Renal cyst, left    AMS (altered mental status)    Dementia (HCC)    Seizure (HCC)    History of CVA (cerebrovascular accident)    Atrial fibrillation ([address]    Anticoagulated    Palliative care by specialist    Acute kidney injury superimposed on CKD ([address]    Acute respiratory failure with hypoxia and hypercapnia ([address]  Resolved Problems:    * No resolved hospital problems. *    Neuro:  AMS. Brain mass. S/P Craniotomy for biopsy. Glial neoplasm. Hx of Seizures, dementia & stroke (residual left sided weakness). Monitor neuro status. Neurosurgery following. Depokote  Lamictal    Gabapentin. Decadron stopped on 03/04. CV:  No acute issues.   Hx of HTN, Hyperlipidemia and Afib    Monitor hemodynamics. BP goal systolic less than 047 mm Hg. PRN hydralzine & labetalol. Amiodarone infusion. Amiodarone scheduled. Norvasc. Metoprolol. Lisinopril. Hydralazine. Cardiology/EP following. .   Pulm: Acute respiratory failure. Possible aspiration. Monitor RR & SpO2. Intubated. Mechanical ventilation. Intrapulmonary nebulizer. Wean as able. Daily CXR & ABGs. Albuterol. PRN. Scheduled Duoneb. GI:    Dysphagia. BMI 24. Hx of constipation. Monitor bowel function. NPO.    NGT. Tube feeds: Diabetic formula at 50 ml per hour. Bowel regime. Renal: SOCORRO. CKD Stage 3. Pulled loyola catheter out developed hematuria. Monitor BUN & Cr, electrolytes & replace as needed. Monitor I & O. Loyola. Flomax held. Urology following  Free water. ID:   Leukocytosis. Possible aspiration. ID following. Respiratory culture pending. Unasyn Day 5-stopped. Meropenum day 3. Vancomycin Day 3. Endocrine: Hyperglycemia. Hypernatremia. Hx of diabetes     Monitor BS.    ISS. Lantus  MSK: Deconditioned. Previous stroke with left sided weakness. ROM. Turn & reposition. PT & OT when able  Monitor for skin breakdown. Heme: Multifactorial anemia. Cranial bx-glial neoplasm, Hx of melanoma. Monitor CBC. Radiation oncology following  Oncology following. Bowel regime: none  Pain control/Sedation: Tylenol. B&O suppositories. Melatonin. Propofol. DVT prophylaxis: SCD. GI prophylaxis: Pepcid. TF. Glucose protocol:  ISS  Loyola: Keep in place for critical care monitoring of fluid balance. Ancillary consults:  Medicine. Neurosurgery. Critical care. ID. Radiation Oncology. Oncology. Urology. Family update:  . Vonzella Goodpasture Called his wife Diana Soriano n phone. She is not going to visit [facility] because she has a pulmonology appointment. Updated her on Riaz's status. Code status:  Full code.       Disposition:  Continue ICU.      Electronically signed by Kevin Melvin RN MSN APRN-NP [facility] NP  CCNS CCRN 3/11/2021 10:44 AM

## 2021-03-12 NOTE — PROGRESS NOTES
Surgical  Neuro Science Intensive Care Unit  Critical Care  Daily Progress Note 3/12/2021     Date of Admission: 02/27/2021  Date of ICU Admission; 03/02./21     CC: Follow up for craniotomy for brain mass.       HOSPITAL COURSE/OVERNIGHT EVENTS:    02/27  Initially presented ot outside facility with AMS, confusion & erratic behavior. PMH include previous stroke with residual left sided weakness, dementia, HTN, HLD, chronic pain issues, CKD & melanoma. Diagnotic imaging reveal brain masses. Transferred to [facility] for ongoing evaluaitn & neurosurgery consult. CT chest/abdomen/pelvis no evidence of malignancy.    ---------------------  03/02 Underwent craniotomy for biopsy of intracranial mass. Glial neoplasm. Admitted to  ICU post--operatively. 03/03  No issues overnight. Did not require PRN antihypertensive agents. Required 12 units of insulin. Pulled loyola out during the night. Seen by speech recs dental soft diet with honey thick liquids.   03/04 Still on diltiazem this am  pulled own loyola out and had hematuria    03/05  Episode of hypotension yesterday responded to IVFs. Cardiology consulted by medicine, HTN medications adjusted now on amiodarone gtt and metoprolol. Norvasc, clonidine, tikosyn and hydralazine stopped. 3-way catheter attempted last evening with difficulty. Urology consulted; 18 Fr coude inserted via cystoscopy. Patient more alert today and following commands  03/06  Last night Cardene started by primary because of hypertension but he became tachycardic and went back into A fib Stopped this am   03/07 Hb down trending will do hemoccult , CT scan - constipation   03/08  No issues overnight. Remains on Amiodarone & Propofol. Required more than 18 doses of antihypertensive agents. Required 24 units of insulin. Bipap during the night. Amiodarone stopped yesterday. 03/09  This am BiPap removed. Tolerated O2 per cannula for about 20 minutes. Placed back on BiPap.   Required 2 doses of nontympanic, normal bowel sounds. NGT. Tube feedings:  Diabetic formula at 55 ml per hour ordered on hold due to BiPap. .  Daily protein supplements. No reported nausea or vomiting. Having liquid BM  FMS in place. Reported 500  Ml of sirrhaea inm past 24 hours. MUSCULOSKELETAL:  Wiggles bilateral fingers & toers. \  SKIN/EXTREMITIES: No rashes/ecchymosis, no edema/clubbing, warm/dry, good capillary refill. LINES:   Peripheral    ` Left radial arterial line. Day 11. Poor curve. Right basilic PICC. Day 8. No palpable cord. Left CVC femoral TLC. Day 4.  .      Recent Labs     03/10/21  0100 03/11/21  0445 03/11/21  1445 03/12/21  0530   WBC 30.3* 22.5*  --  18.2*   HGB 7.2* 6.3* 7.5* 7.5*   HCT 23.2* 20.6* 23.3* 24.2*   MCV 88.5 87.7  --  88.3    231  --  219       Recent Labs     03/10/21  0100 03/10/21  1221 03/11/21  0445 03/12/21  0530    144 146 145   K 2.9* 3.6 3.3* 3.1*   CO2 33* 29 33* 32*   PHOS 4.0  --  3.4 2.5   BUN 47* 37* 36* 31*   CREATININE 1.4* 1.3* 1.4* 1.1     ASSESSMENT/PLAN:     Active Problems:    Leucocytosis    Non-insulin dependent type 2 diabetes mellitus (HCC)    Secondary cancer of brain (HCC)    Melanoma (HCC)    Hypokalemia    Stage 3 chronic kidney disease    COPD (chronic obstructive pulmonary disease) (HCC)    Constipation    Renal cyst, left    AMS (altered mental status)    Dementia (HCC)    Seizure (HCC)    History of CVA (cerebrovascular accident)    Atrial fibrillation ([address]    Anticoagulated    Palliative care by specialist    Acute kidney injury superimposed on CKD ([address]    Acute respiratory failure with hypoxia and hypercapnia (HCC)  Resolved Problems:    * No resolved hospital problems. *    Neuro:  AMS. Brain mass. S/P Craniotomy for biopsy. Glial neoplasm. Hx of Seizures, dementia & stroke (residual left sided weakness). Monitor neuro status. Neurosurgery following. Depokote  Lamictal    Gabapentin. Decadron stopped on 03/04. Biopsy report pending-sent to [facility]  CV:  No acute issues. Hx of HTN, Hyperlipidemia and Afib    Monitor hemodynamics. BP goal systolic less than 260 mm Hg. PRN hydralzine & labetalol. Amiodarone infusion. Amiodarone scheduled. Norvasc. Metoprolol. Lisinopril. Daily Lasix. Hydralazine. Cardiology/EP following. .   Pulm: Acute respiratory failure. Possible aspiration. Monitor RR & SpO2. Intubated. Mechanical ventilation. Intrapulmonary nebulizer. Wean as able. Daily CXR & ABGs. Albuterol. PRN. Scheduled Duoneb. GI:    Dysphagia. BMI 24. Hx of constipation. Monitor bowel function. NPO.    NGT. Tube feeds: Diabetic formula at 50 ml per hour. Bowel regime stopped. FMS for liquid stool. Renal: SOCORRO. CKD Stage 3. Pulled loyola catheter out developed hematuria. Monitor BUN & Cr, electrolytes & replace as needed. Monitor I & O. Loyola. Flomax held. Urology following  Free water. ID:   Leukocytosis. Possible aspiration. ID following. Respiratory culture pending. Unasyn Day 5-stopped. Meropenum day 4. Vancomycin Day 4. Endocrine: Hyperglycemia. Hypernatremia. Hx of diabetes     Monitor BS.    ISS. Lantus  MSK: Deconditioned. Previous stroke with left sided weakness. ROM. Turn & reposition. PT & OT when able  Monitor for skin breakdown. Heme: Multifactorial anemia. Cranial bx-glial neoplasm, Hx of melanoma. Monitor CBC. Radiation oncology following  Oncology following. Bowel regime: none  Pain control/Sedation: Tylenol. B&O suppositories. Melatonin. Propofol. DVT prophylaxis: SCD. GI prophylaxis: Pepcid. TF. Glucose protocol:  ISS  Loyola: Keep in place for critical care monitoring of fluid balance. Ancillary consults:  Medicine. Neurosurgery. Critical care. ID. Radiation Oncology. Oncology. Urology. Family update:  . Candice Madrid Called his wife Manual [place] n phone.   She is not going to visit kinsey because she has a pulmonology appointment. Updated her on Riaz's status. Code status:  Full code. Disposition:  Continue ICU.       Electronically signed by Terrell Bales RN MSN APRN-NP [facility] NP  CCNS CCRN 3/12/2021 10:48 AM

## 2021-03-12 NOTE — PROGRESS NOTES
critical condition, intubated and sedated. Has diarrhea    Objective:  BP (!) 147/66   Pulse 63   Temp 98.1 °F (36.7 °C) (Oral)   Resp 16   Ht 5' 5\" (1.651 m)   Wt 147 lb 11.3 oz (67 kg)   SpO2 98%   BMI 24.58 kg/m²   Constitutional: Intubated, no MV dyssynchrony  Respiratory: Clear breath sounds, no crackles, no wheezes  Cardiovascular: Regular rate and rhythm, no murmurs  Gastrointestinal: Bowel sounds present, soft, nontender. FMS in place  Skin: Warm and dry, no active dermatoses  Musculoskeletal: No joint swelling, no joint erythema    Labs, imaging, and records were personally reviewed. Assessment:  Sepsis  HAP/Aspiration pneumonia    Recommendations:  Continue meropenem 1g q8h. Follow up respiratory cultures. Check stool C difficile toxin assay if with worsening diarrhea.     Thank you for involving me in the care of Agnieszka Beaver. I will continue to follow. Please do not hesitate to call for any questions or concerns.     Electronically signed by Mundo Schultz MD on 3/12/2021 at 10:26 AM

## 2021-03-12 NOTE — PROGRESS NOTES
Pharmacy Consultation Note  (Antibiotic Dosing and Monitoring)    Initial consult date: 3/9/21  Consulting physician/provider: Dr Maciel Smith  Drug(s): Vancomycin    Vancomycin was discontinued yesterday per ID. Therefore, will sign off at this time. Please re-consult if needed.     Alexei Hirsch, PharmD, BCPS, BCCCP  3/12/2021  7:21 AM  Pager: [phone]

## 2021-03-12 NOTE — PLAN OF CARE
3/11/2021 2038 by Lorin Peña RN  Outcome: Completed  3/11/2021 2036 by Lorin Peña RN  Outcome: Met This Shift

## 2021-03-12 NOTE — PROGRESS NOTES
Blood and [address] [place] [address]  Hematology/Oncology  Consult        Patient Name: Behzad Prather  YOB: 1945  PCP: Jessie Brenner DO   Referring Provider: No address on file     Reason for Consultation: No chief complaint on file. Subjective:  Patient remains intubated     History of Present Illness: This pt is a 77 yo male w/ a PMH of hypertension, diabetes, dementia, seizure disorder, BPH, depression, atrial fibrillation on anticoagulation with Eliquis, chronic kidney disease, melanoma removed from the back and forehead, chronic back pain, trigeminal neuralgia, CVA with residual unsteady gait and carotid artery disease who presented to [facility] from [facility] 2/25/21 after presenting with AMS and confusion with irrational behavior. In the ER at [facility] ([facility]), an MRI of the brain revealed a heterogeneous enhancing mass in the left cerebral hemisphere suspicious for neoplasm and several foci of extra-axial parasellar cisterns and subependymal enhancement in the left lateral ventricle suspicious for metastatic disease. From [address] [place] [address], CT scan of the chest showed emphysema and atelectasis, no masses or consolidation. CT scan of the abdomen and pelvis is negative for any acute findings    Currently patient remains confused and lethargic requiring restraints. CBC stable with mild leukocytosis likely from steroids. Oncology has been consulted for CUP with CNS mets, likely from prior melanoma.      Diagnostic Data:     Past Medical History:   Diagnosis Date    Atrial fibrillation ([address]     2018 pt states has a heart monitor, implantable    Carotid artery stenosis     History of cardiovascular stress test 11/13/13    lexiscan    Hyperlipidemia     Hypertension     Non-insulin dependent type 2 diabetes mellitus ([address]     Unspecified cerebral artery occlusion with cerebral infarction nov 11 2013    right face numb balance problems vision fades in and out  walks with walker       Patient Active Problem List    Diagnosis Date Noted    Carotid stenosis 11/12/2013     Priority: High    CVA (cerebral vascular accident) 11/15/2013     Priority: Medium    Acute respiratory failure with hypoxia and hypercapnia (HCC)     Acute kidney injury superimposed on CKD ([address] 03/05/2021    Palliative care by specialist     Melanoma ([address] 02/26/2021    Hypokalemia 02/26/2021    Stage 3 chronic kidney disease 02/26/2021    COPD (chronic obstructive pulmonary disease) ([address] 02/26/2021    Constipation 02/26/2021    Renal cyst, left 02/26/2021    AMS (altered mental status) 02/26/2021    Dementia ([address] 02/26/2021    Seizure ([address] 02/26/2021    History of CVA (cerebrovascular accident) 02/26/2021    Atrial fibrillation ([address] 02/26/2021    Anticoagulated 02/26/2021    Secondary cancer of brain ([address] 02/25/2021    Left cataract 11/03/2020    Leucocytosis 12/15/2013    Vitamin D deficiency 12/15/2013    Non-insulin dependent type 2 diabetes mellitus ([address]     Hypertension     Hyperlipidemia     Cerebral artery occlusion with cerebral infarction ([address] 11/11/2013        Past Surgical History:   Procedure Laterality Date    APPENDECTOMY      CAROTID ENDARTERECTOMY Left Dec 2013    Left Carotid Endarterectomy    CRANIOTOMY N/A 3/2/2021    CRANIOTOMY STEREOTATIC BIOPSY, FRAMELESS, CEREBRAL POSTERIOR FOSSA (NEEDS PATHOLOGY) performed by Taylor Irizarry MD at [place] [address] ECHO COMPL W DOP COLOR FLOW  11/12/2013         INTRACAPSULAR CATARACT EXTRACTION Left 11/3/2020    LEFT EYE CATARACT EMULSIFICATION IOL IMPLANT performed by Amy Lunsford MD at [facility]         Family History  Family History   Adopted: Yes       Social History    TOBACCO:   reports that he quit smoking about 20 months ago. His smoking use included cigarettes. He smoked 1.00 pack per day. He has never used smokeless tobacco.  ETOH:   reports current alcohol use of about 1.0 standard drinks of alcohol per week.     Home Medications  Prior to Admission medications    Medication Sig Start Date End Date Taking? Authorizing Provider   magnesium oxide (MAG-OX) 400 MG tablet Take 400 mg by mouth daily   Yes Historical Provider, MD   SITagliptin (JANUVIA) 50 MG tablet Take 50 mg by mouth daily   Yes Historical Provider, MD   insulin glargine (LANTUS) 100 UNIT/ML injection vial Inject 12 Units into the skin nightly   Yes Historical Provider, MD   dexamethasone (DECADRON) 4 MG tablet Take 6 mg by mouth daily   Yes Historical Provider, MD   divalproex (DEPAKOTE SPRINKLE) 125 MG capsule Take 125 mg by mouth 2 times daily   Yes Historical Provider, MD   melatonin 3 MG TABS tablet Take 5 mg by mouth nightly as needed   Yes Historical Provider, MD   NONFORMULARY Take 125 mcg by mouth 2 times daily tikosyn   Yes Historical Provider, MD   apixaban (ELIQUIS) 5 MG TABS tablet Take by mouth 2 times daily    Historical Provider, MD   hydrALAZINE (APRESOLINE) 50 MG tablet Take 25 mg by mouth 3 times daily     Historical Provider, MD   lamoTRIgine (LAMICTAL) 100 MG tablet Take 200 mg by mouth 2 times daily    Historical Provider, MD   metoprolol succinate (TOPROL XL) 50 MG extended release tablet Take 50 mg by mouth 2 times daily    Historical Provider, MD   potassium chloride (KLOR-CON M) 20 MEQ extended release tablet Take 20 mEq by mouth 2 times daily     Historical Provider, MD   glipiZIDE (GLUCOTROL) 5 MG tablet Take 10 mg by mouth 2 times daily    Historical Provider, MD   tamsulosin (FLOMAX) 0.4 MG capsule Take 0.4 mg by mouth nightly    Historical Provider, MD   gabapentin (NEURONTIN) 300 MG capsule Take 300 mg by mouth 2 times daily.     Historical Provider, MD   albuterol sulfate  (90 Base) MCG/ACT inhaler Inhale 2 puffs into the lungs every 6 hours as needed for Wheezing or Shortness of Breath    Historical Provider, MD   influenza virus trivalent vaccine (FLUZONE) injection Inject 0.5 mLs into the muscle once Given 10/2019 PLACEMENT   Final Result   NG tube appears in satisfactory position         XR CHEST PORTABLE   Final Result   Increased left greater than right basilar opacities relative to prior   comparison which may be secondary to atelectasis, edema, or developing   infection in the appropriate clinical setting. CT HEAD WO CONTRAST   Final Result   Postoperative changes in the left suboccipital region/left lobe of the   cerebellum. Tiny amount of hemorrhage in the surgical bed is similar to only   questionably slightly more pronounced and continued follow-up is recommended. No other significant change. CT HEAD WO CONTRAST PORTABLE   Final Result   1   1. Postsurgical changes including left suboccipital craniotomy defect. 2. No evidence of acute intracranial abnormality. 3. Atrophy and chronic white matter ischemic changes. MRI BRAIN W WO CONTRAST   Final Result   1. Somewhat limited evaluation due to patient motion artifact, especially on   the postcontrast enhanced images. 2. Multiple intracranial METASTASES, most notably in the left cerebellar   hemisphere. Metastatic lesion is also seen in the region of the mammillary   bodies. 3. HYDROCEPHALUS, with dilation of the lateral and 3rd ventricles. The   etiology is unclear. Findings may be due to mass effect on the distal   aqueduct from vasogenic edema in the left cerebellar hemisphere. 4.  The findings were submitted to the Radiology Results [address]   at 13:41 on 2/27/2021  to in be communicated to a licensed caregiver. XR CHEST PORTABLE   Final Result   No radiographic evidence of acute abnormality      XR CHEST PORTABLE    (Results Pending)         ASSESSMENT/PLAN :  77 yo male  History of melanoma s/p resection of back and forehead   A fib on eliquis  Multiple new CNS lesions, consistent with metastasis   AMS    - MRI personally reviewed.  Highly concerning for metastatic neoplastic process  - Case discussed with  above  Flip Chan MD

## 2021-03-12 NOTE — PROGRESS NOTES
Palliative Care Department  [phone]  Palliative Care Progress Note  Provider Felecia Trevizo  [id number]  Hospital Day: 12    Referring Provider: Naty SALAZAR  Palliative Medicine was consulted for assistance with: Code status Discussion, Assist with goals of care, Symptom Management and Family Support     Chief Complaint: Jaiden Sheffield is a 76 y.o. male with chief complaint of AMS    Brief summary: Jaiden Sheffield is a 76 y.o. male with significant past medical history of hypertension, atrial fibrillation, seizure disorder, dementia, chronic kidney disease, as well as prior melanoma on his back and forehead status post resection, and chronic back pain as well as trigeminal neuralgia. He was admitted on 2/25/2021 after he was found to have brain metastasis at an outside hospital.  He was transferred to [facility] for neurosurgical evaluation, and consultations have also been placed to medical and radiation oncology, with a high concern for malignant melanoma. 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 ASSESSMENT/PLAN:     Pertinent hospital diagnoses:  1. Brain metastasis  -History of melanoma  -s/p crani with biopsy  -Path pending, ? Glial neoplasm  -Hematology oncology following  -Radiation oncology following  -neurosurgery following  2. Atrial fibrillation with RVR  -EP consulted and following  3. Respiratory failure/? Aspiration/on High O2 and Bipap    PALLIATIVE CARE ENCOUNTER  - Outcome of goals of care meeting:   - no change, continue current plan of care  - Capacity: At this time, Jaiden Sheffield, Does Not have capacity for medical decision-making.   Capacity is time limited and situation/question specific  - Surrogate decision maker/Legal Juancho Ramires ([phone] home, [phone] cell) wife  - Code Status:   full  - Advanced directives: none  - Spiritual assessment: no needs identified  - Bereavement and grief: no needs identified    - DISPO: awaiting PATHOLOGY which was sent to [facility]    Referrals to: none today    Subjective   Chart reviewed. Spoke with bedside nurse. Patient remains essentially the same. Neurosurgery talked with wife this morning, waiting on pathology. Met with wife Diana Soriano and daughter at bedside. Diana Soriano is very upset, relays a story of patient falling repeatedly for 2-3 months before February 9th when she took him to [facility] for falling. She spoke with nurse there multiple times while he was in the ER, states nurse told her patient could walk fine with a cane and he was being discharged. She came to pick him up and reports he was banging into the glass doors in the entryway and she had trouble getting him into the car. He fell again and she had him go to [facility], where he was again sent home. She felt that patient was \"delirious\" and he continued to fall. She called her PCP and pleaded with him to do something, he got the patient admitted to [facility] at the end of February and she received a call shortly after he was admitted stating they thought he had brain cancer and \"fluid leaking\" from his brain. Wife repeatedly stating that she doesn't understand why his cancer was \"missed\" because \"I could understand if it was me or another black person, but he's white. \"  She feels the reason he is currently intubated in ICU is that his brain cancer wasn't found when he first went to the ER beginning of February. She wants to find out what type of cancer he has so that he can start treatment for it. She wants to continue with full code and all possible treatments. She did talk about losing her son Brad from sickle cell disease, and she \"cracked up\" at that time and required treatment with thorazine. Emotional support provided to wife and daughter.     OBJECTIVE:   Prognosis: Guarded    Physical Exam:  BP (!) 174/77   Pulse 64   Temp 98.1 °F (36.7 °C) (Oral)   Resp 13   Ht 5' 5\" (1.651 m)   Wt 147 lb 11.3 oz (67 kg) SpO2 95%   BMI 24.58 kg/m²   Gen: ill appearing, opens eyes intermittently to voice  HEENT:  scalp dressing. ETT in place. Mucous membranes moist.  Neck:  No JVD, trachea midline  Lungs: mechanical respirations, scattered rhonchi  Heart: RRR, no murmur/rub or gallop  Abd: non-distended, soft, non-tender  : Hogan catheter  Ext:  No edema  Skin: pale, warm, dry  Neuro: eyes open to voice, tracks people    Objective data reviewed: labs, images, records, medication use, vitals and chart    Inpatient medications reviewed: yes  Home Medications reviewed: yes    Time/Communication  Greater than 50% of time spent, total 25 minutes in counseling and coordination of care at the bedside/over the telephone regarding see above. Thank you for allowing Palliative Medicine to participate in the care of Jasonkimberley Yao.     Provider [address] [facility]

## 2021-03-12 NOTE — PROGRESS NOTES
LSW placed telephone call to pts wife to offer ongoing caregiver support as they await biopsy results. Spouse states she manages to keep her mind clear when at home watching old television shows. She denies needs at this itme.

## 2021-03-12 NOTE — PLAN OF CARE
Problem: Falls - Risk of:  Goal: Will remain free from falls  Description: Will remain free from falls  Outcome: Met This Shift  Goal: Absence of physical injury  Description: Absence of physical injury  Outcome: Met This Shift     Problem: Skin Integrity:  Goal: Will show no infection signs and symptoms  Description: Will show no infection signs and symptoms  Outcome: Met This Shift  Goal: Absence of new skin breakdown  Description: Absence of new skin breakdown  Outcome: Met This Shift     Problem: Pain:  Goal: Control of acute pain  Description: Control of acute pain  Outcome: Met This Shift     Problem: Non-Violent Restraints  Goal: No harm/injury to patient while restraints in use  Outcome: Met This Shift  Goal: Patient's dignity will be maintained  Outcome: Met This Shift     Problem: Pain:  Goal: Pain level will decrease  Description: Pain level will decrease  Outcome: Ongoing  Goal: Control of chronic pain  Description: Control of chronic pain  Outcome: Ongoing     Problem: Non-Violent Restraints  Goal: Removal from restraints as soon as assessed to be safe  Outcome: Not Met This Shift

## 2021-03-12 NOTE — PROGRESS NOTES
chloride  33.3 mL Intravenous Q8H    chlorhexidine  15 mL Mouth/Throat BID    hydrALAZINE  25 mg Per NG tube 3 times per day    lisinopril  20 mg Per NG tube Daily    amiodarone  200 mg Per NG tube Q8H    amLODIPine  5 mg Per NG tube Daily    gabapentin  300 mg Per NG tube 2 times per day    lamoTRIgine  200 mg Per NG tube BID    melatonin  10 mg Per NG tube Nightly    metoprolol tartrate  50 mg Per NG tube BID    insulin glargine  10 Units Subcutaneous BID    heparin flush  3 mL Intravenous 2 times per day    divalproex  125 mg Oral BID    sodium chloride flush  10 mL Intravenous 2 times per day     PRN Meds: artificial tears **OR** polyvinyl alcohol, acetaminophen **OR** acetaminophen, sodium chloride flush, heparin flush, hydrALAZINE, labetalol, opium-belladonna, albuterol, glucose, dextrose, glucagon (rDNA), sodium chloride flush      Intake/Output Summary (Last 24 hours) at 3/12/2021 0824  Last data filed at 3/12/2021 0800  Gross per 24 hour   Intake 2934.89 ml   Output 3420 ml   Net -485.11 ml       Exam:    BP (!) 147/42   Pulse 59   Temp 99 °F (37.2 °C) (Oral)   Resp 19   Ht 5' 5\" (1.651 m)   Wt 147 lb 11.3 oz (67 kg)   SpO2 98%   BMI 24.58 kg/m²     General appearance: Intubated, on full vent support. No apparent distress. Respiratory: Clear to auscultation bilaterally. Cardiovascular: Normal S1/S2. Regular rhythm and rate. Abdomen: Soft, non-tender, non-distended with normal bowel sounds. Musculoskeletal: No clubbing, cyanosis or edema bilaterally. Skin: Skin color, texture, turgor normal.  No rashes or lesions.   Neurologic: Sedated  Peripheral Pulses: +2 palpable, equal bilaterally       Labs:   Recent Labs     03/10/21  0100 03/11/21  0445 03/11/21  1445 03/12/21  0530   WBC 30.3* 22.5*  --  18.2*   HGB 7.2* 6.3* 7.5* 7.5*   HCT 23.2* 20.6* 23.3* 24.2*    231  --  219     Recent Labs     03/10/21  0100 03/10/21  1221 03/11/21  0445 03/12/21  0530    144 146 145   K 2. 9* 3.6 3.3* 3.1*    108* 109* 108*   CO2 33* 29 33* 32*   BUN 47* 37* 36* 31*   CREATININE 1.4* 1.3* 1.4* 1.1   CALCIUM 7.3* 7.6* 7.8* 7.8*   PHOS 4.0  --  3.4 2.5     No results for input(s): AST, ALT, BILIDIR, BILITOT, ALKPHOS in the last 72 hours. No results for input(s): INR in the last 72 hours. No results for input(s): Teryl Drown in the last 72 hours. Radiology:  XR CHEST PORTABLE   Final Result   New bibasilar infiltrates         XR CHEST PORTABLE   Final Result   Clearing of right basilar atelectasis/infiltrate         XR CHEST PORTABLE   Final Result   Patchy right lung base atelectasis or infiltrate         XR CHEST PORTABLE   Final Result   Moderate persistent elevation right hemidiaphragm         XR CHEST PORTABLE   Final Result   New nonspecific hazy confluent opacity in the right lung base may reflect   atelectasis and/or pneumonitis         XR CHEST PORTABLE   Final Result   1. Satisfactory position of the endotracheal tube and NG tube. XR CHEST PORTABLE   Final Result   Bilateral lower lobe pneumonia. CT ABDOMEN PELVIS WO CONTRAST Additional Contrast? None   Final Result   Hogan catheter is in the bladder in proper position. No indication for   hemorrhage along the trajectory of the Hogan catheter or conspicuous   hemorrhage in the bladder lumen. The bladder is not distended. Pattern of severe constipation with fecal rectal retention. Bi basilar infiltrates more prominent on the right side, pneumonia considered. XR ABDOMEN FOR NG/OG/NE TUBE PLACEMENT   Final Result   NG tube appears in satisfactory position         XR CHEST PORTABLE   Final Result   Increased left greater than right basilar opacities relative to prior   comparison which may be secondary to atelectasis, edema, or developing   infection in the appropriate clinical setting.          CT HEAD WO CONTRAST   Final Result   Postoperative changes in the left suboccipital region/left lobe of the   cerebellum. Tiny amount of hemorrhage in the surgical bed is similar to only   questionably slightly more pronounced and continued follow-up is recommended. No other significant change. CT HEAD WO CONTRAST PORTABLE   Final Result   1   1. Postsurgical changes including left suboccipital craniotomy defect. 2. No evidence of acute intracranial abnormality. 3. Atrophy and chronic white matter ischemic changes. MRI BRAIN W WO CONTRAST   Final Result   1. Somewhat limited evaluation due to patient motion artifact, especially on   the postcontrast enhanced images. 2. Multiple intracranial METASTASES, most notably in the left cerebellar   hemisphere. Metastatic lesion is also seen in the region of the mammillary   bodies. 3. HYDROCEPHALUS, with dilation of the lateral and 3rd ventricles. The   etiology is unclear. Findings may be due to mass effect on the distal   aqueduct from vasogenic edema in the left cerebellar hemisphere. 4.  The findings were submitted to the Radiology Results [address]   at 13:41 on 2/27/2021  to in be communicated to a licensed caregiver.       XR CHEST PORTABLE   Final Result   No radiographic evidence of acute abnormality      XR CHEST PORTABLE    (Results Pending)             Active Hospital Problems    Diagnosis Date Noted    Acute respiratory failure with hypoxia and hypercapnia (HCC) [J96.01, J96.02]     Acute kidney injury superimposed on CKD ([address] [N17.9, N18.9] 03/05/2021    Palliative care by specialist [Z51.5]     Melanoma ([address] [C43.9] 02/26/2021    Hypokalemia [E87.6] 02/26/2021    Stage 3 chronic kidney disease [N18.30] 02/26/2021    COPD (chronic obstructive pulmonary disease) ([address] [J44.9] 02/26/2021    Constipation [K59.00] 02/26/2021    Renal cyst, left [N28.1] 02/26/2021    AMS (altered mental status) [R41.82] 02/26/2021    Dementia ([address] [F03.90] 02/26/2021    Seizure ([address] [R56.9] 02/26/2021    History of CVA (cerebrovascular accident) [Z86.73] 02/26/2021    Atrial fibrillation ([facility] [I48.91] 02/26/2021    Anticoagulated [Z79.01] 02/26/2021    Secondary cancer of brain ([facility] [C79.31] 02/25/2021    Leucocytosis [D72.829] 12/15/2013    Non-insulin dependent type 2 diabetes mellitus ([facility] [E11.9]        Assessment  Brain mass  suspicious for metastatic brain neoplasm with unclear primary.   Had underwent frameless service static left suboccipital craniotomy for brain tumor biopsy and debulking on 3/2  Acute metabolic encephalopathy  likely secondary to above, improving  Acute respiratory failure with hypoxia  now intubated; on full vent support  Suspected aspiration pneumonia  Paroxysmal atrial fibrillation   in sinus rhythm  Dysphagia  Hyponatremia  likely secondary to poor oral intake, corrected   Acute on chronic anemia  hemoglobin improved status post PRBC transfusion  Type 2 diabetes mellitus with  Neuropathy  Hematuria  Seizure disorder  Hypertension  History of melanoma  Constipation    Plan:  Vent management and critical care per intensivist  On Romeo Escobedo; vancomycin discontinued per ID  Continue with Depakote Lamictal  Awaiting pathology report  Monitor H&H, transfuse as needed to keep hemoglobin above 7 g/dL  Follow respiratory cultures, NGTD  Continue with Norvasc, lisinopril, hydralazine, Lopressor  On Amiodarone gtt  Monitor renal function, replete electrolytes as needed  Basal and corrective bolus insulin regimen  Tube feed with free fluid via NG tube    DVT prophylaxis: Subcutaneous heparin    Diet: DIET TUBE FEED CONTINUOUS/CYCLIC NPO; Diabetic; Nasogastric; Continuous; 20; 55; 24  Code Status: Full Code    PT/OT Eval Status: N/A    Dispo -TBD    Akosua Garcia MD 3/12/2021 8:24 AM

## 2021-03-13 NOTE — PROGRESS NOTES
J[facility] PROGRESS NOTE      Chief complaint: Follow-up of leukocytosis    The patient is a 76 y.o. male with history of DM, hypertension, atrial fibrillation, hyperlipidemia, stroke, dementia, seizure disorder, melanoma status post excision from back and forehead 7 years ago, transferred to [facility] on 02/25 from [facility] where he initially presented with confusion and abnormal behavior for 3 weeks, found to have MRI of the brain showing multiple intracranial metastasis most notably in the left cerebellar hemisphere, hydrocephalus with dilation of the lateral and third ventricles. On admission, he was afebrile and hemodynamically stable with leukocytosis of 14,000. Urinalysis showed no pyuria. SARS-CoV-2 PCR was negative. Chest x-ray was unremarkable. 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 Dexamethasone has been started since transfer. Leukocytosis increased to 24,000 on 03/02. He received cefazolin perioperatively. He pulled out his Hogan catheter on 03/02 causing penile bleeding. He was intubated on 03/09 due to respiratory failure. Ampicillin-sulbactam started on 03/05 for suspected aspiration pneumonia was changed to meropenem and vancomycin on 03/09. Respiratory Gram stain and culture on 03/10 showed abundant polymorphonuclear leukocytes, no epithelial cells, rare Gram-positive cocci in pairs, rare yeast, rare Gram-variable rods, reduced oral pharyngeal emigdio, one colony of MRSA, moderate growth of Candida albicans and another yeast. He underwent bronchoscopy on 03/10 during which thick mucous secretions in the right mainstem bronchus. BAL Gram stain and culture showed abundant polymorphonuclear leukocytes, no epithelial cells, rare yeast, moderate growth of Candida albicans. MRSA nares culture was negative. Subjective: Patient was seen and examined.  He remains in critical condition, intubated. Has diarrhea. According to nursing staff, he has thick white endotracheal secretions. Objective:  BP (!) 165/94   Pulse 59   Temp 97.7 °F (36.5 °C) (Temporal)   Resp (!) 7   Ht 5' 5\" (1.651 m)   Wt 147 lb 11.3 oz (67 kg)   SpO2 100%   BMI 24.58 kg/m²   Constitutional: Intubated, no MV dyssynchrony  Respiratory: Clear breath sounds, no crackles, no wheezes  Cardiovascular: Regular rate and rhythm, no murmurs  Gastrointestinal: Bowel sounds present, soft, nontender. FMS in place  Skin: Warm and dry, no active dermatoses  Musculoskeletal: No joint swelling, no joint erythema    Labs, imaging, and records were personally reviewed. Assessment:  Sepsis  MRSA HAP/Aspiration pneumonia  Yeast on respiratory culture, probably colonization  Leukocytosis, resolving    Recommendations:  Stop meropenem. Start doxycycline 100 mg BID. Check stool C difficile toxin assay if with worsening diarrhea.     Thank you for involving me in the care of Raegan Yadav. I will continue to follow. Please do not hesitate to call for any questions or concerns.     Electronically signed by Anthony Yo MD on 3/13/2021 at 10:02 AM

## 2021-03-13 NOTE — PROGRESS NOTES
[facility]ensive [facility]  Critical Care  Daily Progress Note 3/13/2021    Date of Admission: 3/2/21    CC:  S/p craniotomy for intracranial mass. Intubated and sedated. HOSPITAL EVENTS  3/2 underwent craniotomy for biopsy of intracranial mass  3//5 started on amiodarone  3/9 intubated for respiratory distress  3/10 bronchoscopy-thick secretions and mucus plugging    OVERNIGHT EVENTS: T max 98 F. Intubated. On AC. On amiodarone drip. Did not require additional doses of antihypertensive agents in the previous 24 hours. Required additional 12 units of insulin in the previous 24 hours. PHYSICAL EXAM:    BP (!) 180/80   Pulse 68   Temp 97.7 °F (36.5 °C) (Temporal)   Resp 16   Ht 5' 5\" (1.651 m)   Wt 147 lb 11.3 oz (67 kg)   SpO2 100%   BMI 24.58 kg/m²     Intake/Output Summary (Last 24 hours) at 3/13/2021 0859  Last data filed at 3/13/2021 0700  Gross per 24 hour   Intake 3148.6 ml   Output 2220 ml   Net 928.6 ml         General appearance:  Comfortable. NEUROLOGIC:        GCS:    3 - Opens eyes to loud noise or command   6 - Follows simple motor commands  1T - Makes no noise       Pupil size:  Left 3 mm  Right 3 mm  Pupil reaction: Yes   PERRLA  Wiggles fingers: Left No Right Yes  Hand grasp:   Left absent     Right present  Wiggles toes: Left No    Right Yes  Plantar flexion: Left absent    Right absent    CONSTITUTIONAL: No acute distress  CARDIOVASCULAR: S1 S2, regular rate, regular rhythm,Monitor: SB- SR  PULMONARY: Intubated on AC. No rhonchi/rales/wheezes. RENAL:  Hogan to gravity, clear yellow urine. ABDOMEN: Soft, nontender, nondistended, nontympanic, normal bowel sounds. OGT with TF.    MUSCULOSKELETAL: Left sided weakness. SKIN/EXTREMITIES: No rashes/ecchymosis, no edema/clubbing, warm/dry, good capillary refill. IV ACCESS:   PICC Right basilic D 9. Left femoral TLC.  D5      ASSESSMENT/PLAN:     Active Problems:    Leucocytosis    Non-insulin dependent type 2 diabetes mellitus ([facility]    Secondary cancer of brain ([facility]    Melanoma ([facility]    Hypokalemia    Stage 3 chronic kidney disease    COPD (chronic obstructive pulmonary disease) (HCC)    Constipation    Renal cyst, left    AMS (altered mental status)    Dementia (HCC)    Seizure (HCC)    History of CVA (cerebrovascular accident)    Atrial fibrillation ([facility]    Anticoagulated    Palliative care by specialist    Acute kidney injury superimposed on CKD ([facility]    Acute respiratory failure with hypoxia and hypercapnia ([facility]  Resolved Problems:    * No resolved hospital problems. *          Neuro: s/p crani for brain mass  For biopsy and subtotal excision. Frozen section was possible glioma. Definitive report pending. HX stroke. Monitor neuro status. Depakote sprinkle. Gabapentin. Lamictal.   CV: Afib. Rate controlled. HX HTN. HLD Carotid disease. Afib. Amiodarone drip- stopped. BP goal <160 mm Hg. PRN Labetalol. Hydralazine. Amlodipine. Hydralazine. Lisinopril. Metoprolol. Amiodarone. Pulm: Acute respiratory failure. S/p Bronch. Intubated. On AC. PF ratio 257. PS trial.  Duoneb. Increased Diuretic dosing for pleural effusion and +9.9L since admission. Monitor respiratory status  GI: NPO. Unable to feed. NGT with TF. Lansoprazole. Diarrhea  Monitor bowel status  Renal:  No acute issues. Traumatic catheterization after pt pulled out loyola prior catheter. Urology following. Monitor uop, renal function and electrolytes  ID: Leukocytosis. Low grade temp. ID following. Stopped Meropenem. Started doxycycline. Endocrine:  Hyperglycemia. HX DM. Lantus BID. No increase dt results <100. ISS  MSK:. Left hemiparesis. CT brain. Heme: Anemia. Monitor CBC        Bowel regime: psyllium  Pain control/Sedation:  Acetaminophen. B & O suppository  DVT prophylaxis: SCDs. Heparin  GI prophylaxis: Lansoprazole. TF  Glucose protocol: Lantus. ISS    Mouth/Eye care: Precedex. Tears.    L[place]ola: Keep in place for critical care monitoring of fluid balance. Consults: Medicine. Neurosurgery. ID. Palliative care. Patient/Family update: Will update as family available    Code status:  Full      Disposition:  NSICU. Jose Sullivan DNP.  APRN-CNP  3/13/2021  8:51 AM

## 2021-03-13 NOTE — PROGRESS NOTES
Wean parameter done    VT= 582 mls  F= 16 B/M  V= 6.31 l/m  NIF= -15 cmH2O  VC= n/a  RSBI = 36   Patient has (+) leak.

## 2021-03-13 NOTE — PROGRESS NOTES
PO#10  PROCEDURE:  Frameless stereotactic left suboccipital craniectomy for  brain tumor, biopsy and debulking. The patient is stable neurosurgically   Having pulmonary issues so intubated  Incision is healthy. Await Pathology report. Dr Rose Session note appreciated.   Will repeat Ct scan of brain as patient not showing significant improvement

## 2021-03-14 NOTE — PROGRESS NOTES
Hospitalist Progress Note      PCP: Segundo Adrian DO    Date of Admission: 2/25/2021        Hospital Course: This is a 60-year-old male who presented to the hospital for weakness. Ran Jones had had altered mental status at home to associated with irrational behavior.  MRI of the brain done showed a heterogeneous enhancing mass in the left cerebral hemisphere suspicious for neoplasm and several foci of extra-axial parasellar cisterns and subependymal enhancement of the left lateral ventricle suspicious for metastatic disease.  CT of the chest showed no evidence of any malignancy and CT of the abdomen and pelvis also negative for any malignancy. He was started on Dexamethasone, evaluated by neurosurgeon, underwent frameless stereotactic left suboccipital craniotomy for brain tumor biopsy and debulking; pathology sent to [facility]. Oncology and palliative care on board. Hospital course has been complicated by worsening respiratory status, requiring CPAP for acute respiratory failure with hypoxia which improved with 15 L high flow nasal cannula, Ventimask ultimately decompensating and requiring intubation. Infectious disease on board, antibiotics coverage broadened with Merrem. Vancomycin d/c when MRSA swab negative. Subjective: Pt was seen and examined at bedside. No acute event overnight; still unable to participate in review of systems due to intubated, sedated state. Having apneic periods on ventilator. Does not answer yes/no questions despite eyes being open to voice.     Medications:  Reviewed    Infusion Medications     Scheduled Medications    doxycycline (VIBRAMYCIN) IV  100 mg Intravenous Q12H    furosemide  20 mg Intravenous BID    psyllium  1 packet Oral Daily    potassium bicarb-citric acid  40 mEq Per NG tube BID    lansoprazole  15 mg Per NG tube QAM AC    ipratropium-albuterol  1 ampule Inhalation Q4H WA    heparin (porcine)  5,000 Units Subcutaneous 3 times per day    insulin lispro 0-18 Units Subcutaneous Q4H    chlorhexidine  15 mL Mouth/Throat BID    hydrALAZINE  25 mg Per NG tube 3 times per day    lisinopril  20 mg Per NG tube Daily    amiodarone  200 mg Per NG tube Q8H    amLODIPine  5 mg Per NG tube Daily    gabapentin  300 mg Per NG tube 2 times per day    lamoTRIgine  200 mg Per NG tube BID    melatonin  10 mg Per NG tube Nightly    metoprolol tartrate  50 mg Per NG tube BID    insulin glargine  10 Units Subcutaneous BID    heparin flush  3 mL Intravenous 2 times per day    divalproex  125 mg Oral BID    sodium chloride flush  10 mL Intravenous 2 times per day     PRN Meds: artificial tears **OR** polyvinyl alcohol, acetaminophen **OR** acetaminophen, sodium chloride flush, heparin flush, hydrALAZINE, labetalol, opium-belladonna, albuterol, glucose, dextrose, glucagon (rDNA), sodium chloride flush      Intake/Output Summary (Last 24 hours) at 3/14/2021 0845  Last data filed at 3/14/2021 0800  Gross per 24 hour   Intake 2202 ml   Output 4210 ml   Net -2008 ml       Exam:    BP (!) 168/75   Pulse 72   Temp 98.4 °F (36.9 °C) (Temporal)   Resp 11   Ht 5' 5\" (1.651 m)   Wt 159 lb 13.3 oz (72.5 kg)   SpO2 97%   BMI 26.60 kg/m²     General appearance: Intubated, pressure support. No apparent distress. Respiratory: Clear to auscultation bilaterally. Cardiovascular: Normal S1/S2. Regular rhythm and rate. Abdomen: Soft, non-tender, non-distended with normal bowel sounds. Musculoskeletal: No clubbing, cyanosis, mild hand edema noted bilaterally. Skin: Skin color, texture, turgor normal.  No rashes or lesions.   Neurologic: Sedated, eyes open to voice but does not nod yes/no to answer questions or follow commands  Peripheral Pulses: +2 palpable, equal bilaterally       Labs:   Recent Labs     03/12/21  0530 03/13/21  0330 03/14/21  0400   WBC 18.2* 12.1* 12.3*   HGB 7.5* 7.2* 7.3*   HCT 24.2* 22.7* 23.3*    241 252     Recent Labs     03/12/21  0530 03/13/21  2268 03/14/21  0400    139 140   K 3.1* 3.6 3.6   * 102 100   CO2 32* 34* 36*   BUN 31* 26* 23   CREATININE 1.1 1.2 1.1   CALCIUM 7.8* 8.0* 7.7*   PHOS 2.5 2.5 3.2     No results for input(s): AST, ALT, BILIDIR, BILITOT, ALKPHOS in the last 72 hours. No results for input(s): INR in the last 72 hours. No results for input(s): Francies Gathers in the last 72 hours. Radiology:  XR CHEST PORTABLE   Final Result   Patchy bibasilar atelectasis or infiltrates, stable in the right lung and   mildly increased in the left lung. CT HEAD WO CONTRAST   Final Result   1. Subacute phase of postoperative changes in the left cranial fossa. Hypodensity with mass effect is present in the left cerebellar hemisphere. 2.  More prominent diffuse hypodensity of the white matter of both cerebral   hemispheres since the January 31st.      3.  Ventricular system appears to be prominent as seen previously. XR CHEST PORTABLE   Final Result   1. Improved aeration of the right lung when compared to the prior study. Small vague residual infiltrate seen within the right lung base. XR CHEST PORTABLE   Final Result   New bibasilar infiltrates         XR CHEST PORTABLE   Final Result   Clearing of right basilar atelectasis/infiltrate         XR CHEST PORTABLE   Final Result   Patchy right lung base atelectasis or infiltrate         XR CHEST PORTABLE   Final Result   Moderate persistent elevation right hemidiaphragm         XR CHEST PORTABLE   Final Result   New nonspecific hazy confluent opacity in the right lung base may reflect   atelectasis and/or pneumonitis         XR CHEST PORTABLE   Final Result   1. Satisfactory position of the endotracheal tube and NG tube. XR CHEST PORTABLE   Final Result   Bilateral lower lobe pneumonia. CT ABDOMEN PELVIS WO CONTRAST Additional Contrast? None   Final Result   Hogan catheter is in the bladder in proper position.   No indication for   hemorrhage along the trajectory of the Hogan catheter or conspicuous   hemorrhage in the bladder lumen. The bladder is not distended. Pattern of severe constipation with fecal rectal retention. Bi basilar infiltrates more prominent on the right side, pneumonia considered. XR ABDOMEN FOR NG/OG/NE TUBE PLACEMENT   Final Result   NG tube appears in satisfactory position         XR CHEST PORTABLE   Final Result   Increased left greater than right basilar opacities relative to prior   comparison which may be secondary to atelectasis, edema, or developing   infection in the appropriate clinical setting. CT HEAD WO CONTRAST   Final Result   Postoperative changes in the left suboccipital region/left lobe of the   cerebellum. Tiny amount of hemorrhage in the surgical bed is similar to only   questionably slightly more pronounced and continued follow-up is recommended. No other significant change. CT HEAD WO CONTRAST PORTABLE   Final Result   1   1. Postsurgical changes including left suboccipital craniotomy defect. 2. No evidence of acute intracranial abnormality. 3. Atrophy and chronic white matter ischemic changes. MRI BRAIN W WO CONTRAST   Final Result   1. Somewhat limited evaluation due to patient motion artifact, especially on   the postcontrast enhanced images. 2. Multiple intracranial METASTASES, most notably in the left cerebellar   hemisphere. Metastatic lesion is also seen in the region of the mammillary   bodies. 3. HYDROCEPHALUS, with dilation of the lateral and 3rd ventricles. The   etiology is unclear. Findings may be due to mass effect on the distal   aqueduct from vasogenic edema in the left cerebellar hemisphere. 4.  The findings were submitted to the Radiology Results [address]   at 13:41 on 2/27/2021  to in be communicated to a licensed caregiver.       XR CHEST PORTABLE   Final Result   No radiographic evidence of acute abnormality      XR CHEST PORTABLE    (Results Pending)             Active Hospital Problems    Diagnosis Date Noted    Acute respiratory failure with hypoxia and hypercapnia (HCC) [J96.01, J96.02]     Acute kidney injury superimposed on CKD ([address] [N17.9, N18.9] 03/05/2021    Palliative care by specialist [Z51.5]     Melanoma ([address] [C43.9] 02/26/2021    Hypokalemia [E87.6] 02/26/2021    Stage 3 chronic kidney disease [N18.30] 02/26/2021    COPD (chronic obstructive pulmonary disease) ([address] [J44.9] 02/26/2021    Constipation [K59.00] 02/26/2021    Renal cyst, left [N28.1] 02/26/2021    AMS (altered mental status) [R41.82] 02/26/2021    Dementia ([address] [F03.90] 02/26/2021    Seizure ([facility] [R56.9] 02/26/2021    History of CVA (cerebrovascular accident) [Z86.73] 02/26/2021    Atrial fibrillation ([address] [I48.91] 02/26/2021    Anticoagulated [Z79.01] 02/26/2021    Secondary cancer of brain ([address] [C79.31] 02/25/2021    Leucocytosis [D72.829] 12/15/2013    Non-insulin dependent type 2 diabetes mellitus ([address] [E11.9]        Assessment  Brain mass  suspicious for metastatic brain neoplasm with unclear primary.   Had underwent frameless service static left suboccipital craniotomy for brain tumor biopsy and debulking on 3/2  Acute metabolic encephalopathy  likely secondary to above, improving  Acute respiratory failure with hypoxia  now intubated; on full vent support  Suspected aspiration pneumonia  Paroxysmal atrial fibrillation   in sinus rhythm  Dysphagia  Hyponatremia  likely secondary to poor oral intake, corrected   Acute on chronic anemia  hemoglobin improved status post PRBC transfusion  Type 2 diabetes mellitus with  Neuropathy  Hematuria  Seizure disorder  Hypertension  History of melanoma  Constipation    Plan:  Vent management and critical care per intensivist- pressure support trial yesterday  On Merrem; vancomycin discontinued per ID  Continue with Depakote, Lamictal  Awaiting pathology report  Monitor H&H, transfuse as needed to keep hemoglobin above 7 g/dL  Follow respiratory cultures, NGTD  Continue with Norvasc, lisinopril, hydralazine, Lopressor  Off amiodarone drip  Monitor renal function, replete electrolytes as needed  Basal and corrective bolus insulin regimen  Tube feed with free fluid via NG tube  Repeat head ct due to lack of improvement      DVT prophylaxis: Subcutaneous heparin    Diet: DIET TUBE FEED CONTINUOUS/CYCLIC NPO; Diabetic; Nasogastric; Continuous; 20; 55; 24  Code Status: Full Code    PT/OT Eval Status: N/A    Dispo -TBD    Cheryle Kid, DO 3/14/2021 8:45 AM

## 2021-03-14 NOTE — PROGRESS NOTES
Neuro Science Intensive Care Unit  Critical Care  Daily Progress Note 3/14/2021    Date of Admission: 3/2/21    CC:  S/p craniotomy for intracranial mass. Intubated and sedated. HOSPITAL EVENTS  3/2 underwent craniotomy for biopsy of intracranial mass  3//5 started on amiodarone  3/9 intubated for respiratory distress  3/10 bronchoscopy-thick secretions and mucus plugging  3/13 PS trial. Stopped amiodarone drip. OVERNIGHT EVENTS: T max 98 F. Intubated. On PSV. Did not require additional doses of antihypertensive agents in the previous 24 hours. Required additional 12 units of insulin in the previous 24 hours. PHYSICAL EXAM:    BP (!) 177/74   Pulse 72   Temp 98.6 °F (37 °C)   Resp 18   Ht 5' 5\" (1.651 m)   Wt 159 lb 13.3 oz (72.5 kg)   SpO2 97%   BMI 26.60 kg/m²     Intake/Output Summary (Last 24 hours) at 3/14/2021 0736  Last data filed at 3/14/2021 0700  Gross per 24 hour   Intake 2202 ml   Output 4200 ml   Net -1998 ml         General appearance:  Comfortable. NEUROLOGIC:        GCS:    3 - Opens eyes to loud noise or command   6 - Follows simple motor commands  1T - Makes no noise       Pupil size:  Left 3 mm  Right 3 mm  Pupil reaction: Yes   PERRLA  Wiggles fingers: Left No Right Yes  Hand grasp:   Left absent     Right present  Wiggles toes: Left No    Right Yes  Plantar flexion: Left absent    Right absent    CONSTITUTIONAL: No acute distress  CARDIOVASCULAR: S1 S2, regular rate, irregular rhythm,Monitor: Afib   PULMONARY: Intubated on PSV. Respirations unlabored. . No rhonchi/rales/wheezes. RENAL:  Hogan to gravity, clear yellow urine. ABDOMEN: Soft, nontender, nondistended, nontympanic, normal bowel sounds. OGT with TF.    MUSCULOSKELETAL: Left sided weakness. SKIN/EXTREMITIES: No rashes/ecchymosis, no edema/clubbing, warm/dry, good capillary refill. IV ACCESS:   PICC Right basilic E86. Left femoral TLC. D6- DC today.        ASSESSMENT/PLAN:     Active Problems: Leucocytosis    Non-insulin dependent type 2 diabetes mellitus ([facility]    Secondary cancer of brain ([facility]    Melanoma ([facility]    Hypokalemia    Stage 3 chronic kidney disease    COPD (chronic obstructive pulmonary disease) (HCC)    Constipation    Renal cyst, left    AMS (altered mental status)    Dementia (HCC)    Seizure (HCC)    History of CVA (cerebrovascular accident)    Atrial fibrillation ([facility]    Anticoagulated    Palliative care by specialist    Acute kidney injury superimposed on CKD ([facility]    Acute respiratory failure with hypoxia and hypercapnia ([facility]  Resolved Problems:    * No resolved hospital problems. *          Neuro: s/p crani for brain mass  For biopsy and subtotal excision. Frozen section was possible glioma. Definitive report pending. HX stroke. Monitor neuro status. Depakote sprinkle. Gabapentin. Lamictal.   CV: Afib. Rate controlled. HX HTN. HLD Carotid disease. Afib. .  BP goal <160 mm Hg. PRN Labetalol. Hydralazine. Amlodipine. Hydralazine. Lisinopril. Metoprolol. Amiodarone. Pulm: Acute respiratory failure. S/p Bronch. Intubated. On PS PF ratio 172. Duoneb. Diuretic dosing for  +7.1L since admission. Monitor respiratory status  GI: NPO. Unable to feed. NGT with TF. Lansoprazole. Psyllium Diarrhea  Monitor bowel status  Renal:  No acute issues. Traumatic catheterization after pt pulled out loyola prior catheter. Urology following. Monitor uop, renal function and electrolytes  ID: Leukocytosis. Low grade temp. ID following. doxycycline. Endocrine:  Hyperglycemia. HX DM. Lantus BID. No increase dt results <100. ISS  MSK:. Left hemiparesis. Heme: Anemia. Monitor CBC        Bowel regime: psyllium  Pain control/Sedation:  Acetaminophen. B & O suppository  DVT prophylaxis: SCDs. Heparin  GI prophylaxis: Lansoprazole. TF  Glucose protocol: Lantus. ISS    Mouth/Eye care: Precedex. Tears. Loyola: Keep in place for critical care monitoring of fluid balance. Consults: Medicine. Neurosurgery. ID. Palliative care. Patient/Family update: Will update as family available    Code status:  Full      Disposition:  NSDEXU. Ann Chicas DNP.  RUIZ-CNP  3/14/2021  7:36 AM

## 2021-03-14 NOTE — PROGRESS NOTES
PO#11  PROCEDURE:  Frameless stereotactic left suboccipital craniectomy for  brain tumor, biopsy and debulking. The patient is stable neurosurgically   Moves right side to commands & not left side  Having pulmonary issues so intubated  Incision is healthy. Await Pathology report. Dr De Oliveira Sanbornton note appreciated. Reviewed CT scan of brain:  1.  Subacute phase of postoperative changes in the left cranial fossa. Hypodensity with mass effect is present in the left cerebellar hemisphere.       2.  More prominent diffuse hypodensity of the white matter of both cerebral   hemispheres since the January 31st.       3.  Ventricular system appears to be prominent as seen previously.         Nothing new to add from neurosurgical stand point at this time.

## 2021-03-14 NOTE — PROGRESS NOTES
Wean parameter done    VT= 487 mls  F= 15 B/M  V= 7310 l/m  NIF= -21 cmH2O  VC = n/a  RSBI = 32  Patient has (+) leak

## 2021-03-15 NOTE — PROGRESS NOTES
Surgical  Neuro Science Intensive Care Unit  Critical Care  Daily Progress Note 3/15/2021     Date of Admission: 02/27/2021  Date of ICU Admission; 03/02./21     CC: Follow up for craniotomy for brain mass.       HOSPITAL COURSE/OVERNIGHT EVENTS:    02/27  Initially presented ot outside facility with AMS, confusion & erratic behavior. PMH include previous stroke with residual left sided weakness, dementia, HTN, HLD, chronic pain issues, CKD & melanoma. Diagnotic imaging reveal brain masses. Transferred to [facility] for ongoing evaluaitn & neurosurgery consult. CT chest/abdomen/pelvis no evidence of malignancy.    ---------------------  03/02 Underwent craniotomy for biopsy of intracranial mass. Glial neoplasm. Admitted to  ICU post--operatively. 03/03  No issues overnight. Did not require PRN antihypertensive agents. Required 12 units of insulin. Pulled loyola out during the night. Seen by speech recs dental soft diet with honey thick liquids.   03/04 Still on diltiazem this am  pulled own loyola out and had hematuria    03/05  Episode of hypotension yesterday responded to IVFs. Cardiology consulted by medicine, HTN medications adjusted now on amiodarone gtt and metoprolol. Norvasc, clonidine, tikosyn and hydralazine stopped. 3-way catheter attempted last evening with difficulty. Urology consulted; 18 Fr coude inserted via cystoscopy. Patient more alert today and following commands  03/06  Last night Cardene started by primary because of hypertension but he became tachycardic and went back into A fib Stopped this am   03/07 Hb down trending will do hemoccult , CT scan - constipation   03/08  No issues overnight. Remains on Amiodarone & Propofol. Required more than 18 doses of antihypertensive agents. Required 24 units of insulin. Bipap during the night. Amiodarone stopped yesterday. 03/09  This am BiPap removed. Tolerated O2 per cannula for about 20 minutes. Placed back on BiPap.   Required 2 doses of antihypertensive agents. WBC elevated. Back into Afib this am.  Amiodarone to be restarted. Hypernatremia. Vancomycin & merepenum initiated. In the afternoon, developed AMS with respiratory distress, intubated. Placed on Propofol. Levophed for brief period of time. 03/10  T max 99.4 F. Follow commands this am.  Did not require any prn antihypertensive agents or insulin coverage. Sodium level 144. Bronchoscopy done for thick secretions. 03/11  T max 99.5 F. Na level 146 today. Did not require any antihypertensive agents. 03/12  No acute issues overnight. Required 3 does of hydralazine 7 18 units of insulin. Nelly Parr 03/14 T max 98 F. Intubated. On AC. On amiodarone drip. Did not require additional doses of antihypertensive agents in the previous 24 hours. Required additional 12 units of insulin in the previous 24 hours. 03/15  T max 98.9F. No issues overnight. PHYSICAL EXAM:  BP (!) 139/57   Pulse 64   Temp 98 °F (36.7 °C)   Resp 14   Ht 5' 5\" (1.651 m)   Wt 152 lb 5.4 oz (69.1 kg)   SpO2 100%   BMI 25.35 kg/m²     Intake/Output Summary (Last 24 hours) at 3/15/2021 1037  Last data filed at 3/15/2021 0700  Gross per 24 hour   Intake 2091 ml   Output 2095 ml   Net -4 ml     General appearance:  Comfortable. Pain Description: none    NEUROLOGIC:   RASS Score:  0  GCS:  10T  3 - Opens eyes to loud noise or command   6 - Follows simple motor commands  1 - Makes no noise  Intubated. Pupil size:  Left 3 mm  Right 3 mm  Pupil reaction: Yes   PERRLA  Wiggles fingers: Left  No Right Yes  Hand grasp:   Left: Yes     Right    Yes  Wiggles toes: Left   Yes Right  Yes  Plantar flexion: Left  No  Right   Yes  Crani incision:  CDI    CONSTITUTIONAL: No acute distress, lying in hospital bed. CARDIOVASCULAR: S1 S2, regular rate, regular rhythm, no murmur/gallop/rub. Monitor: NSR. PULMONARY: Bilaterally course but clear. No rhonchi/rales/wheezes, no use of accessory muscles. Intubated. Mechanical ventilation: PS  10 cm. FiO2 50%. Peep 5 cm. PF ratio   Secretions thick dark brownish. RENAL:  Hogan to gravity, clear yellow urine. Fluid balance for previous 24 hours:  - 753 ml. ABDOMEN: Soft, nontender, nondistended, nontympanic, normal bowel sounds. NGT. Tube feedings:  Diabetic formula at 55 ml per hour ordered   Daily protein supplements. No reported nausea or vomiting. Having liquid BM  FMS in place. Reported 300  Ml of sirrhaea inm past 24 hours. MUSCULOSKELETAL:  Wiggles right fingers & toers. NO movement in left upper or lower extremity. SKIN/EXTREMITIES: No rashes/ecchymosis, no edema/clubbing, warm/dry, good capillary refill. LINES:   Right basilic PICC. Day 11. No palpable cord. .      Recent Labs     03/13/21  0330 03/14/21  0400 03/15/21  0450   WBC 12.1* 12.3* 12.4*   HGB 7.2* 7.3* 7.0*   HCT 22.7* 23.3* 22.4*   MCV 88.7 88.6 90.3    252 245       Recent Labs     03/13/21  0330 03/14/21  0400 03/15/21  0450    140 139   K 3.6 3.6 3.7   CO2 34* 36* 38*   PHOS 2.5 3.2 4.0   BUN 26* 23 25*   CREATININE 1.2 1.1 1.1     ASSESSMENT/PLAN:     Active Problems:    Leucocytosis    Non-insulin dependent type 2 diabetes mellitus (HCC)    Secondary cancer of brain (HCC)    Melanoma (HCC)    Hypokalemia    Stage 3 chronic kidney disease    COPD (chronic obstructive pulmonary disease) (HCC)    Constipation    Renal cyst, left    AMS (altered mental status)    Dementia (HCC)    Seizure (HCC)    History of CVA (cerebrovascular accident)    Atrial fibrillation ([address]    Anticoagulated    Palliative care by specialist    Acute kidney injury superimposed on CKD ([address]    Acute respiratory failure with hypoxia and hypercapnia (HCC)  Resolved Problems:    * No resolved hospital problems. *    Neuro:  AMS. Brain mass. S/P Craniotomy for biopsy. Glial neoplasm. Hx of Seizures, dementia & stroke (residual left sided weakness). Monitor neuro status. Neurosurgery following. Depokote  Lamictal    Gabapentin. Decadron stopped on 03/04. Biopsy report pending-sent to [facility]  CV:  No acute issues. Hx of HTN, Hyperlipidemia and Afib    Monitor hemodynamics. BP goal systolic less than 555 mm Hg. PRN hydralzine & labetalol. Amiodarone scheduled. Norvasc. Metoprolol. Lisinopril. Daily Lasix. Hydralazine. Cardiology/EP following. .   Pulm: Acute respiratory failure. Possible aspiration. Monitor RR & SpO2. Intubated. Mechanical ventilation. Intrapulmonary nebulizer. Wean as able. Daily CXR & ABGs. Albuterol. PRN. Scheduled Duoneb. GI:    Dysphagia. BMI 24. Hx of constipation. Monitor bowel function. NPO.    NGT. Tube feeds: Diabetic formula at 55 ml per hour. Bowel regime stopped. FMS for liquid stool. Renal: SOCORRO. CKD Stage 3. Monitor BUN & Cr, electrolytes & replace as needed. Monitor I & O. Hogan. Urology following  Free water. ID:   Leukocytosis. Possible aspiration. ID following. Respiratory culture with MRSA. Doxycycline Day 3. Endocrine: Hyperglycemia. Hypernatremia. Hx of diabetes     Monitor BS.    ISS. Lantus  MSK: Deconditioned. Previous stroke with left sided weakness. ROM. Turn & reposition. PT & OT when able  Monitor for skin breakdown. Heme: Multifactorial anemia. Cranial bx-glial neoplasm, Hx of melanoma. Monitor CBC. Radiation oncology following  Oncology following. Bowel regime: none  Pain control/Sedation: Tylenol. B&O suppositories. Melatonin. Propofol. DVT prophylaxis: SCD. GI prophylaxis: Pepcid. TF. Glucose protocol:  ISS  Hogan: Keep in place for critical care monitoring of fluid balance. Ancillary consults:  Medicine. Neurosurgery. Critical care. ID. Radiation Oncology. Oncology. Urology. Code status:  Full code. Disposition:  Continue ICU.       Electronically signed by Jackie Hall RN MSN APRN-NP [facility] NP  CCNS CCRN 3/15/2021 10:37 AM

## 2021-03-15 NOTE — CARE COORDINATION
Care Coordination: Remains intubated on vent, kay PSV. Off amio drip. Per ID, mrsa/hap/asp pneumonia. yeast on resp cx, Likely colonization. Cont Iv doxy q12. FMS in place, 300 cc/24 hrs. Planning vent weaning. If able to be liberated from vent, dc plan will be [facility]. If vent[facility]ach required [facility]  Spoke with wife and daughter this am.

## 2021-03-15 NOTE — PROGRESS NOTES
Reviewed surgical Pathology  Diagnosis:   A.  Brain tumor:   Hypercellular glial tissue, see comment. Maxene Bud tumor:   Poorly differentiated neoplasm invading cerebellum, favor metastatic   melanoma, see comment. Comment:   A.  Note that the malignant cells identified within part B are not   present within the Part A specimen submitted for frozen section.  Given   the high grade malignant cells identified in part B, the glial   hypercellularity in Part A is favored to be reactive gliosis, rather than   glial neoplasm.      B.  Immunostains stain the malignant cells in part B as follows:   Pankeratin: Negative   S100: Positive   Sox10: Positive (but also staining brain cells)   MART1:  Negative   Will explailan to the family

## 2021-03-15 NOTE — PROGRESS NOTES
Palliative Care Department  [phone]  Palliative Care Progress Note  Provider Sweetie HUFF    Erik Dickinson  [id number]  Hospital Day: 23    Referring Provider: Lavon Gowers APRN-CNP  Palliative Medicine was consulted for assistance with: Code status Discussion, Assist with goals of care, Symptom Management and Family Support     Chief Complaint: Erik Dickinson is a 76 y.o. male with chief complaint of AMS    Brief summary: Erik Dickinson is a 76 y.o. male with significant past medical history of hypertension, atrial fibrillation, seizure disorder, dementia, chronic kidney disease, as well as prior melanoma on his back and forehead status post resection, and chronic back pain as well as trigeminal neuralgia. He was admitted on 2/25/2021 after he was found to have brain metastasis at an outside hospital.  He was transferred to [facility] for neurosurgical evaluation, and consultations have also been placed to medical and radiation oncology, with a high concern for malignant melanoma. 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 ASSESSMENT/PLAN:     Pertinent hospital diagnoses:  1. Brain metastasis  -History of melanoma  -s/p crani with biopsy  -Path appears to be metastatic melanoma  -Hematology oncology following  -Radiation oncology following  -neurosurgery following  2. Atrial fibrillation with RVR  -EP consulted and following  3. Respiratory failure/? Aspiration/on High O2 and Bipap   -Continues require mechanical ventilation   -May require tracheostomy and PEG tube placement    PALLIATIVE CARE ENCOUNTER  - Outcome of goals of care meeting:   - no change, continue current plan of care  - Capacity: At this time, Erik Dickinson, Does Not have capacity for medical decision-making.   Capacity is time limited and situation/question specific  - Surrogate decision maker/Legal Jacquesarias RitchieKing William ([phone] home, [phone] cell) wife  - Code Status:   full  - Advanced directives: none  - Spiritual assessment: no needs identified  - Bereavement and grief: no needs identified    - DISPO: awaiting PATHOLOGY which was sent to [facility]    Referrals to: none today    Subjective   Chart reviewed. Spoke with bedside nurse. Patient remains essentially the same. He is seen at the bedside with no family present. He is not very responsive for me, did not follow any commands. He continues to be mechanically ventilated, off all sedation, overall remains very ill. I did speak with the patient's wife by telephone, discussing his overall condition, as well as his potential needs of tracheostomy and PEG tube. We discussed goals of care and CODE STATUS. She was encouraged to consider changing CODE STATUS to a DNR status, as due to his severe acute illness in combination with metastatic disease, the likelihood of a quality outcome in the event of cardiac arrest will be low. States she plans to discuss goals of care further with her daughter, she is encouraged to discuss Mike's known wishes regarding long-term mechanical ventilation and artificial nutrition, especially in light of metastatic disease, the impact of his current acute illness and decreased performance status on his ability to receive treatment for his malignancy. Much time spent providing support, palliative follow-up further tomorrow to provide ongoing support for the family. OBJECTIVE:   Prognosis: Guarded to poor    Physical Exam:  BP (!) 135/54   Pulse 69   Temp 99.8 °F (37.7 °C)   Resp 19   Ht 5' 5\" (1.651 m)   Wt 152 lb 5.4 oz (69.1 kg)   SpO2 97%   BMI 25.35 kg/m²   Gen: ill appearing  HEENT:  scalp dressing. ETT in place.   Mucous membranes moist.  Neck:  No JVD, trachea midline  Lungs: mechanical respirations, scattered rhonchi  Heart: RRR, no murmur/rub or gallop  Abd: non-distended, soft, non-tender  : Hogan catheter  Ext:  No edema  Skin: pale, warm, dry  Neuro: PERRL, not following commands for me today Objective data reviewed: labs, images, records, medication use, vitals and chart    Inpatient medications reviewed: yes  Home Medications reviewed: yes    Time/Communication  Greater than 50% of time spent, total 35 minutes in counseling and coordination of care at the bedside/over the telephone regarding see above. Thank you for allowing Palliative Medicine to participate in the care of [facility].     Provider Erickson Razo 12  Palliative Medicine

## 2021-03-15 NOTE — PROGRESS NOTES
Pt is having trouble maintaining oxygen saturation. Fio2 had to be tritrated up from 40 to Fio2. 100. Dr. Rogelio Gandhi notified.  Ordered a stat chest xray and came to the bedside

## 2021-03-15 NOTE — PROGRESS NOTES
Comprehensive Nutrition Assessment    Type and Reason for Visit:  Reassess    Nutrition Recommendations/Plan: Modify TF to better meet estimated needs w/ current vent settings    Recommend 1.5 Diabetic @ 35 ml/hr + 1 protein modular daily to provide 840 ml tv, 1260 kcal, 69 gm pro, 638 ml free water (1360 kcal, 95 gm pro w/ daily pro mod)    Nutrition Assessment:  Pt remains nutritionally at risk, now EN dependent 2/2 intubation w/ brain tumor/mets s/p crani, now s/p bronch. Noted hx DM, COPD, dementia.  Will provide updated TF recs and monitor    Malnutrition Assessment:  Malnutrition Status:  Insufficient data    Context:  Acute Illness     Findings of the 6 clinical characteristics of malnutrition:  Energy Intake:  Mild decrease in energy intake (Comment)(TYRELL PTA intake)  Weight Loss:  1 - 5% over 1 month     Body Fat Loss:  Unable to assess     Muscle Mass Loss:  Unable to assess    Fluid Accumulation:  No significant fluid accumulation     Strength:  Not Performed    Estimated Daily Nutrient Needs:  Energy (kcal):  PS3B 1367; ; Weight Used for Energy Requirements:  Admission     Protein (g):  ; Weight Used for Protein Requirements:  Admission(1.5-1.8)        Fluid (ml/day):  per neuro    Nutrition Related Findings:  vent, MAP WNL, soft abd, active BS, NGT w/ TF, FMS, +1 pitting edema, +I/Os      Wounds:  Surgical Incision       Current Nutrition Therapies:    Current Tube Feeding (TF) Orders:  · Feeding Route: Nasogastric  · Formula: Diabetic  · Schedule: Continuous @55 ml/hr, 1320 ml tv  · Current TF & Flush Orders Provides: 1584 kcal, 79 gm pro, 1063 ml free water    Anthropometric Measures:  · Height: 5' 5\" (165.1 cm)  · Current Body Weight: 138 lb (62.6 kg)(measured 3/2, noted bed wt 152# 3/15 w/ +fluid balance)   · Admission Body Weight: 138 lb (62.6 kg)(first measured)    · Usual Body Weight: 149 lb (67.6 kg)(actual wt on 2/9 & 11/3 EMR encounters)     · Ideal Body Weight: 136 lbs; % Ideal Body Weight 101.5 %   · BMI: 23  · BMI Categories: Normal Weight (BMI 22.0 to 24.9) age over 72       Nutrition Diagnosis:   · Inadequate oral intake related to cognitive or neurological impairment as evidenced by NPO or clear liquid status due to medical condition      Nutrition Interventions:   Food and/or Nutrient Delivery:  Modify Tube Feeding(Recommend 1.5 Diabetic @ 35 ml/hr + 1 protein modular)  Nutrition Education/Counseling:  Education not indicated   Coordination of Nutrition Care:  Continue to monitor while inpatient    Goals: Tolerance to TF at goal rate       Nutrition Monitoring and Evaluation:   Food/Nutrient Intake Outcomes:  Enteral Nutrition Intake/Tolerance  Physical Signs/Symptoms Outcomes:  Biochemical Data, GI Status, Fluid Status or Edema, Hemodynamic Status, Nutrition Focused Physical Findings, Skin, Weight     Discharge Planning:     Too soon to determine     Electronically signed by Artur Beck, MS, RD, LD on 3/15/21 at 11:23 AM EDT    Contact: [phone]

## 2021-03-15 NOTE — PROGRESS NOTES
Coordination of care discussion and chart review with PM team. LSW met at bedside with pts wife and daughter to offer ongoing psychosocial support. Mrs Roddy Bolanos states the doctor did inform her that pts brain tumor is most likely from a melanoma. She states he will need a trach and peg. She plans to speak with the oncologist to discuss treatment options. No immediate PM psychosocial needs identified for Thersa Quirk.  will remain available for on-going psychosocial support, as needed.

## 2021-03-15 NOTE — PROGRESS NOTES
JORGE PROGRESS NOTE      Chief complaint: Follow-up of leukocytosis    The patient is a 76 y.o. male with history of DM, hypertension, atrial fibrillation, hyperlipidemia, stroke, dementia, seizure disorder, melanoma status post excision from back and forehead 7 years ago, transferred to [facility] on 02/25 from [facility] where he initially presented with confusion and abnormal behavior for 3 weeks, found to have MRI of the brain showing multiple intracranial metastasis most notably in the left cerebellar hemisphere, hydrocephalus with dilation of the lateral and third ventricles. On admission, he was afebrile and hemodynamically stable with leukocytosis of 14,000. Urinalysis showed no pyuria. SARS-CoV-2 PCR was negative. Chest x-ray was unremarkable. 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 Histopathology showed poorly differentiated neoplasm invading cerebellum, favor metastatic melanoma. Dexamethasone has been started since transfer. Leukocytosis increased to 24,000 on 03/02. He received cefazolin perioperatively. He pulled out his Hogan catheter on 03/02 causing penile bleeding. He was intubated on 03/09 due to respiratory failure. Ampicillin-sulbactam started on 03/05 for suspected aspiration pneumonia was changed to meropenem and vancomycin on 03/09. Respiratory Gram stain and culture on 03/10 showed abundant polymorphonuclear leukocytes, no epithelial cells, rare Gram-positive cocci in pairs, rare yeast, rare Gram-variable rods, reduced oral pharyngeal emigdio, one colony of MRSA, moderate growth of Candida albicans and another yeast. He underwent bronchoscopy on 03/10 during which thick mucous secretions in the right mainstem bronchus. BAL Gram stain and culture showed abundant polymorphonuclear leukocytes, no epithelial cells, rare yeast, moderate growth of Candida albicans.   MRSA nares culture was negative. Subjective: Patient was seen and examined. He remains in critical condition, intubated, not communicating. Has diarrhea. Objective:  BP (!) 139/57   Pulse 64   Temp 98 °F (36.7 °C)   Resp 14   Ht 5' 5\" (1.651 m)   Wt 152 lb 5.4 oz (69.1 kg)   SpO2 100%   BMI 25.35 kg/m²   Constitutional: Intubated, no MV dyssynchrony  Respiratory: Clear breath sounds, no crackles, no wheezes  Cardiovascular: Regular rate and rhythm, no murmurs  Gastrointestinal: Bowel sounds present, soft, nontender. FMS in place  Skin: Warm and dry, no active dermatoses  Musculoskeletal: No joint swelling, no joint erythema    Labs, imaging, and records were personally reviewed. Assessment:  Metastatic melanoma invading cerebellum  Sepsis, resolved  MRSA HAP/Aspiration pneumonia  Yeast on respiratory culture, probably colonization  Leukocytosis, resolving    Recommendations:  Continue doxycycline 100 mg BID to finish 7 days from 03/13-03/19. Check stool C difficile toxin assay if with worsening diarrhea.     Thank you for involving me in the care of Rubye Phlegm. I will continue to follow. Please do not hesitate to call for any questions or concerns.     Electronically signed by Kristyn Moses MD on 3/15/2021 at 10:33 AM

## 2021-03-15 NOTE — PROGRESS NOTES
Hospitalist Progress Note      SYNOPSIS: Patient admitted on 2021 for  Weakness and altered mental status. MRI showed a heterogenous enhancing mass in the left cerebral hemisphere suspicous for neoplasm. CT of the chest, abdomen and pelvis was negative. He had frameless stereotactic left suboccipital craniotomy for brain tumor biopsy and debulking. Oncology and palliative care on board. Hospital course has been complicated by worsening respiratory status; he didn't do well on CPAP and ended up being intubated. ID on board. Currently on IV meropenem. SUBJECTIVE:    Patient seen and examined. Wife and daughter were by his bedside, he remains intubated and sedated. They had no complaints. Records reviewed. Temp (24hrs), Av.5 °F (36.9 °C), Min:98 °F (36.7 °C), Max:98.9 °F (37.2 °C)    DIET: DIET TUBE FEED CONTINUOUS/CYCLIC NPO; Diabetic 1.5; Nasogastric; Continuous; 20; 35; 24  Diet Tube Feed Modular: Protein Modular  CODE: Full Code    Intake/Output Summary (Last 24 hours) at 3/15/2021 1211  Last data filed at 3/15/2021 0700  Gross per 24 hour   Intake  ml   Output 2020 ml   Net -29 ml       OBJECTIVE:    BP (!) 139/57   Pulse 64   Temp 98 °F (36.7 °C)   Resp 14   Ht 5' 5\" (1.651 m)   Wt 152 lb 5.4 oz (69.1 kg)   SpO2 100%   BMI 25.35 kg/m²     General appearance: intubated, sedated  HEENT:  Conjunctivae/corneas clear. Neck: Supple. No jugular venous distention. Respiratory: diminished breath sounds bibasally, no wheezes or crackles   Cardiovascular: Regular rate rhythm, normal S1-S2  Abdomen: Soft, nontender, nondistended  Musculoskeletal: No clubbing, cyanosis, no bilateral lower extremity edema. Brisk capillary refill.    Skin:  No rashes  on visible skin  Neurologic: intubated, sedated, RASS score is -4    ASSESSMENT:  #Brain tumor s/p craniotomy and debulking on 3/2  -neurosurgery on board  -pathology report pending    #Acute hypoxic respiratory failure  -remains intubated and sedated  -vent management as per critical care    #Aspiration pneumonia  -on IV meropenem and doxycycline  -sputum culture pending  -ID on board    #Hyponatremia: resolved    #Acute on chronic anemia:  - s/p transfusion.  - Hb is 7; transfuse if Hb<7    #Hematuria: stable    #Hypertension: on amlodipine. #Seizure disorder: on depakote and lamictal    #History of melanoma: s/p resection. #Nutrition: on tube feeds.     DVT prophylaxis; SCDs          Medications:  REVIEWED DAILY    Infusion Medications   Scheduled Medications    doxycycline (VIBRAMYCIN) IV  100 mg Intravenous Q12H    furosemide  20 mg Intravenous BID    psyllium  1 packet Oral Daily    potassium bicarb-citric acid  40 mEq Per NG tube BID    lansoprazole  15 mg Per NG tube QAM AC    ipratropium-albuterol  1 ampule Inhalation Q4H WA    heparin (porcine)  5,000 Units Subcutaneous 3 times per day    insulin lispro  0-18 Units Subcutaneous Q4H    chlorhexidine  15 mL Mouth/Throat BID    hydrALAZINE  25 mg Per NG tube 3 times per day    lisinopril  20 mg Per NG tube Daily    amiodarone  200 mg Per NG tube Q8H    amLODIPine  5 mg Per NG tube Daily    gabapentin  300 mg Per NG tube 2 times per day    lamoTRIgine  200 mg Per NG tube BID    melatonin  10 mg Per NG tube Nightly    metoprolol tartrate  50 mg Per NG tube BID    insulin glargine  10 Units Subcutaneous BID    heparin flush  3 mL Intravenous 2 times per day    divalproex  125 mg Oral BID    sodium chloride flush  10 mL Intravenous 2 times per day     PRN Meds: artificial tears **OR** polyvinyl alcohol, acetaminophen **OR** acetaminophen, sodium chloride flush, heparin flush, hydrALAZINE, labetalol, opium-belladonna, albuterol, glucose, dextrose, glucagon (rDNA), sodium chloride flush    Labs:     Recent Labs     03/13/21  0330 03/14/21  0400 03/15/21  0450   WBC 12.1* 12.3* 12.4*   HGB 7.2* 7.3* 7.0*   HCT 22.7* 23.3* 22.4*    252 245       Recent Labs     03/13/21 0330 03/14/21  0400 03/15/21  0450    140 139   K 3.6 3.6 3.7    100 98   CO2 34* 36* 38*   BUN 26* 23 25*   CREATININE 1.2 1.1 1.1   CALCIUM 8.0* 7.7* 8.1*   PHOS 2.5 3.2 4.0       No results for input(s): PROT, ALB, ALKPHOS, ALT, AST, BILITOT, AMYLASE, LIPASE in the last 72 hours. No results for input(s): INR in the last 72 hours. No results for input(s): Lyndee Lencho in the last 72 hours. Chronic labs:    Lab Results   Component Value Date    CHOL 78 12/13/2013    TRIG 40 12/13/2013    HDL 28.0 (A) 12/13/2013    LDLCALC 42 12/13/2013    TSH 0.692 12/13/2013    INR 1.2 03/02/2021    LABA1C 6.6 (H) 02/26/2021       Radiology: REVIEWED DAILY    +++++++++++++++++++++++++++++++++++++++++++++++++  [place], [place]  +++++++++++++++++++++++++++++++++++++++++++++++++  NOTE: This report was transcribed using voice recognition software. Every effort was made to ensure accuracy; however, inadvertent computerized transcription errors may be present.

## 2021-03-16 NOTE — PROGRESS NOTES
[facility]  PROGRESS NOTE  ATTENDING NOTE    CRITICAL CARE    CC:  Brain mass    HPI  Initially presented ot outside facility with AMS, confusion & erratic behavior. PMH include previous stroke with residual left sided weakness, dementia, HTN, HLD, chronic pain issues, CKD & melanoma.  Diagnotic imaging reveal brain masses.  Transferred to [facility] for ongoing evaluaitn & neurosurgery consult.  CT chest/abdomen/pelvis no evidence of malignancy.      Patient Active Problem List   Diagnosis    Carotid stenosis    CVA (cerebral vascular accident)    Hypertension    Cerebral artery occlusion with cerebral infarction ([address]    Hyperlipidemia    Leucocytosis    Vitamin D deficiency    Non-insulin dependent type 2 diabetes mellitus ([address]    Left cataract    Secondary cancer of brain ([address]    Melanoma ([address]    Hypokalemia    Stage 3 chronic kidney disease    COPD (chronic obstructive pulmonary disease) (HCC)    Constipation    Renal cyst, left    AMS (altered mental status)    Dementia ([address]    Seizure ([address]    History of CVA (cerebrovascular accident)    Atrial fibrillation ([address]    Anticoagulated    Palliative care by specialist    Acute kidney injury superimposed on CKD ([address]    Acute respiratory failure with hypoxia and hypercapnia ([address]       OVERNIGHT EVENTS:  No issues overnight    HOSPITAL COURSE:  03/02 Underwent craniotomy for biopsy of intracranial mass.  Glial neoplasm. Admitted to [facility] post--operatively.    03/03  No issues overnight.  Did not require PRN antihypertensive agents. Required 12 units of insulin.  Pulled loyola out during the night.  Seen by speech recs dental soft diet with honey thick liquids.   03/04 Still on diltiazem this am  pulled own loyola out and had hematuria    03/05  Episode of hypotension yesterday responded to IVFs. Cardiology consulted by medicine, HTN medications adjusted now on amiodarone gtt and metoprolol.  Norvasc, clonidine, tikosyn and hydralazine stopped. 3-way catheter attempted last evening with difficulty. Urology consulted; 18 Fr coude inserted via cystoscopy. Patient more alert today and following commands  03/06  Last night Cardene started by primary because of hypertension but he became tachycardic and went back into A fib Stopped this am   03/07 Hb down trending will do hemoccult , CT scan - constipation   03/08  No issues overnight. Remains on Amiodarone & Propofol. Required more than 18 doses of antihypertensive agents. Required 24 units of insulin. Bipap during the night. Amiodarone stopped yesterday. 03/09  This am BiPap removed. Tolerated O2 per cannula for about 20 minutes. Placed back on BiPap. Required 2 doses of antihypertensive agents. WBC elevated. Back into Afib this am.  Amiodarone to be restarted. Hypernatremia. Vancomycin & merepenum initiated. In the afternoon, developed AMS with respiratory distress, intubated. Placed on Propofol. Levophed for brief period of time. 03/10  T max 99.4 F. Follow commands this am.  Did not require any prn antihypertensive agents or insulin coverage. Sodium level 144. Bronchoscopy done for thick secretions. 03/11  T max 99.5 F. Na level 146 today. Did not require any antihypertensive agents. 03/12  No acute issues overnight. Required 3 does of hydralazine 7 18 units of insulin. Lysbeth Carrel 03/14 T max 98 F.  Intubated. On AC. On amiodarone drip. Did not require additional doses of antihypertensive agents in the previous 24 hours.  Required additional 12 units of insulin in the previous 24 hours.    03/15  T max 98.9F. No issues overnight. BP (!) 176/82   Pulse 70   Temp 99 °F (37.2 °C) (Temporal)   Resp 18   Ht 5' 5\" (1.651 m)   Wt 152 lb 5.4 oz (69.1 kg)   SpO2 100%   BMI 25.35 kg/m²   Physical Exam  Constitutional:       Comments: Opens eyes with much coaxing   HENT:      Head: Normocephalic.       Nose: Nose normal.      Mouth/Throat:      Mouth: Mucous membranes are moist.      Pharynx: Oropharynx is clear. Eyes:      Extraocular Movements: Extraocular movements intact. Pupils: Pupils are equal, round, and reactive to light. Neck:      Musculoskeletal: Normal range of motion and neck supple. Cardiovascular:      Rate and Rhythm: Normal rate and regular rhythm. Pulmonary:      Effort: Pulmonary effort is normal.      Comments: Coarse breath sounds bilaterally  Abdominal:      General: There is no distension. Palpations: Abdomen is soft. Tenderness: There is no abdominal tenderness. Musculoskeletal:         General: No tenderness or signs of injury. Skin:     General: Skin is warm and dry. Neurological:      Comments: Intermittently follows commands, does not move left side         Lines: Loyola:  yes - Continue loyola catheter for managing strict I and Os in this critically ill patient. Central line:  no  PICC:  yes -right basilic PICC placed 3/5    CAM-ICU:  negative  RASS:  RASS -1 (Drowsy)    ASSESSMENT/PLAN:  1. Cerebellar brain masspost suboccipital craniotomy and brain biopsy, pathology shows metastatic melanoma, neurosurgery and oncology following  2. Acute respiratory failure secondary hypoxiacontinue vent management, duo nebs pulmonary toilet, discussed with wife potential need for tracheostomy this week    3. Chronic permanent atrial fibrillationcontinue with amiodarone, metoprolol    4. Essential hypertensioncontinue with Norvasc, metoprolol, lisinopril, hydralazine    5. Dysphagiacontinue with tube feeds    6. CKD stage IIImonitor urinary output, BUN and creatinine    7. Diabetes type 2continue with Lantus and insulin sliding scale    8.   MRSA hospital-acquired pneumonia/aspiration pneumoniaID following, continue with doxycycline    DVT and GI prophylaxisHeparin subcu, lansoprazole    CC TIME:  I spent 36 min managing this patients critical issues which are a constant threat to life excluding time teaching and performing procedures.       Danial Meneses

## 2021-03-16 NOTE — PROGRESS NOTES
Surgical  Neuro Science Intensive Care Unit  Critical Care  Daily Progress Note 3/16/2021     Date of Admission: 02/27/2021  Date of ICU Admission; 03/02./21     CC: Follow up for craniotomy for brain mass.       HOSPITAL COURSE/OVERNIGHT EVENTS:    02/27  Initially presented ot outside facility with AMS, confusion & erratic behavior. PMH include previous stroke with residual left sided weakness, dementia, HTN, HLD, chronic pain issues, CKD & melanoma. Diagnotic imaging reveal brain masses. Transferred to [facility] for ongoing evaluaitn & neurosurgery consult. CT chest/abdomen/pelvis no evidence of malignancy.    ---------------------  03/02 Underwent craniotomy for biopsy of intracranial mass. Glial neoplasm. Admitted to  ICU post--operatively. 03/03  No issues overnight. Did not require PRN antihypertensive agents. Required 12 units of insulin. Pulled loyola out during the night. Seen by speech recs dental soft diet with honey thick liquids.   03/04 Still on diltiazem this am  pulled own loyola out and had hematuria    03/05  Episode of hypotension yesterday responded to IVFs. Cardiology consulted by medicine, HTN medications adjusted now on amiodarone gtt and metoprolol. Norvasc, clonidine, tikosyn and hydralazine stopped. 3-way catheter attempted last evening with difficulty. Urology consulted; 18 Fr coude inserted via cystoscopy. Patient more alert today and following commands  03/06  Last night Cardene started by primary because of hypertension but he became tachycardic and went back into A fib Stopped this am   03/07 Hb down trending will do hemoccult , CT scan - constipation   03/08  No issues overnight. Remains on Amiodarone & Propofol. Required more than 18 doses of antihypertensive agents. Required 24 units of insulin. Bipap during the night. Amiodarone stopped yesterday. 03/09  This am BiPap removed. Tolerated O2 per cannula for about 20 minutes. Placed back on BiPap.   Required 2 doses of antihypertensive agents. WBC elevated. Back into Afib this am.  Amiodarone to be restarted. Hypernatremia. Vancomycin & merepenum initiated. In the afternoon, developed AMS with respiratory distress, intubated. Placed on Propofol. Levophed for brief period of time. 03/10  T max 99.4 F. Follow commands this am.  Did not require any prn antihypertensive agents or insulin coverage. Sodium level 144. Bronchoscopy done for thick secretions. 03/11  T max 99.5 F. Na level 146 today. Did not require any antihypertensive agents. 03/12  No acute issues overnight. Required 3 does of hydralazine 7 18 units of insulin. Candice Madrid 03/14 T max 98 F. Intubated. On AC. On amiodarone drip. Did not require additional doses of antihypertensive agents in the previous 24 hours. Required additional 12 units of insulin in the previous 24 hours. 03/15  T max 98.9F. No issues overnight. Early evening developed decreasing SPO2. Bronchoscopy done for thick dark secretions. Became slightly hypotensive.    03/16  Stable vital signs. No further changes in SpO2. PHYSICAL EXAM:  BP (!) 147/63   Pulse 65   Temp 98.4 °F (36.9 °C) (Temporal)   Resp 18   Ht 5' 5\" (1.651 m)   Wt 152 lb 5.4 oz (69.1 kg)   SpO2 100%   BMI 25.35 kg/m²     Intake/Output Summary (Last 24 hours) at 3/16/2021 1237  Last data filed at 3/16/2021 1100  Gross per 24 hour   Intake 365 ml   Output 2260 ml   Net -1895 ml     General appearance:  Comfortable. Pain Description: none    NEUROLOGIC:   RASS Score:  0  GCS:  10T  3 - Opens eyes to loud noise or command   6 - Follows simple motor commands  1 - Makes no noise  Intubated. Pupil size:  Left 3 mm  Right 3 mm  Pupil reaction: Yes   PERRLA  Wiggles fingers: Left  No Right Yes  Hand grasp:   Left: Yes     Right    Yes  Wiggles toes: Left   Yes Right  Yes  Plantar flexion: Left  No  Right   Yes  Crani incision:  CDI    CONSTITUTIONAL: No acute distress, lying in hospital bed. CARDIOVASCULAR: S1 S2, regular rate, regular rhythm, no murmur/gallop/rub. Monitor: NSR. PULMONARY: Bilaterally course but clear. No rhonchi/rales/wheezes, no use of accessory muscles. Intubated. Mechanical ventilation: PS  10 cm. FiO2 50%. Peep 5 cm. PF ratio   Secretions thick dark brownish. RENAL:  Hogan to gravity, clear yellow urine. Fluid balance for previous 24 hours:  _ 1.7 L. .      ABDOMEN: Soft, nontender, nondistended, nontympanic, normal bowel sounds. NGT. Tube feedings:  Diabetic formula at 55 ml per hour ordered   Daily protein supplements. No reported nausea or vomiting. Having liquid BM  FMS in place. Reported 300  Ml of dsirrhaea in past 24 hours. MUSCULOSKELETAL:  Wiggles right fingers & toers. No movement in left upper or lower extremity. SKIN/EXTREMITIES: No rashes/ecchymosis, no edema/clubbing, warm/dry, good capillary refill. LINES:   Right basilic PICC. Day 12. No palpable cord.      .      Recent Labs     03/14/21  0400 03/15/21  0450 03/16/21  0440   WBC 12.3* 12.4* 14.5*   HGB 7.3* 7.0* 7.1*   HCT 23.3* 22.4* 23.4*   MCV 88.6 90.3 89.3    245 255       Recent Labs     03/14/21  0400 03/15/21  0450 03/16/21  0440    139 142   K 3.6 3.7 3.9   CO2 36* 38* 37*   PHOS 3.2 4.0 3.9   BUN 23 25* 26*   CREATININE 1.1 1.1 1.0     ASSESSMENT/PLAN:     Active Problems:    Leucocytosis    Non-insulin dependent type 2 diabetes mellitus (HCC)    Secondary cancer of brain (HCC)    Melanoma (HCC)    Hypokalemia    Stage 3 chronic kidney disease    COPD (chronic obstructive pulmonary disease) (HCC)    Constipation    Renal cyst, left    AMS (altered mental status)    Dementia (HCC)    Seizure (HCC)    History of CVA (cerebrovascular accident)    Atrial fibrillation ([address]    Anticoagulated    Palliative care by specialist    Acute kidney injury superimposed on CKD ([address]    Acute respiratory failure with hypoxia and hypercapnia (HCC)  Resolved Problems:    * No resolved hospital problems. *    Neuro:  AMS. Brain mass. S/P Craniotomy for biopsy. Glial neoplasm. Hx of Seizures, dementia & stroke (residual left sided weakness). Monitor neuro status. Neurosurgery following. Depokote  Lamictal    Gabapentin. Decadron stopped on 03/04. Biopsy report pending-sent to [facility]  CV:  No acute issues. Hx of HTN, Hyperlipidemia and Afib    Monitor hemodynamics. BP goal systolic less than 678 mm Hg. PRN hydralzine & labetalol. Amiodarone scheduled. Norvasc. Metoprolol. Lisinopril. Hydralazine. Cardiology/EP following. .   Pulm: Acute respiratory failure. Possible aspiration. Monitor RR & SpO2. Intubated. Mechanical ventilation. Intrapulmonary nebulizer. Wean as able. Daily CXR & ABGs. Albuterol. PRN. Scheduled Duoneb. GI:    Dysphagia. BMI 24. Hx of constipation. Monitor bowel function. NPO.    NGT. Tube feeds: Diabetic formula at 55 ml per hour. Bowel regime stopped. FMS for liquid stool. Renal: SOCORRO. CKD Stage 3. Monitor BUN & Cr, electrolytes & replace as needed. Monitor I & O. Hogan. Urology following  Free water. ID:   Leukocytosis. Possible aspiration. ID following. Respiratory culture with MRSA. Doxycycline Day 3. Endocrine: Hyperglycemia. Hypernatremia. Hx of diabetes     Monitor BS.    ISS. Lantus  MSK: Deconditioned. Previous stroke with left sided weakness. ROM. Turn & reposition. PT & OT when able  Monitor for skin breakdown. Heme: Multifactorial anemia. Cranial bx-glial neoplasm, Hx of melanoma. Monitor CBC. Radiation oncology following  Oncology following. Bowel regime: none  Pain control/Sedation: Tylenol. B&O suppositories. Melatonin. Propofol. DVT prophylaxis: SCD. GI prophylaxis: Pepcid. TF. Glucose protocol:  ISS  Hogan: Keep in place for critical care monitoring of fluid balance. Ancillary consults:  Medicine. Neurosurgery.

## 2021-03-16 NOTE — PROGRESS NOTES
Blood and [address] [place] [address]  Hematology/Oncology  Consult        Patient Name: Ron Norris  YOB: 1945  PCP: Ariana Denny DO   Referring Provider: No address on file     Reason for Consultation: No chief complaint on file. Subjective:    Patient remains intubated and critically ill     History of Present Illness: This pt is a 77 yo male w/ a PMH of hypertension, diabetes, dementia, seizure disorder, BPH, depression, atrial fibrillation on anticoagulation with Eliquis, chronic kidney disease, melanoma removed from the back and forehead, chronic back pain, trigeminal neuralgia, CVA with residual unsteady gait and carotid artery disease who presented to [facility] from [facility] 2/25/21 after presenting with AMS and confusion with irrational behavior. In the ER at [facility] ([facility]), an MRI of the brain revealed a heterogeneous enhancing mass in the left cerebral hemisphere suspicious for neoplasm and several foci of extra-axial parasellar cisterns and subependymal enhancement in the left lateral ventricle suspicious for metastatic disease. From [address] [place] [address], CT scan of the chest showed emphysema and atelectasis, no masses or consolidation. CT scan of the abdomen and pelvis is negative for any acute findings    Currently patient remains confused and lethargic requiring restraints. CBC stable with mild leukocytosis likely from steroids. Oncology has been consulted for CUP with CNS mets, likely from prior melanoma.      Diagnostic Data:     Past Medical History:   Diagnosis Date    Atrial fibrillation ([address]     2018 pt states has a heart monitor, implantable    Carotid artery stenosis     History of cardiovascular stress test 11/13/13    lexiscan    Hyperlipidemia     Hypertension     Non-insulin dependent type 2 diabetes mellitus ([address]     Unspecified cerebral artery occlusion with cerebral infarction nov 11 2013    right face numb balance problems vision fades in and out  walks with walker Patient Active Problem List    Diagnosis Date Noted    Carotid stenosis 11/12/2013     Priority: High    CVA (cerebral vascular accident) 11/15/2013     Priority: Medium    Acute respiratory failure with hypoxia and hypercapnia (HCC)     Acute kidney injury superimposed on CKD ([address] 03/05/2021    Palliative care by specialist     Melanoma ([address] 02/26/2021    Hypokalemia 02/26/2021    Stage 3 chronic kidney disease 02/26/2021    COPD (chronic obstructive pulmonary disease) ([address] 02/26/2021    Constipation 02/26/2021    Renal cyst, left 02/26/2021    AMS (altered mental status) 02/26/2021    Dementia ([address] 02/26/2021    Seizure ([address] 02/26/2021    History of CVA (cerebrovascular accident) 02/26/2021    Atrial fibrillation ([address] 02/26/2021    Anticoagulated 02/26/2021    Secondary cancer of brain ([address] 02/25/2021    Left cataract 11/03/2020    Leucocytosis 12/15/2013    Vitamin D deficiency 12/15/2013    Non-insulin dependent type 2 diabetes mellitus ([address]     Hypertension     Hyperlipidemia     Cerebral artery occlusion with cerebral infarction ([address] 11/11/2013        Past Surgical History:   Procedure Laterality Date    APPENDECTOMY      CAROTID ENDARTERECTOMY Left Dec 2013    Left Carotid Endarterectomy    CRANIOTOMY N/A 3/2/2021    CRANIOTOMY STEREOTATIC BIOPSY, FRAMELESS, CEREBRAL POSTERIOR FOSSA (NEEDS PATHOLOGY) performed by Bryce Angel MD at [place] [address] ECHO COMPL W DOP COLOR FLOW  11/12/2013         INTRACAPSULAR CATARACT EXTRACTION Left 11/3/2020    LEFT EYE CATARACT EMULSIFICATION IOL IMPLANT performed by Ara Du MD at [facility]         Family History  Family History   Adopted: Yes       Social History    TOBACCO:   reports that he quit smoking about 20 months ago. His smoking use included cigarettes. He smoked 1.00 pack per day.  He has never used smokeless tobacco.  ETOH:   reports current alcohol use of about 1.0 standard drinks of alcohol per week. Home Medications  Prior to Admission medications    Medication Sig Start Date End Date Taking? Authorizing Provider   magnesium oxide (MAG-OX) 400 MG tablet Take 400 mg by mouth daily   Yes Historical Provider, MD   SITagliptin (JANUVIA) 50 MG tablet Take 50 mg by mouth daily   Yes Historical Provider, MD   insulin glargine (LANTUS) 100 UNIT/ML injection vial Inject 12 Units into the skin nightly   Yes Historical Provider, MD   dexamethasone (DECADRON) 4 MG tablet Take 6 mg by mouth daily   Yes Historical Provider, MD   divalproex (DEPAKOTE SPRINKLE) 125 MG capsule Take 125 mg by mouth 2 times daily   Yes Historical Provider, MD   melatonin 3 MG TABS tablet Take 5 mg by mouth nightly as needed   Yes Historical Provider, MD   NONFORMULARY Take 125 mcg by mouth 2 times daily tikosyn   Yes Historical Provider, MD   apixaban (ELIQUIS) 5 MG TABS tablet Take by mouth 2 times daily    Historical Provider, MD   hydrALAZINE (APRESOLINE) 50 MG tablet Take 25 mg by mouth 3 times daily     Historical Provider, MD   lamoTRIgine (LAMICTAL) 100 MG tablet Take 200 mg by mouth 2 times daily    Historical Provider, MD   metoprolol succinate (TOPROL XL) 50 MG extended release tablet Take 50 mg by mouth 2 times daily    Historical Provider, MD   potassium chloride (KLOR-CON M) 20 MEQ extended release tablet Take 20 mEq by mouth 2 times daily     Historical Provider, MD   glipiZIDE (GLUCOTROL) 5 MG tablet Take 10 mg by mouth 2 times daily    Historical Provider, MD   tamsulosin (FLOMAX) 0.4 MG capsule Take 0.4 mg by mouth nightly    Historical Provider, MD   gabapentin (NEURONTIN) 300 MG capsule Take 300 mg by mouth 2 times daily.     Historical Provider, MD   albuterol sulfate  (90 Base) MCG/ACT inhaler Inhale 2 puffs into the lungs every 6 hours as needed for Wheezing or Shortness of Breath    Historical Provider, MD   influenza virus trivalent vaccine (FLUZONE) injection Inject 0.5 mLs into the muscle once Given 10/2019    Historical Provider, MD   HYDROcodone-acetaminophen (NORCO) 5-325 MG per tablet Take 1 tablet by mouth every 6 hours as needed for Pain    Historical Provider, MD   cloNIDine (CATAPRES) 0.1 MG tablet Take 0.2 mg by mouth 2 times daily     Historical Provider, MD   metFORMIN (GLUCOPHAGE) 500 MG tablet Take 1,000 mg by mouth 2 times daily     Historical Provider, MD   spironolactone (ALDACTONE) 25 MG tablet Take 1 tablet by mouth 2 times daily. Patient taking differently: Take 25 mg by mouth daily  11/18/13   Leora Lopez DO, FACOI   lisinopril (PRINIVIL;ZESTRIL) 20 MG tablet Take 20 mg by mouth every 12 hours     Historical Provider, MD   amLODIPine (NORVASC) 10 MG tablet Take 10 mg by mouth daily. Historical Provider, MD   Multiple Vitamins-Minerals (THERAPEUTIC MULTIVITAMIN-MINERALS) tablet Take 1 tablet by mouth daily. Historical Provider, MD   aspirin 81 MG tablet Take 81 mg by mouth daily. Historical Provider, MD       Allergies  No Known Allergies    Review of Systems:    Confused and unable to provide      Objective  BP (!) 147/63   Pulse 65   Temp 98.4 °F (36.9 °C) (Temporal)   Resp 18   Ht 5' 5\" (1.651 m)   Wt 152 lb 5.4 oz (69.1 kg)   SpO2 100%   BMI 25.35 kg/m²     Physical Exam:   Performance Status:  General: Confused, requiring restraint  Head and neck : PERRLA, EOMI . Sclera non icteric. Oropharynx : Clear  Neck: no JVD,  no adenopathy  LYMPHATICS : No LAD  Heart: Regular rate and regular rhythm, no murmur  Lungs: Clear to auscultation   Extremities: No edema,no cyanosis, no clubbing.    Abdomen: Soft, non-tender;no masses, no organomegaly  Skin:  No rash  Neurologic:Confused and in restraints    Recent Laboratory Data-   Lab Results   Component Value Date    WBC 14.5 (H) 03/16/2021    HGB 7.1 (L) 03/16/2021    HCT 23.4 (L) 03/16/2021    MCV 89.3 03/16/2021     03/16/2021    LYMPHOPCT 8.4 (L) 03/16/2021    RBC 2.62 (L) 03/16/2021    MCH 27.1 03/16/2021 Final Result   Clearing of right basilar atelectasis/infiltrate         XR CHEST PORTABLE   Final Result   Patchy right lung base atelectasis or infiltrate         XR CHEST PORTABLE   Final Result   Moderate persistent elevation right hemidiaphragm         XR CHEST PORTABLE   Final Result   New nonspecific hazy confluent opacity in the right lung base may reflect   atelectasis and/or pneumonitis         XR CHEST PORTABLE   Final Result   1. Satisfactory position of the endotracheal tube and NG tube. XR CHEST PORTABLE   Final Result   Bilateral lower lobe pneumonia. CT ABDOMEN PELVIS WO CONTRAST Additional Contrast? None   Final Result   Hogan catheter is in the bladder in proper position. No indication for   hemorrhage along the trajectory of the Hogan catheter or conspicuous   hemorrhage in the bladder lumen. The bladder is not distended. Pattern of severe constipation with fecal rectal retention. Bi basilar infiltrates more prominent on the right side, pneumonia considered. XR ABDOMEN FOR NG/OG/NE TUBE PLACEMENT   Final Result   NG tube appears in satisfactory position         XR CHEST PORTABLE   Final Result   Increased left greater than right basilar opacities relative to prior   comparison which may be secondary to atelectasis, edema, or developing   infection in the appropriate clinical setting. CT HEAD WO CONTRAST   Final Result   Postoperative changes in the left suboccipital region/left lobe of the   cerebellum. Tiny amount of hemorrhage in the surgical bed is similar to only   questionably slightly more pronounced and continued follow-up is recommended. No other significant change. CT HEAD WO CONTRAST PORTABLE   Final Result   1   1. Postsurgical changes including left suboccipital craniotomy defect. 2. No evidence of acute intracranial abnormality. 3. Atrophy and chronic white matter ischemic changes. MRI BRAIN W WO CONTRAST   Final Result   1. Somewhat limited evaluation due to patient motion artifact, especially on   the postcontrast enhanced images. 2. Multiple intracranial METASTASES, most notably in the left cerebellar   hemisphere. Metastatic lesion is also seen in the region of the mammillary   bodies. 3. HYDROCEPHALUS, with dilation of the lateral and 3rd ventricles. The   etiology is unclear. Findings may be due to mass effect on the distal   aqueduct from vasogenic edema in the left cerebellar hemisphere. 4.  The findings were submitted to the Radiology Results [address]   at 13:41 on 2/27/2021  to in be communicated to a licensed caregiver. XR CHEST PORTABLE   Final Result   No radiographic evidence of acute abnormality      XR CHEST PORTABLE    (Results Pending)         ASSESSMENT/PLAN :  77 yo male  History of melanoma s/p resection of back and forehead   A fib on eliquis  Multiple new CNS lesions, consistent with metastasis   AMS    - MRI personally reviewed. Highly concerning for metastatic neoplastic process  - Case discussed with Dr. Royce Canavan. We are in agreement for CNS biopsy to confirm pathology. Likely represents recurrent melanoma  - Likely will benefit from XRT [facility]). Radiation oncology consult placed  - Will check BRAF, KIT, NTRK, NRAS assuming melanoma confirmed  - Targeted therapy if mutation detected. If not, recommend front line IO/IO with ipi/nivo  - Cont steroids  - Will follow      3/1/21  - Patient to have a head CT wo contrast today  - CNS biopsy tomorrow to confirm pathology which likely represents recurrent melanoma  - Likely will benefit from XRT . Radiation oncology consult placed  -If melanoma confirmed will check BRAF, KIT, NTRK, NRAS and targeted therapy if mutation detected. -If no mutation detected, recommend front line IO/IO with ipi/nivo  -Continue steroids    3/2/2021  - Head CT reviewed. - He is status post craniotomy stereotactic biopsy in the cerebral posterior fossa.   Will follow pathology. - Radiation oncology recommend WBRT for palliative radiation therapy   - If melanoma confirmed will check BRAF, KIT, NTRK, NRAS and targeted therapy if mutation detected. If no mutation detected, recommend front line IO/IO with ipi/nivo  - Continue steroids    3/4/21  - CBC continues with leukocytosis related to steroid use and worsening anemia.  - Pathology remains pending.  - Rad onc recommends WBRT for palliative radiation therapy. Pending tissue dx  - Tentative treatment plan as above  - Continue on steroids    3/12/21  - Hgb 7.5 s/p transfusion pRBC yesterday. Also continues with leukocytosis. - Intubated on 3/9 d/t respiratory distress  - Started on IV antibiotics d/t elevated WBC's and fevers. - Pathology remains pending  - If melanoma confirmed will check BRAF, KIT, NTRK, NRAS and targeted therapy if mutation detected. If no mutation detected, recommend front line IO/IO with ipi/nivo    3/16/21  - S/p bronch yesterday. - Remains on IV doxycycline.   - Pathology consistent with poorly differentiated neoplasm invading cerebellum, favor metastatic   Melanoma.  -Will check BRAF, KIT, NTRK, NRAS and targeted therapy if mutation detected. If no mutation detected, recommend front line IO/IO with ipi/nivo  - Rad onc for WBRT  - Above dependent on clinical course. Remains critically ill, in ICU, on vent      Electronically signed by RUIZ Reno CNP on 3/16/2021 at 1:02 PM   Patient seen and examined.   Note edited to reflect above  Cherie Tracy MD

## 2021-03-16 NOTE — PROGRESS NOTES
[facility]  PROGRESS NOTE  ATTENDING NOTE    CRITICAL CARE    CC:  Brain mass    HPI  Initially presented ot outside facility with AMS, confusion & erratic behavior. PMH include previous stroke with residual left sided weakness, dementia, HTN, HLD, chronic pain issues, CKD & melanoma.  Diagnotic imaging reveal brain masses.  Transferred to [facility] for ongoing evaluaitn & neurosurgery consult.  CT chest/abdomen/pelvis no evidence of malignancy.      Patient Active Problem List   Diagnosis    Carotid stenosis    CVA (cerebral vascular accident)    Hypertension    Cerebral artery occlusion with cerebral infarction ([address]    Hyperlipidemia    Leucocytosis    Vitamin D deficiency    Non-insulin dependent type 2 diabetes mellitus ([address]    Left cataract    Secondary cancer of brain ([address]    Melanoma ([address]    Hypokalemia    Stage 3 chronic kidney disease    COPD (chronic obstructive pulmonary disease) (HCC)    Constipation    Renal cyst, left    AMS (altered mental status)    Dementia ([address]    Seizure ([address]    History of CVA (cerebrovascular accident)    Atrial fibrillation ([address]    Anticoagulated    Palliative care by specialist    Acute kidney injury superimposed on CKD ([address]    Acute respiratory failure with hypoxia and hypercapnia ([address]       OVERNIGHT EVENTS:  Bronchoscopy performed    HOSPITAL COURSE:  03/02 Underwent craniotomy for biopsy of intracranial mass.  Glial neoplasm. Admitted to [facility] post--operatively.    03/03  No issues overnight.  Did not require PRN antihypertensive agents. Required 12 units of insulin.  Pulled loyola out during the night.  Seen by speech recs dental soft diet with honey thick liquids.   03/04 Still on diltiazem this am  pulled own loyola out and had hematuria    03/05  Episode of hypotension yesterday responded to IVFs. Cardiology consulted by medicine, HTN medications adjusted now on amiodarone gtt and metoprolol.  Norvasc, clonidine, tikosyn and hydralazine stopped. 3-way catheter attempted last evening with difficulty. Urology consulted; 18 Fr coude inserted via cystoscopy. Patient more alert today and following commands  03/06  Last night Cardene started by primary because of hypertension but he became tachycardic and went back into A fib Stopped this am   03/07 Hb down trending will do hemoccult , CT scan - constipation   03/08  No issues overnight. Remains on Amiodarone & Propofol. Required more than 18 doses of antihypertensive agents. Required 24 units of insulin. Bipap during the night. Amiodarone stopped yesterday. 03/09  This am BiPap removed. Tolerated O2 per cannula for about 20 minutes. Placed back on BiPap. Required 2 doses of antihypertensive agents. WBC elevated. Back into Afib this am.  Amiodarone to be restarted. Hypernatremia. Vancomycin & merepenum initiated. In the afternoon, developed AMS with respiratory distress, intubated. Placed on Propofol. Levophed for brief period of time. 03/10  T max 99.4 F. Follow commands this am.  Did not require any prn antihypertensive agents or insulin coverage. Sodium level 144. Bronchoscopy done for thick secretions. 03/11  T max 99.5 F. Na level 146 today. Did not require any antihypertensive agents. 03/12  No acute issues overnight. Required 3 does of hydralazine 7 18 units of insulin. Luis F Angry 03/14 T max 98 F.  Intubated. On AC. On amiodarone drip. Did not require additional doses of antihypertensive agents in the previous 24 hours.  Required additional 12 units of insulin in the previous 24 hours.    03/15  T max 98.9F. No issues overnight.   Bronchoscopy performed  3/16 palliative care met with family considering comfort care measures     BP (!) 134/107   Pulse 78   Temp 98 °F (36.7 °C) (Temporal)   Resp 17   Ht 5' 5\" (1.651 m)   Wt 152 lb 5.4 oz (69.1 kg)   SpO2 98%   BMI 25.35 kg/m²   Physical Exam  Constitutional:       Comments: Opens eyes with much coaxing   HENT:      Head: Normocephalic. Nose: Nose normal.      Mouth/Throat:      Mouth: Mucous membranes are moist.      Pharynx: Oropharynx is clear. Eyes:      Extraocular Movements: Extraocular movements intact. Pupils: Pupils are equal, round, and reactive to light. Neck:      Musculoskeletal: Normal range of motion and neck supple. Cardiovascular:      Rate and Rhythm: Normal rate and regular rhythm. Pulmonary:      Effort: Pulmonary effort is normal.      Comments: Coarse breath sounds bilaterally  Abdominal:      General: There is no distension. Palpations: Abdomen is soft. Tenderness: There is no abdominal tenderness. Musculoskeletal:         General: No tenderness or signs of injury. Skin:     General: Skin is warm and dry. Neurological:      Comments: Intermittently follows commands, does not move left side         Lines: Loyola:  yes - Continue loyola catheter for managing strict I and Os in this critically ill patient. Central line:  no  PICC:  yes -right basilic PICC placed 3/5    CAM-ICU:  negative  RASS:  RASS -1 (Drowsy)    ASSESSMENT/PLAN:  1. Cerebellar brain masspost suboccipital craniotomy and brain biopsy, pathology shows metastatic melanoma, neurosurgery and oncology following--oncology recommends radiation oncology follow-up  2. Acute respiratory failure secondary hypoxiacontinue vent management, duo nebs pulmonary toilet, discussed with wife potential need for tracheostomy this week--patient considering comfort measures    3. Chronic permanent atrial fibrillationcontinue with amiodarone, metoprolol    4. Essential hypertensioncontinue with Norvasc, metoprolol, lisinopril, hydralazine    5. Dysphagiacontinue with tube feeds    6. CKD stage IIImonitor urinary output, BUN and creatinine    7. Diabetes type 2continue with Lantus and insulin sliding scale    8.   MRSA hospital-acquired pneumonia/aspiration pneumoniaID following, continue with doxycycline, follow-up cultures from bronchoscopy    DVT and GI prophylaxisHeparin subcu, lansoprazole    CC TIME:  I spent 36 min managing this patients critical issues which are a constant threat to life excluding time teaching and performing procedures.       María Elena Salcido MD, MSc, FACS  3/16/2021  6:18 PM

## 2021-03-16 NOTE — CARE COORDINATION
Care coordination: Remains intubated on vent. Follows commands intermittently on R. BraIn mass pathology shows metastatic melanoma. Will likely need trach/peg this week pending wife and daughter's decision re: goals of care. Palliative care assisting. If plan is for trach/peg, dc plan will be to [facility]. If pranav needed, [facility] the [facility] is following.

## 2021-03-16 NOTE — PROGRESS NOTES
Hospitalist Progress Note      SYNOPSIS: Patient admitted on 2021 for Weakness and altered mental status. MRI showed a heterogenous enhancing mass in the left cerebral hemisphere suspicous for neoplasm. CT of the chest, abdomen and pelvis was negative. He had frameless stereotactic left suboccipital craniotomy for brain tumor biopsy and debulking. Oncology and palliative care on board. Hospital course has been complicated by worsening respiratory status; he didn't do well on CPAP and ended up being intubated. ID on board. Currently on IV meropenem. 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 He remains intubated. Daughter was by his bedside and had no complaints. Records reviewed. Temp (24hrs), Av.8 °F (37.1 °C), Min:98.1 °F (36.7 °C), Max:99.5 °F (37.5 °C)    DIET: DIET TUBE FEED CONTINUOUS/CYCLIC NPO; Diabetic 1.5; Nasogastric; Continuous; 20; 35; 24  Diet Tube Feed Modular: Protein Modular  CODE: Full Code    Intake/Output Summary (Last 24 hours) at 3/16/2021 1145  Last data filed at 3/16/2021 1100  Gross per 24 hour   Intake 420 ml   Output 2360 ml   Net -1940 ml       OBJECTIVE:    BP (!) 147/63   Pulse 65   Temp 98.4 °F (36.9 °C) (Temporal)   Resp 18   Ht 5' 5\" (1.651 m)   Wt 152 lb 5.4 oz (69.1 kg)   SpO2 100%   BMI 25.35 kg/m²     General appearance: intubated, HEENT:  Conjunctivae/corneas clear. Neck: Supple. No jugular venous distention. Respiratory: diminished breath sounds bibasally, no wheezes or crackles ; intubated  Cardiovascular: Regular rate rhythm, normal S1-S2  Abdomen: Soft, nontender, nondistended  Musculoskeletal: No clubbing, cyanosis, no bilateral lower extremity edema. Brisk capillary refill.    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  On IV docycycline  -ID on board     #Hyponatremia: resolved     #Acute on chronic anemia:  - s/p transfusion.  - Hb is 7.1; transfuse if Hb<7     #Hematuria: stable     #Hypertension: on amlodipine.     #Seizure disorder: on depakote and lamictal     #History of melanoma: s/p resection.     #Nutrition: on tube feeds.     DVT prophylaxis; heparin        DISPOSITION: to be deteremined    Medications:  REVIEWED DAILY    Infusion Medications   Scheduled Medications    lansoprazole  30 mg Per NG tube QAM AC    doxycycline (VIBRAMYCIN) IV  100 mg Intravenous Q12H    psyllium  1 packet Oral Daily    potassium bicarb-citric acid  40 mEq Per NG tube BID    ipratropium-albuterol  1 ampule Inhalation Q4H WA    heparin (porcine)  5,000 Units Subcutaneous 3 times per day    insulin lispro  0-18 Units Subcutaneous Q4H    chlorhexidine  15 mL Mouth/Throat BID    hydrALAZINE  25 mg Per NG tube 3 times per day    lisinopril  20 mg Per NG tube Daily    amiodarone  200 mg Per NG tube Q8H    amLODIPine  5 mg Per NG tube Daily    gabapentin  300 mg Per NG tube 2 times per day    lamoTRIgine  200 mg Per NG tube BID    melatonin  10 mg Per NG tube Nightly    metoprolol tartrate  50 mg Per NG tube BID    insulin glargine  10 Units Subcutaneous BID    heparin flush  3 mL Intravenous 2 times per day    divalproex  125 mg Oral BID    sodium chloride flush  10 mL Intravenous 2 times per day     PRN Meds: hydrALAZINE, labetalol, artificial tears **OR** polyvinyl alcohol, acetaminophen **OR**

## 2021-03-16 NOTE — PROGRESS NOTES
Tubed feed stopped per Dr. Charly Alcala for possible broncoscopy     Called wife obtain consent for procedure and placed in soft chart (witnessed by two RNs), Gave wife update on patient condition.

## 2021-03-16 NOTE — PROGRESS NOTES
JORGE PROGRESS NOTE      Chief complaint: Follow-up of leukocytosis    The patient is a 76 y.o. male with history of DM, hypertension, atrial fibrillation, hyperlipidemia, stroke, dementia, seizure disorder, melanoma status post excision from back and forehead 7 years ago, transferred to [facility] on 02/25 from [facility] where he initially presented with confusion and abnormal behavior for 3 weeks, found to have MRI of the brain showing multiple intracranial metastasis most notably in the left cerebellar hemisphere, hydrocephalus with dilation of the lateral and third ventricles. On admission, he was afebrile and hemodynamically stable with leukocytosis of 14,000. Urinalysis showed no pyuria. SARS-CoV-2 PCR was negative. Chest x-ray was unremarkable. 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 Histopathology showed poorly differentiated neoplasm invading cerebellum, favor metastatic melanoma. Dexamethasone has been started since transfer. Leukocytosis increased to 24,000 on 03/02. He received cefazolin perioperatively. He pulled out his Hogan catheter on 03/02 causing penile bleeding. He was intubated on 03/09 due to respiratory failure. Ampicillin-sulbactam started on 03/05 for suspected aspiration pneumonia was changed to meropenem and vancomycin on 03/09. Respiratory Gram stain and culture on 03/10 showed abundant polymorphonuclear leukocytes, no epithelial cells, rare Gram-positive cocci in pairs, rare yeast, rare Gram-variable rods, reduced oral pharyngeal emigdio, one colony of MRSA, moderate growth of Candida albicans and another yeast. He underwent bronchoscopy on 03/10 during which thick mucous secretions in the right mainstem bronchus. BAL Gram stain and culture showed abundant polymorphonuclear leukocytes, no epithelial cells, rare yeast, moderate growth of Candida albicans.   MRSA nares culture was negative. He underwent bronchoscopy on 03/15 during which mucus plugging of bilateral airways and moderate amount of thick secretions were noted. Subjective: Patient was seen and examined. He remains in critical condition, intubated, not communicating. Has diarrhea. Afebrile. Objective:  BP (!) 151/87   Pulse 66   Temp 98.4 °F (36.9 °C) (Temporal)   Resp 21   Ht 5' 5\" (1.651 m)   Wt 152 lb 5.4 oz (69.1 kg)   SpO2 100%   BMI 25.35 kg/m²   Constitutional: Intubated, no MV dyssynchrony  Respiratory: Clear breath sounds, no crackles, no wheezes  Cardiovascular: Regular rate and rhythm, no murmurs  Gastrointestinal: Bowel sounds present, soft, nontender. FMS in place  Skin: Warm and dry, no active dermatoses  Musculoskeletal: No joint swelling, no joint erythema    Labs, imaging, and records were personally reviewed. Assessment:  Metastatic melanoma invading cerebellum  Sepsis, resolved  MRSA HAP/Aspiration pneumonia  Yeast on respiratory culture, probably colonization  Leukocytosis, resolving    Recommendations:  Continue doxycycline 100 mg BID to finish 7 days from 03/13-03/19. Check stool C difficile toxin assay if with worsening diarrhea.     Thank you for involving me in the care of Agnieszka Beaver. I will continue to follow. Please do not hesitate to call for any questions or concerns.     Electronically signed by Mundo Schultz MD on 3/16/2021 at 10:27 AM

## 2021-03-16 NOTE — PROGRESS NOTES
Pt became hypotensive (70's/30's) post bronchoscopy. Informed Dr. Rodo Murphy and obtained order for 500cc fluid bolus. 15minutes post fluid bolus-pt became hypertensive. Gave IV hydralazine 10mg and IV metoprolol 5mg per order. Pt now 377'E systolic and resting comfortably.

## 2021-03-16 NOTE — PROGRESS NOTES
Palliative Care Department  [phone]  Palliative Care Progress Note  Provider Isael HUFF    Luis Masters  [id number]  Hospital Day: 20    Referring Provider: Lupe SALAZAR  Palliative Medicine was consulted for assistance with: Code status Discussion, Assist with goals of care, Symptom Management and Family Support     Chief Complaint: Luis Masters is a 76 y.o. male with chief complaint of AMS    Brief summary: Luis Masters is a 76 y.o. male with significant past medical history of hypertension, atrial fibrillation, seizure disorder, dementia, chronic kidney disease, as well as prior melanoma on his back and forehead status post resection, and chronic back pain as well as trigeminal neuralgia. He was admitted on 2/25/2021 after he was found to have brain metastasis at an outside hospital.  He was transferred to [facility] for neurosurgical evaluation, and consultations have also been placed to medical and radiation oncology, with a high concern for malignant melanoma. 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 ASSESSMENT/PLAN:     Pertinent hospital diagnoses:  1. Brain metastasis  -History of melanoma  -s/p crani with biopsy  -Path appears to be metastatic melanoma  -Hematology oncology following  -Radiation oncology following  -neurosurgery following  2. Atrial fibrillation with RVR  -EP consulted and following  3. Respiratory failure/? Aspiration/on High O2 and Bipap   -Continues require mechanical ventilation   -May require tracheostomy and PEG tube placement    PALLIATIVE CARE ENCOUNTER  - Outcome of goals of care meeting:   - Continue Current care  -  Change code status to [facility]  -  Wife considering a transition to comfort focused care  - Capacity: At this time, Luis Masters, Does Not have capacity for medical decision-making.   Capacity is time limited and situation/question specific  - Surrogate decision maker/Legal Martha Yarbrough ([phone] home, [phone] cell) wife  - Code Status:  DNR-CCAd      Referrals to: none today    Subjective   Chart reviewed. Spoke with bedside nurse. Patient remains essentially the same. He is seen at the bedside with no family present. He is not very responsive for me, did not follow any commands, though intermittent command following has been reported. He continues to be mechanically ventilated, off all sedation, overall remains very ill. I did speak with the patient's wife by telephone, discussing his overall condition, as well as his potential needs of tracheostomy and PEG tube. At this time she agrees to changing CODE STATUS to DNR CCA. She also states that she is now considering a transition to comfort focused care, she does not believe [place] would wish to be maintained long-term in his current state, and due to his performance status he may not likely tolerate aggressive management of his metastatic disease. She states that she plans to visit tomorrow, and would like to speak with someone at the bedside regarding the next steps in his care, including potential plan for transition to comfort focused care. She states that at this time her main priority is providing [place] comfort. OBJECTIVE:   Prognosis: Poor  According to the Education for Physicians on End of [facility], in general a patient with metastatic solid cancer, acute leukemia, or high grade lymphoma who will not be receiving systemic chemotherapy have a prognosis of less than 6 months. Exceptions may be patients with metastatic breast or prostate cancer with good performance status due to the indolent course of these cancers. Features suggestion a short prognosis include declining functional status, SOB, weight loss. The single most important predictive factor in cancer is the performance status. Patients with solid tumors typically lose about 70% of their functional ability in  the last 3 months of life.     Several common cancer syndromes have well documented short median survival times; Malignant hypercalcemia:  8 weeks except in newly diagnosed breast cancer of myeloma. Malignant pericardial effusion:  8 weeks  Carcinomatous meningitis:  8 - 12 weeks  Multiple brain metastasis:  1-2 months without radiation; 3 - 6 months with radiation  Malignant ascites, pleural effusion or bowel obstruction: < 6 months     These patients are found to have a 6 month or less prognosis. If they desire to elect their Medicare Hospice Benefit and concentrate not on disease altering therapy but instead on comfort measures, can be accepted into a hospice program or their choice. Physical Exam:  BP (!) 144/69   Pulse 69   Temp 98.2 °F (36.8 °C) (Temporal)   Resp 18   Ht 5' 5\" (1.651 m)   Wt 152 lb 5.4 oz (69.1 kg)   SpO2 100%   BMI 25.35 kg/m²   Gen: ill appearing  HEENT:  scalp dressing. ETT in place. Mucous membranes moist.  Neck:  No JVD, trachea midline  Lungs: mechanical respirations, scattered rhonchi  Heart: RRR, no murmur/rub or gallop  Abd: non-distended, soft, non-tender  : Hogan catheter  Ext:  No edema, weak  Skin: pale, warm, dry  Neuro: PERRL, opens eyes    Objective data reviewed: labs, images, records, medication use, vitals and chart    Inpatient medications reviewed: yes  Home Medications reviewed: yes    Time/Communication  Greater than 50% of time spent, total 35 minutes in counseling and coordination of care at the bedside/over the telephone regarding see above. Thank you for allowing Palliative Medicine to participate in the care of Zuly Mathew.     Provider Erickson R[address]  Palliative Medicine

## 2021-03-16 NOTE — PROGRESS NOTES
5 mg Per NG tube Daily    gabapentin  300 mg Per NG tube 2 times per day    lamoTRIgine  200 mg Per NG tube BID    melatonin  10 mg Per NG tube Nightly    metoprolol tartrate  50 mg Per NG tube BID    insulin glargine  10 Units Subcutaneous BID    heparin flush  3 mL Intravenous 2 times per day    divalproex  125 mg Oral BID    sodium chloride flush  10 mL Intravenous 2 times per day       IV Infusions (if any):      Diagnostics:    EKG: normal sinus rhythm, unchanged from previous tracings. ECHO: reviewed. Ejection fraction: 55%  Stress Test: not obtained. Cardiac Angiography: not obtained. Labs:   CBC:   Recent Labs     03/15/21  0450 03/16/21  0440   WBC 12.4* 14.5*   HGB 7.0* 7.1*   HCT 22.4* 23.4*    255     BMP:   Recent Labs     03/15/21  0450 03/16/21  0440    142   K 3.7 3.9   CO2 38* 37*   BUN 25* 26*   CREATININE 1.1 1.0   LABGLOM >60 >60   GLUCOSE 144* 126*     BNP: No results for input(s): BNP in the last 72 hours. PT/INR: No results for input(s): PROTIME, INR in the last 72 hours. APTT:No results for input(s): APTT in the last 72 hours. CARDIAC ENZYMES:No results for input(s): CKTOTAL, CKMB, CKMBINDEX, TROPONINI in the last 72 hours. FASTING LIPID PANEL:  Lab Results   Component Value Date    HDL 28.0 12/13/2013    LDLCALC 42 12/13/2013    TRIG 40 12/13/2013     LIVER PROFILE:No results for input(s): AST, ALT, LABALBU in the last 72 hours.     ASSESSMENT:    Patient Active Problem List   Diagnosis    Hypertension    Hyperlipidemia    Secondary cancer of [facility] ([facility] [facility]    Melanoma ([facility]    COPD (chronic obstructive pulmonary disease) ([facility] [facility]    Dementia ([facility] [address]    History of CVA (cerebrovascular accident)    Atrial fibrillation (HCC)--sinus rhythm on amiodarone therapy    Palliative care by specialist    Acute respiratory failure with hypoxia --patient intubated and ventilated       PLAN:    Reduce dose of amiodarone to 200 mg twice a day  Control of blood pressure Continue metoprolol at current dosage      Please see orders. Discussed with patient and nursing.     Shawna Damon MD,FACC

## 2021-03-17 NOTE — PROGRESS NOTES
Palliative Care Department  [phone]  Palliative Care Progress Note  Provider Feleciamikey Mcguirenelida 88  [id number]  Hospital Day: 21    Referring Provider: Leatha SALAZAR  Palliative Medicine was consulted for assistance with: Code status Discussion, Assist with goals of care, Symptom Management and Family Support     Chief Complaint: Jhoan Rock is a 76 y.o. male with chief complaint of AMS    Brief summary: Jhoan Rock is a 76 y.o. male with significant past medical history of hypertension, atrial fibrillation, seizure disorder, dementia, chronic kidney disease, as well as prior melanoma on his back and forehead status post resection, and chronic back pain as well as trigeminal neuralgia. He was admitted on 2/25/2021 after he was found to have brain metastasis at an outside hospital.  He was transferred to [facility] for neurosurgical evaluation, and consultations have also been placed to medical and radiation oncology, with a high concern for malignant melanoma. S/p craniotomy with biopsy. ASSESSMENT/PLAN:     Pertinent hospital diagnoses:  1. 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 Atrial fibrillation with RVR  -EP consulted and following  3. Respiratory failure/? Aspiration/on High O2 and Bipap   -Continues require mechanical ventilation   -wife now agreeable to compassionate extubation    PALLIATIVE CARE ENCOUNTER  - Outcome of goals of care meeting:   - plan for compassionate extubation tomorrow after wife comes to say goodbye  -  Will change to [facility] tomorrow with extubation  - Capacity: At this time, Jhoan Rock, Does Not have capacity for medical decision-making.   Capacity is time limited and situation/question specific  - Surrogate decision maker/Legal Jorge Samples ([phone] home, [phone] cell) wife  - Code Status:  DNR-CCA      Referrals to: none today    Subjective   Chart reviewed. Spoke with bedside nurse. Patient remains essentially the same. He is seen at the bedside with wife and daughter present. He is not following commands, continues to be mechanically ventilated, off all sedation. Wife Lizett Nicole tearful, states she is understanding that patient has not improved during this hospital stay and in fact has declined. She understands that treatment would not really provide benefit, she doesn't feel he would want a tracheostomy. She wants to pursue comfort measures and \"let nature take its course. \"  Asked wife when she wanted to do this, she expressed that she did not understand the question, she thought he could remain with the current ventilator settings until he . Explained that if her goal is comfort, the ETT is not comfortable to have and is keeping him alive. Discussed that when family is ready to transition to aggressive comfort measures, we provide medications for palliation and remove the ETT. Wife and daughter expressed understanding, both stated they don't want to be present for that. Will discuss further tomorrow regarding possible need for hospice consult if patient remains stable after extubation. OBJECTIVE:   Prognosis: Poor    Physical Exam:  BP (!) 156/61   Pulse 62   Temp 98 °F (36.7 °C) (Temporal)   Resp 18   Ht 5' 5\" (1.651 m)   Wt 152 lb 5.4 oz (69.1 kg)   SpO2 98%   BMI 25.35 kg/m²   Gen: ill appearing, on ventilator, eyes intermittently open  HEENT:  ETT in place.   Mucous membranes moist.  Neck:  No JVD, trachea midline  Lungs: mechanical respirations, scattered rhonchi  Heart: RRR, no murmur/rub or gallop  Abd: non-distended, soft, non-tender  : Hogan catheter, deferred  Ext:  No edema, weak  Skin: pale, warm, dry  Neuro: PERRL, opens eyes    Objective data reviewed: labs, images, records, medication use, vitals and chart    Inpatient medications reviewed: yes  Home Medications reviewed: yes    Time/Communication  Greater than 50% of time spent, total 35 minutes in counseling and coordination of care at the bedside/over the telephone regarding see above. Thank you for allowing Palliative Medicine to participate in the care of Marilee Bennett.     Provider [facility]

## 2021-03-17 NOTE — PROGRESS NOTES
Surgical  Neuro Science Intensive Care Unit  Critical Care  Daily Progress Note 3/17/2021     Date of Admission: 02/27/2021  Date of ICU Admission; 03/02./21     CC: Follow up for craniotomy for brain mass.       HOSPITAL COURSE/OVERNIGHT EVENTS:    02/27  Initially presented ot outside facility with AMS, confusion & erratic behavior. PMH include previous stroke with residual left sided weakness, dementia, HTN, HLD, chronic pain issues, CKD & melanoma. Diagnotic imaging reveal brain masses. Transferred to [facility] for ongoing evaluaitn & neurosurgery consult. CT chest/abdomen/pelvis no evidence of malignancy.    ---------------------  03/02 Underwent craniotomy for biopsy of intracranial mass. Glial neoplasm. Admitted to  ICU post--operatively. 03/03  No issues overnight. Did not require PRN antihypertensive agents. Required 12 units of insulin. Pulled loyola out during the night. Seen by speech recs dental soft diet with honey thick liquids.   03/04 Still on diltiazem this am  pulled own loyola out and had hematuria    03/05  Episode of hypotension yesterday responded to IVFs. Cardiology consulted by medicine, HTN medications adjusted now on amiodarone gtt and metoprolol. Norvasc, clonidine, tikosyn and hydralazine stopped. 3-way catheter attempted last evening with difficulty. Urology consulted; 18 Fr coude inserted via cystoscopy. Patient more alert today and following commands  03/06  Last night Cardene started by primary because of hypertension but he became tachycardic and went back into A fib Stopped this am   03/07 Hb down trending will do hemoccult , CT scan - constipation   03/08  No issues overnight. Remains on Amiodarone & Propofol. Required more than 18 doses of antihypertensive agents. Required 24 units of insulin. Bipap during the night. Amiodarone stopped yesterday. 03/09  This am BiPap removed. Tolerated O2 per cannula for about 20 minutes. Placed back on BiPap.   Required 2 doses of antihypertensive agents. WBC elevated. Back into Afib this am.  Amiodarone to be restarted. Hypernatremia. Vancomycin & merepenum initiated. In the afternoon, developed AMS with respiratory distress, intubated. Placed on Propofol. Levophed for brief period of time. 03/10  T max 99.4 F. Follow commands this am.  Did not require any prn antihypertensive agents or insulin coverage. Sodium level 144. Bronchoscopy done for thick secretions. 03/11  T max 99.5 F. Na level 146 today. Did not require any antihypertensive agents. 03/12  No acute issues overnight. Required 3 does of hydralazine 7 18 units of insulin. Kaitlynn Don 03/14 T max 98 F. Intubated. On AC. On amiodarone drip. Did not require additional doses of antihypertensive agents in the previous 24 hours. Required additional 12 units of insulin in the previous 24 hours. 03/15  T max 98.9F. No issues overnight. Early evening developed decreasing SPO2. Bronchoscopy done for thick dark secretions. Became slightly hypotensive.    03/16  Stable vital signs. No further changes in SpO2.    03/17  No acute issues. PHYSICAL EXAM:  BP (!) 149/75   Pulse 62   Temp 98.6 °F (37 °C) (Temporal)   Resp 21   Ht 5' 5\" (1.651 m)   Wt 152 lb 5.4 oz (69.1 kg)   SpO2 99%   BMI 25.35 kg/m²     Intake/Output Summary (Last 24 hours) at 3/17/2021 1130  Last data filed at 3/17/2021 1000  Gross per 24 hour   Intake 1813 ml   Output 1925 ml   Net -112 ml     General appearance:  Comfortable. Pain Description: none    NEUROLOGIC:   RASS Score:  0  GCS:  10T  3 - Opens eyes to loud noise or command   6 - Follows simple motor commands  1 - Makes no noise  Intubated.       Pupil size:  Left 3 mm  Right 3 mm  Pupil reaction: Yes   PERRLA  Wiggles fingers: Left  No Right Yes  Hand grasp:   Left: Yes     Right    Yes  Wiggles toes: Left   Yes Right  Yes  Plantar flexion: Left  No  Right   Yes  Crani incision:  CDI    CONSTITUTIONAL: No acute distress, lying in hospital bed. CARDIOVASCULAR: S1 S2, regular rate, regular rhythm, no murmur/gallop/rub. Monitor: NSR. PULMONARY: Bilaterally course but clear. No rhonchi/rales/wheezes, no use of accessory muscles. Intubated. Mechanical ventilation: PS  10 cm. FiO2 50%. Peep 5 cm. PF ratio   Secretions thick dark brownish. RENAL:  Hogan to gravity, clear yellow urine. Fluid balance for previous 24 hours: - 112 ml  . ABDOMEN: Soft, nontender, nondistended, nontympanic, normal bowel sounds. NGT. Tube feedings:  Diabetic formula at 55 ml per hour ordered   Daily protein supplements. No reported nausea or vomiting. Having liquid BM  FMS in place. Reported 300  Ml of dsirrhaea in past 24 hours. MUSCULOSKELETAL:  Wiggles right fingers & toes. No movement in left upper or lower extremity. SKIN/EXTREMITIES: No rashes/ecchymosis, no edema/clubbing, warm/dry, good capillary refill. LINES:   Right basilic PICC. Day 12. No palpable cord.    .      Recent Labs     03/15/21  0450 03/16/21  0440 03/17/21  0400   WBC 12.4* 14.5* 11.9*   HGB 7.0* 7.1* 6.6*   HCT 22.4* 23.4* 21.5*   MCV 90.3 89.3 91.5    255 260       Recent Labs     03/15/21  0450 03/16/21  0440 03/17/21  0400    142 142   K 3.7 3.9 3.8   CO2 38* 37* 40*   PHOS 4.0 3.9 3.0   BUN 25* 26* 32*   CREATININE 1.1 1.0 1.1     ASSESSMENT/PLAN:     Active Problems:    Leucocytosis    Non-insulin dependent type 2 diabetes mellitus (HCC)    Secondary cancer of brain (HCC)    Melanoma (HCC)    Hypokalemia    Stage 3 chronic kidney disease    COPD (chronic obstructive pulmonary disease) (HCC)    Constipation    Renal cyst, left    AMS (altered mental status)    Dementia (HCC)    Seizure (HCC)    History of CVA (cerebrovascular accident)    Atrial fibrillation ([facility]    Anticoagulated    Palliative care by specialist    Acute kidney injury superimposed on CKD ([facility] [address]    Acute respiratory failure with hypoxia and hypercapnia (HCC)  Resolved Problems: * No resolved hospital problems. *    Neuro:  AMS. Brain mass. S/P Craniotomy for biopsy. Glial neoplasm. Hx of Seizures, dementia & stroke (residual left sided weakness). Monitor neuro status. Neurosurgery following. Depokote  Lamictal    Gabapentin. Decadron stopped on 03/04. Biopsy report pending-sent to [facility]  CV:  No acute issues. Hx of HTN, Hyperlipidemia and Afib    Monitor hemodynamics. BP goal systolic less than 048 mm Hg. PRN hydralzine & labetalol. Amiodarone scheduled. Norvasc. Metoprolol. Lisinopril. Hydralazine. Cardiology/EP following. .   Pulm: Acute respiratory failure. Possible aspiration. Monitor RR & SpO2. Intubated. Mechanical ventilation. Intrapulmonary nebulizer. Wean as able. Daily CXR & ABGs. Albuterol. PRN. Scheduled Duoneb. GI:    Dysphagia. BMI 24. Hx of constipation. Monitor bowel function. NPO.    NGT. Tube feeds: Diabetic formula at 55 ml per hour. Bowel regime stopped. FMS for liquid stool. Renal: SOCORRO. CKD Stage 3. Monitor BUN & Cr, electrolytes & replace as needed. Monitor I & O. Hogan. Urology following  Free water. ID:   Leukocytosis. Possible aspiration. ID following. Respiratory culture with MRSA. Doxycycline Day 3. Endocrine: Hyperglycemia. Hypernatremia. Hx of diabetes     Monitor BS.    ISS. Lantus  MSK: Deconditioned. Previous stroke with left sided weakness. ROM. Turn & reposition. PT & OT when able  Monitor for skin breakdown. Heme: Multifactorial anemia. Cranial bx-glial neoplasm, Hx of melanoma. Monitor CBC. Radiation oncology following  Oncology following. Bowel regime: none  Pain control/Sedation: Tylenol. B&O suppositories. Melatonin. Propofol. DVT prophylaxis: SCD. GI prophylaxis: Pepcid. TF. Glucose protocol:  ISS  Hogan: Keep in place for critical care monitoring of fluid balance. Ancillary consults:  Medicine. Neurosurgery. Critical care. ID. Radiation Oncology. Oncology. Urology. Code status: DNRCCA    Disposition:  Continue ICU.       Electronically signed by Thresa Bernheim RN MSN APRN-NP [facility] NP  CCNS CCRN 3/17/2021 11:30 AM

## 2021-03-17 NOTE — PROGRESS NOTES
JORGE PROGRESS NOTE      Chief complaint: Follow-up of leukocytosis    The patient is a 76 y.o. male with history of DM, hypertension, atrial fibrillation, hyperlipidemia, stroke, dementia, seizure disorder, melanoma status post excision from back and forehead 7 years ago, transferred to [address] [place] [address] on 02/25 from [facility] where he initially presented with confusion and abnormal behavior for 3 weeks, found to have MRI of the brain showing multiple intracranial metastasis most notably in the left cerebellar hemisphere, hydrocephalus with dilation of the lateral and third ventricles. On admission, he was afebrile and hemodynamically stable with leukocytosis of 14,000. Urinalysis showed no pyuria. SARS-CoV-2 PCR was negative. Chest x-ray was unremarkable. 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 Histopathology showed poorly differentiated neoplasm invading cerebellum, favor metastatic melanoma. Dexamethasone has been started since transfer. Leukocytosis increased to 24,000 on 03/02. He received cefazolin perioperatively. He pulled out his Hogan catheter on 03/02 causing penile bleeding. He was intubated on 03/09 due to respiratory failure. Ampicillin-sulbactam started on 03/05 for suspected aspiration pneumonia was changed to meropenem and vancomycin on 03/09. Respiratory Gram stain and culture on 03/10 showed abundant polymorphonuclear leukocytes, no epithelial cells, rare Gram-positive cocci in pairs, rare yeast, rare Gram-variable rods, reduced oral pharyngeal emigdio, one colony of MRSA, moderate growth of Candida albicans and another yeast. He underwent bronchoscopy on 03/10 during which thick mucous secretions in the right mainstem bronchus. BAL Gram stain and culture showed abundant polymorphonuclear leukocytes, no epithelial cells, rare yeast, moderate growth of Candida albicans.   MRSA nares culture was negative. He underwent bronchoscopy on 03/15 during which mucus plugging of bilateral airways and moderate amount of thick secretions were noted. Bronchial wash Gram stain and culture showed few polymorphonuclear leukocytes, no epithelial cells, rare Gram-positive cocci in pairs, moderate growth of Staphylococcus aureus. Subjective: Patient was seen and examined. He remains in critical condition, intubated, not communicating. Has diarrhea. Afebrile. Objective:  BP (!) 150/65   Pulse 63   Temp 98 °F (36.7 °C) (Temporal)   Resp 17   Ht 5' 5\" (1.651 m)   Wt 152 lb 5.4 oz (69.1 kg)   SpO2 99%   BMI 25.35 kg/m²   Constitutional: Intubated, no MV dyssynchrony  Respiratory: Clear breath sounds, no crackles, no wheezes  Cardiovascular: Regular rate and rhythm, no murmurs  Gastrointestinal: Bowel sounds present, soft, nontender. FMS in place  Skin: Warm and dry, no active dermatoses  Musculoskeletal: No joint swelling, no joint erythema    Labs, imaging, and records were personally reviewed. Assessment:  Metastatic melanoma invading cerebellum  Sepsis, resolved  MRSA HAP/Aspiration pneumonia  Yeast on respiratory culture, probably colonization  Leukocytosis, resolving    Recommendations:  Continue doxycycline 100 mg BID to finish 7 days from 03/13-03/19. Check stool C difficile toxin assay if with worsening diarrhea.     Thank you for involving me in the care of Ivelisse Mendoza. I will continue to follow. Please do not hesitate to call for any questions or concerns.     Electronically signed by Nicole Reece MD on 3/17/2021 at 10:33 AM

## 2021-03-17 NOTE — PROGRESS NOTES
[facility]  PROGRESS NOTE  ATTENDING NOTE    CRITICAL CARE    CC:  Brain mass    HPI  Initially presented ot outside facility with AMS, confusion & erratic behavior. PMH include previous stroke with residual left sided weakness, dementia, HTN, HLD, chronic pain issues, CKD & melanoma.  Diagnotic imaging reveal brain masses.  Transferred to [facility] for ongoing evaluaitn & neurosurgery consult.  CT chest/abdomen/pelvis no evidence of malignancy.      Patient Active Problem List   Diagnosis    Carotid stenosis    CVA (cerebral vascular accident)    Hypertension    Cerebral artery occlusion with cerebral infarction ([address]    Hyperlipidemia    Leucocytosis    Vitamin D deficiency    Non-insulin dependent type 2 diabetes mellitus ([address]    Left cataract    Secondary cancer of brain ([address]    Melanoma ([address]    Hypokalemia    Stage 3 chronic kidney disease    COPD (chronic obstructive pulmonary disease) (HCC)    Constipation    Renal cyst, left    AMS (altered mental status)    Dementia ([address]    Seizure ([address]    History of CVA (cerebrovascular accident)    Atrial fibrillation ([address]    Anticoagulated    Palliative care by specialist    Acute kidney injury superimposed on CKD ([address]    Acute respiratory failure with hypoxia and hypercapnia ([address]       OVERNIGHT EVENTS:  More awake today    HOSPITAL COURSE:  03/02 Underwent craniotomy for biopsy of intracranial mass.  Glial neoplasm. Admitted to [facility] post--operatively.    03/03  No issues overnight.  Did not require PRN antihypertensive agents. Required 12 units of insulin.  Pulled loyola out during the night.  Seen by speech recs dental soft diet with honey thick liquids.   03/04 Still on diltiazem this am  pulled own loyola out and had hematuria    03/05  Episode of hypotension yesterday responded to IVFs. Cardiology consulted by medicine, HTN medications adjusted now on amiodarone gtt and metoprolol.  Norvasc, clonidine, tikosyn and hydralazine stopped. 3-way catheter attempted last evening with difficulty. Urology consulted; 18 Fr coude inserted via cystoscopy. Patient more alert today and following commands  03/06  Last night Cardene started by primary because of hypertension but he became tachycardic and went back into A fib Stopped this am   03/07 Hb down trending will do hemoccult , CT scan - constipation   03/08  No issues overnight. Remains on Amiodarone & Propofol. Required more than 18 doses of antihypertensive agents. Required 24 units of insulin. Bipap during the night. Amiodarone stopped yesterday. 03/09  This am BiPap removed. Tolerated O2 per cannula for about 20 minutes. Placed back on BiPap. Required 2 doses of antihypertensive agents. WBC elevated. Back into Afib this am.  Amiodarone to be restarted. Hypernatremia. Vancomycin & merepenum initiated. In the afternoon, developed AMS with respiratory distress, intubated. Placed on Propofol. Levophed for brief period of time. 03/10  T max 99.4 F. Follow commands this am.  Did not require any prn antihypertensive agents or insulin coverage. Sodium level 144. Bronchoscopy done for thick secretions. 03/11  T max 99.5 F. Na level 146 today. Did not require any antihypertensive agents. 03/12  No acute issues overnight. Required 3 does of hydralazine 7 18 units of insulin. Kiran Fernandez 03/14 T max 98 F.  Intubated. On AC. On amiodarone drip. Did not require additional doses of antihypertensive agents in the previous 24 hours.  Required additional 12 units of insulin in the previous 24 hours.    03/15  T max 98.9F. No issues overnight. Bronchoscopy performed  3/16 palliative care met with family considering comfort care measures   3/17 family meeting    BP (!) 168/70   Pulse 67   Temp 98 °F (36.7 °C) (Temporal)   Resp 16   Ht 5' 5\" (1.651 m)   Wt 152 lb 5.4 oz (69.1 kg)   SpO2 97%   BMI 25.35 kg/m²   Physical Exam  HENT:      Head: Normocephalic. doxycycline, follow-up cultures from bronchoscopy    DVT and GI prophylaxisHeparin subcu, lansoprazole    CC TIME:  I spent 48 min managing this patients critical issues which are a constant threat to life excluding time teaching and performing procedures. I had a long discussion with wife and daughter. Then we talked to oncology via phone. Mr. Seymour Collier performance status has declined and there will not be many options for him as he will not be able to come off the ventilator. The family has elected to withdrawal ventilatory support and will do so tomorrow.       Saurabh Villarreal MD, MSc, FACS  3/17/2021  5:29 PM

## 2021-03-17 NOTE — PROGRESS NOTES
PO#15  PROCEDURE:  Frameless stereotactic left suboccipital craniectomy for  brain tumor, biopsy and debulking  Not any better. Discussed with family yesterday about Path report & awaiting input from Oncologist.  The family spoke with oncologist & Dr Hortensia Dacosta. Discussed issues. No improvement in neuro status  I spoke with family today & answered questions. The family is leaning towards DNR-CC  Nothing new to add from neurosurgical stand point at this time.

## 2021-03-17 NOTE — PROGRESS NOTES
Hospitalist Progress Note      SYNOPSIS: Patient admitted on 2021 for Weakness and altered mental status. MRI showed a heterogenous enhancing mass in the left cerebral hemisphere suspicous for neoplasm. CT of the chest, abdomen and pelvis was negative. He had frameless stereotactic left suboccipital craniotomy for brain tumor biopsy and debulking. Oncology and palliative care on board. Hospital course has been complicated by worsening respiratory status; he didn't do well on CPAP and ended up being intubated. ID on board. Currently on IV meropenem. 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 Patient seen and examined. He remains intubated. FiO2 40%      Temp (24hrs), Av.9 °F (36.6 °C), Min:97.6 °F (36.4 °C), Max:98.4 °F (36.9 °C)    DIET: DIET TUBE FEED CONTINUOUS/CYCLIC NPO; Diabetic 1.5; Nasogastric; Continuous; 20; 35; 24  Diet Tube Feed Modular: Protein Modular  CODE: DNR-CCA    Intake/Output Summary (Last 24 hours) at 3/17/2021 0651  Last data filed at 3/17/2021 0600  Gross per 24 hour   Intake 1813 ml   Output 2085 ml   Net -272 ml       OBJECTIVE:    BP (!) 162/67   Pulse 66   Temp 97.7 °F (36.5 °C) (Temporal)   Resp 18   Ht 5' 5\" (1.651 m)   Wt 152 lb 5.4 oz (69.1 kg)   SpO2 100%   BMI 25.35 kg/m²     General appearance: intubated,   HEENT:  Conjunctivae/corneas clear. Neck: Supple. No jugular venous distention. Respiratory: diminished breath sounds bibasally, no wheezes or crackles ; intubated  Cardiovascular: Regular rate rhythm, normal S1-S2   Abdomen: Soft, nontender, nondistended  Musculoskeletal: No clubbing, cyanosis, no bilateral lower extremity edema. Brisk capillary refill. Skin:  No rashes  on visible skin  Neurologic: intubated    ASSESSMENT:  · Brain tumor s/p craniotomy and debulking on 3/2 -neurosurgery on board.  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 Palliative care on board  · Acute hypoxic respiratory failure- remains intubated. Had bronchoscopy on 3/15/2021 with findings of mucus plugging of bilateral airways, worse on right. Vent management as per critical care  · Aspiration pneumonia -sputum culture growing MRSA. ID on board  · Hyponatremia- resolved  ·  Acute on chronic anemia- hemoglobin 6.6 this am. Transfuse 1 unit. Transfuse for hemoglobin <7  · Atrial fibrillation-  EP following. On amiodarone and metoprolol.    · Hematuria: stable  · Hypertension: on amlodipine.   · Seizure disorder: on depakote and lamictal  · History of melanoma: s/p resection.     #Nutrition: on tube feeds.     DVT prophylaxis; heparin        DISPOSITION: to be deteremined    Medications:  REVIEWED DAILY    Infusion Medications   Scheduled Medications    amiodarone  200 mg Per NG tube BID    insulin lispro  0-18 Units Subcutaneous Q6H    lansoprazole  30 mg Per NG tube QAM AC    doxycycline (VIBRAMYCIN) IV  100 mg Intravenous Q12H    psyllium  1 packet Oral Daily    potassium bicarb-citric acid  40 mEq Per NG tube BID    ipratropium-albuterol  1 ampule Inhalation Q4H WA    heparin (porcine)  5,000 Units Subcutaneous 3 times per day    chlorhexidine  15 mL Mouth/Throat BID    hydrALAZINE  25 mg Per NG tube 3 times per day    lisinopril  20 mg Per NG tube Daily    amLODIPine  5 mg Per NG tube Daily    gabapentin  300 mg Per NG tube 2 times per day    lamoTRIgine  200 mg Per NG tube BID    melatonin  10 mg Per NG tube Nightly    metoprolol tartrate  50 mg Per NG tube BID    insulin glargine  10 Units Subcutaneous BID    heparin flush  3 mL Intravenous 2 times per day    divalproex  125 mg Oral BID    sodium chloride flush  10 mL Intravenous 2 times per day     PRN Meds: hydrALAZINE, labetalol, artificial tears **OR** polyvinyl alcohol, acetaminophen **OR** acetaminophen, sodium chloride flush, heparin flush, opium-belladonna, albuterol, glucose, dextrose, glucagon (rDNA), sodium chloride flush    Labs:     Recent Labs     03/15/21  0450 03/16/21  0440 03/17/21  0400   WBC 12.4* 14.5* 11.9*   HGB 7.0* 7.1* 6.6*   HCT 22.4* 23.4* 21.5*    255 260       Recent Labs     03/15/21  0450 03/16/21  0440 03/17/21  0400    142 142   K 3.7 3.9 3.8   CL 98 101 100   CO2 38* 37* 40*   BUN 25* 26* 32*   CREATININE 1.1 1.0 1.1   CALCIUM 8.1* 8.0* 8.4*   PHOS 4.0 3.9 3.0       No results for input(s): PROT, ALB, ALKPHOS, ALT, AST, BILITOT, AMYLASE, LIPASE in the last 72 hours. No results for input(s): INR in the last 72 hours. No results for input(s): Darek Ripper in the last 72 hours. Chronic labs:    Lab Results   Component Value Date    CHOL 78 12/13/2013    TRIG 40 12/13/2013    HDL 28.0 (A) 12/13/2013    LDLCALC 42 12/13/2013    TSH 0.692 12/13/2013    INR 1.2 03/02/2021    LABA1C 6.6 (H) 02/26/2021       Radiology: REVIEWED DAILY    +++++++++++++++++++++++++++++++++++++++++++++++++  Yohan Bailey  +++++++++++++++++++++++++++++++++++++++++++++++++  NOTE: This report was transcribed using voice recognition software. Every effort was made to ensure accuracy; however, inadvertent computerized transcription errors may be present.

## 2021-03-17 NOTE — PROGRESS NOTES
Attempted to place patient on Pressure Support Ventilator mode, patient only breathing 8/min. Bedside nurse notified. Patient placed back to AC/VC.

## 2021-03-18 NOTE — DISCHARGE SUMMARY
Discharge Summary    Admit date: 2/25/2021    Discharge date and time: No discharge date for patient encounter. Admitting Physician: Elisha Milligan DO     Consultants: Critical care, palliative, neurosurgery, ID, EP, Hospice    Admission Diagnoses:  Brain tumor    Discharge Diagnoses and Hospital Course:  · Brain tumor s/p craniotomy and debulking on 3/2 -neurosurgery followed. 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 Palliative care on board, patients family decided to compassionately extubate and pursue hospice care. · Acute hypoxic respiratory failure   · Aspiration pneumonia -sputum culture growing MRSA. · Hyponatremia   ·  Acute on chronic anemia  · Atrial fibrillation   · Hematuria  · Hypertension  · Seizure disorder  · History of melanoma    Discharge Exam:  Vitals:    03/18/21 1200   BP: 132/61   Pulse: 63   Resp: 11   Temp:    SpO2: 99%        General appearance:  extubated, comfortable, on 5LNC  HEENT:  Conjunctivae/corneas clear. Neck: Supple. No jugular venous distention. Respiratory: diminished breath sounds bibasally, no wheezes or crackles  Cardiovascular: Regular rate rhythm, normal S1-S2   Abdomen: Soft, nontender, nondistended  Musculoskeletal: No clubbing, cyanosis, no bilateral lower extremity edema. Brisk capillary refill. Skin:  No rashes  on visible skin      Disposition: Hospice House  The patient's condition is poor. Patient Instructions: DC to hospice house, comfort medications to be prescribed by hospice house. No future appointments.     Discharge Medications:     Medication List      STOP taking these medications    albuterol sulfate  (90 Base) MCG/ACT inhaler     amLODIPine 10 MG tablet  Commonly known as: NORVASC     aspirin 81 MG tablet     cloNIDine 0.1 MG tablet  Commonly known as: CATAPRES     dexamethasone 4 MG tablet  Commonly known as: DECADRON     divalproex 125 MG capsule Commonly known as: DEPAKOTE SPRINKLE     Eliquis 5 MG Tabs tablet  Generic drug: apixaban     gabapentin 300 MG capsule  Commonly known as: NEURONTIN     glipiZIDE 5 MG tablet  Commonly known as: GLUCOTROL     hydrALAZINE 50 MG tablet  Commonly known as: APRESOLINE     HYDROcodone-acetaminophen 5-325 MG per tablet  Commonly known as: Seymour Snipe     influenza virus trivalent vaccine injection  Commonly known as: FLUZONE     insulin glargine 100 UNIT/ML injection vial  Commonly known as: LANTUS     Januvia 50 MG tablet  Generic drug: SITagliptin     lamoTRIgine 100 MG tablet  Commonly known as: LAMICTAL     lisinopril 20 MG tablet  Commonly known as: PRINIVIL;ZESTRIL     magnesium oxide 400 MG tablet  Commonly known as: MAG-OX     melatonin 3 MG Tabs tablet     metFORMIN 500 MG tablet  Commonly known as: GLUCOPHAGE     metoprolol succinate 50 MG extended release tablet  Commonly known as: TOPROL XL     NONFORMULARY     potassium chloride 20 MEQ extended release tablet  Commonly known as: KLOR-CON M     spironolactone 25 MG tablet  Commonly known as: ALDACTONE     tamsulosin 0.4 MG capsule  Commonly known as: FLOMAX     therapeutic multivitamin-minerals tablet            Activity: bedrest    Diet: Pleasure feeds if tolerate          Preparing for this patient's discharge, including paperwork, orders, instructions, and meeting with patient did require > 30 minutes.     Alyse Torres DO   12:26 PM  3/18/2021

## 2021-03-18 NOTE — PROGRESS NOTES
Status Order: DNR-CC     Past Medical History:        Diagnosis Date    Atrial fibrillation ([facility]     2018 pt states has a heart monitor, implantable    Carotid artery stenosis     History of cardiovascular stress test 11/13/13    lexiscan    Hyperlipidemia     Hypertension     Non-insulin dependent type 2 diabetes mellitus ([facility]     Unspecified cerebral artery occlusion with cerebral infarction nov 11 2013    right face numb balance problems vision fades in and out  walks with walker     Past Surgical History:        Procedure Laterality Date    APPENDECTOMY      CAROTID ENDARTERECTOMY Left Dec 2013    Left Carotid Endarterectomy    CRANIOTOMY N/A 3/2/2021    CRANIOTOMY STEREOTATIC BIOPSY, FRAMELESS, CEREBRAL POSTERIOR FOSSA (NEEDS PATHOLOGY) performed by Kyleigh Hathaway MD at [place] [address] ECHO COMPL W DOP COLOR FLOW  11/12/2013         INTRACAPSULAR CATARACT EXTRACTION Left 11/3/2020    LEFT EYE CATARACT EMULSIFICATION IOL IMPLANT performed by Charles Mccabe MD at [address] [place] [address] Drive:  Patient has no known allergies. Family Goal: Comfort Care     Meeting held with Wife, Paula Mercedes and step-daughter    The hospice benefit and philosophy were explained including that hospice is end of life care in which, per Medicare, a patient has a terminal diagnosis that life expectancy would be 6 months or less. Hospice care is a service that is covered by most insurance plans. The following levels of hospice care were discussed including, routine level of hospice care at private home or facility, which patient/family is responsible for any room and board fees at the facility, and general in patient level of care (GIP) at the [facility] for short term symptom management. Per Medicare guidelines, a patient is considered appropriate for GIP if they have uncontrolled symptoms such as pain, agitation, labored breathing or nausea/vomiting.   Once symptoms become managed, the patient pain: morphine 2mg  Number of PRN doses in the last 6hrs: 2 dose  Effectiveness minimal  Comments: Furrowed brow present upon assessment    Respiratory Assessment: positive for - shortness of breath Respiratory Rate:18  Dyspnea Yes   Current O2 Status:  5 liters/min via nasal cannula     No  Intractable cough   No  Audible gurgling   No  Unmanaged secretions as evidenced by foaming, symptoms of discomfort    Current medication regimen for respiratory:  Morphine 2mg, ativan 1mg, robinul 0.4mg  Number of PRN doses in last 6hrs: 1dose ativan, 2 doses morphine, 1 dose robinul  Effective  minimal   Comments: Patient having deep labored respirations with periods of apnea    Nausea Assessment: no nausea and no vomiting     Agitation/Anxiety/Restless Assessment: Yes   Frequency: intermittent Last Occurred: overnight  Current medication regimen for agitation/anxiety: Ativan 1mg  PRN Doses in the last 6hrs. 1 dose 0.5 ativan           Effective moderate  Comments:       Other Symptoms of Higher Acuity Care requiring frequent skilled nursing/physician interventions  :  Yes   Symptom descriptions:     Ev Sharma is a 76year old male who was transferred on 2/25 from [facility] after MRI showing a brain mass. He had a biopsy/debulking of tumor on 3/2. Hospital stay complicated by cardiac and respiratory issues. He was on an amiodarone drip and other antihypertensive medications. He was intubated on 3/9. Had multiple bronchs. Family decided to extubate patient 3/18 and focus on comfort care. Patient having some nonpurposeful/siezure movements. His right hand and leg moved. He had a sock over his hand due to agitation and picking. He is having deep labored respiratations with periods of apnea.       Discussed symptoms with Chaya Garza CNP and has determined patient is eligible for inpatient hospice level of care    Attending physician to be notified of changes to plan of care by charge nurse/ bedside RN- they are updated that patient is transferring to the [facility] and require discharge order. RN instructed to leave IV and loyola in place. Received transfer time of 1600. [facility] set up for transport at 1600. Report called to the [facility].     Electronically signed by Katherine Caceres RN on 3/18/2021 at 12:00 PM

## 2021-03-18 NOTE — PROGRESS NOTES
Neuro Science Intensive Care Unit  Critical Care  Daily Progress Note 3/18/2021    Date of Admission: 3/2/21    CC:  S/p craniotomy for intracranial mass. Intubated and sedated. HOSPITAL EVENTS  3/2 underwent craniotomy for biopsy of intracranial mass  3//5 started on amiodarone  3/9 intubated for respiratory distress  3/10 bronchoscopy-thick secretions and mucus plugging  3/13 PS trial. Stopped amiodarone drip.   03/14 T max 98 F.  Intubated. On AC. On amiodarone drip. Did not require additional doses of antihypertensive agents in the previous 24 hours.  Required additional 12 units of insulin in the previous 24 hours.    03/15  T max 98.9F. No issues overnight. Early evening developed decreasing SPO2. Bronchoscopy done for thick dark secretions. Became slightly hypotensive.    03/16  Stable vital signs. No further changes in SpO2.    03/17  No acute issues    OVERNIGHT EVENTS: T max 98.2F. Intubated on AC. Did not require additional doses of antihypertensive agents in the previous 24 hours. Required additional 9 units of insulin in the previous 24 hours. PHYSICAL EXAM:    BP (!) 158/74   Pulse 66   Temp 98 °F (36.7 °C) (Oral)   Resp 19   Ht 5' 5\" (1.651 m)   Wt 152 lb 5.4 oz (69.1 kg)   SpO2 97%   BMI 25.35 kg/m²     Intake/Output Summary (Last 24 hours) at 3/18/2021 0753  Last data filed at 3/18/2021 0700  Gross per 24 hour   Intake 1406 ml   Output 1215 ml   Net 191 ml         General appearance:  Comfortable.        NEUROLOGIC:        GCS:    3 - Opens eyes to loud noise or command   6 - Follows simple motor commands  1T - Makes no noise       Pupil size:  Left 3 mm  Right 3 mm  Pupil reaction: Yes   PERRLA  Wiggles fingers: Left No Right Yes  Hand grasp:   Left absent     Right present  Wiggles toes: Left No    Right Yes  Plantar flexion: Left absent    Right absent    CONSTITUTIONAL: No acute distress  CARDIOVASCULAR: S1 S2, regular rate, irregular rhythm,Monitor: Afib   PULMONARY: Intubated on PSV. Respirations unlabored. . No rhonchi/rales/wheezes. RENAL:  Loyola to gravity, clear yellow urine. ABDOMEN: Soft, nontender, nondistended, nontympanic, normal bowel sounds. OGT with TF.    MUSCULOSKELETAL: Left sided weakness. SKIN/EXTREMITIES: No rashes/ecchymosis, no edema/clubbing, warm/dry, good capillary refill. IV ACCESS:   PICC Right basilic    ASSESSMENT/PLAN:     Active Problems:    Essential (primary) hypertension    Leucocytosis    Non-insulin dependent type 2 diabetes mellitus (HCC)    Secondary cancer of brain ([facility]    Melanoma (HCC)    Hypokalemia    Stage 3 chronic kidney disease    COPD (chronic obstructive pulmonary disease) (HCC)    Constipation    Renal cyst, left    AMS (altered mental status)    Dementia (HCC)    Seizure (HCC)    History of CVA (cerebrovascular accident)    Atrial fibrillation ([facility]    Anticoagulated    Palliative care by specialist    Acute [facility] injury superimposed [facility] CKD ([facility]    Acute respiratory failure with hypoxia and hypercapnia (HCC)    Oropharyngeal dysphagia    Pneumonia due to methicillin resistant Staphylococcus aureus (MRSA) ([facility]  Resolved Problems:    * No resolved hospital problems. *          Neuro: s/p crani for brain mass for biopsy and subtotal excision. Metastatic melanoma. HX stroke. Monitor neuro status. Depakote sprinkle. Gabapentin. Lamictal.   CV: No acute issues. HX HTN. HLD Carotid disease. Afib. .  BP goal <160 mm Hg. PRN Labetalol. Hydralazine. Amlodipine. Hydralazine. Lisinopril. Metoprolol. Amiodarone. Pulm: Acute respiratory failure. S/p Bronch. Intubated. AC PF ratio 179. Duoneb. Monitor respiratory status  GI: NPO. Unable to feed. GT Clamped . Lansoprazole. Psyllium Diarrhea  Monitor bowel status. Rectal tube. Renal:  No acute issues. Traumatic catheterization after pt pulled out loyola prior catheter. Urology following. Monitor uop, renal function and electrolytes  ID: No Leukocytosis. Afebrile.  ID

## 2021-03-18 NOTE — FLOWSHEET NOTE
Fms discontinued per order.  Transport here to take pt to [facility] report given to transport

## 2021-03-18 NOTE — PROGRESS NOTES
PO#16  PROCEDURE:  Frameless stereotactic left suboccipital craniectomy for  brain tumor, biopsy and debulking  No improvement in neuro status  I spoke with family today & answered questions. The family has made him [facility]  The patient is extubated  Spoke with family  Nothing new to add from neurosurgical stand point at this time.

## 2021-03-18 NOTE — PROGRESS NOTES
Arrived at patient's bedside to update spouse, Shey Guardian patient was accepted GIP and obtain consents. Family no longer present. Bedside nurse unsure if they left or stepped-out. Placed a call to Spouse, Omrancho Guardian. Left message requesting a call back. Plan is for patient to go to Hospice house GIP if able to discuss with wife and obtain consents.    Electronically signed by Delphine Toth RN on 3/18/2021 at 1:07 PM

## 2021-03-18 NOTE — PLAN OF CARE
Problem: Falls - Risk of:  Goal: Will remain free from falls  Description: Will remain free from falls  3/18/2021 0843 by Diana Badillo, RN  Outcome: Met This Shift  Note: Frequent rounding on pt siderails elevated x4   3/18/2021 0554 by Yaquelin Camacho RN  Outcome: Met This Shift     Problem: Skin Integrity:  Goal: Will show no infection signs and symptoms  Description: Will show no infection signs and symptoms  3/18/2021 0843 by Diana Badillo RN  Outcome: Met This Shift  Note: Monitor wbc count and temperature adm antibiotics as ordered   3/18/2021 0554 by Yaquelin Camacho RN  Outcome: Met This Shift

## 2021-03-18 NOTE — PROGRESS NOTES
Surrogate decision maker/Legal Luciano Joseph ([phone] home, [phone] cell) wife  - Code Status:  DNR-CCA      Referrals to: none today    Subjective   Chart reviewed. Spoke with bedside nurse as well as patient's wife and daughter. Much emotional support offered. They wish to compassionately extubate patient today and pursue aggressive comfort measures. Discussed process of extubation, family now wants to be at bedside with patient after extubation. Discussed hospice consult, they want [facility] to see about GIP if possible. Spoke with care coordinator for ICU as well who will notify hospice of consult. Comfort meds ordered. OBJECTIVE:   Prognosis: Poor    Physical Exam:  BP (!) 158/74   Pulse 67   Temp 98 °F (36.7 °C) (Oral)   Resp 21   Ht 5' 5\" (1.651 m)   Wt 152 lb 5.4 oz (69.1 kg)   SpO2 96%   BMI 25.35 kg/m²   Gen: ill appearing, on ventilator, eyes intermittently open, restless, fidgeting with all extremities  HEENT:  ETT in place. Mucous membranes moist.  Neck:  No JVD, trachea midline  Lungs: mechanical respirations, scattered rhonchi  Heart: RRR, no murmur/rub or gallop  Abd: non-distended, soft, non-tender  : Hogan catheter, deferred  Ext:  No edema, weak  Skin: pale, warm, dry  Neuro: PERRL, opens eyes    Objective data reviewed: labs, images, records, medication use, vitals and chart    Inpatient medications reviewed: yes  Home Medications reviewed: yes    Time/Communication  Greater than 50% of time spent, total 35 minutes in counseling and coordination of care at the bedside/over the telephone regarding see above. Thank you for allowing Palliative Medicine to participate in the care of Linsey Daly.     Provider [facility]

## 2023-12-29 NOTE — PROGRESS NOTES
Pharmacy Consultation Note  (Antibiotic Dosing and Monitoring)    Initial consult date: 3/9/21  Consulting physician/provider: Dr Dina Grace  Drug(s): Vancomycin  Indication: Empiric antibiotics for possible aspiration    Ht Readings from Last 1 Encounters:   21 5' 5\" (1.651 m)     Wt Readings from Last 1 Encounters:   21 143 lb 8.3 oz (65.1 kg)       Age/Gender Actual BW IBW DW  Allergy Information   76 y.o. male 65.1 kg 57 kg 65.1 kg  Patient has no known allergies. Date  WBC BUN/CR Drug/Dose Time   Given Level(s)   (Time) Comments   3/9  Day #1 30 45/1.1 Vancomycin 1250 mg IV q12h 1132 Procalcitonin 0.33    3/10  #2 30.3 47/1.4 Vancomycin 1500 mg IV q24h 0900                           Estimated Creatinine Clearance: 40 mL/min (A) (based on SCr of 1.4 mg/dL (H)). Intake/Output Summary (Last 24 hours) at 3/10/2021 0720  Last data filed at 3/10/2021 0600  Gross per 24 hour   Intake 3705.05 ml   Output 2275 ml   Net 1430.05 ml     Urine output over the last 24 hours: 1.5 mL/kg/hr (2275 mL total)    Diuretics ordered in the last 24 hours: Furosemide 20 mg IV x1 yesterday (0945)      Temp max: Temp (24hrs), Av.2 °F (37.3 °C), Min:97.4 °F (36.3 °C), Max:100 °F (37.8 °C)      Cultures:  available culture and sensitivity results were reviewed in [facility]   COVID19 - Not Detected  3/5 Blood cx's - Prelim no growth  3/7 Urine cx - No growth final  3/9 Respiratory cx (sputum expectorated) - Pending    Assessment:  · 75 yo M admitted  with cerebral posterior fossa mass s/p craniotomy stereotactic biopsy  · ID following - pt currently on antibiotics for possible aspiration - Meropenem and Vancomycin day #2  · Consulted by Dr. Dina Grace to dose/monitor Vancomycin  · Goal AUC/GAURI = 400-600 mg/L-hr. · Pt with SOCORRO - SCr up to 1.4 today    Plan:  · Pt received Vancomycin 1250 mg IV x1 dose yesterday.   Pt will begin Vancomycin 1500 mg IV q24h today  · Will order a Vancomycin trough level once pt reaches steady state and will adjust dose as needed  · Will monitor renal function closely    Will continue to follow.       Thuy Pinto PharmD, BCPS, BCCCP  3/10/2021  7:24 AM  Pager: [phone] groin pain

## 2024-05-11 NOTE — PROGRESS NOTES
Electrophysiology progress note         Date:  3/11/2021  Patient: Tejas Del Rosario  Admission:  2/25/2021 10:19 PM  Admit DX: Brain metastasis ([facility] [address] [C79.31]  Age:  76 y.o., 1945     LOS: 14 days     Reason for evaluation:   Paroxysmal atrial fibrillation, hypertension  Intracranial mass, respiratory failure      SUBJECTIVE:    The patient was seen and examined. Notes and labs reviewed. He remains intubated but now weaned off sedation    OBJECTIVE:    Telemetry: Sinus rhythm  BP (!) 177/47   Pulse 63   Temp 98.5 °F (36.9 °C) (Axillary)   Resp 16   Ht 5' 5\" (1.651 m)   Wt 143 lb 8.3 oz (65.1 kg)   SpO2 100%   BMI 23.88 kg/m²     Intake/Output Summary (Last 24 hours) at 3/11/2021 1924  Last data filed at 3/11/2021 1800  Gross per 24 hour   Intake 2626.66 ml   Output 3200 ml   Net -573.34 ml       EXAM:   CONSTITUTIONAL: Unresponsive, no apparent distress, and appears stated age. HEENT: Normal jugular venous pulsations,  Head is atraumatic, normocephalic. Eyes and oral mucosa are normal.  LUNGS: Good respiratory effort. No for increased work of breathing. On auscultation: clear to auscultation bilaterally  CARDIOVASCULAR:  Normal apical impulse, regular rate and rhythm, normal S1 and S2, no S3  ABDOMEN: Soft, nontender, nondistended. SKIN: Warm and dry. EXTREMITIES: No lower extremity edema. Motor movement grossly intact. No cyanosis or clubbing.     Current Inpatient Medications:   lansoprazole  15 mg Per NG tube QAM AC    [START ON 3/12/2021] furosemide  20 mg Intravenous Daily    ipratropium-albuterol  1 ampule Inhalation Q4H WA    heparin (porcine)  5,000 Units Subcutaneous 3 times per day    insulin lispro  0-18 Units Subcutaneous Q4H    meropenem  1,000 mg Intravenous Q8H    sodium chloride  33.3 mL Intravenous Q8H    chlorhexidine  15 mL Mouth/Throat BID    hydrALAZINE  25 mg Per NG tube 3 times per day    lisinopril  20 mg Per NG tube Daily    amiodarone  200 mg Per NG tube Q8H    amLODIPine  5 mg Per NG tube Daily    gabapentin  300 mg Per NG tube 2 times per day    lamoTRIgine  200 mg Per NG tube BID    melatonin  10 mg Per NG tube Nightly    metoprolol tartrate  50 mg Per NG tube BID    insulin glargine  10 Units Subcutaneous BID    heparin flush  3 mL Intravenous 2 times per day    divalproex  125 mg Oral BID    sodium chloride flush  10 mL Intravenous 2 times per day       IV Infusions (if any):   sodium chloride      norepinephrine Stopped (03/11/21 1046)    propofol 5 mcg/kg/min (03/11/21 0505)    amiodarone 450mg/250ml D5W infusion 0.5 mg/min (03/11/21 0939)       Diagnostics:    EKG: normal sinus rhythm, unchanged from previous tracings. ECHO: reviewed. Ejection fraction: 55%  Stress Test: not obtained. Cardiac Angiography: not obtained. Labs:   CBC:   Recent Labs     03/10/21  0100 03/11/21  0445 03/11/21  1445   WBC 30.3* 22.5*  --    HGB 7.2* 6.3* 7.5*   HCT 23.2* 20.6* 23.3*    231  --      BMP:   Recent Labs     03/10/21  1221 03/11/21  0445    146   K 3.6 3.3*   CO2 29 33*   BUN 37* 36*   CREATININE 1.3* 1.4*   LABGLOM 54 49   GLUCOSE 130* 104*     BNP: No results for input(s): BNP in the last 72 hours. PT/INR: No results for input(s): PROTIME, INR in the last 72 hours. APTT:No results for input(s): APTT in the last 72 hours. CARDIAC ENZYMES:No results for input(s): CKTOTAL, CKMB, CKMBINDEX, TROPONINI in the last 72 hours. FASTING LIPID PANEL:  Lab Results   Component Value Date    HDL 28.0 12/13/2013    LDLCALC 42 12/13/2013    TRIG 40 12/13/2013     LIVER PROFILE:No results for input(s): AST, ALT, LABALBU in the last 72 hours.     ASSESSMENT:    Patient Active Problem List   Diagnosis    Hypertension    Hyperlipidemia    Non-insulin dependent type 2 diabetes mellitus ([facility] [address]    Melanoma ([facility] [address]    Hypokalemia    Stage 3 chronic kidney disease    [facility]    AMS (altered mental status)--on presentation to the hospital    Dementia [facility])    History of CVA (cerebrovascular accident)    Atrial fibrillation (HCC) paroxysmal, was on dofetilide therapy ,switched to amiodarone    Anticoagulated    Palliative care by specialist    Acute respiratory failure with hypoxia and hypercapnia (HCC)--remains intubated and ventilated   Intracranial mass--post biopsy. Surgical pathology results awaited   Anemia--post PRBC transfusion today  IV Lasix given after  transfusion with excellent diuresis    PLAN:    IV Lasix daily for the next few days  Continue IV amiodarone as well as oral amiodarone loading  Await change in mental status/extubation to discontinue IV amiodarone      Please see orders. Discussed with  nursing.     Dot Nuñez MD,FACC 11-May-2024 09:59

## (undated) DEVICE — SOLUTION SCRB 32OZ 7.5% POVIDONE IOD BTL GENTLE EFFECTIVE

## (undated) DEVICE — PAD,NON-ADHERENT,3X8,STERILE,LF,1/PK: Brand: MEDLINE

## (undated) DEVICE — SURGICAL PROCEDURE PACK CRANIOTOMY CUST

## (undated) DEVICE — SOLUTION IV IRRIG WATER 1000ML POUR BRL 2F7114

## (undated) DEVICE — DRILL, WL 4MM, STRYKER SHAFT

## (undated) DEVICE — PRECISION ACORN

## (undated) DEVICE — 3 ML SYRINGE LUER-LOCK TIP: Brand: MONOJECT

## (undated) DEVICE — BLADE CLP TAPR HD WET DRY CAPABILITY GTT IN CHARGING USE

## (undated) DEVICE — CLOTH SURG PREP PREOPERATIVE CHLORHEXIDINE GLUC 2% READYPREP

## (undated) DEVICE — Device

## (undated) DEVICE — GOWN,SIRUS,FABRNF,L,20/CS: Brand: MEDLINE

## (undated) DEVICE — 1.5L THIN WALL CAN: Brand: CRD

## (undated) DEVICE — CODMAN® SURGICAL PATTIES 1/2" X 1" (1.27CM X 2.54CM): Brand: CODMAN®

## (undated) DEVICE — LOCATOR RAD PASS SPHR MRK NAVIGATION BALL DISP STLTH STN

## (undated) DEVICE — 2.3MM TAPERED ROUTER

## (undated) DEVICE — DOUBLE BASIN SET: Brand: MEDLINE INDUSTRIES, INC.

## (undated) DEVICE — CODMAN® SURGICAL PATTIES 1" X 1" (2.54CM X 2.54CM): Brand: CODMAN®

## (undated) DEVICE — SPONGE,NEURO,1"X3",XR,STRL,LF,10/PK: Brand: MEDLINE

## (undated) DEVICE — 1000 S-DRAPE TOWEL DRAPE 10/BX: Brand: STERI-DRAPE™

## (undated) DEVICE — LABEL MED 4 IN SURG PANEL W/ PEN STRL

## (undated) DEVICE — STERILE POLYISOPRENE POWDER-FREE SURGICAL GLOVES: Brand: PROTEXIS

## (undated) DEVICE — SWABSTICK MEDICATED L4IN BENZ TINC SKIN PREP APLICARE

## (undated) DEVICE — TUBING, SUCTION, 3/16" X 12', STRAIGHT: Brand: MEDLINE

## (undated) DEVICE — AGENT HEMSTAT W4XL8IN OXIDIZED REGENERATED CELOS ABSRB

## (undated) DEVICE — NEEDLE HYPO 25GA L0.625IN BLU POLYPR HUB S STL REG BVL STR

## (undated) DEVICE — DRAPE TAPE: Brand: CONVERTORS

## (undated) DEVICE — DRAPE 24X23IN RADIOLOGY CASS ADHES STRIP

## (undated) DEVICE — RANEY SCALP CLIP STERILE: Brand: AESCULAP

## (undated) DEVICE — GLOVE SURG SZ 65 THK91MIL LTX FREE SYN POLYISOPRENE

## (undated) DEVICE — BANDAGE,GAUZE,CONFORMING,4"X75",STRL,LF: Brand: MEDLINE

## (undated) DEVICE — MAYFIELD® DISPOSABLE ADULT SKULL PIN (PLASTIC BASE): Brand: MAYFIELD®

## (undated) DEVICE — GAUZE,SPONGE,AVANT,4"X4",4PLY,STRL,10/TR: Brand: MEDLINE

## (undated) DEVICE — TOTAL TRAY, 16FR 10ML SIL FOLEY, URN: Brand: MEDLINE

## (undated) DEVICE — SYRINGE,EAR/ULCER, 3 OZ, STERILE: Brand: MEDLINE

## (undated) DEVICE — SOLUTION IV IRRIG POUR BRL 0.9% SODIUM CHL 2F7124

## (undated) DEVICE — GAUZE,SPONGE,4"X4",16PLY,XRAY,STRL,LF: Brand: MEDLINE

## (undated) DEVICE — SURGICAL PROCEDURE PACK CATRCT LT EYE BASIC CUST ST JOS LF

## (undated) DEVICE — ENCORE® LATEX MICRO SIZE 8.5, STERILE LATEX POWDER-FREE SURGICAL GLOVE: Brand: ENCORE

## (undated) DEVICE — 3M(TM) MEDIPORE(TM) +PAD SOFT CLOTH ADHESIVE WOUND DRESSING 3569: Brand: 3M™ MEDIPORE™